# Patient Record
Sex: FEMALE | Race: WHITE | NOT HISPANIC OR LATINO | Employment: OTHER | URBAN - METROPOLITAN AREA
[De-identification: names, ages, dates, MRNs, and addresses within clinical notes are randomized per-mention and may not be internally consistent; named-entity substitution may affect disease eponyms.]

---

## 2017-08-18 ENCOUNTER — CONVERSION ENCOUNTER (OUTPATIENT)
Dept: MAMMOGRAPHY | Facility: HOSPITAL | Age: 57
End: 2017-08-18

## 2018-09-12 ENCOUNTER — CONVERSION ENCOUNTER (OUTPATIENT)
Dept: MAMMOGRAPHY | Facility: HOSPITAL | Age: 58
End: 2018-09-12

## 2019-02-14 ENCOUNTER — CONVERSION ENCOUNTER (OUTPATIENT)
Dept: INTERNAL MEDICINE | Facility: CLINIC | Age: 59
End: 2019-02-14

## 2019-02-14 ENCOUNTER — OFFICE VISIT CONVERTED (OUTPATIENT)
Dept: INTERNAL MEDICINE | Facility: CLINIC | Age: 59
End: 2019-02-14
Attending: NURSE PRACTITIONER

## 2019-03-28 ENCOUNTER — OFFICE VISIT CONVERTED (OUTPATIENT)
Dept: INTERNAL MEDICINE | Facility: CLINIC | Age: 59
End: 2019-03-28
Attending: NURSE PRACTITIONER

## 2019-04-08 ENCOUNTER — HOSPITAL ENCOUNTER (OUTPATIENT)
Dept: URGENT CARE | Facility: CLINIC | Age: 59
Discharge: HOME | End: 2019-04-08
Attending: NURSE PRACTITIONER

## 2019-04-10 LAB — BACTERIA UR CULT: NORMAL

## 2019-04-11 LAB — BACTERIA SPEC AEROBE CULT: NORMAL

## 2019-06-07 ENCOUNTER — HOSPITAL ENCOUNTER (OUTPATIENT)
Dept: OTHER | Facility: HOSPITAL | Age: 59
Discharge: HOME | End: 2019-06-07
Attending: NURSE PRACTITIONER

## 2019-06-07 ENCOUNTER — OFFICE VISIT CONVERTED (OUTPATIENT)
Dept: INTERNAL MEDICINE | Facility: CLINIC | Age: 59
End: 2019-06-07
Attending: NURSE PRACTITIONER

## 2019-06-20 ENCOUNTER — OFFICE VISIT CONVERTED (OUTPATIENT)
Dept: INTERNAL MEDICINE | Facility: CLINIC | Age: 59
End: 2019-06-20
Attending: NURSE PRACTITIONER

## 2019-06-20 ENCOUNTER — HOSPITAL ENCOUNTER (OUTPATIENT)
Dept: OTHER | Facility: HOSPITAL | Age: 59
Discharge: HOME | End: 2019-06-20
Attending: NURSE PRACTITIONER

## 2019-06-22 LAB — BACTERIA SPEC AEROBE CULT: NORMAL

## 2019-06-28 ENCOUNTER — OFFICE VISIT CONVERTED (OUTPATIENT)
Dept: INTERNAL MEDICINE | Facility: CLINIC | Age: 59
End: 2019-06-28
Attending: NURSE PRACTITIONER

## 2019-06-28 ENCOUNTER — CONVERSION ENCOUNTER (OUTPATIENT)
Dept: INTERNAL MEDICINE | Facility: CLINIC | Age: 59
End: 2019-06-28

## 2019-06-28 ENCOUNTER — HOSPITAL ENCOUNTER (OUTPATIENT)
Dept: OTHER | Facility: HOSPITAL | Age: 59
Discharge: HOME | End: 2019-06-28
Attending: NURSE PRACTITIONER

## 2019-06-28 LAB
ALBUMIN SERPL-MCNC: 4 G/DL (ref 3.5–5)
ALBUMIN/GLOB SERPL: 1.2 {RATIO} (ref 1.4–2.6)
ALP SERPL-CCNC: 80 U/L (ref 53–141)
ALT SERPL-CCNC: 14 U/L (ref 10–40)
ANION GAP SERPL CALC-SCNC: 15 MMOL/L (ref 8–19)
AST SERPL-CCNC: 13 U/L (ref 15–50)
BASOPHILS # BLD AUTO: 0.04 10*3/UL (ref 0–0.2)
BASOPHILS NFR BLD AUTO: 0.7 % (ref 0–3)
BILIRUB SERPL-MCNC: 0.29 MG/DL (ref 0.2–1.3)
BUN SERPL-MCNC: 12 MG/DL (ref 5–25)
BUN/CREAT SERPL: 13 {RATIO} (ref 6–20)
CALCIUM SERPL-MCNC: 9.1 MG/DL (ref 8.7–10.4)
CHLORIDE SERPL-SCNC: 107 MMOL/L (ref 99–111)
CHOLEST SERPL-MCNC: 194 MG/DL (ref 107–200)
CHOLEST/HDLC SERPL: 3.7 {RATIO} (ref 3–6)
CONV ABS IMM GRAN: 0.01 10*3/UL (ref 0–0.2)
CONV CO2: 23 MMOL/L (ref 22–32)
CONV IMMATURE GRAN: 0.2 % (ref 0–1.8)
CONV TOTAL PROTEIN: 7.3 G/DL (ref 6.3–8.2)
CREAT UR-MCNC: 0.92 MG/DL (ref 0.5–0.9)
DEPRECATED RDW RBC AUTO: 49.3 FL (ref 36.4–46.3)
EOSINOPHIL # BLD AUTO: 0.18 10*3/UL (ref 0–0.7)
EOSINOPHIL # BLD AUTO: 3.2 % (ref 0–7)
ERYTHROCYTE [DISTWIDTH] IN BLOOD BY AUTOMATED COUNT: 14.7 % (ref 11.7–14.4)
EST. AVERAGE GLUCOSE BLD GHB EST-MCNC: 105 MG/DL
GFR SERPLBLD BASED ON 1.73 SQ M-ARVRAT: >60 ML/MIN/{1.73_M2}
GLOBULIN UR ELPH-MCNC: 3.3 G/DL (ref 2–3.5)
GLUCOSE SERPL-MCNC: 87 MG/DL (ref 65–99)
HBA1C MFR BLD: 13.4 G/DL (ref 12–16)
HBA1C MFR BLD: 5.3 % (ref 3.5–5.7)
HCT VFR BLD AUTO: 42.4 % (ref 37–47)
HDLC SERPL-MCNC: 52 MG/DL (ref 40–60)
LDLC SERPL CALC-MCNC: 124 MG/DL (ref 70–100)
LYMPHOCYTES # BLD AUTO: 2.1 10*3/UL (ref 1–5)
MCH RBC QN AUTO: 28.8 PG (ref 27–31)
MCHC RBC AUTO-ENTMCNC: 31.6 G/DL (ref 33–37)
MCV RBC AUTO: 91 FL (ref 81–99)
MONOCYTES # BLD AUTO: 0.43 10*3/UL (ref 0.2–1.2)
MONOCYTES NFR BLD AUTO: 7.7 % (ref 3–10)
NEUTROPHILS # BLD AUTO: 2.83 10*3/UL (ref 2–8)
NEUTROPHILS NFR BLD AUTO: 50.6 % (ref 30–85)
NRBC CBCN: 0 % (ref 0–0.7)
OSMOLALITY SERPL CALC.SUM OF ELEC: 291 MOSM/KG (ref 273–304)
PLATELET # BLD AUTO: 266 10*3/UL (ref 130–400)
PMV BLD AUTO: 10.3 FL (ref 9.4–12.3)
POTASSIUM SERPL-SCNC: 4.3 MMOL/L (ref 3.5–5.3)
RBC # BLD AUTO: 4.66 10*6/UL (ref 4.2–5.4)
SODIUM SERPL-SCNC: 141 MMOL/L (ref 135–147)
TRIGL SERPL-MCNC: 90 MG/DL (ref 40–150)
VARIANT LYMPHS NFR BLD MANUAL: 37.6 % (ref 20–45)
VLDLC SERPL-MCNC: 18 MG/DL (ref 5–37)
WBC # BLD AUTO: 5.59 10*3/UL (ref 4.8–10.8)

## 2019-08-27 ENCOUNTER — OFFICE VISIT CONVERTED (OUTPATIENT)
Dept: INTERNAL MEDICINE | Facility: CLINIC | Age: 59
End: 2019-08-27
Attending: NURSE PRACTITIONER

## 2019-08-27 ENCOUNTER — CONVERSION ENCOUNTER (OUTPATIENT)
Dept: INTERNAL MEDICINE | Facility: CLINIC | Age: 59
End: 2019-08-27

## 2019-09-13 ENCOUNTER — HOSPITAL ENCOUNTER (OUTPATIENT)
Dept: MAMMOGRAPHY | Facility: HOSPITAL | Age: 59
Discharge: HOME | End: 2019-09-13
Attending: NURSE PRACTITIONER

## 2019-12-02 ENCOUNTER — OFFICE VISIT CONVERTED (OUTPATIENT)
Dept: INTERNAL MEDICINE | Facility: CLINIC | Age: 59
End: 2019-12-02
Attending: NURSE PRACTITIONER

## 2019-12-04 ENCOUNTER — HOSPITAL ENCOUNTER (OUTPATIENT)
Dept: MRI IMAGING | Facility: HOSPITAL | Age: 59
Discharge: HOME | End: 2019-12-04
Attending: NURSE PRACTITIONER

## 2020-04-06 ENCOUNTER — OFFICE VISIT CONVERTED (OUTPATIENT)
Dept: ORTHOPEDIC SURGERY | Facility: CLINIC | Age: 60
End: 2020-04-06
Attending: ORTHOPAEDIC SURGERY

## 2020-04-06 ENCOUNTER — CONVERSION ENCOUNTER (OUTPATIENT)
Dept: ORTHOPEDIC SURGERY | Facility: CLINIC | Age: 60
End: 2020-04-06

## 2020-05-18 ENCOUNTER — CONVERSION ENCOUNTER (OUTPATIENT)
Dept: ORTHOPEDIC SURGERY | Facility: CLINIC | Age: 60
End: 2020-05-18

## 2020-05-18 ENCOUNTER — TELEPHONE CONVERTED (OUTPATIENT)
Dept: ORTHOPEDIC SURGERY | Facility: CLINIC | Age: 60
End: 2020-05-18
Attending: PHYSICIAN ASSISTANT

## 2020-05-20 ENCOUNTER — TELEPHONE CONVERTED (OUTPATIENT)
Dept: INTERNAL MEDICINE | Facility: CLINIC | Age: 60
End: 2020-05-20
Attending: INTERNAL MEDICINE

## 2020-06-19 ENCOUNTER — OFFICE VISIT CONVERTED (OUTPATIENT)
Dept: ORTHOPEDIC SURGERY | Facility: CLINIC | Age: 60
End: 2020-06-19
Attending: PHYSICIAN ASSISTANT

## 2020-06-24 ENCOUNTER — OFFICE VISIT CONVERTED (OUTPATIENT)
Dept: INTERNAL MEDICINE | Facility: CLINIC | Age: 60
End: 2020-06-24
Attending: PHYSICIAN ASSISTANT

## 2020-06-24 ENCOUNTER — HOSPITAL ENCOUNTER (OUTPATIENT)
Dept: OTHER | Facility: HOSPITAL | Age: 60
Discharge: HOME | End: 2020-06-24
Attending: PHYSICIAN ASSISTANT

## 2020-06-24 LAB
ALBUMIN SERPL-MCNC: 4.2 G/DL (ref 3.5–5)
ALBUMIN/GLOB SERPL: 1.6 {RATIO} (ref 1.4–2.6)
ALP SERPL-CCNC: 72 U/L (ref 53–141)
ALT SERPL-CCNC: 18 U/L (ref 10–40)
ANION GAP SERPL CALC-SCNC: 15 MMOL/L (ref 8–19)
AST SERPL-CCNC: 17 U/L (ref 15–50)
BASOPHILS # BLD AUTO: 0.04 10*3/UL (ref 0–0.2)
BASOPHILS NFR BLD AUTO: 0.6 % (ref 0–3)
BILIRUB SERPL-MCNC: 0.32 MG/DL (ref 0.2–1.3)
BUN SERPL-MCNC: 18 MG/DL (ref 5–25)
BUN/CREAT SERPL: 21 {RATIO} (ref 6–20)
CALCIUM SERPL-MCNC: 9.3 MG/DL (ref 8.7–10.4)
CHLORIDE SERPL-SCNC: 110 MMOL/L (ref 99–111)
CONV ABS IMM GRAN: 0.01 10*3/UL (ref 0–0.2)
CONV CO2: 21 MMOL/L (ref 22–32)
CONV IMMATURE GRAN: 0.2 % (ref 0–1.8)
CONV TOTAL PROTEIN: 6.9 G/DL (ref 6.3–8.2)
CREAT UR-MCNC: 0.87 MG/DL (ref 0.5–0.9)
DEPRECATED RDW RBC AUTO: 54.1 FL (ref 36.4–46.3)
EOSINOPHIL # BLD AUTO: 0.15 10*3/UL (ref 0–0.7)
EOSINOPHIL # BLD AUTO: 2.3 % (ref 0–7)
ERYTHROCYTE [DISTWIDTH] IN BLOOD BY AUTOMATED COUNT: 15.9 % (ref 11.7–14.4)
GFR SERPLBLD BASED ON 1.73 SQ M-ARVRAT: >60 ML/MIN/{1.73_M2}
GLOBULIN UR ELPH-MCNC: 2.7 G/DL (ref 2–3.5)
GLUCOSE SERPL-MCNC: 88 MG/DL (ref 65–99)
HCT VFR BLD AUTO: 43.2 % (ref 37–47)
HGB BLD-MCNC: 13.4 G/DL (ref 12–16)
LYMPHOCYTES # BLD AUTO: 2.47 10*3/UL (ref 1–5)
LYMPHOCYTES NFR BLD AUTO: 37.9 % (ref 20–45)
MAGNESIUM SERPL-MCNC: 1.94 MG/DL (ref 1.6–2.3)
MCH RBC QN AUTO: 28.9 PG (ref 27–31)
MCHC RBC AUTO-ENTMCNC: 31 G/DL (ref 33–37)
MCV RBC AUTO: 93.1 FL (ref 81–99)
MONOCYTES # BLD AUTO: 0.52 10*3/UL (ref 0.2–1.2)
MONOCYTES NFR BLD AUTO: 8 % (ref 3–10)
NEUTROPHILS # BLD AUTO: 3.32 10*3/UL (ref 2–8)
NEUTROPHILS NFR BLD AUTO: 51 % (ref 30–85)
NRBC CBCN: 0 % (ref 0–0.7)
OSMOLALITY SERPL CALC.SUM OF ELEC: 295 MOSM/KG (ref 273–304)
PLATELET # BLD AUTO: 239 10*3/UL (ref 130–400)
PMV BLD AUTO: 11.4 FL (ref 9.4–12.3)
POTASSIUM SERPL-SCNC: 4.3 MMOL/L (ref 3.5–5.3)
RBC # BLD AUTO: 4.64 10*6/UL (ref 4.2–5.4)
SODIUM SERPL-SCNC: 142 MMOL/L (ref 135–147)
TSH SERPL-ACNC: 1.24 M[IU]/L (ref 0.27–4.2)
WBC # BLD AUTO: 6.51 10*3/UL (ref 4.8–10.8)

## 2020-07-27 ENCOUNTER — OFFICE VISIT CONVERTED (OUTPATIENT)
Dept: ORTHOPEDIC SURGERY | Facility: CLINIC | Age: 60
End: 2020-07-27
Attending: PHYSICIAN ASSISTANT

## 2020-08-01 ENCOUNTER — HOSPITAL ENCOUNTER (OUTPATIENT)
Dept: PREADMISSION TESTING | Facility: HOSPITAL | Age: 60
Discharge: HOME | End: 2020-08-01
Attending: ORTHOPAEDIC SURGERY

## 2020-08-01 LAB — SARS-COV-2 RNA SPEC QL NAA+PROBE: NOT DETECTED

## 2020-08-06 ENCOUNTER — HOSPITAL ENCOUNTER (OUTPATIENT)
Dept: PERIOP | Facility: HOSPITAL | Age: 60
Setting detail: HOSPITAL OUTPATIENT SURGERY
Discharge: HOME | End: 2020-08-06
Attending: ORTHOPAEDIC SURGERY

## 2020-08-06 LAB
ANION GAP SERPL CALC-SCNC: 15 MMOL/L (ref 8–19)
BUN SERPL-MCNC: 19 MG/DL (ref 5–25)
BUN/CREAT SERPL: 17 {RATIO} (ref 6–20)
CALCIUM SERPL-MCNC: 9.7 MG/DL (ref 8.7–10.4)
CHLORIDE SERPL-SCNC: 106 MMOL/L (ref 99–111)
CONV CO2: 24 MMOL/L (ref 22–32)
CREAT UR-MCNC: 1.11 MG/DL (ref 0.5–0.9)
GFR SERPLBLD BASED ON 1.73 SQ M-ARVRAT: 54 ML/MIN/{1.73_M2}
GLUCOSE BLD-MCNC: 115 MG/DL (ref 65–99)
GLUCOSE SERPL-MCNC: 93 MG/DL (ref 65–99)
OSMOLALITY SERPL CALC.SUM OF ELEC: 294 MOSM/KG (ref 273–304)
POTASSIUM SERPL-SCNC: 4.3 MMOL/L (ref 3.5–5.3)
SODIUM SERPL-SCNC: 141 MMOL/L (ref 135–147)

## 2020-08-19 ENCOUNTER — OFFICE VISIT CONVERTED (OUTPATIENT)
Dept: ORTHOPEDIC SURGERY | Facility: CLINIC | Age: 60
End: 2020-08-19
Attending: PHYSICIAN ASSISTANT

## 2020-09-16 ENCOUNTER — OFFICE VISIT CONVERTED (OUTPATIENT)
Dept: ORTHOPEDIC SURGERY | Facility: CLINIC | Age: 60
End: 2020-09-16
Attending: PHYSICIAN ASSISTANT

## 2020-11-06 ENCOUNTER — HOSPITAL ENCOUNTER (OUTPATIENT)
Dept: URGENT CARE | Facility: CLINIC | Age: 60
Discharge: HOME | End: 2020-11-06
Attending: PHYSICIAN ASSISTANT

## 2020-11-10 ENCOUNTER — TELEPHONE CONVERTED (OUTPATIENT)
Dept: INTERNAL MEDICINE | Facility: CLINIC | Age: 60
End: 2020-11-10
Attending: NURSE PRACTITIONER

## 2021-04-13 ENCOUNTER — HOSPITAL ENCOUNTER (OUTPATIENT)
Dept: OTHER | Facility: HOSPITAL | Age: 61
Discharge: HOME | End: 2021-04-13
Attending: PHYSICIAN ASSISTANT

## 2021-04-13 ENCOUNTER — CONVERSION ENCOUNTER (OUTPATIENT)
Dept: INTERNAL MEDICINE | Facility: CLINIC | Age: 61
End: 2021-04-13

## 2021-04-13 ENCOUNTER — OFFICE VISIT CONVERTED (OUTPATIENT)
Dept: INTERNAL MEDICINE | Facility: CLINIC | Age: 61
End: 2021-04-13
Attending: PHYSICIAN ASSISTANT

## 2021-04-13 LAB
BASOPHILS # BLD AUTO: 0.03 10*3/UL (ref 0–0.2)
BASOPHILS NFR BLD AUTO: 0.4 % (ref 0–3)
CONV ABS IMM GRAN: 0.01 10*3/UL (ref 0–0.2)
CONV IMMATURE GRAN: 0.1 % (ref 0–1.8)
DEPRECATED RDW RBC AUTO: 51.2 FL (ref 36.4–46.3)
EOSINOPHIL # BLD AUTO: 0.19 10*3/UL (ref 0–0.7)
EOSINOPHIL # BLD AUTO: 2.7 % (ref 0–7)
ERYTHROCYTE [DISTWIDTH] IN BLOOD BY AUTOMATED COUNT: 15.4 % (ref 11.7–14.4)
HCT VFR BLD AUTO: 44.6 % (ref 37–47)
HGB BLD-MCNC: 13.9 G/DL (ref 12–16)
LYMPHOCYTES # BLD AUTO: 2.78 10*3/UL (ref 1–5)
LYMPHOCYTES NFR BLD AUTO: 40.1 % (ref 20–45)
MCH RBC QN AUTO: 28.4 PG (ref 27–31)
MCHC RBC AUTO-ENTMCNC: 31.2 G/DL (ref 33–37)
MCV RBC AUTO: 91 FL (ref 81–99)
MONOCYTES # BLD AUTO: 0.57 10*3/UL (ref 0.2–1.2)
MONOCYTES NFR BLD AUTO: 8.2 % (ref 3–10)
NEUTROPHILS # BLD AUTO: 3.35 10*3/UL (ref 2–8)
NEUTROPHILS NFR BLD AUTO: 48.5 % (ref 30–85)
NRBC CBCN: 0 % (ref 0–0.7)
PLATELET # BLD AUTO: 238 10*3/UL (ref 130–400)
PMV BLD AUTO: 10.9 FL (ref 9.4–12.3)
RBC # BLD AUTO: 4.9 10*6/UL (ref 4.2–5.4)
WBC # BLD AUTO: 6.93 10*3/UL (ref 4.8–10.8)

## 2021-04-14 LAB
ALBUMIN SERPL-MCNC: 4.2 G/DL (ref 3.5–5)
ALBUMIN/GLOB SERPL: 1.4 {RATIO} (ref 1.4–2.6)
ALP SERPL-CCNC: 78 U/L (ref 53–141)
ALT SERPL-CCNC: 14 U/L (ref 10–40)
ANION GAP SERPL CALC-SCNC: 15 MMOL/L (ref 8–19)
AST SERPL-CCNC: 18 U/L (ref 15–50)
BILIRUB SERPL-MCNC: 0.32 MG/DL (ref 0.2–1.3)
BUN SERPL-MCNC: 17 MG/DL (ref 5–25)
BUN/CREAT SERPL: 18 {RATIO} (ref 6–20)
CALCIUM SERPL-MCNC: 9.1 MG/DL (ref 8.7–10.4)
CHLORIDE SERPL-SCNC: 109 MMOL/L (ref 99–111)
CONV CO2: 20 MMOL/L (ref 22–32)
CONV TOTAL PROTEIN: 7.3 G/DL (ref 6.3–8.2)
CREAT UR-MCNC: 0.93 MG/DL (ref 0.5–0.9)
CRP SERPL-MCNC: 9.6 MG/L (ref 0–5)
ERYTHROCYTE [SEDIMENTATION RATE] IN BLOOD: 16 MM/H (ref 0–30)
GFR SERPLBLD BASED ON 1.73 SQ M-ARVRAT: >60 ML/MIN/{1.73_M2}
GLOBULIN UR ELPH-MCNC: 3.1 G/DL (ref 2–3.5)
GLUCOSE SERPL-MCNC: 73 MG/DL (ref 65–99)
OSMOLALITY SERPL CALC.SUM OF ELEC: 290 MOSM/KG (ref 273–304)
POTASSIUM SERPL-SCNC: 4 MMOL/L (ref 3.5–5.3)
SODIUM SERPL-SCNC: 140 MMOL/L (ref 135–147)

## 2021-04-16 LAB
C3 SERPL-MCNC: 119 MG/DL (ref 88–201)
C4 SERPL-MCNC: 18 MG/DL (ref 10–40)
CONV ANA IGG BY ELISA: NORMAL
RHEUMATOID FACT SERPL-ACNC: <10 IU/ML (ref 0–14)

## 2021-04-26 ENCOUNTER — OFFICE VISIT CONVERTED (OUTPATIENT)
Dept: INTERNAL MEDICINE | Facility: CLINIC | Age: 61
End: 2021-04-26
Attending: NURSE PRACTITIONER

## 2021-04-26 ENCOUNTER — HOSPITAL ENCOUNTER (OUTPATIENT)
Dept: OTHER | Facility: HOSPITAL | Age: 61
Discharge: HOME | End: 2021-04-26
Attending: NURSE PRACTITIONER

## 2021-04-29 ENCOUNTER — HOSPITAL ENCOUNTER (OUTPATIENT)
Dept: OTHER | Facility: HOSPITAL | Age: 61
Discharge: HOME | End: 2021-04-29
Attending: NURSE PRACTITIONER

## 2021-05-10 NOTE — H&P
History and Physical      Patient Name: Melvina Martinez   Patient ID: [id number]   Sex: Female   YOB: 1960    Primary Care Provider: Isabela GUERRERO   Referring Provider: Дмитрий Francois MD    Visit Date: April 6, 2020    Provider: Дмитрий Francois MD   Location: [facility]   Location Address: [address] [place] [address]  [id number]   Location Phone: [phone]          Chief Complaint  · Right shoulder pain      History Of Present Illness  Melvina Martinez is a 59 year old /White female who presents today to [facility].      She's here for evaluation of right shoulder pain, pointing to bicipital groove and posterior shoulder. Patient states pain started after repetitively carrying grandchild. Patient states minimal pain relief with massage therapy and home exercises. Denies receiving a course of physical therapy or right shoulder steroid injection. Patient had a right shoulder MRI on 12/4/2019.       Past Medical History  Anxiety disorder; Bipolar Disorder; Depression; Kidney stones; PTSD; Screening Mammogram         Past Surgical History  Ankle surgery; Appendectomy; Cesarian Section; EYE SURGERY; Knee surgery; Tummy tuck         Medication List  Abilify 10 mg oral tablet; amlodipine 2.5 mg oral tablet; Bromfed DM 2-30-10 mg/5 mL oral syrup; hydroxyzine HCl 25 mg oral tablet; Imitrex 5 mg/actuation nasal spray,non-aerosol; Jardiance 10 mg oral tablet; lamotrigine 100 mg oral tablet; Miralax 17 gram/dose oral powder; prazosin 1 mg oral capsule; topiramate 50 mg oral tablet         Allergy List  Flagyl; PENICILLINS         Family Medical History  Dementia Conditions; Breast Neoplasm; Diabetes         Social History  Tobacco (Never)         Immunizations  Name Date Admin   Tdap          Review of Systems  · Constitutional  o Denies  o : fever, chills, weight loss  · Cardiovascular  o Denies  o : chest pain, shortness of breath  · Gastrointestinal  o Admits  o :  "heartburn  o Denies  o : liver disease, nausea, blood in stools  · Genitourinary  o Denies  o : painful urination, blood in urine  · Integument  o Denies  o : rash, itching  · Neurologic  o Admits  o : headache  o Denies  o : weakness, loss of consciousness  · Musculoskeletal  o Denies  o : painful, swollen joints  · Psychiatric  o Admits  o : anxiety, depression  o Denies  o : drug/alcohol addiction      Vitals  Date Time BP Position Site L\R Cuff Size HR RR TEMP (F) WT  HT  BMI kg/m2 BSA m2 O2 Sat HC       04/06/2020 10:02 AM       16  208lbs 0oz 5'  5\" 34.61 2.08           Physical Examination  · Constitutional  o Appearance  o : well developed, well-nourished, no obvious deformities present  · Head and Face  o Head  o :   § Inspection  § : normocephalic  o Face  o :   § Inspection  § : no facial lesions  · Eyes  o Conjunctivae  o : conjunctivae normal  o Sclerae  o : sclerae white  · Ears, Nose, Mouth and Throat  o Ears  o :   § External Ears  § : appearance within normal limits  § Hearing  § : intact  o Nose  o :   § External Nose  § : appearance normal  · Neck  o Inspection/Palpation  o : normal appearance  o Range of Motion  o : full range of motion  · Respiratory  o Respiratory Effort  o : breathing unlabored  o Inspection of Chest  o : normal appearance  o Auscultation of Lungs  o : no audible wheezing or rales  · Cardiovascular  o Heart  o : regular rate  · Gastrointestinal  o Abdominal Examination  o : soft and non-tender  · Skin and Subcutaneous Tissue  o General Inspection  o : intact, no rashes  · Psychiatric  o General  o : Alert and oriented x3  o Judgement and Insight  o : judgment and insight intact  o Mood and Affect  o : mood normal, affect appropriate  · Right Shoulder  o Inspection  o : Appearance of right shoulder is normal. No skin discoloration, atrophy, or swelling. Full forward flexion. Tender bicipital groove. Positive Speed's Test. Weakness with empty can test. Good tone of deltoid, " biceps, triceps, wrist extensors, and wrist flexors. Neurovascularly intact. Sensation grossly intact.  · Injection Note/Aspiration Note  o Site  o : right shoulder  o Procedure  o : Procedure: After educating the patient, patient gave consent for procedure. After using Chloraprep, the joint space was injected. The patient tolerated the procedure well.  o Medication  o : 80 mg of DepoMedrol with 9cc of 1% Lidocaine  · In Office Procedures  o View  o : AP/LATERAL  o Site  o : right shoulder   o Indication  o : right shoulder pain  o Study  o : X-rays ordered, taken in the office, and reviewed today.  o Xray  o : Negative for fracture or dislocation; Mild to moderate AC joint osteoarthritis; Minimal glenohumeral joint osteoarthritis.   o Comparative Data  o : No comparative data found  · Imaging  o Imaging  o : Right shoulder MRI 12/4/2019 revealed 1) Rotator cuff tendinopathy without definite findings of high-grade tendon tear. 2) Mild degenerative changes at the glenohumeral joint and mild to moderate AC joint degeneration. 3) Biceps tendinopathy without definite tear.          Assessment  · Right shoulder pain, unspecified chronicity     719.41/M25.511  · Right Shoulder Biceps Tendinitis     726.90/M77.9      Plan  · Orders  o Depo-Medrol injection 80mg () - - 04/06/2020   Lot [id number] Exp 05 2021 [employer] Administered by CEM CORDERO MD  o Shoulder Intra-articular Injection without US Guidance [facility] ([id number]) - - 04/06/2020   Lot [id number] [id number] Exp 07 01 2021 [employer] Administered by CEM CORDERO MD  o Scapula (Right) [facility] Preferred View ([id number]) - 719.41/M25.511 - 04/06/2020  · Medications  o Medications have been Reconciled  o Transition of Care or Provider Policy  · Instructions  o Reviewed the patient's Past Medical, Social, and Family history as well as the ROS at today's visit, no changes.  o Call or return if worsening symptoms.  o Reviewed MRI.  o The above service was scribed by Brianna Santos on  my behalf and I attest to the accuracy of the note. charisma  o The plan is a right shoulder steroid injection and a course of physical therapy. If failing to improve, follow-up in 6 weeks.             Electronically Signed by: Yani Santos - , Other -Author on April 6, 2020 11:45:42 AM  Electronically Co-signed by: Дмитрий Francois MD -Reviewer on April 8, 2020 04:30:54 PM

## 2021-05-13 NOTE — PROGRESS NOTES
Progress Note      Patient Name: Melvina Martinez   Patient ID: [id number]   Sex: Female   YOB: 1960    Primary Care Provider: Isabela GUERRERO    Visit Date: June 24, 2020    Provider: Evelyn Escalante PA-C   Location: [facility]   Location Address: [address] [place] [address], [address]  [place], [place]  [id number]   Location Phone: [phone]          Chief Complaint  · Restless legs   · Legs spasms       History Of Present Illness  Melvina Martinez is a 59 year old /White female who presents for evaluation and treatment of:      muscle tightness/ RLS x 1 month.  Symptoms at nighttime.  No swelling of legs that she has noticed.  She has recently started working at [employer] and is on her feet more but symptoms started a few weeks before starting job.  No shortness of breath or orthopnea.    Requesting Hep B vaccine, [facility].  Last dose in January 2020.       Past Medical History  Disease Name Date Onset Notes   Anxiety disorder --  --    Arthritis --  --    Bipolar Disorder --  --    Depression --  --    Diabetes --  --    Hypertension --  --    Kidney stones --  --    PTSD --  --    Screening Mammogram 09/2019 normal, repeat in 1 year         Past Surgical History  Procedure Name Date Notes   Ankle surgery --  --    Appendectomy --  --    Cesarian Section --  --    Colonoscopy --  --    Eye Implant --  yes   EYE SURGERY --  --    Hysterectomy --  --    Knee surgery --  --    Tonsillectomy --  --    Tummy tuck --  --          Medication List  Name Date Started Instructions   Abilify 10 mg oral tablet  take 1 tablet (10 mg) by oral route once daily   amlodipine 2.5 mg oral tablet 03/18/2020 take 1 tablet (2.5 mg) by oral route once daily   diclofenac sodium 75 mg oral tablet,delayed release (DR/EC) 05/18/2020 take 1 tablet (75 mg) by oral route 2 times per day   hydroxyzine HCl 25 mg oral tablet  take 2 tablets (50 mg) by oral route twice daily   Imitrex  5 mg/actuation nasal spray,non-aerosol 06/02/2020 spray 1 spray (5 mg) by intranasal route once; if headache returns, dose may be repeated once after 2 hours, not to exceed 40 mg per day   Jardiance 10 mg oral tablet 05/27/2020 take 1 tablet (10 mg) by oral route once daily in the morning for 30 days   lamotrigine 100 mg oral tablet  take 1 tablet (100 mg) by oral route 2 times per day   pantoprazole 40 mg oral tablet,delayed release (DR/EC) 05/20/2020 take 1 tablet (40 mg) by oral route once daily   prazosin 1 mg oral capsule  take 1 capsule (1 mg) by oral route daily.   topiramate 50 mg oral tablet  take 1 tablet (50 mg) by oral route 2 times per day         Allergy List  Allergen Name Date Reaction Notes   Flagyl --  --  --    PENICILLINS --  --  --          Family Medical History  Disease Name Relative/Age Notes   Dementia Conditions Father/   --    Stroke Father/   Father   Heart Disease Father/   Father   Diabetes, unspecified type Father/   Father   Breast Neoplasm Mother/   --    Diabetes Father/   --    Family history of Arthritis Father/   Father         Social History  Finding Status Start/Stop Quantity Notes   Alcohol Use Current some day --/-- --  rarely drinks   lives with spouse --  --/-- --  --    . --  --/-- --  --    Recreational Drug Use Never --/-- --  no   Retired. --  --/-- --  --    Tobacco Never --/-- --  never smoker         Immunizations  NameDate Admin Mfg Trade Name Lot Number Route Inj VIS Given VIS Publication   Hepatitis B06/24/2020 SKB ENGERIX-B-ADULT [id number] IM RD 06/24/2020    Comments: pt tolerated well   Tdap04/09/2015 NE Not Entered  NE NE 02/19/2019 02/24/2015   Comments:          Review of Systems  · Constitutional  o Denies  o : fever, fatigue, weight loss, weight gain  · Cardiovascular  o Denies  o : lower extremity edema, claudication, chest pressure, palpitations  · Respiratory  o Denies  o : shortness of breath, wheezing, cough, hemoptysis, dyspnea on  "exertion  · Gastrointestinal  o Denies  o : nausea, vomiting, diarrhea, constipation, abdominal pain      Vitals  Date Time BP Position Site L\R Cuff Size HR RR TEMP (F) WT  HT  BMI kg/m2 BSA m2 O2 Sat HC       05/18/2020 10:18 AM         208lbs 0oz 5'  5\" 34.61 2.08     06/19/2020 01:57 PM      78 - R   212lbs 16oz 5'  5\" 35.44 2.1 97 %    06/24/2020 12:59 /84 Sitting    66 - R  98.2 214lbs 4oz 5'  5\" 35.65 2.11 98 %          Physical Examination  · Constitutional  o Appearance  o : no acute distress, well-nourished  · Head and Face  o Head  o :   § Inspection  § : atraumatic, normocephalic  · Ears, Nose, Mouth and Throat  o Ears  o :   § External Ears  § : normal  o Nose  o :   § Intranasal Exam  § : nares patent  o Oral Cavity  o :   § Oral Mucosa  § : moist mucous membranes  · Respiratory  o Respiratory Effort  o : breathing comfortably, symmetric chest rise  o Auscultation of Lungs  o : clear to asculatation bilaterally, no wheezes, rales, or rhonchii  · Cardiovascular  o Heart  o :   § Auscultation of Heart  § : regular rate and rhythm, no murmurs, rubs, or gallops  o Peripheral Vascular System  o :   § Extremities  § : 2+ pitting edema bilaterally  · Skin and Subcutaneous Tissue  o General Inspection  o : no rashes present, no lesions present, no areas of discoloration  · Neurologic  o Mental Status Examination  o :   § Orientation  § : grossly oriented to person, place and time  o Gait and Station  o :   § Gait Screening  § : normal gait              Assessment  · Restless leg syndrome     333.94/G25.81  Will check labs for any deficiencies, recommended compression socks and elevation. If symptoms persist or worsen or she develops other symptoms patient needs to return.  · Lower extremity edema     782.3/R60.0    Problems Reconciled  Plan  · Orders  o CBC with Auto Diff [facility] (58028) - 333.94/G25.81, 782.3/R60.0 - 06/24/2020  o CMP [facility] (82005) - 333.94/G25.81, 782.3/R60.0 - 06/24/2020  o TSH [facility] (98574) " - 333.94/G25.81, 782.3/R60.0 - 06/24/2020  o ACO-39: Current medications updated and reviewed () - - 06/24/2020  o Magnesium, serum (92104) - 333.94/G25.81, 782.3/R60.0 - 06/24/2020  o Engerix-B Adult Vaccine (20mcg/mL) (07277) - - 06/24/2020   Vaccine - Hepatitis B; Dose: 0.5; Site: Right Deltoid; Route: Intramuscular; Date: 06/24/2020 14:02:00; Exp: 05/28/2022; Lot: [id number]; Mfg: [employer]; TradeName: ENGERIX-B-ADULT; Administered By: Li Larry CMA; Comment: pt tolerated well  · Medications  o Medications have been Reconciled  o Transition of Care or Provider Policy  · Instructions  o Take all medications as prescribed/directed.  o Patient was educated/instructed on their diagnosis, treatment and medications prior to discharge from the clinic today.  o Patient instructed to seek medical attention urgently for new or worsening symptoms.  o Call the office with any concerns or questions.  · Disposition  o Call or Return if symptoms worsen or persist.  o follow up as needed  o Labs drawn in clinic            Electronically Signed by: Evelyn Escalante PA-C -Author on June 24, 2020 05:49:57 PM

## 2021-05-13 NOTE — PROGRESS NOTES
Progress Note      Patient Name: Melvina Martinez   Patient ID: [id number]   Sex: Female   YOB: 1960    Primary Care Provider: Isabela GUERRERO   Referring Provider: Дмитрий Francois MD    Visit Date: August 19, 2020    Provider: Siri Schmidt PA-C   Location: [facility]   Location Address: [address] [place] [address]  [id number]   Location Phone: [phone]          Chief Complaint  · Surgery follow up right shoulder arthroscopy      History Of Present Illness  Melvina Martinez is a 60 year old /White female who presents today to [facility].      She is s/p right arthroscopic SAD/DC and biceps tenotomy 8/7/20 by Dr. Francois. She is making progress in PT. She states moderate pain.       Past Medical History  Anxiety disorder; Arthritis; Bipolar Disorder; Depression; Diabetes; Hypertension; Kidney stones; PTSD; Screening Mammogram         Past Surgical History  Ankle surgery; Appendectomy; Cesarian Section; Colonoscopy; Eye Implant; EYE SURGERY; Hysterectomy; Knee surgery; Tonsillectomy; Tummy tuck         Medication List  Abilify 10 mg oral tablet; amlodipine 2.5 mg oral tablet; diclofenac sodium 75 mg oral tablet,delayed release (DR/EC); Imitrex 5 mg/actuation nasal spray,non-aerosol; Jardiance 10 mg oral tablet; lamotrigine 100 mg oral tablet; pantoprazole 40 mg oral tablet,delayed release (DR/EC); topiramate 50 mg oral tablet         Allergy List  Flagyl; PENICILLINS       Allergies Reconciled  Family Medical History  Dementia Conditions; Stroke; Heart Disease; Diabetes, unspecified type; Breast Neoplasm; Diabetes; Family history of Arthritis         Social History  Alcohol Use (Current some day); lives with spouse; .; Recreational Drug Use (Never); Retired.; Tobacco (Never)         Review of Systems  · Constitutional  o Denies  o : fever, chills, weight loss  · Cardiovascular  o Denies  o : chest pain, shortness of breath  · Gastrointestinal  o Denies  o : liver  "disease, heartburn, nausea, blood in stools  · Genitourinary  o Denies  o : painful urination, blood in urine  · Integument  o Denies  o : rash, itching  · Neurologic  o Denies  o : headache, weakness, loss of consciousness  · Musculoskeletal  o Admits  o : painful, swollen joints  · Psychiatric  o Denies  o : drug/alcohol addiction, anxiety, depression      Vitals  Date Time BP Position Site L\R Cuff Size HR RR TEMP (F) WT  HT  BMI kg/m2 BSA m2 O2 Sat        08/19/2020 08:21 AM      69 - R   212lbs 16oz 5'  5\" 35.44 2.1 98 %          Physical Examination  · Constitutional  o Appearance  o : well developed, well-nourished, no obvious deformities present  · Head and Face  o Head  o :   § Inspection  § : normocephalic  o Face  o :   § Inspection  § : no facial lesions  · Eyes  o Conjunctivae  o : conjunctivae normal  o Sclerae  o : sclerae white  · Ears, Nose, Mouth and Throat  o Ears  o :   § External Ears  § : appearance within normal limits  § Hearing  § : intact  o Nose  o :   § External Nose  § : appearance normal  · Neck  o Inspection/Palpation  o : normal appearance  o Range of Motion  o : full range of motion  · Respiratory  o Respiratory Effort  o : breathing unlabored  o Inspection of Chest  o : normal appearance  o Auscultation of Lungs  o : no audible wheezing or rales  · Cardiovascular  o Heart  o : regular rate  · Gastrointestinal  o Abdominal Examination  o : soft and non-tender  · Skin and Subcutaneous Tissue  o General Inspection  o : intact, no rashes  · Psychiatric  o General  o : Alert and oriented x3  o Judgement and Insight  o : judgment and insight intact  o Mood and Affect  o : mood normal, affect appropriate  · Right Shoulder  o Inspection  o : Well approximated incisions. Mild swelling. Forward flexion 110 degrees. Abduction 90 degrees. ER 30 degrees. IR to SI joint. Strength decreased. All compartments soft and well perfused.           Assessment  · Right Aftercare following surgery of the " muskuloskeletal system     V54.81      Plan  · Medications  o Medications have been Reconciled  o Transition of Care or Provider Policy  · Instructions  o Reviewed the patient's Past Medical, Social, and Family history as well as the ROS at today's visit, no changes.  o Call or return if worsening symptoms.  o Continue PT. May d/c sling. Sutures removed. Follow up 4 weeks. Norco prescribed.   o Electronically Identified Patient Education Materials Provided Electronically  · Referrals  o ID: [id number] Date: 04/01/2020 Type: Inbound  Specialty: Orthopedic Surgery            Electronically Signed by: LILA Garcia-C -Author on August 19, 2020 09:02:24 AM  Electronically Co-signed by: Дмитрий Francois MD -Reviewer on August 20, 2020 10:33:31 PM

## 2021-05-13 NOTE — PROGRESS NOTES
Progress Note      Patient Name: Melvina Martinez   Patient ID: [id number]   Sex: Female   YOB: 1960    Primary Care Provider: Isabela GUERRERO   Referring Provider: Дмитрий Francois MD    Visit Date: September 16, 2020    Provider: Siri Schmidt PA-C   Location: [facility]   Location Address: [address] [place] [address]  [id number]   Location Phone: [phone]          Chief Complaint  · Follow up right shoulder arthroscopy      History Of Present Illness  Melvina Martinez is a 60 year old /White female who presents today to [facility].      She is s/p right shoulder arthroscopic SAD/DC and biceps tenotomy 8/6/20 by Dr. Francois. She states pain and ROM are improving, and she is making progress in PT. She is happy with her outcome.       Past Medical History  Anxiety Disorder; Arthritis; Bipolar Disorder; Depression; Diabetes; Hypertension; Kidney stones; PTSD; Screening Mammogram         Past Surgical History  Ankle surgery; Appendectomy; Cesarian Section; Colonoscopy; Eye Implant; EYE SURGERY; Hysterectomy; Knee surgery; Tonsillectomy; Tummy tuck         Medication List  Abilify 10 mg oral tablet; amlodipine 2.5 mg oral tablet; diclofenac sodium 75 mg oral tablet,delayed release (DR/EC); Imitrex 5 mg/actuation nasal spray,non-aerosol; Jardiance 10 mg oral tablet; lamotrigine 100 mg oral tablet; Norco 7.5-325 mg oral tablet; pantoprazole 40 mg oral tablet,delayed release (DR/EC); topiramate 50 mg oral tablet         Allergy List  Flagyl; PENICILLINS       Allergies Reconciled  Family Medical History  Dementia Conditions; Stroke; Heart Disease; Diabetes, unspecified type; Breast Neoplasm; Diabetes; Family history of Arthritis         Social History  Alcohol Use (Current some day); lives with spouse; .; Recreational Drug Use (Never); Retired.; Tobacco (Never)         Review of Systems  · Constitutional  o Denies  o : fever, chills, weight  "loss  · Cardiovascular  o Denies  o : chest pain, shortness of breath  · Gastrointestinal  o Denies  o : liver disease, heartburn, nausea, blood in stools  · Genitourinary  o Denies  o : painful urination, blood in urine  · Integument  o Denies  o : rash, itching  · Neurologic  o Denies  o : headache, weakness, loss of consciousness  · Musculoskeletal  o Admits  o : painful, swollen joints  · Psychiatric  o Denies  o : drug/alcohol addiction, anxiety, depression      Vitals  Date Time BP Position Site L\R Cuff Size HR RR TEMP (F) WT  HT  BMI kg/m2 BSA m2 O2 Sat        09/16/2020 09:59 AM      64 - R   212lbs 16oz 5'  5\" 35.44 2.1 95 %          Physical Examination  · Constitutional  o Appearance  o : well developed, well-nourished, no obvious deformities present  · Head and Face  o Head  o :   § Inspection  § : normocephalic  o Face  o :   § Inspection  § : no facial lesions  · Eyes  o Conjunctivae  o : conjunctivae normal  o Sclerae  o : sclerae white  · Ears, Nose, Mouth and Throat  o Ears  o :   § External Ears  § : appearance within normal limits  § Hearing  § : intact  o Nose  o :   § External Nose  § : appearance normal  · Neck  o Inspection/Palpation  o : normal appearance  o Range of Motion  o : full range of motion  · Respiratory  o Respiratory Effort  o : breathing unlabored  o Inspection of Chest  o : normal appearance  o Auscultation of Lungs  o : no audible wheezing or rales  · Cardiovascular  o Heart  o : regular rate  · Gastrointestinal  o Abdominal Examination  o : soft and non-tender  · Skin and Subcutaneous Tissue  o General Inspection  o : intact, no rashes  · Psychiatric  o General  o : Alert and oriented x3  o Judgement and Insight  o : judgment and insight intact  o Mood and Affect  o : mood normal, affect appropriate  · Right Shoulder  o Inspection  o : Well healed incisions. Forward flexion 150 degrees. Abduction 120 degrees. ER 60 degrees. IR to L1. Strength 4+/ 5 with MMT. Neurovascularly " intact.           Assessment  · Right Aftercare following surgery of the muskuloskeletal system     V54.81      Plan  · Instructions  o Reviewed the patient's Past Medical, Social, and Family history as well as the ROS at today's visit, no changes.  o Call or return if worsening symptoms.  o Finish PT course. Increase activity as tolerated. Follow Up PRN.  · Referrals  o ID: [id number] Date: 04/01/2020 Type: Inbound  Specialty: Orthopedic Surgery            Electronically Signed by: LILA Garcia-PAIGE -Author on September 16, 2020 10:48:56 AM  Electronically Co-signed by: Дмитрий Francois MD -Reviewer on September 16, 2020 09:15:00 PM

## 2021-05-13 NOTE — PROGRESS NOTES
Quick Note      Patient Name: Melvina Martinez   Patient ID: [id number]   Sex: Female   YOB: 1960    Primary Care Provider: Isabela GUERRERO    Visit Date: May 20, 2020    Provider: Mihaela Traylor MD   Location: [facility]   Location Address: [address] [place] [address], [facility] [address]  [place], [place]  [id number]   Location Phone: [phone]          History Of Present Illness  TELEHEALTH TELEPHONE VISIT  Chief Complaint: acid reflux   Melvina Martinez is a 59 year old /White female who is presenting for evaluation via telehealth telephone visit. Verbal consent obtained before beginning visit.   Provider spent 9 minutes with the patient during telehealth visit.   The following staff were present during this visit: Pat Silverman MA; Mihaela Traylor MD   Past Medical History/Overview of Patient Symptoms     Complains of increased acid reflux symptoms  Has been taking Tums multiple times per day, elevated the head of her bed, limiting spicy or greasy foods, still having significant symptoms despite these efforts  Was previously on PPI but stopped due to concerns about side effects of long-term use    States that  is on pantoprazole which works well for him, she would like to try this           Assessment  · GERD (gastroesophageal reflux disease)     530.81/K21.9  will start Pantoprazole  discussed that dose could be reduced once symptoms are well controlled  advised to call clinic if symptoms not significantly improved with medication as further work up may be needed    Problems Reconciled  Plan  · Medications  o pantoprazole 40 mg oral tablet,delayed release (DR/EC)   SIG: take 1 tablet (40 mg) by oral route once daily   DISP: (90) tablets with 0 refills  Prescribed on 05/20/2020     · Instructions  o Plan Of Care:   o Chronic conditions reviewed and taken into consideration for today's treatment plan.  o Patient was educated/instructed on their diagnosis, treatment and  medications prior to discharge from the clinic today.  · Disposition  o Call or Return if symptoms worsen or persist.  o follow up as needed            Electronically Signed by: Mihaela Traylor MD -Author on May 20, 2020 01:19:41 PM

## 2021-05-13 NOTE — PROGRESS NOTES
Progress Note      Patient Name: Melvina Martinez   Patient ID: [id number]   Sex: Female   YOB: 1960    Primary Care Provider: Isabela GUERRERO   Referring Provider: Дмитрий Francois MD    Visit Date: July 27, 2020    Provider: Siri Schmidt PA-C   Location: [facility]   Location Address: [address] [place] [address]  [id number]   Location Phone: [phone]          Chief Complaint  · Follow up right shoulder pain      History Of Present Illness  Melvina Martinez is a 59 year old /White female who presents today to [facility].      She is following up for right shoulder pain, that has been ongoing for one year. She has failed to improved with conservative treatment with PT, injections and NSAIDs. MRI revealed  AC arthritis, RC fraying and biceps tendinitis.       Past Medical History  Anxiety disorder; Arthritis; Bipolar Disorder; Depression; Diabetes; Hypertension; Kidney stones; PTSD; Screening Mammogram         Past Surgical History  Ankle surgery; Appendectomy; Cesarian Section; Colonoscopy; Eye Implant; EYE SURGERY; Hysterectomy; Knee surgery; Tonsillectomy; Tummy tuck         Medication List  Abilify 10 mg oral tablet; amlodipine 2.5 mg oral tablet; diclofenac sodium 75 mg oral tablet,delayed release (DR/EC); Imitrex 5 mg/actuation nasal spray,non-aerosol; Jardiance 10 mg oral tablet; lamotrigine 100 mg oral tablet; pantoprazole 40 mg oral tablet,delayed release (DR/EC); topiramate 50 mg oral tablet         Allergy List  Flagyl; PENICILLINS         Family Medical History  Dementia Conditions; Stroke; Heart Disease; Diabetes, unspecified type; Breast Neoplasm; Diabetes; Family history of Arthritis         Social History  Alcohol Use (Current some day); lives with spouse; .; Recreational Drug Use (Never); Retired.; Tobacco (Never)         Review of Systems  · Constitutional  o Denies  o : fever, chills, weight loss  · Cardiovascular  o Denies  o : chest pain,  "shortness of breath  · Gastrointestinal  o Denies  o : liver disease, heartburn, nausea, blood in stools  · Genitourinary  o Denies  o : painful urination, blood in urine  · Integument  o Denies  o : rash, itching  · Neurologic  o Denies  o : headache, weakness, loss of consciousness  · Musculoskeletal  o Admits  o : painful, swollen joints  · Psychiatric  o Denies  o : drug/alcohol addiction, anxiety, depression      Vitals  Date Time BP Position Site L\R Cuff Size HR RR TEMP (F) WT  HT  BMI kg/m2 BSA m2 O2 Sat HC       07/27/2020 10:40 AM      69 - R   212lbs 16oz 5'  5\" 35.44 2.1 99 %          Physical Examination  · Constitutional  o Appearance  o : well developed, well-nourished, no obvious deformities present  · Head and Face  o Head  o :   § Inspection  § : normocephalic  o Face  o :   § Inspection  § : no facial lesions  · Eyes  o Conjunctivae  o : conjunctivae normal  o Sclerae  o : sclerae white  · Ears, Nose, Mouth and Throat  o Ears  o :   § External Ears  § : appearance within normal limits  § Hearing  § : intact  o Nose  o :   § External Nose  § : appearance normal  · Neck  o Inspection/Palpation  o : normal appearance  o Range of Motion  o : full range of motion  · Respiratory  o Respiratory Effort  o : breathing unlabored  o Inspection of Chest  o : normal appearance  o Auscultation of Lungs  o : no audible wheezing or rales  · Cardiovascular  o Heart  o : regular rate  · Gastrointestinal  o Abdominal Examination  o : soft and non-tender  · Skin and Subcutaneous Tissue  o General Inspection  o : intact, no rashes  · Psychiatric  o General  o : Alert and oriented x3  o Judgement and Insight  o : judgment and insight intact  o Mood and Affect  o : mood normal, affect appropriate  · Right Shoulder  o Inspection  o : Normal appearing. Tender AC joint. Tender subacromial space. Tender biceps tendon. Near full ROM. + Neer's. + Hawkin's. Neurovascularly intact.           Assessment  · AC (acromioclavicular) " arthritis     716.91/M19.019  · Pain: Shoulder     719.41/M25.519  · Shoulder impingement syndrome, right     726.2/M75.41  · Biceps tendinitis     726.12/M75.20      Plan  · Medications  o Medications have been Reconciled  o Transition of Care or Provider Policy  · Instructions  o Dr. Francois saw and examined the patient and agrees with plan.   o Reviewed the patient's Past Medical, Social, and Family history as well as the ROS at today's visit, no changes.  o Call or return if worsening symptoms.  o Discussed surgery.  o Risks/benefits discussed with patient including, but not limited to: infection, bleeding, neurovascular damage, malunion, nonunion, aesthetic deformity, need for further surgery, and death.  o She wishes to proceed with right shoulder arthroscopy.   o Electronically Identified Patient Education Materials Provided Electronically            Electronically Signed by: LILA Garcia-PAIGE -Author on July 27, 2020 11:33:43 AM  Electronically Co-signed by: Дмитрий Francois MD -Reviewer on July 29, 2020 08:50:32 AM

## 2021-05-13 NOTE — PROGRESS NOTES
"   Quick Note      Patient Name: Melvina Martinez   Patient ID: [id number]   Sex: Female   YOB: 1960    Primary Care Provider: Isabela GUERRERO    Visit Date: May 18, 2020    Provider: Siri Schmidt PA-C   Location: [facility]   Location Address: [address] [place] [address]  [id number]   Location Phone: [phone]          History Of Present Illness  TELEHEALTH TELEPHONE VISIT  Chief Complaint: Right shoulder pain   Melvina Martinez is a 59 year old /White female who is presenting for evaluation via telehealth telephone visit. Verbal consent obtained before beginning visit.   Provider spent 5 minutes with the patient during telehealth visit.   The following staff were present during this visit: Siri Schmidt PA-C   Past Medical History/Overview of Patient Symptoms     She is following up for right shoulder biceps tendinitis. She states her pain improved with injection on 4/6/20 by Dr. Francois, but pain returned after 1.5 weeks. She was not able to start PT.     Assessment: right shoulder pain, right biceps tendinitis  Plan: Course of PT, Voltaren prescribed. Follow up 4 weeks.       Vitals  Date Time BP Position Site L\R Cuff Size HR RR TEMP (F) WT  HT  BMI kg/m2 BSA m2 O2 Sat HC       05/18/2020 10:18 AM         208lbs 0oz 5'  5\" 34.61 2.08               Plan  · Medications  o Medications have been Reconciled  o Transition of Care or Provider Policy  · Instructions  o Plan Of Care:             Electronically Signed by: Siri Schmidt PA-C -Author on May 18, 2020 10:30:41 AM  Electronically Co-signed by: Дмитрий Francois MD -Reviewer on May 18, 2020 04:31:53 PM  "

## 2021-05-13 NOTE — PROGRESS NOTES
Progress Note      Patient Name: Melvina Martinez   Patient ID: [id number]   Sex: Female   YOB: 1960    Primary Care Provider: Isabela GUERRERO    Visit Date: November 10, 2020    Provider: YOLANDA Oliva   Location: [facility]   Location Address: [address] [place] [address] [address]  [place], [place]  [id number]   Location Phone: [phone]          History Of Present Illness  TELEHEALTH TELEPHONE VISIT  Melvina Martinez is a 60 year old /White female who is presenting for evaluation via telehealth telephone visit. Verbal consent obtained before beginning visit.   Provider spent 11 minutes with the patient during the telehealth visit.   The following staff were present during this visit: Isabela Nelson NP   Past Medical History/ Overview of Patient Symptoms  Melvina Martinez is a 60 year old /White female who presents for evaluation and treatment of:      x-ray at [facility] with tendonitis.   pain for about a week and a half.   NKI, left handed, does repetitive motions throughout the day  taking motrin, patch also not helping pain  she reports no improvement with ace wrap         Past Medical History  Disease Name Date Onset Notes   Anxiety disorder --  --    Arthritis --  --    Bipolar Disorder --  --    Depression --  --    Diabetes --  --    Hypertension --  --    Kidney stones --  --    PTSD --  --    Screening Mammogram 09/2019 normal, repeat in 1 year         Past Surgical History  Procedure Name Date Notes   Ankle surgery --  --    Appendectomy --  --    Cesarian Section --  --    Colonoscopy --  --    Eye Implant --  yes   EYE SURGERY --  --    Hysterectomy --  --    Knee surgery --  --    Tonsillectomy --  --    Tummy tuck --  --          Medication List  Name Date Started Instructions   amlodipine 2.5 mg oral tablet 10/05/2020 take 1 tablet (2.5 mg) by oral route once daily   Imitrex 5 mg/actuation nasal spray,non-aerosol 06/02/2020 spray 1 spray (5 mg) by  intranasal route once; if headache returns, dose may be repeated once after 2 hours, not to exceed 40 mg per day   Jardiance 10 mg oral tablet 10/05/2020 take 1 tablet (10 mg) by oral route once daily in the morning for 30 days   lamotrigine 100 mg oral tablet  take 1 tablet (100 mg) by oral route 2 times per day   pantoprazole 40 mg oral tablet,delayed release (DR/EC) 08/19/2020 take 1 tablet (40 mg) by oral route once daily   prazosin 2 mg oral capsule  take 1 capsule (2 mg) by oral route 2 times per day   quetiapine 100 mg oral tablet  take 1 tablet (100 mg) by oral route 3 times per day   topiramate 50 mg oral tablet  take 1 tablet (50 mg) by oral route 2 times per day         Allergy List  Allergen Name Date Reaction Notes   Flagyl --  --  --    PENICILLINS --  --  --        Allergies Reconciled  Family Medical History  Disease Name Relative/Age Notes   Dementia Conditions Father/   --    Stroke Father/   Father   Heart Disease Father/   Father   Diabetes, unspecified type Father/   Father   Breast Neoplasm Mother/   --    Diabetes Father/   --    Family history of Arthritis Father/   Father         Social History  Finding Status Start/Stop Quantity Notes   Alcohol Use Current some day --/-- --  rarely drinks   lives with spouse --  --/-- --  --    . --  --/-- --  --    Recreational Drug Use Never --/-- --  no   Retired. --  --/-- --  --    Tobacco Never --/-- --  never smoker         Immunizations  NameDate Admin Mfg Trade Name Lot Number Route Inj VIS Given VIS Publication   Hepatitis B06/24/2020 SKB ENGERIX-B-ADULT [id number] IM RD 06/24/2020    Comments: pt tolerated well   Tdap04/09/2015 NE Not Entered  NE NE 02/19/2019 02/24/2015   Comments:          Review of Systems  · Constitutional  o Denies  o : fever, fatigue, weight loss, weight gain  · Cardiovascular  o Denies  o : lower extremity edema, claudication, chest pressure, palpitations  · Respiratory  o Denies  o : shortness of breath, wheezing,  cough, hemoptysis, dyspnea on exertion  · Gastrointestinal  o Denies  o : nausea, vomiting, diarrhea, constipation, abdominal pain      Physical Examination     Gen: No acute distress  Resp: No cough, no audible wheezing  Neuro: Grossly oriented to person, place, and time  Psych: Normal mood and affect  MSK: left elbow-pain reproduced with verbalized pronation and supination of palm with arm extended           Assessment  · Left elbow pain     719.42/M25.522  · Lateral epicondylitis, left elbow     726.32/M77.12  She has a left wrist splint at home already. She will start wearing this during the day and at night. She will do range of motion 4-6 times a day without the splint. She will continue with Motrin and ice as needed. If this is not improving within 2 to 4 weeks she will call for further eval and Ortho referral.    Problems Reconciled  Plan  · Orders  o Physician Telephone Evaluation, 11-20 minutes (23359) - - 11/10/2020  o ACO-39: Current medications updated and reviewed (1159F, ) - - 11/10/2020  · Medications  o Medications have been Reconciled  o Transition of Care or Provider Policy  · Instructions  o Plan Of Care:   o Call the office with any concerns or questions.  o Patient was educated/instructed on their diagnosis, treatment and medications prior to discharge from the clinic today.  · Disposition  o Call or Return if symptoms worsen or persist.            Electronically Signed by: Isabela Nelson APRN -Author on November 10, 2020 11:45:26 AM

## 2021-05-13 NOTE — PROGRESS NOTES
Progress Note      Patient Name: Melvina Martinez   Patient ID: [id number]   Sex: Female   YOB: 1960    Primary Care Provider: Isabela GUERRERO    Visit Date: June 19, 2020    Provider: Siri Schmidt PA-C   Location: [facility]   Location Address: [address] [place] [address]  [id number]   Location Phone: [phone]          Chief Complaint  · Follow up right shoulder pain      History Of Present Illness  Melvina Martinez is a 59 year old /White female who presents today to [facility].      She is following up for right shoulder pain that is anterior and posterior associated with crepitus. MRI showed no evidence of high grade RC tear, but did show tendinitis and AC arthritis as well as biceps tendinitis.       Past Medical History  Anxiety disorder; Arthritis; Bipolar Disorder; Depression; Diabetes; Hypertension; Kidney stones; PTSD; Screening Mammogram         Past Surgical History  Ankle surgery; Appendectomy; Cesarian Section; Colonoscopy; Eye Implant; EYE SURGERY; Hysterectomy; Knee surgery; Tonsillectomy; Tummy tuck         Medication List  Abilify 10 mg oral tablet; amlodipine 2.5 mg oral tablet; diclofenac sodium 75 mg oral tablet,delayed release (DR/EC); hydroxyzine HCl 25 mg oral tablet; Imitrex 5 mg/actuation nasal spray,non-aerosol; Jardiance 10 mg oral tablet; lamotrigine 100 mg oral tablet; pantoprazole 40 mg oral tablet,delayed release (DR/EC); prazosin 1 mg oral capsule; topiramate 50 mg oral tablet         Allergy List  Flagyl; PENICILLINS       Allergies Reconciled  Family Medical History  Dementia Conditions; Stroke; Heart Disease; Diabetes, unspecified type; Breast Neoplasm; Diabetes; Family history of Arthritis         Social History  Alcohol Use (Current some day); lives with spouse; .; Recreational Drug Use (Never); Retired.; Tobacco (Never)         Review of Systems  · Constitutional  o Denies  o : fever, chills, weight  "loss  · Cardiovascular  o Denies  o : chest pain, shortness of breath  · Gastrointestinal  o Denies  o : liver disease, heartburn, nausea, blood in stools  · Genitourinary  o Denies  o : painful urination, blood in urine  · Integument  o Denies  o : rash, itching  · Neurologic  o Denies  o : headache, weakness, loss of consciousness  · Musculoskeletal  o Admits  o : painful, swollen joints  · Psychiatric  o Denies  o : drug/alcohol addiction, anxiety, depression      Vitals  Date Time BP Position Site L\R Cuff Size HR RR TEMP (F) WT  HT  BMI kg/m2 BSA m2 O2 Sat        06/19/2020 01:57 PM      78 - R   212lbs 16oz 5'  5\" 35.44 2.1 97 %          Physical Examination  · Constitutional  o Appearance  o : well developed, well-nourished, no obvious deformities present  · Head and Face  o Head  o :   § Inspection  § : normocephalic  o Face  o :   § Inspection  § : no facial lesions  · Eyes  o Conjunctivae  o : conjunctivae normal  o Sclerae  o : sclerae white  · Ears, Nose, Mouth and Throat  o Ears  o :   § External Ears  § : appearance within normal limits  § Hearing  § : intact  o Nose  o :   § External Nose  § : appearance normal  · Neck  o Inspection/Palpation  o : normal appearance  o Range of Motion  o : full range of motion  · Respiratory  o Respiratory Effort  o : breathing unlabored  o Inspection of Chest  o : normal appearance  o Auscultation of Lungs  o : no audible wheezing or rales  · Cardiovascular  o Heart  o : regular rate  · Gastrointestinal  o Abdominal Examination  o : soft and non-tender  · Skin and Subcutaneous Tissue  o General Inspection  o : intact, no rashes  · Psychiatric  o General  o : Alert and oriented x3  o Judgement and Insight  o : judgment and insight intact  o Mood and Affect  o : mood normal, affect appropriate  · Right Shoulder  o Inspection  o : Appearance of right shoulder is normal. No skin discoloration, atrophy, or swelling. Full forward flexion. Tender bicipital groove. Positive " Speed's Test. Weakness with empty can test. Good tone of deltoid, biceps, triceps, wrist extensors, and wrist flexors. Neurovascularly intact. Sensation grossly intact.   · Imaging  o Imaging  o : Right shoulder MRI 12/4/2019 revealed 1) Rotator cuff tendinopathy without definite findings of high-grade tendon tear. 2) Mild degenerative changes at the glenohumeral joint and mild to moderate AC joint degeneration. 3) Biceps tendinopathy without definite tear.           Assessment  · AC (acromioclavicular) arthritis     716.91/M19.019  · Pain: Shoulder     719.41/M25.519  · Biceps tendinitis     726.12/M75.20  · Rotator cuff tendinitis, right     726.10/M75.81      Plan  · Instructions  o Reviewed the patient's Past Medical, Social, and Family history as well as the ROS at today's visit, no changes.  o Call or return if worsening symptoms.  o Change anti-inflammatory to Relafen, PT ordered. Follow up in 6 weeks.  · Referrals  o ID: [id number] Date: 04/01/2020 Type: Inbound  Specialty: Orthopedic Surgery            Electronically Signed by: MARY GarciaC -Author on June 19, 2020 02:23:00 PM

## 2021-05-14 VITALS
WEIGHT: 208.56 LBS | OXYGEN SATURATION: 97 % | DIASTOLIC BLOOD PRESSURE: 70 MMHG | BODY MASS INDEX: 34.75 KG/M2 | SYSTOLIC BLOOD PRESSURE: 108 MMHG | HEART RATE: 83 BPM | TEMPERATURE: 98.5 F | RESPIRATION RATE: 18 BRPM | HEIGHT: 65 IN

## 2021-05-14 VITALS — BODY MASS INDEX: 35.49 KG/M2 | WEIGHT: 213 LBS | HEIGHT: 65 IN | OXYGEN SATURATION: 95 % | HEART RATE: 64 BPM

## 2021-05-14 VITALS — HEIGHT: 65 IN | BODY MASS INDEX: 35.49 KG/M2 | WEIGHT: 213 LBS | OXYGEN SATURATION: 98 % | HEART RATE: 69 BPM

## 2021-05-14 VITALS
HEIGHT: 65 IN | HEART RATE: 84 BPM | BODY MASS INDEX: 34.82 KG/M2 | TEMPERATURE: 99.9 F | SYSTOLIC BLOOD PRESSURE: 128 MMHG | WEIGHT: 209 LBS | OXYGEN SATURATION: 98 % | DIASTOLIC BLOOD PRESSURE: 72 MMHG

## 2021-05-14 NOTE — PROGRESS NOTES
Progress Note      Patient Name: Melvina Martinez   Patient ID: [id number]   Sex: Female   YOB: 1960    Primary Care Provider: Isabela GUERRERO    Visit Date: April 26, 2021    Provider: YOLANDA Oliva   Location: [facility]   Location Address: [address] [place] [address], [facility]  [place], [place]  [id number]   Location Phone: [phone]          Chief Complaint  · unable to raise shoulders above head   · f/u labs      History Of Present Illness  Melvina Martinez is a 60 year old /White female who presents for evaluation and treatment of:      She reports having a massage recently. She reports having severe pain during this even to light palpation. She was seen following this and fibromyalgia was discussed. reviewed labs in the office today.     She also reports pain in her shoulders and limited ROM bilaterally x1 mth. left shoulder started prior to right shoulder. she is having severe pain in left shoulder. right shoulder surgery last aug. she reports ongoing pain since that time but more tolerable than left    [facility]    She see psych, Mrs. [facility] again may 17. Has been on current med regimen x 5 years. doing well with bipolar/anxiety.       Past Medical History  Disease Name Date Onset Notes   Anxiety disorder --  --    Arthritis --  --    Bipolar Disorder --  --    Depression --  --    Diabetes --  --    Hypertension --  --    Kidney Stones --  --    PTSD --  --    Screening Mammogram 09/2019 normal, repeat in 1 year         Past Surgical History  Procedure Name Date Notes   Ankle surgery --  --    Appendectomy --  --    Cesarian Section --  --    Colonoscopy --  --    Eye Implant --  yes   EYE SURGERY --  --    Hysterectomy --  --    Knee surgery --  --    Tonsillectomy --  --    Tummy tuck --  --          Medication List  Name Date Started Instructions   amlodipine 2.5 mg oral tablet 04/22/2021 take 1 tablet (2.5 mg) by  oral route once daily   Imitrex 5 mg/actuation nasal spray,non-aerosol 02/09/2021 spray 1 spray (5 mg) by intranasal route once; if headache returns, dose may be repeated once after 2 hours, not to exceed 40 mg per day   Jardiance 10 mg oral tablet 04/19/2021 TAKE ONE TABLET BY MOUTH EVERY MORNING   lamotrigine 100 mg oral tablet  take 1 tablet (100 mg) by oral route 2 times per day   pantoprazole 40 mg oral tablet,delayed release (DR/EC) 11/25/2020 take 1 tablet (40 mg) by oral route once daily   prazosin 2 mg oral capsule  take 1 capsule (2 mg) by oral route 2 times per day   quetiapine 100 mg oral tablet  take 1 tablet (100 mg) by oral route 3 times per day   topiramate 50 mg oral tablet  take 1 tablet (50 mg) by oral route 2 times per day         Allergy List  Allergen Name Date Reaction Notes   Flagyl --  --  --    PENICILLINS --  --  --        Allergies Reconciled  Family Medical History  Disease Name Relative/Age Notes   Dementia Conditions Father/   --    Stroke Father/   Father   Heart Disease Father/   Father   Diabetes, unspecified type Father/   Father   Breast Neoplasm Mother/   --    Diabetes Father/   --    Family history of Arthritis Father/   Father         Social History  Finding Status Start/Stop Quantity Notes   Alcohol Use --  --/-- --  rarely drinks   lives with spouse --  --/-- --  --    . --  --/-- --  --    Recreational Drug Use Never --/-- --  no   Retired. --  --/-- --  --    Tobacco Never --/-- --  never smoker         Immunizations  NameDate Admin Mfg Trade Name Lot Number Route Inj VIS Given VIS Publication   Hepatitis B06/24/2020 SKB ENGERIX-B-ADULT [id number] IM RD 06/24/2020    Comments: pt tolerated well   Tdap04/09/2015 NE Not Entered  NE NE 02/19/2019 02/24/2015   Comments:          Review of Systems  · Constitutional  o Denies  o : fever, fatigue, weight loss, weight gain  · Cardiovascular  o Denies  o : lower extremity edema, claudication, chest pressure,  "palpitations  · Respiratory  o Denies  o : shortness of breath, wheezing, cough, hemoptysis, dyspnea on exertion  · Gastrointestinal  o Denies  o : nausea, vomiting, diarrhea, constipation, abdominal pain      Vitals  Date Time BP Position Site L\R Cuff Size HR RR TEMP (F) WT  HT  BMI kg/m2 BSA m2 O2 Sat FR L/min FiO2 HC       06/24/2020 12:59 /84 Sitting    66 - R  98.2 214lbs 4oz 5'  5\" 35.65 2.11 98 %  21%    04/13/2021 03:13 /72 Sitting    84 - R  99.9 209lbs 0oz 5'  5\" 34.78 2.09 98 %  21%    04/26/2021 11:40 /70 Sitting    83 - R 18 98.5 208lbs 9oz 5'  5\" 34.71 2.08 97 %  21%          Physical Examination  · Constitutional  o Appearance  o : no acute distress, well-nourished  · Head and Face  o Head  o :   § Inspection  § : atraumatic, normocephalic  · Eyes  o Eyes  o : extraocular movements intact, no scleral icterus, no conjunctival injection  · Ears, Nose, Mouth and Throat  o Ears  o :   § External Ears  § : normal  o Nose  o :   § Intranasal Exam  § : nares patent  o Oral Cavity  o :   § Oral Mucosa  § : moist mucous membranes  · Respiratory  o Respiratory Effort  o : breathing comfortably, symmetric chest rise  o Auscultation of Lungs  o : clear to asculatation bilaterally, no wheezes, rales, or rhonchii  · Cardiovascular  o Heart  o :   § Auscultation of Heart  § : regular rate and rhythm, no murmurs, rubs, or gallops  o Peripheral Vascular System  o :   § Extremities  § : no edema  · Neurologic  o Mental Status Examination  o :   § Orientation  § : grossly oriented to person, place and time  o Gait and Station  o :   § Gait Screening  § : normal gait  · Psychiatric  o General  o : normal mood and affect  · Right Shoulder  o Inspection  o : no abnormalities, redness, swelling, scarring or atrophy  o Palpation  o : tender to palpation at AC joint  o Range of Motion  o : 4/5 external rotation strength  · Left Shoulder  o Inspection  o : no abnormalities, redness, swelling, scarring or " atrophy  o Palpation  o : tender to palpation, no crepitus  o Range of Motion  o : decreased ROM. positive arm drop test          Assessment  · Screening for depression     V79.0/Z13.89  · PTSD     309.81  · Shoulder pain, left     719.41/M25.512  X-ray in the office today. Concern for rotator cuff injury. We will also get an MRI.  · Right shoulder pain     719.41/M25.511  She will schedule follow-up with Dr. Jasso's office given recent repair  · Bipolar depression     296.50/F31.9  · Fibromyalgia     729.1/M79.7  Discussed labs results and diagnosis of fibromyalgia versus other diagnoses. Discussed possible treatment options such as Cymbalta, amitriptyline, or gabapentin/Lyrica. Concerned that given significant psych history and the fact that she is currently doing well on several medicines [facility], hesitant to add or change these at this time. She will discuss options as discussed in the office with psychiatrist in the next few weeks. May consider starting her on gabapentin if Cymbalta and amitriptyline are not appropriate options.      Plan  · Orders  o ACO-18: Positive screen for clinical depression using a standardized tool and a follow-up plan documented () - V79.0/Z13.89 - 04/26/2021   phq9 score of 11.  o ACO-39: Current medications updated and reviewed (, 1159F) - - 04/26/2021  o Shoulder (Left) [facility]erred [facility] ([id number]) - 719.41/M25.512 - 04/26/2021  o MRI shoulder left wo contrast (31105) - 719.41/M25.512 - 04/26/2021  o ORTHOPEDIC CONSULTATION (ORTHO) - 719.41/M25.512, 719.41/M25.511 - 04/26/2021  · Medications  o Medications have been Reconciled  o Transition of Care or Provider Policy  · Instructions  o Depression Screen completed and scanned into the EMR under the designated folder within the patient's documents.  o Today's PHQ-9 result is __11._  o Patient was educated/instructed on their diagnosis, treatment and medications prior to discharge from the clinic  today.  · Disposition  o Call or Return if symptoms worsen or persist.  o Follow up in 1 month  o Xray performed in clinic            Electronically Signed by: YOLANDA Oliva -Author on April 26, 2021 07:12:51 PM

## 2021-05-14 NOTE — PROGRESS NOTES
"   Progress Note      Patient Name: Melvina Martinez   Patient ID: [id number]   Sex: Female   YOB: 1960    Primary Care Provider: Isabela GUERRERO    Visit Date: April 13, 2021    Provider: Evelyn Escalante PA-C   Location: [facility]   Location Address: [address] [place] [address], [facility]  [place], [place]  [id number]   Location Phone: [phone]          Chief Complaint  · \" Having severe pain in both legs for years but has gotton worse now\"  · \" Wanted to get tested for fibromalysia\"  · \" Went to get a massage and couldnt tolerate the pressure on my legs\"      History Of Present Illness  Melvina Martinez is a 60 year old /White female who presents for evaluation and treatment of:      concerns of fibromyalgia.  Patient states that she had leg pain when she was getting a massage last week.  Patient has been very sensitive to touch.  Patient states that she has had this problem for awhile and would like to be tested for possible fibromyalgia.  Patient has had R knee pain after massage as well.       Past Medical History  Disease Name Date Onset Notes   Anxiety disorder --  --    Arthritis --  --    Bipolar Disorder --  --    Depression --  --    Diabetes --  --    Hypertension --  --    Kidney Stones --  --    PTSD --  --    Screening Mammogram 09/2019 normal, repeat in 1 year         Past Surgical History  Procedure Name Date Notes   Ankle surgery --  --    Appendectomy --  --    Cesarian Section --  --    Colonoscopy --  --    Eye Implant --  yes   EYE SURGERY --  --    Hysterectomy --  --    Knee surgery --  --    Tonsillectomy --  --    Tummy maggyck --  --          Medication List  Name Date Started Instructions   amlodipine 2.5 mg oral tablet 01/11/2021 take 1 tablet (2.5 mg) by oral route once daily   Imitrex 5 mg/actuation nasal spray,non-aerosol 02/09/2021 spray 1 spray (5 mg) by intranasal route once; if headache returns, dose may be repeated once after 2 hours, not to exceed " 40 mg per day   Jardiance 10 mg oral tablet 03/15/2021 TAKE ONE TABLET BY MOUTH EVERY MORNING   lamotrigine 100 mg oral tablet  take 1 tablet (100 mg) by oral route 2 times per day   pantoprazole 40 mg oral tablet,delayed release (DR/EC) 11/25/2020 take 1 tablet (40 mg) by oral route once daily   prazosin 2 mg oral capsule  take 1 capsule (2 mg) by oral route 2 times per day   quetiapine 100 mg oral tablet  take 1 tablet (100 mg) by oral route 3 times per day   topiramate 50 mg oral tablet  take 1 tablet (50 mg) by oral route 2 times per day         Allergy List  Allergen Name Date Reaction Notes   Flagyl --  --  --    PENICILLINS --  --  --        Allergies Reconciled  Family Medical History  Disease Name Relative/Age Notes   Dementia Conditions Father/   --    Stroke Father/   Father   Heart Disease Father/   Father   Diabetes, unspecified type Father/   Father   Breast Neoplasm Mother/   --    Diabetes Father/   --    Family history of Arthritis Father/   Father         Social History  Finding Status Start/Stop Quantity Notes   Alcohol Use Current some day --/-- --  rarely drinks   lives with spouse --  --/-- --  --    . --  --/-- --  --    Recreational Drug Use Never --/-- --  no   Retired. --  --/-- --  --    Tobacco Never --/-- --  never smoker         Immunizations  NameDate Admin Mfg Trade Name Lot Number Route Inj VIS Given VIS Publication   Hepatitis B06/24/2020 SKB ENGERIX-B-ADULT [id number] IM RD 06/24/2020    Comments: pt tolerated well   Tdap04/09/2015 NE Not Entered  NE NE 02/19/2019 02/24/2015   Comments:          Review of Systems  · Constitutional  o Denies  o : fever, fatigue, weight loss, weight gain  · Cardiovascular  o Denies  o : lower extremity edema, claudication, chest pressure, palpitations  · Respiratory  o Denies  o : shortness of breath, wheezing, cough, hemoptysis, dyspnea on exertion  · Gastrointestinal  o Denies  o : nausea, vomiting, diarrhea, constipation, abdominal  "pain      Vitals  Date Time BP Position Site L\R Cuff Size HR RR TEMP (F) WT  HT  BMI kg/m2 BSA m2 O2 Sat FR L/min FiO2 HC       06/24/2020 12:59 /84 Sitting    66 - R  98.2 214lbs 4oz 5'  5\" 35.65 2.11 98 %  21%    07/27/2020 10:40 AM      69 - R   212lbs 16oz 5'  5\" 35.44 2.1 99 %      08/19/2020 08:21 AM      69 - R   212lbs 16oz 5'  5\" 35.44 2.1 98 %      09/16/2020 09:59 AM      64 - R   212lbs 16oz 5'  5\" 35.44 2.1 95 %      04/13/2021 03:13 /72 Sitting    84 - R  99.9 209lbs 0oz 5'  5\" 34.78 2.09 98 %  21%          Physical Examination  · Constitutional  o Appearance  o : no acute distress, well-nourished  · Head and Face  o Head  o :   § Inspection  § : atraumatic, normocephalic  · Eyes  o Eyes  o : extraocular movements intact, no scleral icterus, no conjunctival injection  · Ears, Nose, Mouth and Throat  o Ears  o :   § External Ears  § : normal  o Nose  o :   § Intranasal Exam  § : nares patent  o Oral Cavity  o :   § Oral Mucosa  § : moist mucous membranes  · Respiratory  o Respiratory Effort  o : breathing comfortably, symmetric chest rise  o Auscultation of Lungs  o : clear to asculatation bilaterally, no wheezes, rales, or rhonchii  · Cardiovascular  o Heart  o :   § Auscultation of Heart  § : regular rate and rhythm, no murmurs, rubs, or gallops  o Peripheral Vascular System  o :   § Extremities  § : no edema  · Musculoskeletal  o General  o :   § General Musculoskeletal  § : No joint swelling or deformity., Muscle tone, strength, and development grossly normal. R knee TTP superior and lateral patella  · Skin and Subcutaneous Tissue  o General Inspection  o : no lesions present, no areas of discoloration, skin turgor normal  · Neurologic  o Mental Status Examination  o :   § Orientation  § : grossly oriented to person, place and time  o Gait and Station  o :   § Gait Screening  § : normal gait  · Psychiatric  o General  o : normal mood and " affect              Assessment  · Arthritis     716.90/M19.90  Will go ahead and check inflammatory markers for other reasons for joint pain and muscle pain. If all normal could consider trial of Cymbalta. Patient is to follow up in 4 weeks with Isabela Nelson NP to discuss other options.   · Knee pain, right     719.46/M25.561  Continue conservative treatment at this time, could consider knee xray and PT if symptoms persist or worsen.    Problems Reconciled  Plan  · Orders  o CBC with Auto Diff [facility] ([id number]) - 716.90/M19.90, 719.46/M25.561 - 04/13/2021  o CMP [facility] ([id number]) - 716.90/M19.90, 719.46/M25.561 - 04/13/2021  o ACO-39: Current medications updated and reviewed (1159F, ) - - 04/13/2021  o RF, C3, C4, MONCHO IgG by Melinda [facility] ([id number], 93520, [id number]) - 716.90/M19.90, 719.46/M25.561 - 04/13/2021  o CRP (Inflammation) [facility] ([id number]) - 716.90/M19.90, 719.46/M25.561 - 04/13/2021  o ESR (61521) - 716.90/M19.90, 719.46/M25.561 - 04/13/2021  · Medications  o Medications have been Reconciled  o Transition of Care or Provider Policy  · Instructions  o Patient was educated/instructed on their diagnosis, treatment and medications prior to discharge from the clinic today.  o Patient instructed to seek medical attention urgently for new or worsening symptoms.  o Call the office with any concerns or questions.  · Disposition  o Call or Return if symptoms worsen or persist.  o follow up in 1 month  o Follow up with YOLANDA Ma            Electronically Signed by: Evelyn Escalante PA-C -Author on April 13, 2021 04:26:20 PM

## 2021-05-15 VITALS
HEIGHT: 65 IN | OXYGEN SATURATION: 99 % | RESPIRATION RATE: 14 BRPM | SYSTOLIC BLOOD PRESSURE: 120 MMHG | BODY MASS INDEX: 35.24 KG/M2 | HEART RATE: 77 BPM | WEIGHT: 211.5 LBS | DIASTOLIC BLOOD PRESSURE: 84 MMHG | TEMPERATURE: 98.8 F

## 2021-05-15 VITALS
SYSTOLIC BLOOD PRESSURE: 108 MMHG | DIASTOLIC BLOOD PRESSURE: 78 MMHG | HEART RATE: 106 BPM | HEIGHT: 65 IN | BODY MASS INDEX: 34.99 KG/M2 | WEIGHT: 210 LBS | OXYGEN SATURATION: 96 % | TEMPERATURE: 98.3 F

## 2021-05-15 VITALS
TEMPERATURE: 98.2 F | OXYGEN SATURATION: 98 % | BODY MASS INDEX: 35.7 KG/M2 | WEIGHT: 214.25 LBS | DIASTOLIC BLOOD PRESSURE: 84 MMHG | SYSTOLIC BLOOD PRESSURE: 124 MMHG | HEIGHT: 65 IN | HEART RATE: 66 BPM

## 2021-05-15 VITALS — WEIGHT: 213 LBS | HEIGHT: 65 IN | OXYGEN SATURATION: 97 % | HEART RATE: 78 BPM | BODY MASS INDEX: 35.49 KG/M2

## 2021-05-15 VITALS
SYSTOLIC BLOOD PRESSURE: 128 MMHG | RESPIRATION RATE: 15 BRPM | DIASTOLIC BLOOD PRESSURE: 88 MMHG | HEIGHT: 65 IN | OXYGEN SATURATION: 97 % | TEMPERATURE: 97.8 F | WEIGHT: 217 LBS | BODY MASS INDEX: 36.15 KG/M2 | HEART RATE: 81 BPM

## 2021-05-15 VITALS
OXYGEN SATURATION: 98 % | RESPIRATION RATE: 14 BRPM | SYSTOLIC BLOOD PRESSURE: 118 MMHG | WEIGHT: 208 LBS | HEIGHT: 65 IN | BODY MASS INDEX: 34.66 KG/M2 | TEMPERATURE: 98.2 F | HEART RATE: 84 BPM | DIASTOLIC BLOOD PRESSURE: 74 MMHG

## 2021-05-15 VITALS — WEIGHT: 208 LBS | HEIGHT: 65 IN | BODY MASS INDEX: 34.66 KG/M2

## 2021-05-15 VITALS
RESPIRATION RATE: 12 BRPM | DIASTOLIC BLOOD PRESSURE: 84 MMHG | HEIGHT: 65 IN | SYSTOLIC BLOOD PRESSURE: 122 MMHG | OXYGEN SATURATION: 99 % | TEMPERATURE: 98.3 F | BODY MASS INDEX: 34.36 KG/M2 | WEIGHT: 206.25 LBS | HEART RATE: 66 BPM

## 2021-05-15 VITALS — OXYGEN SATURATION: 99 % | HEART RATE: 69 BPM | BODY MASS INDEX: 35.49 KG/M2 | WEIGHT: 213 LBS | HEIGHT: 65 IN

## 2021-05-15 VITALS
HEIGHT: 65 IN | TEMPERATURE: 97.4 F | DIASTOLIC BLOOD PRESSURE: 90 MMHG | HEART RATE: 96 BPM | BODY MASS INDEX: 35.22 KG/M2 | SYSTOLIC BLOOD PRESSURE: 114 MMHG | WEIGHT: 211.37 LBS | OXYGEN SATURATION: 97 %

## 2021-05-15 VITALS
BODY MASS INDEX: 36.55 KG/M2 | DIASTOLIC BLOOD PRESSURE: 84 MMHG | HEIGHT: 65 IN | OXYGEN SATURATION: 96 % | RESPIRATION RATE: 15 BRPM | HEART RATE: 105 BPM | SYSTOLIC BLOOD PRESSURE: 120 MMHG | WEIGHT: 219.37 LBS | TEMPERATURE: 98.1 F

## 2021-05-15 VITALS — HEIGHT: 65 IN | WEIGHT: 208 LBS | BODY MASS INDEX: 34.66 KG/M2 | RESPIRATION RATE: 16 BRPM

## 2021-05-17 ENCOUNTER — OFFICE VISIT CONVERTED (OUTPATIENT)
Dept: ORTHOPEDIC SURGERY | Facility: CLINIC | Age: 61
End: 2021-05-17
Attending: ORTHOPAEDIC SURGERY

## 2021-05-28 ENCOUNTER — OFFICE VISIT CONVERTED (OUTPATIENT)
Dept: INTERNAL MEDICINE | Facility: CLINIC | Age: 61
End: 2021-05-28
Attending: NURSE PRACTITIONER

## 2021-06-04 RX ORDER — AMLODIPINE BESYLATE 2.5 MG/1
2.5 TABLET ORAL DAILY
COMMUNITY
End: 2021-06-23 | Stop reason: SDUPTHER

## 2021-06-04 RX ORDER — SUMATRIPTAN 5 MG/1
1 SPRAY NASAL
COMMUNITY
End: 2021-06-23 | Stop reason: SDUPTHER

## 2021-06-04 RX ORDER — QUETIAPINE FUMARATE 100 MG/1
100 TABLET, FILM COATED ORAL 2 TIMES DAILY
COMMUNITY
End: 2021-06-23 | Stop reason: SDUPTHER

## 2021-06-04 RX ORDER — PANTOPRAZOLE SODIUM 40 MG/1
40 TABLET, DELAYED RELEASE ORAL DAILY
COMMUNITY
End: 2021-06-11 | Stop reason: SDUPTHER

## 2021-06-04 RX ORDER — EMPAGLIFLOZIN 10 MG/1
10 TABLET, FILM COATED ORAL DAILY
COMMUNITY
End: 2021-06-23 | Stop reason: SDUPTHER

## 2021-06-04 RX ORDER — DULOXETIN HYDROCHLORIDE 30 MG/1
30 CAPSULE, DELAYED RELEASE ORAL DAILY
COMMUNITY
End: 2021-06-23 | Stop reason: SDUPTHER

## 2021-06-04 RX ORDER — CLINDAMYCIN HYDROCHLORIDE 300 MG/1
300 CAPSULE ORAL EVERY 8 HOURS
COMMUNITY
Start: 2021-06-03 | End: 2021-06-13

## 2021-06-04 RX ORDER — TOPIRAMATE 50 MG/1
50 TABLET, FILM COATED ORAL 2 TIMES DAILY
COMMUNITY
End: 2021-06-23 | Stop reason: SDUPTHER

## 2021-06-04 RX ORDER — PRAZOSIN HYDROCHLORIDE 2 MG/1
2 CAPSULE ORAL NIGHTLY
COMMUNITY
End: 2021-06-23 | Stop reason: SDUPTHER

## 2021-06-04 RX ORDER — LAMOTRIGINE 100 MG/1
200 TABLET ORAL DAILY
COMMUNITY
End: 2021-06-23 | Stop reason: SDUPTHER

## 2021-06-05 NOTE — H&P
History and Physical      Patient Name: Melvina Martinez   Patient ID: [id number]   Sex: Female   YOB: 1960    Primary Care Provider: Isabela GUERRERO    Visit Date: May 17, 2021    Provider: Дмитрий Francois MD   Location: [facility]   Location Address: [address] [place] [address]  [id number]   Location Phone: [phone]          Chief Complaint  · Left shoulder pain       History Of Present Illness  Melvina Martinez is a 60 year old /White female who presents today to [facility].      Patient presents today for an evaluation of left shoulder. She states she has been having decreasing range of motion and pain with range of motion. She presents today with MRI results of her left shoulder. She denies any injury or trauma to her left shoulder. She had a previous right shoulder arthroscopy. She states her pain is worse than her right shoulder before surgery.       Past Medical History  Anxiety disorder; Arthritis; Bipolar Disorder; Depression; Diabetes; Hypertension; Kidney Stones; PTSD; Screening Mammogram         Past Surgical History  Ankle surgery; Appendectomy; Cesarian Section; Colonoscopy; Eye Implant; EYE SURGERY; Hysterectomy; Knee surgery; Tonsillectomy; Tummy tuck         Medication List  amlodipine 2.5 mg oral tablet; Cymbalta 30 mg oral capsule,delayed release(DR/EC); Imitrex 5 mg/actuation nasal spray,non-aerosol; Jardiance 10 mg oral tablet; lamotrigine 100 mg oral tablet; pantoprazole 40 mg oral tablet,delayed release (DR/EC); prazosin 2 mg oral capsule; quetiapine 100 mg oral tablet; topiramate 50 mg oral tablet         Allergy List  Flagyl; PENICILLINS       Allergies Reconciled  Family Medical History  Dementia Conditions; Stroke; Heart Disease; Diabetes, unspecified type; Breast Neoplasm; Diabetes; Family history of Arthritis         Social History  Alcohol Use; lives with spouse; .; Recreational Drug Use (Never); Retired.; Tobacco (Never)  "        Immunizations  Name Date Admin   Hepatitis B 06/24/2020   Tdap 04/09/2015         Review of Systems  · Constitutional  o Denies  o : fever, chills, weight loss  · Cardiovascular  o Denies  o : chest pain, shortness of breath  · Gastrointestinal  o Denies  o : liver disease, heartburn, nausea, blood in stools  · Genitourinary  o Denies  o : painful urination, blood in urine  · Integument  o Denies  o : rash, itching  · Neurologic  o Denies  o : headache, weakness, loss of consciousness  · Musculoskeletal  o Denies  o : painful, swollen joints  · Psychiatric  o Denies  o : drug/alcohol addiction, anxiety, depression      Vitals  Date Time BP Position Site L\R Cuff Size HR RR TEMP (F) WT  HT  BMI kg/m2 BSA m2 O2 Sat FR L/min FiO2        05/17/2021 03:49 PM      90 - R   209lbs 2oz 5'  5\" 34.8 2.09 97 %            Physical Examination  · Constitutional  o Appearance  o : well developed, well-nourished, no obvious deformities present  · Head and Face  o Head  o :   § Inspection  § : normocephalic  o Face  o :   § Inspection  § : no facial lesions  · Eyes  o Conjunctivae  o : conjunctivae normal  o Sclerae  o : sclerae white  · Ears, Nose, Mouth and Throat  o Ears  o :   § External Ears  § : appearance within normal limits  § Hearing  § : intact  o Nose  o :   § External Nose  § : appearance normal  · Neck  o Inspection/Palpation  o : normal appearance  o Range of Motion  o : full range of motion  · Respiratory  o Respiratory Effort  o : breathing unlabored  o Inspection of Chest  o : normal appearance  o Auscultation of Lungs  o : no audible wheezing or rales  · Cardiovascular  o Heart  o : regular rate  · Gastrointestinal  o Abdominal Examination  o : soft and non-tender  · Skin and Subcutaneous Tissue  o General Inspection  o : intact, no rashes  · Psychiatric  o General  o : Alert and oriented x3  o Judgement and Insight  o : judgment and insight intact  o Mood and Affect  o : mood normal, affect " appropriate  · Left Shoulder  o Inspection  o : Full passive forward flexion with significant pain. Good tone of deltoid, biceps, triceps, wrist extensors, and wrist flexors. No swelling, skin discoloration or atrophy. Skin intact. Sensation grossly intact. Neurovascular intact. Weak empty can testing. Painful empty can testing. IR to back pocket. Painful cross body adduction.   · Imaging  o Imaging  o : 4/29/21 LEFT SHOULDER MRI: 1. High-grade bursal surface tear of the mid supraspinatus with suspected small full-thickness tear component. 2. Infraspinatus tendinopathy with shallow bursal surface fraying/tear. 3. Mild glenohumeral osteoarthritis and mild degenerative changes at the acromioclavicular joint. 4. Degenerative changes in the posterior-superior labrum and suspected small tear of the inferior labrum.           Assessment  · Left shoulder pain, unspecified chronicity     719.41/M25.512  · Rotator cuff tear of left shoulder     840.4/M75.100      Plan  · Medications  o Medications have been Reconciled  o Transition of Care or Provider Policy  · Instructions  o Dr. Francois saw and examined the patient and agrees with plan.   o MRI reviewed by Dr. Francois.  o Reviewed the patient's Past Medical, Social, and Family history as well as the ROS at today's visit, no changes.  o Call or return if worsening symptoms.  o Discussed surgery.  o Risks/benefits discussed with patient including, but not limited to: infection, bleeding, neurovascular damage, re-rupture, aesthetic deformity, need for further surgery, and death.  o Surgery pamphlet given.  o Follow Up Post-OP.  o Discussed treatment plans and diagnosis with the patient. Discussed operative vs non-operative measures. Patient wishes to proceed with a left shoulder arthroscopy.   o The above service was scribed by Lisa Leon on my behalf and I attest to the accuracy of the note. charisma  o Electronically Identified Patient Education Materials Provided  Electronically  · Referrals  o ID: [id number] Date: 05/14/2021 Type: Inbound  Specialty: Orthopedic Surgery            Electronically Signed by: Lisa Leon-, Other -Author on May 18, 2021 11:02:21 AM  Electronically Co-signed by: Дмитрий Francois MD -Reviewer on May 18, 2021 04:13:35 PM

## 2021-06-10 ENCOUNTER — ANESTHESIA (OUTPATIENT)
Dept: PERIOP | Facility: HOSPITAL | Age: 61
End: 2021-06-10

## 2021-06-10 ENCOUNTER — HOSPITAL ENCOUNTER (OUTPATIENT)
Facility: HOSPITAL | Age: 61
Setting detail: HOSPITAL OUTPATIENT SURGERY
Discharge: HOME | End: 2021-06-10
Attending: ORTHOPAEDIC SURGERY | Admitting: ORTHOPAEDIC SURGERY

## 2021-06-10 ENCOUNTER — ANESTHESIA EVENT (OUTPATIENT)
Dept: PERIOP | Facility: HOSPITAL | Age: 61
End: 2021-06-10

## 2021-06-10 VITALS
TEMPERATURE: 97.7 F | HEART RATE: 57 BPM | SYSTOLIC BLOOD PRESSURE: 125 MMHG | HEIGHT: 65 IN | WEIGHT: 201.94 LBS | RESPIRATION RATE: 16 BRPM | DIASTOLIC BLOOD PRESSURE: 82 MMHG | BODY MASS INDEX: 33.65 KG/M2 | OXYGEN SATURATION: 93 %

## 2021-06-10 DIAGNOSIS — S46.012D TRAUMATIC COMPLETE TEAR OF LEFT ROTATOR CUFF, SUBSEQUENT ENCOUNTER: Primary | ICD-10-CM

## 2021-06-10 PROBLEM — M75.102 ROTATOR CUFF TEAR, LEFT: Status: ACTIVE | Noted: 2021-06-10

## 2021-06-10 LAB
GLUCOSE BLDC GLUCOMTR-MCNC: 109 MG/DL (ref 70–130)
GLUCOSE BLDC GLUCOMTR-MCNC: 88 MG/DL (ref 70–130)

## 2021-06-10 PROCEDURE — 23412 REPAIR ROTATOR CUFF CHRONIC: CPT | Performed by: ORTHOPAEDIC SURGERY

## 2021-06-10 PROCEDURE — 25010000002 PROPOFOL 10 MG/ML EMULSION: Performed by: NURSE ANESTHETIST, CERTIFIED REGISTERED

## 2021-06-10 PROCEDURE — 25010000002 MIDAZOLAM PER 1 MG: Performed by: ANESTHESIOLOGY

## 2021-06-10 PROCEDURE — 25010000002 ONDANSETRON PER 1 MG: Performed by: NURSE ANESTHETIST, CERTIFIED REGISTERED

## 2021-06-10 PROCEDURE — C1713 ANCHOR/SCREW BN/BN,TIS/BN: HCPCS | Performed by: ORTHOPAEDIC SURGERY

## 2021-06-10 PROCEDURE — 82962 GLUCOSE BLOOD TEST: CPT

## 2021-06-10 PROCEDURE — L3670 SO ACRO/CLAV CAN WEB PRE OTS: HCPCS | Performed by: ORTHOPAEDIC SURGERY

## 2021-06-10 PROCEDURE — 25010000002 DEXAMETHASONE PER 1 MG: Performed by: NURSE ANESTHETIST, CERTIFIED REGISTERED

## 2021-06-10 PROCEDURE — 76942 ECHO GUIDE FOR BIOPSY: CPT | Performed by: ORTHOPAEDIC SURGERY

## 2021-06-10 PROCEDURE — 25010000002 FENTANYL CITRATE (PF) 50 MCG/ML SOLUTION: Performed by: NURSE ANESTHETIST, CERTIFIED REGISTERED

## 2021-06-10 PROCEDURE — 29824 SHO ARTHRS SRG DSTL CLAVICLC: CPT | Performed by: ORTHOPAEDIC SURGERY

## 2021-06-10 PROCEDURE — 25010000002 NEOSTIGMINE 10 MG/10ML SOLUTION 10 ML VIAL: Performed by: NURSE ANESTHETIST, CERTIFIED REGISTERED

## 2021-06-10 DEVICE — SUT/ANCH BIOCOMP PUSHLOCK SP 4.5X28MM: Type: IMPLANTABLE DEVICE | Site: SHOULDER | Status: FUNCTIONAL

## 2021-06-10 DEVICE — SUT/ANCH BIOCOMP CORKSCREW FUL/THRD FIBRCHAIN2 2FW5.5X15: Type: IMPLANTABLE DEVICE | Site: SHOULDER | Status: FUNCTIONAL

## 2021-06-10 RX ORDER — MIDAZOLAM HYDROCHLORIDE 1 MG/ML
6 INJECTION INTRAMUSCULAR; INTRAVENOUS ONCE
Status: COMPLETED | OUTPATIENT
Start: 2021-06-10 | End: 2021-06-10

## 2021-06-10 RX ORDER — BACITRACIN 50000 [IU]/1
INJECTION, POWDER, FOR SOLUTION INTRAMUSCULAR AS NEEDED
Status: DISCONTINUED | OUTPATIENT
Start: 2021-06-10 | End: 2021-06-10 | Stop reason: HOSPADM

## 2021-06-10 RX ORDER — LIDOCAINE HYDROCHLORIDE 20 MG/ML
INJECTION, SOLUTION INFILTRATION; PERINEURAL AS NEEDED
Status: DISCONTINUED | OUTPATIENT
Start: 2021-06-10 | End: 2021-06-10 | Stop reason: SURG

## 2021-06-10 RX ORDER — GLYCOPYRROLATE 0.2 MG/ML
INJECTION INTRAMUSCULAR; INTRAVENOUS AS NEEDED
Status: DISCONTINUED | OUTPATIENT
Start: 2021-06-10 | End: 2021-06-10 | Stop reason: SURG

## 2021-06-10 RX ORDER — ONDANSETRON 2 MG/ML
INJECTION INTRAMUSCULAR; INTRAVENOUS AS NEEDED
Status: DISCONTINUED | OUTPATIENT
Start: 2021-06-10 | End: 2021-06-10 | Stop reason: SURG

## 2021-06-10 RX ORDER — FENTANYL CITRATE 50 UG/ML
INJECTION, SOLUTION INTRAMUSCULAR; INTRAVENOUS AS NEEDED
Status: DISCONTINUED | OUTPATIENT
Start: 2021-06-10 | End: 2021-06-10 | Stop reason: SURG

## 2021-06-10 RX ORDER — ACETAMINOPHEN 500 MG
1000 TABLET ORAL ONCE
Status: COMPLETED | OUTPATIENT
Start: 2021-06-10 | End: 2021-06-10

## 2021-06-10 RX ORDER — BUPIVACAINE HYDROCHLORIDE 5 MG/ML
INJECTION, SOLUTION PERINEURAL
Status: COMPLETED | OUTPATIENT
Start: 2021-06-10 | End: 2021-06-10

## 2021-06-10 RX ORDER — DEXMEDETOMIDINE HYDROCHLORIDE 100 UG/ML
INJECTION, SOLUTION INTRAVENOUS AS NEEDED
Status: DISCONTINUED | OUTPATIENT
Start: 2021-06-10 | End: 2021-06-10 | Stop reason: SURG

## 2021-06-10 RX ORDER — CLINDAMYCIN PHOSPHATE 900 MG/50ML
900 INJECTION INTRAVENOUS ONCE
Status: COMPLETED | OUTPATIENT
Start: 2021-06-10 | End: 2021-06-10

## 2021-06-10 RX ORDER — DEXAMETHASONE SODIUM PHOSPHATE 4 MG/ML
INJECTION, SOLUTION INTRA-ARTICULAR; INTRALESIONAL; INTRAMUSCULAR; INTRAVENOUS; SOFT TISSUE AS NEEDED
Status: DISCONTINUED | OUTPATIENT
Start: 2021-06-10 | End: 2021-06-10 | Stop reason: SURG

## 2021-06-10 RX ORDER — MEPERIDINE HYDROCHLORIDE 25 MG/ML
50 INJECTION INTRAMUSCULAR; INTRAVENOUS; SUBCUTANEOUS ONCE
Status: DISCONTINUED | OUTPATIENT
Start: 2021-06-10 | End: 2021-06-10 | Stop reason: HOSPADM

## 2021-06-10 RX ORDER — SODIUM CHLORIDE 0.9 % (FLUSH) 0.9 %
10 SYRINGE (ML) INJECTION AS NEEDED
Status: DISCONTINUED | OUTPATIENT
Start: 2021-06-10 | End: 2021-06-10 | Stop reason: HOSPADM

## 2021-06-10 RX ORDER — SODIUM CHLORIDE, SODIUM LACTATE, POTASSIUM CHLORIDE, CALCIUM CHLORIDE 600; 310; 30; 20 MG/100ML; MG/100ML; MG/100ML; MG/100ML
9 INJECTION, SOLUTION INTRAVENOUS CONTINUOUS PRN
Status: DISCONTINUED | OUTPATIENT
Start: 2021-06-10 | End: 2021-06-10 | Stop reason: HOSPADM

## 2021-06-10 RX ORDER — HYDROCODONE BITARTRATE AND ACETAMINOPHEN 7.5; 325 MG/1; MG/1
1 TABLET ORAL EVERY 4 HOURS PRN
Qty: 45 TABLET | Refills: 0 | Status: SHIPPED | OUTPATIENT
Start: 2021-06-10 | End: 2021-07-02 | Stop reason: SDUPTHER

## 2021-06-10 RX ORDER — NEOSTIGMINE METHYLSULFATE 1 MG/ML
INJECTION, SOLUTION INTRAVENOUS AS NEEDED
Status: DISCONTINUED | OUTPATIENT
Start: 2021-06-10 | End: 2021-06-10 | Stop reason: SURG

## 2021-06-10 RX ORDER — MEPERIDINE HYDROCHLORIDE 25 MG/ML
12.5 INJECTION INTRAMUSCULAR; INTRAVENOUS; SUBCUTANEOUS
Status: DISCONTINUED | OUTPATIENT
Start: 2021-06-10 | End: 2021-06-10 | Stop reason: HOSPADM

## 2021-06-10 RX ORDER — OXYCODONE HYDROCHLORIDE 5 MG/1
5 TABLET ORAL ONCE AS NEEDED
Status: DISCONTINUED | OUTPATIENT
Start: 2021-06-10 | End: 2021-06-10 | Stop reason: HOSPADM

## 2021-06-10 RX ORDER — PROPOFOL 10 MG/ML
VIAL (ML) INTRAVENOUS AS NEEDED
Status: DISCONTINUED | OUTPATIENT
Start: 2021-06-10 | End: 2021-06-10 | Stop reason: SURG

## 2021-06-10 RX ORDER — SODIUM CHLORIDE 0.9 % (FLUSH) 0.9 %
10 SYRINGE (ML) INJECTION EVERY 12 HOURS SCHEDULED
Status: DISCONTINUED | OUTPATIENT
Start: 2021-06-10 | End: 2021-06-10 | Stop reason: HOSPADM

## 2021-06-10 RX ORDER — ONDANSETRON 2 MG/ML
4 INJECTION INTRAMUSCULAR; INTRAVENOUS ONCE AS NEEDED
Status: DISCONTINUED | OUTPATIENT
Start: 2021-06-10 | End: 2021-06-10 | Stop reason: HOSPADM

## 2021-06-10 RX ORDER — ROCURONIUM BROMIDE 10 MG/ML
INJECTION, SOLUTION INTRAVENOUS AS NEEDED
Status: DISCONTINUED | OUTPATIENT
Start: 2021-06-10 | End: 2021-06-10 | Stop reason: SURG

## 2021-06-10 RX ORDER — GLYCOPYRROLATE 0.2 MG/ML
0.2 INJECTION INTRAMUSCULAR; INTRAVENOUS
Status: COMPLETED | OUTPATIENT
Start: 2021-06-10 | End: 2021-06-10

## 2021-06-10 RX ADMIN — ROCURONIUM BROMIDE 50 MG: 10 INJECTION INTRAVENOUS at 09:35

## 2021-06-10 RX ADMIN — ONDANSETRON 4 MG: 2 INJECTION INTRAMUSCULAR; INTRAVENOUS at 10:17

## 2021-06-10 RX ADMIN — GLYCOPYRROLATE 0.4 MCG: 0.2 INJECTION INTRAMUSCULAR; INTRAVENOUS at 10:46

## 2021-06-10 RX ADMIN — FENTANYL CITRATE 100 MCG: 50 INJECTION INTRAMUSCULAR; INTRAVENOUS at 09:35

## 2021-06-10 RX ADMIN — LIDOCAINE HYDROCHLORIDE 100 MG: 20 INJECTION, SOLUTION INFILTRATION; PERINEURAL at 09:35

## 2021-06-10 RX ADMIN — NEOSTIGMINE METHYLSULFATE 3 MG: 1 INJECTION, SOLUTION INTRAVENOUS at 10:46

## 2021-06-10 RX ADMIN — DEXMEDETOMIDINE HYDROCHLORIDE 20 MCG: 100 INJECTION, SOLUTION INTRAVENOUS at 10:17

## 2021-06-10 RX ADMIN — GLYCOPYRROLATE 0.2 MG: 0.2 INJECTION INTRAMUSCULAR; INTRAVENOUS at 08:51

## 2021-06-10 RX ADMIN — PROPOFOL 200 MG: 10 INJECTION, EMULSION INTRAVENOUS at 09:35

## 2021-06-10 RX ADMIN — DEXMEDETOMIDINE HYDROCHLORIDE 20 MCG: 100 INJECTION, SOLUTION INTRAVENOUS at 09:51

## 2021-06-10 RX ADMIN — DEXAMETHASONE SODIUM PHOSPHATE 4 MG: 4 INJECTION INTRA-ARTICULAR; INTRALESIONAL; INTRAMUSCULAR; INTRAVENOUS; SOFT TISSUE at 09:40

## 2021-06-10 RX ADMIN — MIDAZOLAM HYDROCHLORIDE 6 MG: 1 INJECTION, SOLUTION INTRAMUSCULAR; INTRAVENOUS at 08:25

## 2021-06-10 RX ADMIN — SODIUM CHLORIDE, POTASSIUM CHLORIDE, SODIUM LACTATE AND CALCIUM CHLORIDE: 600; 310; 30; 20 INJECTION, SOLUTION INTRAVENOUS at 09:28

## 2021-06-10 RX ADMIN — SODIUM CHLORIDE, POTASSIUM CHLORIDE, SODIUM LACTATE AND CALCIUM CHLORIDE 9 ML/HR: 600; 310; 30; 20 INJECTION, SOLUTION INTRAVENOUS at 11:39

## 2021-06-10 RX ADMIN — BUPIVACAINE HYDROCHLORIDE 35 ML: 5 INJECTION, SOLUTION PERINEURAL at 08:48

## 2021-06-10 RX ADMIN — CLINDAMYCIN IN 5 PERCENT DEXTROSE 900 MG: 18 INJECTION, SOLUTION INTRAVENOUS at 09:38

## 2021-06-10 RX ADMIN — ACETAMINOPHEN 1000 MG: 500 TABLET ORAL at 08:24

## 2021-06-10 NOTE — ANESTHESIA PROCEDURE NOTES
Peripheral Block    Pre-sedation assessment completed: 6/10/2021 8:22 AM    Patient reassessed immediately prior to procedure    Patient location during procedure: pre-op  Start time: 6/10/2021 8:23 AM  Stop time: 6/10/2021 8:45 AM  Reason for block: at surgeon's request and post-op pain management  Performed by  Anesthesiologist: Luciano Solano MD  Preanesthetic Checklist  Completed: patient identified, IV checked, site marked, risks and benefits discussed, surgical consent, monitors and equipment checked, pre-op evaluation and timeout performed  Prep:  Pt Position: supine (HOB elevated)  Sterile barriers:cap, washed/disinfected hands, sterile barriers, gloves, mask, partial drape and alcohol skin prep  Prep: ChloraPrep  Patient monitoring: blood pressure monitoring, continuous pulse oximetry and EKG  Procedure  Sedation:yes  Performed under: local infiltration  Guidance:ultrasound guided and nerve stimulator  ULTRASOUND INTERPRETATION.  Using ultrasound guidance a 22 G gauge needle was placed in close proximity to the brachial plexus nerve, at which point, under ultrasound guidance anesthetic was injected in the area of the nerve and spread of the anesthesia was seen on ultrasound in close proximity thereto.  There were no abnormalities seen on ultrasound; a digital image was taken; and the patient tolerated the procedure with no complications. Images:still images obtained, printed/placed on chart    Laterality:right  Block Type:interscalene  Injection Technique:single-shot  Needle Type:echogenic  Needle Gauge:22 G (2in)  Resistance on Injection: none    Medications Used: bupivacaine (MARCAINE) 0.5 % injection, 35 mL      Medications  Comment:precedex 50mcg added to solution, negative iv test dose negative sa, no HR changes    Post Assessment  Injection Assessment: negative aspiration for heme, no paresthesia on injection and incremental injection  Patient Tolerance:comfortable throughout  block  Complications:no  Additional Notes  The block or continuous infusion is requested by the referring physician for management of postoperative pain, or pain related to a procedure. Ultrasound guidance (deemed medically necessary). Painless injection, pt was awake and conversant during the procedure without complications. Needle and surrounding structures visualized throughout procedure. No adverse reactions or complications seen during this period. Post-procedure image showed no signs of complication, and anatomy was consistent with an uncomplicated nerve blockade.

## 2021-06-10 NOTE — ANESTHESIA POSTPROCEDURE EVALUATION
Patient: Melvina Martinez    Procedure Summary     Date: 06/10/21 Room / Location: [facility] OR [address] [place] [address] OR [facility]    Anesthesia Start: 0928 Anesthesia Stop: 1050    Procedures:       LEFT SHOULDER ARTHROSCOPY (Left Shoulder)      SUBACROMIAL DECOMPRESSION, DISTAL CLAVICULECTOMY AND ROTATOR CUFF REPAIR (Left Shoulder) Diagnosis: (LEFT SHOULDER ROTATOR CUFF TEAR)    Surgeons: Дмитрий Francois MD Provider: Luciano Solano MD    Anesthesia Type: general with block ASA Status: 2          Anesthesia Type: general with block    Vitals  Vitals Value Taken Time   /71 06/10/21 1058   Temp     Pulse 70 06/10/21 1059   Resp     SpO2 93 % 06/10/21 1059   Vitals shown include unvalidated device data.        Post Anesthesia Care and Evaluation    Patient location during evaluation: bedside  Patient participation: complete - patient participated  Level of consciousness: awake  Pain score: 0  Pain management: adequate  Airway patency: patent  Anesthetic complications: No anesthetic complications  PONV Status: none  Cardiovascular status: acceptable and stable  Respiratory status: acceptable and room air  Hydration status: acceptable

## 2021-06-10 NOTE — ANESTHESIA PROCEDURE NOTES
Airway  Urgency: elective    Date/Time: 6/10/2021 9:37 AM    General Information and Staff    Patient location during procedure: OR    Indications and Patient Condition    Preoxygenated: yes  Mask difficulty assessment: 1 - vent by mask    Final Airway Details  Final airway type: endotracheal airway      Successful airway: ETT    Successful intubation technique: direct laryngoscopy  Blade: Meier  Blade size: 2  ETT size (mm): 7.0

## 2021-06-11 ENCOUNTER — TELEPHONE (OUTPATIENT)
Dept: INTERNAL MEDICINE | Facility: CLINIC | Age: 61
End: 2021-06-11

## 2021-06-11 RX ORDER — PANTOPRAZOLE SODIUM 40 MG/1
40 TABLET, DELAYED RELEASE ORAL DAILY
Qty: 30 TABLET | Refills: 1 | Status: SHIPPED | OUTPATIENT
Start: 2021-06-11 | End: 2021-06-14 | Stop reason: SDUPTHER

## 2021-06-11 NOTE — TELEPHONE ENCOUNTER
Caller: PATRICIO RAI    Relationship: Emergency Contact    Best call back number: [phone]     Medication needed:   Requested Prescriptions     Pending Prescriptions Disp Refills   • pantoprazole (PROTONIX) 40 MG EC tablet       Sig: Take 1 tablet by mouth Daily.       When do you need the refill by: ASAP    Does the patient have less than a 3 day supply:  [] Yes  [x] No    What is the patient's preferred pharmacy:    [facility]  [address] [place] [phone]

## 2021-06-15 RX ORDER — PANTOPRAZOLE SODIUM 40 MG/1
40 TABLET, DELAYED RELEASE ORAL DAILY
Qty: 30 TABLET | Refills: 1 | Status: SHIPPED | OUTPATIENT
Start: 2021-06-15 | End: 2021-07-09 | Stop reason: SDUPTHER

## 2021-06-19 NOTE — ANESTHESIA PREPROCEDURE EVALUATION
Anesthesia Evaluation     NPO Solid Status: > 8 hours  NPO Liquid Status: > 8 hours           Airway   Mallampati: I  TM distance: >3 FB  Neck ROM: full  No difficulty expected  Dental - normal exam     Pulmonary - normal exam   Cardiovascular - normal exam    (+) hypertension well controlled,       Neuro/Psych  (+) headaches, psychiatric history,     GI/Hepatic/Renal/Endo    (+)   renal disease, diabetes mellitus type 2,     Musculoskeletal     Abdominal    Substance History      OB/GYN          Other   arthritis,                      Anesthesia Plan    ASA 2     general with block     intravenous induction         
room air

## 2021-06-23 ENCOUNTER — OFFICE VISIT (OUTPATIENT)
Dept: ORTHOPEDIC SURGERY | Facility: CLINIC | Age: 61
End: 2021-06-23

## 2021-06-23 VITALS — WEIGHT: 202.4 LBS | HEART RATE: 96 BPM | BODY MASS INDEX: 33.72 KG/M2 | OXYGEN SATURATION: 96 % | HEIGHT: 65 IN

## 2021-06-23 DIAGNOSIS — M25.512 ACUTE PAIN OF LEFT SHOULDER: ICD-10-CM

## 2021-06-23 DIAGNOSIS — Z47.89 AFTERCARE FOLLOWING SURGERY OF THE MUSCULOSKELETAL SYSTEM: Primary | ICD-10-CM

## 2021-06-23 PROCEDURE — 99024 POSTOP FOLLOW-UP VISIT: CPT | Performed by: PHYSICIAN ASSISTANT

## 2021-06-23 RX ORDER — AMLODIPINE BESYLATE 2.5 MG/1
TABLET ORAL
COMMUNITY
Start: 2021-04-22 | End: 2021-07-13 | Stop reason: SDUPTHER

## 2021-06-23 RX ORDER — EMPAGLIFLOZIN 10 MG/1
TABLET, FILM COATED ORAL
COMMUNITY
Start: 2021-04-19 | End: 2021-07-09 | Stop reason: SDUPTHER

## 2021-06-23 RX ORDER — VERAPAMIL HYDROCHLORIDE 120 MG/1
120 TABLET, FILM COATED ORAL DAILY
COMMUNITY

## 2021-06-23 RX ORDER — ARIPIPRAZOLE 10 MG/1
TABLET ORAL
COMMUNITY
End: 2021-07-09

## 2021-06-23 RX ORDER — QUETIAPINE FUMARATE 100 MG/1
50 TABLET, FILM COATED ORAL 2 TIMES DAILY
COMMUNITY
End: 2022-04-14 | Stop reason: SDDI

## 2021-06-23 RX ORDER — PRAZOSIN HYDROCHLORIDE 2 MG/1
4 CAPSULE ORAL NIGHTLY
COMMUNITY

## 2021-06-23 RX ORDER — ONDANSETRON 4 MG/1
4 TABLET, ORALLY DISINTEGRATING ORAL EVERY 6 HOURS PRN
Status: DISCONTINUED | OUTPATIENT
Start: 2021-06-23 | End: 2021-06-23

## 2021-06-23 RX ORDER — LAMOTRIGINE 100 MG/1
100 TABLET ORAL 2 TIMES DAILY
COMMUNITY

## 2021-06-23 RX ORDER — TOPIRAMATE 50 MG/1
50 TABLET, FILM COATED ORAL 2 TIMES DAILY
COMMUNITY

## 2021-06-23 RX ORDER — SUMATRIPTAN 5 MG/1
SPRAY NASAL
COMMUNITY
Start: 2021-02-09 | End: 2021-08-30 | Stop reason: SDUPTHER

## 2021-06-23 RX ORDER — DULOXETIN HYDROCHLORIDE 30 MG/1
CAPSULE, DELAYED RELEASE ORAL
COMMUNITY
Start: 2021-05-14 | End: 2021-07-09 | Stop reason: SDUPTHER

## 2021-06-23 RX ORDER — ONDANSETRON 4 MG/1
4 TABLET, ORALLY DISINTEGRATING ORAL EVERY 6 HOURS PRN
Status: DISCONTINUED | OUTPATIENT
Start: 2021-06-23 | End: 2021-10-03

## 2021-06-23 RX ORDER — HYDROXYZINE HYDROCHLORIDE 25 MG/1
25 TABLET, FILM COATED ORAL
COMMUNITY
End: 2021-07-09

## 2021-06-23 NOTE — PROGRESS NOTES
"Chief Complaint  Pain and Follow-up of the Left Shoulder and Suture / Staple Removal    Subjective          Melvina Martinez presents to [facility] for follow-up 2 weeks postop from left shoulder SAD, DCR and mini open rotator cuff repair performed by Dr. Francois 6/10/2021.  Patient states she was doing well, taking Norco for pain and occasionally coming out of the sling to work on shoulder pendulums.  She states that a few days ago she bent over without thinking, had her sling on, but went to  her 40 pound-year-old granddaughter, felt a pop deep in her shoulder and has had worsening pain since.  Patient is also complaining of right shoulder decreased range of motion over the past few months.  She had a arthroscopic procedure without rotator cuff repair to the right shoulder performed by Dr. Francois previously.    Objective   Vital Signs:   Pulse 96   Ht 165.1 cm (65\")   Wt 91.8 kg (202 lb 6.4 oz)   SpO2 96%   BMI 33.68 kg/m²       Physical Exam  Constitutional:       Appearance: Normal appearance. He is well-developed and normal weight.   HENT:      Head: Normocephalic.      Right Ear: Hearing and external ear normal.      Left Ear: Hearing and external ear normal.      Nose: Nose normal.   Eyes:      Conjunctiva/sclera: Conjunctivae normal.   Cardiovascular:      Rate and Rhythm: Normal rate.   Pulmonary:      Effort: Pulmonary effort is normal.      Breath sounds: No wheezing or rales.   Abdominal:      Palpations: Abdomen is soft.      Tenderness: There is no abdominal tenderness.   Musculoskeletal:      Cervical back: Normal range of motion.   Skin:     Findings: No rash.   Neurological:      Mental Status: He is alert and oriented to person, place, and time.   Psychiatric:         Mood and Affect: Mood and affect normal.         Judgment: Judgment normal.     Ortho Exam  Left shoulder: Well-healing surgical incisions without erythema dehiscence or purulent drainage.  Limited " flexion extension abduction internal and external rotation due to discomfort and recent surgery.  Sensation to light touch intact bilateral upper extremities.  Radial pulses 2+ bilaterally.  Full range of motion of left elbow wrist and digits on left upper extremity.  Right shoulder: Skin and neurovascularly intact, flexion to 110 internal rotation to L5, abduction to 140.  Neurovascularly intact.  Full range of motion right elbow wrist and digits on right upper extremity.             Imaging Results (Most Recent)     None                Assessment and Plan    Problem List Items Addressed This Visit        Musculoskeletal and Injuries    Aftercare following surgery of the musculoskeletal system - Primary    Current Assessment & Plan     Continue resting icing and elevating.  Zofran provided for nausea.  Sling recommended for the next 2 to 4 weeks.  Patient to start physical therapy for passive range of motion left shoulder in 1 week per Dr. Francois's request.  Patient will also start doing physical therapy for right shoulder.  Follow-up in 2 to 3 weeks.         Relevant Medications    ondansetron ODT (ZOFRAN-ODT) disintegrating tablet 4 mg    Other Relevant Orders    Ambulatory Referral to Physical Therapy Evaluate and treat, POST OP; Stretching (passive), ROM, Strengthening    Ambulatory Referral to Physical Therapy Evaluate and treat; Stretching (ACTIVE AND PASSIVE), Strengthening    Acute pain of left shoulder          Follow Up   Return in about 4 weeks (around 7/21/2021) for Recheck.  Patient Instructions   Continue resting icing and elevating arm, wear sling for the next 2 to 4 weeks, start physical therapy for left shoulder doing passive range of motion exercises next week.  Begin doing physical therapy for right shoulder.  Return in 2 to 3 weeks.    Patient was given instructions and counseling regarding her condition or for health maintenance advice. Please see specific information pulled into the AVS if  appropriate.

## 2021-06-23 NOTE — ASSESSMENT & PLAN NOTE
Continue resting icing and elevating.  Zofran provided for nausea.  Sling recommended for the next 2 to 4 weeks.  Patient to start physical therapy for passive range of motion left shoulder in 1 week per Dr. Francois's request.  Patient will also start doing physical therapy for right shoulder.  Follow-up in 2 to 3 weeks.

## 2021-06-23 NOTE — PATIENT INSTRUCTIONS
Continue resting icing and elevating arm, wear sling for the next 2 to 4 weeks, start physical therapy for left shoulder doing passive range of motion exercises next week.  Begin doing physical therapy for right shoulder.  Return in 2 to 3 weeks.

## 2021-06-29 ENCOUNTER — TELEPHONE (OUTPATIENT)
Dept: ORTHOPEDIC SURGERY | Facility: CLINIC | Age: 61
End: 2021-06-29

## 2021-07-02 DIAGNOSIS — Z98.890 S/P ARTHROSCOPY OF LEFT SHOULDER: Primary | ICD-10-CM

## 2021-07-02 RX ORDER — HYDROCODONE BITARTRATE AND ACETAMINOPHEN 7.5; 325 MG/1; MG/1
1 TABLET ORAL EVERY 6 HOURS PRN
Qty: 30 TABLET | Refills: 0 | Status: SHIPPED | OUTPATIENT
Start: 2021-07-02 | End: 2021-07-09

## 2021-07-02 NOTE — TELEPHONE ENCOUNTER
PATIENT ONLY HAS 2 PILLS LEFT OF NORCO 7.5 SHE HAD SURG ON 6/10/21. SHE HAD A RE-INJURY AND THERAPY REALLY HURTS HER SHE HAS A FOLLOW UP ON 7/12 THE SOONEST WITH DAI. CAN SHE GET A REFILL. PHARMACY IS [facility].  SINCE DR CORDERO IS OUT AND PATIENT HAS BEEN HAVING INCREASED PAIN SINCE 6/30 CAN DR MCKENZIE LAKHANI.

## 2021-07-09 RX ORDER — PANTOPRAZOLE SODIUM 40 MG/1
40 TABLET, DELAYED RELEASE ORAL DAILY
Qty: 30 TABLET | Refills: 1 | Status: SHIPPED | OUTPATIENT
Start: 2021-07-09 | End: 2021-09-09 | Stop reason: SDUPTHER

## 2021-07-09 RX ORDER — DULOXETIN HYDROCHLORIDE 30 MG/1
30 CAPSULE, DELAYED RELEASE ORAL DAILY
Qty: 90 CAPSULE | Refills: 0 | Status: SHIPPED | OUTPATIENT
Start: 2021-07-09 | End: 2021-10-25 | Stop reason: SDUPTHER

## 2021-07-09 NOTE — TELEPHONE ENCOUNTER
Caller: Melvina Martinez    Relationship: Self    Best call back number:[phone]  Medication needed:   pantoprazole (PROTONIX) 40 MG EC tablet  DULoxetine (Cymbalta) 30 MG capsule  When do you need the refill by: ASAP   What additional details did the patient provide when requesting the medication: WANTS 90 DAY SUPPLY IS POSSIBLE     Does the patient have less than a 3 day supply:  [x] Yes  [] No    What is the patient's preferred pharmacy: [facility] [place] [facility] [place]  [address] [phone] [facility] [phone]

## 2021-07-12 ENCOUNTER — OFFICE VISIT (OUTPATIENT)
Dept: ORTHOPEDIC SURGERY | Facility: CLINIC | Age: 61
End: 2021-07-12

## 2021-07-12 VITALS — HEART RATE: 76 BPM | HEIGHT: 65 IN | OXYGEN SATURATION: 98 % | WEIGHT: 205 LBS | BODY MASS INDEX: 34.16 KG/M2

## 2021-07-12 DIAGNOSIS — Z47.89 AFTERCARE FOLLOWING SURGERY OF THE MUSCULOSKELETAL SYSTEM: ICD-10-CM

## 2021-07-12 DIAGNOSIS — M25.512 ACUTE PAIN OF LEFT SHOULDER: Primary | ICD-10-CM

## 2021-07-12 PROCEDURE — 99024 POSTOP FOLLOW-UP VISIT: CPT | Performed by: PHYSICIAN ASSISTANT

## 2021-07-12 NOTE — ASSESSMENT & PLAN NOTE
Patient will obtain MRI, recommend she continue with PT and home exercises, follow-up after MRI.   Been saw and evaluated this patient today.

## 2021-07-12 NOTE — PROGRESS NOTES
"Chief Complaint  Pain of the Left Shoulder    Subjective          Melvina Martinez presents to [facility] for left shoulder pain.  Patient is roughly 6 weeks postop left shoulder SCD DCR and mini open RCR performed by Dr. Francois 6/10/2021.  At her last visit she admitted to reaching down to  her 40-year-old granddaughter while she was in a sling, she felt a pop deep in her shoulder and has had anterior shoulder pain since.  She has been to 6 physical therapy visits and states that her range of motion is declining.    Objective   Vital Signs:   Pulse 76   Ht 165.1 cm (65\")   Wt 93 kg (205 lb)   SpO2 98%   BMI 34.11 kg/m²       Physical Exam  Constitutional:       Appearance: Normal appearance. He is well-developed and normal weight.   HENT:      Head: Normocephalic.      Right Ear: Hearing and external ear normal.      Left Ear: Hearing and external ear normal.      Nose: Nose normal.   Eyes:      Conjunctiva/sclera: Conjunctivae normal.   Cardiovascular:      Rate and Rhythm: Normal rate.   Pulmonary:      Effort: Pulmonary effort is normal.      Breath sounds: No wheezing or rales.   Abdominal:      Palpations: Abdomen is soft.      Tenderness: There is no abdominal tenderness.   Musculoskeletal:      Cervical back: Normal range of motion.   Skin:     Findings: No rash.   Neurological:      Mental Status: He is alert and oriented to person, place, and time.   Psychiatric:         Mood and Affect: Mood and affect normal.         Judgment: Judgment normal.     Ortho Exam  Left shoulder: Well-healing surgical incisions without erythema dehiscence or purulent drainage, patient has extremely limited range of motion, unable flex shoulder past 20 degrees, abduction to 20 degrees, refuses to internally rotate the arm due to pain.  Sensation to light touch is intact.  Full range of motion left elbow wrist and digits on the extremity.  Radial pulse 2+ bilaterally.  Result Review :        "     Imaging Results (Most Recent)     None                Assessment and Plan    Problem List Items Addressed This Visit        Musculoskeletal and Injuries    Aftercare following surgery of the musculoskeletal system    Current Assessment & Plan     Patient will obtain MRI, recommend she continue with PT and home exercises, follow-up after MRI.  Dr. Francois saw and evaluated this patient today.         Acute pain of left shoulder - Primary    Relevant Orders    MRI Shoulder Left Without Contrast          Follow Up   Return for After MRI.  Patient Instructions   Continue home exercises and PT to keep shoulder range of motion.  Follow-up after MRI.    Patient was given instructions and counseling regarding her condition or for health maintenance advice. Please see specific information pulled into the AVS if appropriate.

## 2021-07-13 RX ORDER — AMLODIPINE BESYLATE 2.5 MG/1
2.5 TABLET ORAL DAILY
Qty: 90 TABLET | Refills: 1 | Status: SHIPPED | OUTPATIENT
Start: 2021-07-13 | End: 2021-10-29 | Stop reason: HOSPADM

## 2021-07-13 NOTE — TELEPHONE ENCOUNTER
Caller: Melvina Martinez    Relationship: Self    Best call back number: [phone]    Medication needed:   Requested Prescriptions     Pending Prescriptions Disp Refills   • amLODIPine (NORVASC) 2.5 MG tablet         When do you need the refill by: ONE WEEK LEFT.    What additional details did the patient provide when requesting the medication: PATIENT IS REQUESTING 3 MO SUPPLY    Does the patient have less than a 3 day supply:  [] Yes  [x] No    What is the patient's preferred pharmacy: [facility]  [place] - [address] [phone] [facility] [phone]   [phone]

## 2021-07-15 VITALS — WEIGHT: 209.12 LBS | HEART RATE: 90 BPM | BODY MASS INDEX: 34.84 KG/M2 | HEIGHT: 65 IN | OXYGEN SATURATION: 97 %

## 2021-07-15 VITALS
TEMPERATURE: 97.3 F | SYSTOLIC BLOOD PRESSURE: 118 MMHG | HEIGHT: 65 IN | DIASTOLIC BLOOD PRESSURE: 82 MMHG | OXYGEN SATURATION: 97 % | BODY MASS INDEX: 34.32 KG/M2 | WEIGHT: 206 LBS | HEART RATE: 83 BPM

## 2021-07-21 ENCOUNTER — TELEPHONE (OUTPATIENT)
Dept: INTERNAL MEDICINE | Facility: CLINIC | Age: 61
End: 2021-07-21

## 2021-07-21 RX ORDER — LANCETS 30 GAUGE
2 EACH MISCELLANEOUS 2 TIMES DAILY
Qty: 60 EACH | Refills: 0 | Status: SHIPPED | OUTPATIENT
Start: 2021-07-21 | End: 2021-12-13 | Stop reason: SDUPTHER

## 2021-07-21 NOTE — TELEPHONE ENCOUNTER
Caller: Melvina Martinez    Relationship: Self    Best call back number:[phone]    What is the best time to reach you: ANYTIME    Who are you requesting to speak with (clinical staff, provider,  specific staff member): CLINICAL      What was the call regarding: PATIENT HAS BEEN HAVING AN EXTREMELY DRY MOUTH LATELY. SHE DRINKS PLENTY OF WATER AND IT IS STILL SUPER DRY. IS THERE SOMETHING PATIENT CAN TAKE OR TRY?    Do you require a callback: YES

## 2021-07-21 NOTE — TELEPHONE ENCOUNTER
Patient is in [place], please send blood glucose kit and supplies to pharmacy in [place]. Scheduled an appt with Dr. Traylor on Monday and patient is aware to head to hospital if any abdominal pain, vomiting, diarrhea, or other symptoms arise.

## 2021-07-21 NOTE — TELEPHONE ENCOUNTER
Isabela Nelson patient  Last Seen 5/28/21, last labs 4/13/21    Patient has been urinating a lot and also dry mouth, dad has Diabetes and is currently on Jardiance but doesn't check her sugar levels or anything. Please advise.

## 2021-07-21 NOTE — TELEPHONE ENCOUNTER
Pt should be seen asap for an appointment to discuss sugar levels further.  If she is acutely worried she can come in for a nurse visit to get her sugar checked quickly and we can refill her meter.  If vomiting or diarrhea or abd pain could be headed into DKA which would be more emergent

## 2021-07-26 ENCOUNTER — OFFICE VISIT (OUTPATIENT)
Dept: INTERNAL MEDICINE | Facility: CLINIC | Age: 61
End: 2021-07-26

## 2021-07-26 ENCOUNTER — HOSPITAL ENCOUNTER (OUTPATIENT)
Dept: MRI IMAGING | Facility: HOSPITAL | Age: 61
Discharge: HOME | End: 2021-07-26
Admitting: PHYSICIAN ASSISTANT

## 2021-07-26 VITALS
HEIGHT: 65 IN | WEIGHT: 207.38 LBS | SYSTOLIC BLOOD PRESSURE: 110 MMHG | BODY MASS INDEX: 34.55 KG/M2 | HEART RATE: 115 BPM | TEMPERATURE: 98 F | OXYGEN SATURATION: 98 % | DIASTOLIC BLOOD PRESSURE: 78 MMHG

## 2021-07-26 DIAGNOSIS — M25.512 ACUTE PAIN OF LEFT SHOULDER: ICD-10-CM

## 2021-07-26 DIAGNOSIS — E11.9 TYPE 2 DIABETES MELLITUS WITHOUT COMPLICATION, WITHOUT LONG-TERM CURRENT USE OF INSULIN (HCC): Primary | ICD-10-CM

## 2021-07-26 PROBLEM — I10 HYPERTENSION: Status: ACTIVE | Noted: 2021-07-26

## 2021-07-26 PROBLEM — F43.10 POSTTRAUMATIC STRESS DISORDER: Status: ACTIVE | Noted: 2021-07-26

## 2021-07-26 PROBLEM — E66.01 MORBID OBESITY (HCC): Status: ACTIVE | Noted: 2017-11-20

## 2021-07-26 PROBLEM — F31.9 BIPOLAR DISORDER: Status: ACTIVE | Noted: 2021-07-26

## 2021-07-26 LAB
ALBUMIN SERPL-MCNC: 4 G/DL (ref 3.5–5.2)
ALBUMIN/GLOB SERPL: 1.3 G/DL
ALP SERPL-CCNC: 91 U/L (ref 39–117)
ALT SERPL W P-5'-P-CCNC: 13 U/L (ref 1–33)
ANION GAP SERPL CALCULATED.3IONS-SCNC: 8.4 MMOL/L (ref 5–15)
AST SERPL-CCNC: 13 U/L (ref 1–32)
BILIRUB SERPL-MCNC: 0.2 MG/DL (ref 0–1.2)
BUN SERPL-MCNC: 15 MG/DL (ref 6–20)
BUN/CREAT SERPL: 16.9 (ref 7–25)
CALCIUM SPEC-SCNC: 9.2 MG/DL (ref 8.6–10.5)
CHLORIDE SERPL-SCNC: 108 MMOL/L (ref 98–107)
CO2 SERPL-SCNC: 24.6 MMOL/L (ref 22–29)
CREAT SERPL-MCNC: 0.89 MG/DL (ref 0.57–1)
GFR SERPL CREATININE-BSD FRML MDRD: 65 ML/MIN/1.73
GLOBULIN UR ELPH-MCNC: 3 GM/DL
GLUCOSE SERPL-MCNC: 103 MG/DL (ref 65–99)
HBA1C MFR BLD: 5.27 % (ref 4.8–5.6)
POTASSIUM SERPL-SCNC: 4.5 MMOL/L (ref 3.5–5.2)
PROT SERPL-MCNC: 7 G/DL (ref 6–8.5)
SODIUM SERPL-SCNC: 141 MMOL/L (ref 136–145)

## 2021-07-26 PROCEDURE — 36415 COLL VENOUS BLD VENIPUNCTURE: CPT | Performed by: INTERNAL MEDICINE

## 2021-07-26 PROCEDURE — 80053 COMPREHEN METABOLIC PANEL: CPT | Performed by: INTERNAL MEDICINE

## 2021-07-26 PROCEDURE — 83036 HEMOGLOBIN GLYCOSYLATED A1C: CPT | Performed by: INTERNAL MEDICINE

## 2021-07-26 PROCEDURE — 73221 MRI JOINT UPR EXTREM W/O DYE: CPT | Performed by: RADIOLOGY

## 2021-07-26 PROCEDURE — 73221 MRI JOINT UPR EXTREM W/O DYE: CPT

## 2021-07-26 PROCEDURE — 99213 OFFICE O/P EST LOW 20 MIN: CPT | Performed by: INTERNAL MEDICINE

## 2021-07-28 ENCOUNTER — OFFICE VISIT (OUTPATIENT)
Dept: ORTHOPEDIC SURGERY | Facility: CLINIC | Age: 61
End: 2021-07-28

## 2021-07-28 VITALS — HEIGHT: 65 IN | BODY MASS INDEX: 34.85 KG/M2 | OXYGEN SATURATION: 98 % | HEART RATE: 88 BPM | WEIGHT: 209.2 LBS

## 2021-07-28 DIAGNOSIS — M25.611 DECREASED RIGHT SHOULDER RANGE OF MOTION: ICD-10-CM

## 2021-07-28 DIAGNOSIS — Z47.89 AFTERCARE FOLLOWING SURGERY OF THE MUSCULOSKELETAL SYSTEM: Primary | ICD-10-CM

## 2021-07-28 DIAGNOSIS — M75.101 TEAR OF RIGHT SUPRASPINATUS TENDON: ICD-10-CM

## 2021-07-28 PROCEDURE — 99024 POSTOP FOLLOW-UP VISIT: CPT | Performed by: PHYSICIAN ASSISTANT

## 2021-07-28 NOTE — PROGRESS NOTES
"Chief Complaint  Pain of the Left Shoulder    Subjective          Melvina Martinez presents to [facility] for follow-up on left shoulder.  She is status post left shoulder arthroscopy with subacromial decompression, distal clavicle resection and mini open rotator cuff repair of the supraspinatus tendon performed by Dr. Francois 6/10/2021.  Several weeks ago the patient admitted to reaching down to  her 4-year-old granddaughter, patient states she was wearing the sling at that time but felt a deep pop in her shoulder and has had anterior shoulder pain since.  She notes significant pain and decreased range of motion.  At her last visit an MRI of the left shoulder was ordered and patient follows up today with the results.  She presents wearing a sling today.    Objective   Vital Signs:   Pulse 88   Ht 165.1 cm (65\")   Wt 94.9 kg (209 lb 3.2 oz)   SpO2 98%   BMI 34.81 kg/m²       Physical Exam  Constitutional:       Appearance: Normal appearance. He is well-developed and normal weight.   HENT:      Head: Normocephalic.      Right Ear: Hearing and external ear normal.      Left Ear: Hearing and external ear normal.      Nose: Nose normal.   Eyes:      Conjunctiva/sclera: Conjunctivae normal.   Cardiovascular:      Rate and Rhythm: Normal rate.   Pulmonary:      Effort: Pulmonary effort is normal.      Breath sounds: No wheezing or rales.   Abdominal:      Palpations: Abdomen is soft.      Tenderness: There is no abdominal tenderness.   Musculoskeletal:      Cervical back: Normal range of motion.   Skin:     Findings: No rash.   Neurological:      Mental Status: He is alert and oriented to person, place, and time.   Psychiatric:         Mood and Affect: Mood and affect normal.         Judgment: Judgment normal.     Ortho Exam  Left shoulder: Significant tenderness to palpation of the left anterior shoulder.  Mild swelling, skin intact with well-healing surgical incisions.  Passive flexion " to 110, abduction to 40, internal rotation to the lateral aspect of the hip.  Patient winces in pain with range of motion of the shoulder.  Sensation to light touch intact, radial pulses 2+, good range of motion left elbow wrist and digits.  Result Review :            Imaging Results (Most Recent)     None       MRI Shoulder Left Without Contrast    Result Date: 7/26/2021  Narrative: PROCEDURE: MRI SHOULDER LEFT WO CONTRAST  COMPARISON: [facility], MR, MRI LEFT SHOULDER WO CONTRAST, 4/29/2021, 10:59.  INDICATIONS: SEVERE LEFT SHOULDER PAIN, WITH LIMITED ROM. POST LEFT SHOULDER SURGERY JUNE 2021.  TECHNIQUE: A variety of imaging planes and parameters were utilized for visualization of suspected pathology.  Images were performed without contrast.   FINDINGS:  The examination is limited by patient motion artifact.  Marrow edema in the humeral head is favored to be postsurgical.  No fracture or malalignment is seen.  There appears to have been a previous distal clavicle ectomy.  Correlate with surgical history.  Marrow edema in the distal clavicles favored to be postsurgical in etiology.  Fluid is noted in the subdeltoid/subacromial bursa.  Intermediate signal abnormality in the supraspinatus tendon is consistent with tendinosis.  There is a partial thickness bursal surface tear of the distal supraspinatus tendon at the humeral insertion.  The tear measures 0.3 cm AP.  No significant tendon retraction.  The rotator cuff otherwise appears unremarkable.  No significant muscle body atrophy is seen.  The biceps long head tendon and its attachment to the superior labrum are intact.  The generative changes of the labrum are noted.  No acute labral tear is seen.  A small glenohumeral joint effusion is noted.  Cartilage in the joint appears intact.  No loose body is seen.  CONCLUSION:  1. Previous supraspinatus tendon tear.  A small partial thickness bursal surface tear of the distal supraspinatus tendon  remains 2. Subdeltoid/subacromial bursitis 3. Previous distal clavicle ectomy 4. Small glenohumeral joint effusion 5. Mild supraspinatus tendinosis      Craig Bolivar M.D.       Electronically Signed and Approved By: Craig Bolivar M.D. on 7/26/2021 at 15:36                      Assessment and Plan    Problem List Items Addressed This Visit        Musculoskeletal and Injuries    Aftercare following surgery of the musculoskeletal system - Primary    Current Assessment & Plan     Dr. Francois and myself both evaluated this patient today.  MRI reviewed with the patient.  Recommend continuation of PT and home exercises, resting icing and elevating, Tylenol and ibuprofen for pain relief.  We will follow up in 5 weeks time for recheck or sooner if patient experiences any new or worsening symptoms.         Decreased right shoulder range of motion    Tear of right supraspinatus tendon          Follow Up   Return in about 5 weeks (around 9/1/2021) for Recheck.  Patient Instructions   Dr. Francois and myself both evaluated this patient today.  MRI reviewed with the patient.  Recommend continuation of PT and home exercises, resting icing and elevating, Tylenol and ibuprofen for pain relief.  We will follow up in 5 weeks time for recheck or sooner if patient experiences any new or worsening symptoms.    Patient was given instructions and counseling regarding her condition or for health maintenance advice. Please see specific information pulled into the AVS if appropriate.

## 2021-07-28 NOTE — PATIENT INSTRUCTIONS
Dr. Francois and myself both evaluated this patient today.  MRI reviewed with the patient.  Recommend continuation of PT and home exercises, resting icing and elevating, Tylenol and ibuprofen for pain relief.  We will follow up in 5 weeks time for recheck or sooner if patient experiences any new or worsening symptoms.

## 2021-08-30 ENCOUNTER — TELEMEDICINE (OUTPATIENT)
Dept: INTERNAL MEDICINE | Facility: CLINIC | Age: 61
End: 2021-08-30

## 2021-08-30 DIAGNOSIS — E11.9 TYPE 2 DIABETES MELLITUS WITHOUT COMPLICATION, WITHOUT LONG-TERM CURRENT USE OF INSULIN (HCC): ICD-10-CM

## 2021-08-30 DIAGNOSIS — I10 ESSENTIAL HYPERTENSION: Primary | ICD-10-CM

## 2021-08-30 PROBLEM — G43.909 MIGRAINE: Status: ACTIVE | Noted: 2021-08-30

## 2021-08-30 PROCEDURE — 99214 OFFICE O/P EST MOD 30 MIN: CPT | Performed by: INTERNAL MEDICINE

## 2021-08-30 RX ORDER — SUMATRIPTAN 5 MG/1
1 SPRAY NASAL
Qty: 6 EACH | Refills: 2 | Status: SHIPPED | OUTPATIENT
Start: 2021-08-30

## 2021-08-30 NOTE — PROGRESS NOTES
This was an audio and video enabled telemedicine encounter via [employer].    Chief Complaint  Diabetes and Hypertension    Subjective          Melvina Martinez presents to [facility]  Presents for follow-up of chronic medical conditions and to review labs.    Labs reviewed, CMP normal, A1c 5.2%.  Has been checking her sugars in the morning and states that they usually run around 90.    Needs a refill of her Imitrex sent to [facility].  States that this works well for her.      Objective   Vital Signs:   There were no vitals taken for this visit.    Physical Exam  Constitutional:       Appearance: Normal appearance.   HENT:      Head: Normocephalic and atraumatic.   Eyes:      General: No scleral icterus.        Right eye: No discharge.         Left eye: No discharge.      Conjunctiva/sclera: Conjunctivae normal.   Pulmonary:      Effort: Pulmonary effort is normal.   Skin:     Findings: No lesion or rash.   Neurological:      Mental Status: She is alert and oriented to person, place, and time. Mental status is at baseline.   Psychiatric:         Mood and Affect: Mood normal.         Behavior: Behavior normal.         Thought Content: Thought content normal.         Judgment: Judgment normal.        Result Review :   The following data was reviewed by: Mihaela Traylor MD on 08/30/2021:  CMP    CMP 4/13/21 7/26/21   Glucose  103 (A)   Glucose 73    BUN 17 15   Creatinine 0.93 (A) 0.89   eGFR Non African Am  65   Sodium 140 141   Potassium 4.0 4.5   Chloride 109 108 (A)   Calcium 9.1 9.2   Albumin 4.2 4.00   Total Bilirubin 0.32 0.2   Alkaline Phosphatase 78 91   AST (SGOT) 18 13   ALT (SGPT) 14 13   (A) Abnormal value            CBC    CBC 4/13/21   WBC 6.93   RBC 4.90   Hemoglobin 13.9   Hematocrit 44.6   MCV 91.0   MCH 28.4   MCHC 31.2 (A)   RDW 15.4 (A)   Platelets 238   (A) Abnormal value                    A1C Last 3 Results    HGBA1C Last 3 Results 7/26/21    Hemoglobin A1C 5.27              Procedures      Assessment and Plan    Diagnoses and all orders for this visit:    1. Essential hypertension (Primary)  Assessment & Plan:  Well-controlled at home per patient  Continue current management    Orders:  -     Comprehensive Metabolic Panel; Future  -     CBC & Differential; Future  -     TSH; Future  -     Lipid Panel; Future    2. Type 2 diabetes mellitus without complication, without long-term current use of insulin (CMS/[facility])  Assessment & Plan:  Well-controlled on Jardiance  Continue current management  Repeat labs in 3 months    Orders:  -     Comprehensive Metabolic Panel; Future  -     CBC & Differential; Future  -     TSH; Future  -     Lipid Panel; Future    Other orders  -     SUMAtriptan (Imitrex) 5 MG/ACT nasal spray; 1 spray into the nostril(s) as directed by provider Every 2 (Two) Hours As Needed for Migraine.  Dispense: 6 each; Refill: 2      Follow Up   Return in about 3 months (around 11/30/2021) for Next scheduled follow up, with Isabela Nelson.  Patient was given instructions and counseling regarding her condition or for health maintenance advice. Please see specific information pulled into the AVS if appropriate.

## 2021-09-01 ENCOUNTER — OFFICE VISIT (OUTPATIENT)
Dept: ORTHOPEDIC SURGERY | Facility: CLINIC | Age: 61
End: 2021-09-01

## 2021-09-01 VITALS — BODY MASS INDEX: 35.52 KG/M2 | HEART RATE: 74 BPM | OXYGEN SATURATION: 95 % | WEIGHT: 213.2 LBS | HEIGHT: 65 IN

## 2021-09-01 DIAGNOSIS — Z47.89 AFTERCARE FOLLOWING SURGERY OF THE MUSCULOSKELETAL SYSTEM: Primary | ICD-10-CM

## 2021-09-01 PROCEDURE — 99024 POSTOP FOLLOW-UP VISIT: CPT | Performed by: PHYSICIAN ASSISTANT

## 2021-09-01 NOTE — PROGRESS NOTES
"Chief Complaint  Pain of the Left Shoulder    Subjective          Melvina Martinez presents to [facility] for follow-up on left shoulder status post left shoulder arthroscopy with subacromial decompression, distal clavicle resection and mini open rotator cuff repair of the supraspinatus tendon performed by Dr. Francois 6/10/2021.  Patient states that since her last visit she has made great improvements in both pain and range of motion.  She has been doing physical therapy and home exercises.  She has discontinued use of her sling.  Early on in her postsurgical course the patient accidentally reached down to  her 4-year-old granddaughter and had significant pain in the shoulder, an MRI was obtained showing a 0.3 cm partial-thickness bursal surface tear of the distal supraspinatus tendon at the humerus insertion without significant retraction.  She is very happy that she is feeling and moving her shoulder much better.    Objective   Vital Signs:   Pulse 74   Ht 165.1 cm (65\")   Wt 96.7 kg (213 lb 3.2 oz)   SpO2 95%   BMI 35.48 kg/m²       Physical Exam  Constitutional:       Appearance: Normal appearance. Patient is well-developed and normal weight.   HENT:      Head: Normocephalic.      Right Ear: Hearing and external ear normal.      Left Ear: Hearing and external ear normal.      Nose: Nose normal.   Eyes:      Conjunctiva/sclera: Conjunctivae normal.   Cardiovascular:      Rate and Rhythm: Normal rate.   Pulmonary:      Effort: Pulmonary effort is normal.      Breath sounds: No wheezing or rales.   Abdominal:      Palpations: Abdomen is soft.      Tenderness: There is no abdominal tenderness.   Musculoskeletal:      Cervical back: Normal range of motion.   Skin:     Findings: No rash.   Neurological:      Mental Status: Patient is alert and oriented to person, place, and time.   Psychiatric:         Mood and Affect: Mood and affect normal.         Judgment: Judgment normal. "     Ortho Exam  Left shoulder: Skin intact, well-healed surgical incisions, mild tenderness about the shoulder to palpation, active forward flexion to 90 abduction to 45 and internal rotation to the side of the hip.  Passive range of motion flexion 135 abduction 135, limited external rotation.  Strength 3 out of 5 in the left compared to 5 out of 5 in the right shoulder with resisted shoulder flexion and extension.  Sensation to light touch is intact, radial pulses 2+, good range of motion left elbow wrist and digits.  Result Review :            Imaging Results (Most Recent)     None                Assessment and Plan    Problem List Items Addressed This Visit        Musculoskeletal and Injuries    Aftercare following surgery of the musculoskeletal system - Primary    Current Assessment & Plan     Patient is doing much better, recommend continuation of physical therapy and home exercises.  Tylenol and ibuprofen for pain relief.  Follow-up in 6 weeks for recheck.         Relevant Orders    Ambulatory Referral to Physical Therapy Evaluate and treat, POST OP; Stretching, ROM, Strengthening          Follow Up   Return in about 6 weeks (around 10/13/2021) for Recheck.  Patient Instructions   Patient is doing much better, recommend continuation of physical therapy and home exercises.  Tylenol and ibuprofen for pain relief.  Follow-up in 6 weeks for recheck.    Patient was given instructions and counseling regarding her condition or for health maintenance advice. Please see specific information pulled into the AVS if appropriate.

## 2021-09-01 NOTE — PATIENT INSTRUCTIONS
Patient is doing much better, recommend continuation of physical therapy and home exercises.  Tylenol and ibuprofen for pain relief.  Follow-up in 6 weeks for recheck.

## 2021-09-03 ENCOUNTER — OFFICE VISIT (OUTPATIENT)
Dept: INTERNAL MEDICINE | Facility: CLINIC | Age: 61
End: 2021-09-03

## 2021-09-03 VITALS
TEMPERATURE: 97 F | WEIGHT: 210.8 LBS | SYSTOLIC BLOOD PRESSURE: 130 MMHG | OXYGEN SATURATION: 95 % | BODY MASS INDEX: 35.12 KG/M2 | HEIGHT: 65 IN | DIASTOLIC BLOOD PRESSURE: 72 MMHG | HEART RATE: 84 BPM

## 2021-09-03 DIAGNOSIS — M19.042 PRIMARY OSTEOARTHRITIS OF LEFT HAND: Primary | ICD-10-CM

## 2021-09-03 PROCEDURE — 99213 OFFICE O/P EST LOW 20 MIN: CPT | Performed by: INTERNAL MEDICINE

## 2021-09-09 RX ORDER — PANTOPRAZOLE SODIUM 40 MG/1
40 TABLET, DELAYED RELEASE ORAL DAILY
Qty: 30 TABLET | Refills: 1 | Status: SHIPPED | OUTPATIENT
Start: 2021-09-09 | End: 2021-10-29 | Stop reason: HOSPADM

## 2021-09-09 NOTE — TELEPHONE ENCOUNTER
Caller: Melvina Martinez    Relationship: Self    Best call back number: [phone]    Medication needed:   Requested Prescriptions     Pending Prescriptions Disp Refills   • pantoprazole (PROTONIX) 40 MG EC tablet 30 tablet 1     Sig: Take 1 tablet by mouth Daily.       When do you need the refill by: 09/09/2021      Does the patient have less than a 3 day supply:  [] Yes  [x] No    What is the patient's preferred pharmacy: [facility] [facility] [place] - [address] [facility] [address] [facility] [phone] [facility] [phone]

## 2021-09-17 ENCOUNTER — TRANSCRIBE ORDERS (OUTPATIENT)
Dept: ADMINISTRATIVE | Facility: HOSPITAL | Age: 61
End: 2021-09-17

## 2021-09-17 DIAGNOSIS — N63.0 LUMP IN FEMALE BREAST: Primary | ICD-10-CM

## 2021-09-23 NOTE — TELEPHONE ENCOUNTER
Caller: Melvina Martinez    Relationship: Self      Medication requested (name and dosage): empagliflozin (Jardiance) 10 MG tablet tablet - 90 DAY SUPPLY    Pharmacy where request should be sent: [facility] [place] [facility] [place]  [address] [facility] - [phone] [facility] [phone] FX     Additional details provided by patient: PATIENT ONLY HAS 4 DAYS LEFT    Best call back number: [phone]     Does the patient have less than a 3 day supply:  [] Yes  [x] No    Janet Dwyer Rep   09/23/21 11:07 EDT     PATIENT REQUESTS CALL BACK WHEN PRESCRIPTIONS HAVE BEEN SENT

## 2021-10-03 ENCOUNTER — APPOINTMENT (OUTPATIENT)
Dept: CT IMAGING | Facility: HOSPITAL | Age: 61
End: 2021-10-03

## 2021-10-03 ENCOUNTER — HOSPITAL ENCOUNTER (INPATIENT)
Facility: HOSPITAL | Age: 61
LOS: 1 days | Discharge: HOME | End: 2021-10-07
Attending: EMERGENCY MEDICINE | Admitting: INTERNAL MEDICINE

## 2021-10-03 DIAGNOSIS — R11.2 INTRACTABLE VOMITING WITH NAUSEA, UNSPECIFIED VOMITING TYPE: Primary | ICD-10-CM

## 2021-10-03 DIAGNOSIS — E87.20 LACTIC ACIDOSIS: ICD-10-CM

## 2021-10-03 DIAGNOSIS — K82.8 BILIARY DYSKINESIA: ICD-10-CM

## 2021-10-03 PROBLEM — R11.10 INTRACTABLE VOMITING: Status: ACTIVE | Noted: 2021-10-03

## 2021-10-03 LAB
ALBUMIN SERPL-MCNC: 4.6 G/DL (ref 3.5–5.2)
ALBUMIN/GLOB SERPL: 1.5 G/DL
ALP SERPL-CCNC: 86 U/L (ref 39–117)
ALT SERPL W P-5'-P-CCNC: 14 U/L (ref 1–33)
AMPHET+METHAMPHET UR QL: NEGATIVE
ANION GAP SERPL CALCULATED.3IONS-SCNC: 16.7 MMOL/L (ref 5–15)
AST SERPL-CCNC: 18 U/L (ref 1–32)
BARBITURATES UR QL SCN: NEGATIVE
BASOPHILS # BLD AUTO: 0.04 10*3/MM3 (ref 0–0.2)
BASOPHILS NFR BLD AUTO: 0.5 % (ref 0–1.5)
BENZODIAZ UR QL SCN: NEGATIVE
BILIRUB SERPL-MCNC: 0.5 MG/DL (ref 0–1.2)
BILIRUB UR QL STRIP: NEGATIVE
BUN SERPL-MCNC: 15 MG/DL (ref 8–23)
BUN/CREAT SERPL: 16.5 (ref 7–25)
CALCIUM SPEC-SCNC: 9.7 MG/DL (ref 8.6–10.5)
CANNABINOIDS SERPL QL: NEGATIVE
CHLORIDE SERPL-SCNC: 107 MMOL/L (ref 98–107)
CLARITY UR: CLEAR
CO2 SERPL-SCNC: 16.3 MMOL/L (ref 22–29)
COCAINE UR QL: NEGATIVE
COLOR UR: YELLOW
CREAT SERPL-MCNC: 0.91 MG/DL (ref 0.57–1)
D-LACTATE SERPL-SCNC: 0.9 MMOL/L (ref 0.5–2)
D-LACTATE SERPL-SCNC: 2.2 MMOL/L (ref 0.5–2)
DEPRECATED RDW RBC AUTO: 48.2 FL (ref 37–54)
EOSINOPHIL # BLD AUTO: 0.07 10*3/MM3 (ref 0–0.4)
EOSINOPHIL NFR BLD AUTO: 0.9 % (ref 0.3–6.2)
ERYTHROCYTE [DISTWIDTH] IN BLOOD BY AUTOMATED COUNT: 15.2 % (ref 12.3–15.4)
GFR SERPL CREATININE-BSD FRML MDRD: 63 ML/MIN/1.73
GLOBULIN UR ELPH-MCNC: 3.1 GM/DL
GLUCOSE SERPL-MCNC: 100 MG/DL (ref 65–99)
GLUCOSE UR STRIP-MCNC: ABNORMAL MG/DL
HCT VFR BLD AUTO: 43.5 % (ref 34–46.6)
HGB BLD-MCNC: 14.7 G/DL (ref 12–15.9)
HGB UR QL STRIP.AUTO: NEGATIVE
HOLD SPECIMEN: NORMAL
HOLD SPECIMEN: NORMAL
IMM GRANULOCYTES # BLD AUTO: 0.02 10*3/MM3 (ref 0–0.05)
IMM GRANULOCYTES NFR BLD AUTO: 0.3 % (ref 0–0.5)
KETONES UR QL STRIP: ABNORMAL
LEUKOCYTE ESTERASE UR QL STRIP.AUTO: NEGATIVE
LIPASE SERPL-CCNC: 30 U/L (ref 13–60)
LYMPHOCYTES # BLD AUTO: 3.08 10*3/MM3 (ref 0.7–3.1)
LYMPHOCYTES NFR BLD AUTO: 38.9 % (ref 19.6–45.3)
MCH RBC QN AUTO: 29.4 PG (ref 26.6–33)
MCHC RBC AUTO-ENTMCNC: 33.8 G/DL (ref 31.5–35.7)
MCV RBC AUTO: 87 FL (ref 79–97)
METHADONE UR QL SCN: NEGATIVE
MONOCYTES # BLD AUTO: 0.57 10*3/MM3 (ref 0.1–0.9)
MONOCYTES NFR BLD AUTO: 7.2 % (ref 5–12)
NEUTROPHILS NFR BLD AUTO: 4.13 10*3/MM3 (ref 1.7–7)
NEUTROPHILS NFR BLD AUTO: 52.2 % (ref 42.7–76)
NITRITE UR QL STRIP: NEGATIVE
NRBC BLD AUTO-RTO: 0 /100 WBC (ref 0–0.2)
OPIATES UR QL: POSITIVE
OXYCODONE UR QL SCN: NEGATIVE
PH UR STRIP.AUTO: 7 [PH] (ref 5–8)
PLATELET # BLD AUTO: 233 10*3/MM3 (ref 140–450)
PMV BLD AUTO: 9.6 FL (ref 6–12)
POTASSIUM SERPL-SCNC: 4.4 MMOL/L (ref 3.5–5.2)
PROT SERPL-MCNC: 7.7 G/DL (ref 6–8.5)
PROT UR QL STRIP: NEGATIVE
RBC # BLD AUTO: 5 10*6/MM3 (ref 3.77–5.28)
SODIUM SERPL-SCNC: 140 MMOL/L (ref 136–145)
SP GR UR STRIP: >1.03 (ref 1–1.03)
UROBILINOGEN UR QL STRIP: ABNORMAL
WBC # BLD AUTO: 7.91 10*3/MM3 (ref 3.4–10.8)
WHOLE BLOOD HOLD SPECIMEN: NORMAL
WHOLE BLOOD HOLD SPECIMEN: NORMAL

## 2021-10-03 PROCEDURE — 80307 DRUG TEST PRSMV CHEM ANLYZR: CPT | Performed by: EMERGENCY MEDICINE

## 2021-10-03 PROCEDURE — G0378 HOSPITAL OBSERVATION PER HR: HCPCS

## 2021-10-03 PROCEDURE — 25010000002 ONDANSETRON PER 1 MG: Performed by: EMERGENCY MEDICINE

## 2021-10-03 PROCEDURE — 99284 EMERGENCY DEPT VISIT MOD MDM: CPT

## 2021-10-03 PROCEDURE — 36415 COLL VENOUS BLD VENIPUNCTURE: CPT | Performed by: EMERGENCY MEDICINE

## 2021-10-03 PROCEDURE — 25010000002 METOCLOPRAMIDE PER 10 MG: Performed by: EMERGENCY MEDICINE

## 2021-10-03 PROCEDURE — 83605 ASSAY OF LACTIC ACID: CPT | Performed by: EMERGENCY MEDICINE

## 2021-10-03 PROCEDURE — 25010000002 DIPHENHYDRAMINE PER 50 MG: Performed by: EMERGENCY MEDICINE

## 2021-10-03 PROCEDURE — 74177 CT ABD & PELVIS W/CONTRAST: CPT

## 2021-10-03 PROCEDURE — 99223 1ST HOSP IP/OBS HIGH 75: CPT | Performed by: PHYSICIAN ASSISTANT

## 2021-10-03 PROCEDURE — 85025 COMPLETE CBC W/AUTO DIFF WBC: CPT | Performed by: EMERGENCY MEDICINE

## 2021-10-03 PROCEDURE — 25010000002 HYDROMORPHONE 1 MG/ML SOLUTION: Performed by: EMERGENCY MEDICINE

## 2021-10-03 PROCEDURE — 81003 URINALYSIS AUTO W/O SCOPE: CPT | Performed by: EMERGENCY MEDICINE

## 2021-10-03 PROCEDURE — 80053 COMPREHEN METABOLIC PANEL: CPT | Performed by: EMERGENCY MEDICINE

## 2021-10-03 PROCEDURE — 83690 ASSAY OF LIPASE: CPT | Performed by: EMERGENCY MEDICINE

## 2021-10-03 PROCEDURE — 0 IOPAMIDOL PER 1 ML: Performed by: EMERGENCY MEDICINE

## 2021-10-03 RX ORDER — METOCLOPRAMIDE HYDROCHLORIDE 5 MG/ML
10 INJECTION INTRAMUSCULAR; INTRAVENOUS ONCE
Status: COMPLETED | OUTPATIENT
Start: 2021-10-03 | End: 2021-10-03

## 2021-10-03 RX ORDER — ONDANSETRON 2 MG/ML
4 INJECTION INTRAMUSCULAR; INTRAVENOUS ONCE
Status: COMPLETED | OUTPATIENT
Start: 2021-10-03 | End: 2021-10-03

## 2021-10-03 RX ORDER — DIPHENHYDRAMINE HYDROCHLORIDE 50 MG/ML
25 INJECTION INTRAMUSCULAR; INTRAVENOUS ONCE
Status: COMPLETED | OUTPATIENT
Start: 2021-10-03 | End: 2021-10-03

## 2021-10-03 RX ORDER — POLYETHYLENE GLYCOL 3350 17 G/17G
17 POWDER, FOR SOLUTION ORAL DAILY PRN
Status: DISCONTINUED | OUTPATIENT
Start: 2021-10-03 | End: 2021-10-04

## 2021-10-03 RX ORDER — AMOXICILLIN 250 MG
2 CAPSULE ORAL 2 TIMES DAILY
Status: DISCONTINUED | OUTPATIENT
Start: 2021-10-03 | End: 2021-10-04

## 2021-10-03 RX ORDER — SODIUM CHLORIDE 0.9 % (FLUSH) 0.9 %
10 SYRINGE (ML) INJECTION AS NEEDED
Status: DISCONTINUED | OUTPATIENT
Start: 2021-10-03 | End: 2021-10-07 | Stop reason: HOSPADM

## 2021-10-03 RX ORDER — ONDANSETRON 2 MG/ML
4 INJECTION INTRAMUSCULAR; INTRAVENOUS EVERY 6 HOURS PRN
Status: DISCONTINUED | OUTPATIENT
Start: 2021-10-03 | End: 2021-10-07 | Stop reason: HOSPADM

## 2021-10-03 RX ORDER — PROCHLORPERAZINE EDISYLATE 5 MG/ML
5 INJECTION INTRAMUSCULAR; INTRAVENOUS EVERY 6 HOURS PRN
Status: DISCONTINUED | OUTPATIENT
Start: 2021-10-03 | End: 2021-10-07 | Stop reason: HOSPADM

## 2021-10-03 RX ORDER — ACETAMINOPHEN 325 MG/1
650 TABLET ORAL EVERY 4 HOURS PRN
Status: DISCONTINUED | OUTPATIENT
Start: 2021-10-03 | End: 2021-10-07 | Stop reason: HOSPADM

## 2021-10-03 RX ORDER — ACETAMINOPHEN 160 MG/5ML
650 SOLUTION ORAL EVERY 4 HOURS PRN
Status: DISCONTINUED | OUTPATIENT
Start: 2021-10-03 | End: 2021-10-07 | Stop reason: HOSPADM

## 2021-10-03 RX ORDER — SODIUM CHLORIDE, SODIUM LACTATE, POTASSIUM CHLORIDE, CALCIUM CHLORIDE 600; 310; 30; 20 MG/100ML; MG/100ML; MG/100ML; MG/100ML
100 INJECTION, SOLUTION INTRAVENOUS CONTINUOUS
Status: DISCONTINUED | OUTPATIENT
Start: 2021-10-03 | End: 2021-10-07

## 2021-10-03 RX ORDER — BISACODYL 5 MG/1
5 TABLET, DELAYED RELEASE ORAL DAILY PRN
Status: DISCONTINUED | OUTPATIENT
Start: 2021-10-03 | End: 2021-10-04

## 2021-10-03 RX ORDER — SODIUM CHLORIDE 0.9 % (FLUSH) 0.9 %
10 SYRINGE (ML) INJECTION EVERY 12 HOURS SCHEDULED
Status: DISCONTINUED | OUTPATIENT
Start: 2021-10-03 | End: 2021-10-07 | Stop reason: HOSPADM

## 2021-10-03 RX ORDER — BISACODYL 10 MG
10 SUPPOSITORY, RECTAL RECTAL DAILY PRN
Status: DISCONTINUED | OUTPATIENT
Start: 2021-10-03 | End: 2021-10-04

## 2021-10-03 RX ORDER — ACETAMINOPHEN 650 MG/1
650 SUPPOSITORY RECTAL EVERY 4 HOURS PRN
Status: DISCONTINUED | OUTPATIENT
Start: 2021-10-03 | End: 2021-10-07 | Stop reason: HOSPADM

## 2021-10-03 RX ADMIN — ONDANSETRON 4 MG: 2 INJECTION INTRAMUSCULAR; INTRAVENOUS at 16:06

## 2021-10-03 RX ADMIN — SODIUM CHLORIDE, POTASSIUM CHLORIDE, SODIUM LACTATE AND CALCIUM CHLORIDE 100 ML/HR: 600; 310; 30; 20 INJECTION, SOLUTION INTRAVENOUS at 21:55

## 2021-10-03 RX ADMIN — DIPHENHYDRAMINE HYDROCHLORIDE 25 MG: 50 INJECTION INTRAMUSCULAR; INTRAVENOUS at 19:28

## 2021-10-03 RX ADMIN — METOCLOPRAMIDE 10 MG: 5 INJECTION, SOLUTION INTRAMUSCULAR; INTRAVENOUS at 19:29

## 2021-10-03 RX ADMIN — SODIUM CHLORIDE, PRESERVATIVE FREE 10 ML: 5 INJECTION INTRAVENOUS at 21:55

## 2021-10-03 RX ADMIN — HYDROMORPHONE HYDROCHLORIDE 1 MG: 1 INJECTION, SOLUTION INTRAMUSCULAR; INTRAVENOUS; SUBCUTANEOUS at 17:27

## 2021-10-03 RX ADMIN — DOCUSATE SODIUM 50MG AND SENNOSIDES 8.6MG 2 TABLET: 8.6; 5 TABLET, FILM COATED ORAL at 21:56

## 2021-10-03 RX ADMIN — ONDANSETRON 4 MG: 2 INJECTION INTRAMUSCULAR; INTRAVENOUS at 17:14

## 2021-10-03 RX ADMIN — IOPAMIDOL 100 ML: 755 INJECTION, SOLUTION INTRAVENOUS at 16:49

## 2021-10-03 RX ADMIN — SODIUM CHLORIDE 2000 ML: 9 INJECTION, SOLUTION INTRAVENOUS at 19:29

## 2021-10-03 RX ADMIN — HYDROMORPHONE HYDROCHLORIDE 1 MG: 1 INJECTION, SOLUTION INTRAMUSCULAR; INTRAVENOUS; SUBCUTANEOUS at 16:05

## 2021-10-03 NOTE — ED PROVIDER NOTES
"Time: 3:48 PM EDT  Arrived by: Private car  Chief Complaint:   Chief Complaint   Patient presents with   • Abdominal Pain     PT STATES SHE HAS BEEN THROWING UP AND THAT SHE WAS CONSITPATED, PT STATES THAT SHE TOOK MAG CITRATE ON FRI AND HAD A BOWEL MOVEMENT BUT THE PAIN IS BACK ON THE LEFT HAND SIDE OF HER ABDOMEN      History provided by: Patient  History is limited by: N/A     History of Present Illness:    Melvina Martinez is a 61 y.o. female who presents to the emergency department today with complaints of abdominal pain. The patient reports that four days ago, she developed constipation and diffuse pain to her abdomen. She has also had episodes of vomiting with one today which looked like \"green bile.\" Her family states that she has tried Ex-Lax and mag citrate with no significant relief.    Last night, the patient's relative reports that her pain worsened significantly. The patient adds that she feels \"shaky\" and like she is \"vibrating.\" She also feels as if she \"can't breathe right.\" She denies any sensations of bloating and is unable to keep down any foods. She does note a history of kidney stones, but states that her discomfort today is far more severe.    The patient has a medical history of anxiety, hypertension, acid reflux, diabetes mellitus, and migraines. She has a surgical history of appendectomy, , and hysterectomy. She denies smoking or drug use but does drink occasionally. There are no other acute complaints at this time.      History provided by:  Patient and relative   used: No    Abdominal Pain  Pain location:  Generalized  Pain quality comment:  \"pain\"  Pain radiates to:  Does not radiate  Pain severity:  Severe  Onset quality:  Gradual  Duration:  4 days  Timing:  Intermittent  Progression:  Worsening  Chronicity:  New  Relieved by:  Nothing  Worsened by:  Nothing  Ineffective treatments: Laxatives.  Associated symptoms: constipation (4 days), nausea, shortness of " "breath (\"can't breathe right\") and vomiting    Associated symptoms: no chest pain, no chills, no dysuria and no fever        Similar Symptoms Previously: No.  Recently seen: Patient was seen by radiology on 2021 for a lump/mass in breast.      Patient Care Team  Primary Care Provider: Isabela Nelson APRN    Past Medical History:     Allergies   Allergen Reactions   • Penicillins Anaphylaxis and Shortness Of Breath   • Metronidazole Hives and Nausea Only     Past Medical History:   Diagnosis Date   • Acid reflux    • Anxiety disorder    • Arthritis    • Bipolar disorder ([facility])    • Depression    • Diabetes ([facility])     DOES NOT CHECK BS DAILY   • Hypertension    • Kidney stones    • Limb swelling    • Migraines    • PTSD (post-traumatic stress disorder)    • Rotator cuff tear, left    • Rotator cuff tear, left 6/10/2021   • Stress incontinence      Past Surgical History:   Procedure Laterality Date   • ABDOMINOPLASTY      Tummy tuck   • ANKLE SURGERY     • APPENDECTOMY     •  SECTION     • COLONOSCOPY     • EYE SURGERY     • HYSTERECTOMY     • INTRAOCULAR LENS INSERTION     • KNEE SURGERY     • SHOULDER ARTHROSCOPY Left 6/10/2021    Procedure: LEFT SHOULDER ARTHROSCOPY;  Surgeon: Дмитрий Francois MD;  Location: [facility];  Service: Orthopedics;  Laterality: Left;   • SHOULDER ROTATOR CUFF REPAIR Left 6/10/2021    Procedure: SUBACROMIAL DECOMPRESSION, DISTAL CLAVICULECTOMY AND ROTATOR CUFF REPAIR;  Surgeon: Дмитрий Francois MD;  Location: [facility] OR [facility];  Service: Orthopedics;  Laterality: Left;   • SHOULDER SURGERY Right    • TONSILLECTOMY       Family History   Problem Relation Age of Onset   • Cancer Mother         Unspecified   • Breast cancer Mother    • Dementia Father    • Stroke Father    • Heart disease Father    • Diabetes Father         Unspecified type   • Arthritis Father        Home Medications:  Prior to Admission medications    Medication Sig Start Date End Date Taking? Authorizing " Provider   amLODIPine (NORVASC) 2.5 MG tablet Take 1 tablet by mouth Daily. 7/13/21  Yes Evelyn Escalante PA-C   Blood Glucose Monitoring Suppl kit 1 kit 3 (Three) Times a Day. Lancets and blood glucose strips with machine to check blood sugars up to 3 times a day. 7/21/21  Yes Evelia Emmanuel MD   DULoxetine (Cymbalta) 30 MG capsule Take 1 capsule by mouth Daily. 7/9/21  Yes Evelia Emmanuel MD   empagliflozin (Jardiance) 10 MG tablet tablet Take 1 tablet by mouth Daily. 9/24/21  Yes Isabela Nelson APRN   glucose blood test strip Use as instructed 7/21/21  Yes Evelia Emmanuel MD   lamoTRIgine (LaMICtal) 100 MG tablet lamotrigine 100 mg oral tablet take 1 tablet (100 mg) by oral route 2 times per day   Active   Yes Gregorio Adam MD   Lancets misc 2 each 2 (two) times a day. 7/21/21  Yes Evelia Emmanuel MD   pantoprazole (PROTONIX) 40 MG EC tablet Take 1 tablet by mouth Daily. 9/9/21  Yes Evelia Emmanuel MD   prazosin (MINIPRESS) 2 MG capsule prazosin 2 mg oral capsule take 1 capsule (2 mg) by oral route 2 times per day   Active   Yes ProviderGregorio MD   QUEtiapine (SEROquel) 100 MG tablet quetiapine 100 mg oral tablet take 1 tablet (100 mg) by oral route 3 times per day   Active   Yes ProviderGregorio MD   verapamil (CALAN) 120 MG tablet verapamil 120 mg oral tablet take 1 tablet (120 mg) by oral route daily.   Suspended   Yes ProviderGregorio MD   SUMAtriptan (Imitrex) 5 MG/ACT nasal spray 1 spray into the nostril(s) as directed by provider Every 2 (Two) Hours As Needed for Migraine. 8/30/21   Mihaela Traylor MD   topiramate (TOPAMAX) 50 MG tablet topiramate 50 mg oral tablet take 1 tablet (50 mg) by oral route 2 times per day   Active    ProviderGregorio MD        Social History:   Social History     Tobacco Use   • Smoking status: Never Smoker   • Smokeless tobacco: Never Used   Vaping Use   • Vaping Use: Never used   Substance Use Topics   • Alcohol  "use: Yes     Comment: rarely drinks   • Drug use: Never     Recent travel: not applicable     Review of Systems:  Review of Systems   Constitutional: Negative for chills and fever.        Patient feels \"shaky\" and like she is \"vibrating.\"   HENT: Negative for nosebleeds.    Eyes: Negative for redness.   Respiratory: Positive for shortness of breath (\"can't breathe right\").    Cardiovascular: Negative for chest pain.   Gastrointestinal: Positive for abdominal pain (left abdomen), constipation (4 days), nausea and vomiting. Negative for abdominal distention.   Genitourinary: Negative for dysuria and frequency.   Musculoskeletal: Negative for back pain and neck pain.   Skin: Negative for rash.   Neurological: Negative for seizures and syncope.   All other systems reviewed and are negative.       Physical Exam:  BP (!) 136/104   Pulse 82   Temp 98.1 °F (36.7 °C)   Resp 26   Ht 165.1 cm (65\")   Wt 94.8 kg (208 lb 15.9 oz)   LMP  (LMP Unknown)   SpO2 91%   Breastfeeding No   BMI 34.78 kg/m²     Physical Exam  Vitals and nursing note reviewed.   Constitutional:       General: She is in acute distress (severe distress due to pain).   HENT:      Head: Normocephalic and atraumatic.      Nose: Nose normal.      Mouth/Throat:      Mouth: Mucous membranes are moist.   Eyes:      General: No scleral icterus.  Cardiovascular:      Rate and Rhythm: Normal rate and regular rhythm.      Heart sounds: Normal heart sounds. No murmur heard.     Pulmonary:      Effort: No respiratory distress.      Breath sounds: Normal breath sounds.   Abdominal:      Palpations: Abdomen is soft. There is no mass or pulsatile mass.      Tenderness: There is generalized abdominal tenderness (moderate).      Hernia: No hernia is present.      Comments: High pitched bowel sounds.   Musculoskeletal:         General: No tenderness. Normal range of motion.      Cervical back: Normal range of motion and neck supple. No tenderness.      Right lower " leg: No edema.      Left lower leg: No edema.   Skin:     General: Skin is warm and dry.   Neurological:      Mental Status: She is alert. Mental status is at baseline.   Psychiatric:         Behavior: Behavior normal.                Medications in the Emergency Department:  Medications   sodium chloride 0.9 % flush 10 mL (has no administration in time range)   sodium chloride 0.9 % bolus 2,000 mL (2,000 mL Intravenous New Bag 10/3/21 1929)   HYDROmorphone (DILAUDID) injection 1 mg (1 mg Intravenous Given 10/3/21 1605)   ondansetron (ZOFRAN) injection 4 mg (4 mg Intravenous Given 10/3/21 1606)   iopamidol (ISOVUE-370) 76 % injection 100 mL (100 mL Intravenous Given 10/3/21 1649)   ondansetron (ZOFRAN) injection 4 mg (4 mg Intravenous Given 10/3/21 1714)   HYDROmorphone (DILAUDID) injection 1 mg (1 mg Intravenous Given 10/3/21 1727)   metoclopramide (REGLAN) injection 10 mg (10 mg Intravenous Given 10/3/21 1929)   diphenhydrAMINE (BENADRYL) injection 25 mg (25 mg Intravenous Given 10/3/21 1928)        Labs  Labs Reviewed   COMPREHENSIVE METABOLIC PANEL - Abnormal; Notable for the following components:       Result Value    Glucose 100 (*)     CO2 16.3 (*)     Anion Gap 16.7 (*)     All other components within normal limits    Narrative:     GFR Normal >60  Chronic Kidney Disease <60  Kidney Failure <15     URINALYSIS W/ MICROSCOPIC IF INDICATED (NO CULTURE) - Abnormal; Notable for the following components:    Specific Gravity, UA >1.030 (*)     Glucose, UA >=1000 mg/dL (3+) (*)     Ketones, UA 15 mg/dL (1+) (*)     All other components within normal limits    Narrative:     Urine microscopic not indicated.   LACTIC ACID, PLASMA - Abnormal; Notable for the following components:    Lactate 2.2 (*)     All other components within normal limits   LIPASE - Normal   CBC WITH AUTO DIFFERENTIAL - Normal   RAINBOW DRAW    Narrative:     The following orders were created for panel order [place] Draw.  Procedure                                Abnormality         Status                     ---------                               -----------         ------                     Green Top (Gel)[[id number]]                                  Final result               Lavender Top[[id number]]                                     Final result               Gold Top - SST[[id number]]                                   Final result               Light Blue Top[[id number]]                                   Final result                 Please view results for these tests on the individual orders.   LACTIC ACID, REFLEX   URINE DRUG SCREEN   CBC AND DIFFERENTIAL    Narrative:     The following orders were created for panel order CBC & Differential.  Procedure                               Abnormality         Status                     ---------                               -----------         ------                     CBC Auto Differential[[id number]]        Normal              Final result                 Please view results for these tests on the individual orders.   GREEN TOP   LAVENDER TOP   GOLD TOP - SST   LIGHT BLUE TOP        Imaging:  CT Abdomen Pelvis With Contrast    Result Date: 10/3/2021  PROCEDURE: CT ABDOMEN PELVIS W CONTRAST  COMPARISON: Other, [facility], MAMMO SCREENING DIGITAL TOMOSYNTHESIS BILATERAL W CAD, 9/13/2021, 11:14.  [facility], CT, ABD PEL W/O CONTRAST, 5/08/2017, 11:45.  INDICATIONS: abdominal pain  TECHNIQUE: After obtaining the patient's consent, CT images were created with non-ionic intravenous contrast material.   PROTOCOL:   Standard imaging protocol performed    RADIATION:   DLP: 1130.7 mGy*cm   Automated exposure control was utilized to minimize radiation dose. CONTRAST: 100 cc Isovue 370 I.V. LABS:   eGFR:  > 60 ml/min/1.73m2  FINDINGS:  There are hypodense areas within the left lobe of the liver.  The largest measures 3.7 cm.  These are suggested on prior exam and could reflect cysts.  The gallbladder is distended.   There are no gallstones.  There is no intrahepatic biliary dilatation.  There is no acute abnormality of the spleen or the pancreas.  There is a left renal cyst measuring 9.8 by 8.2 cm.  The right kidney is grossly unremarkable.  The bladder does not appear unusual for the degree of distention.  There are some sigmoid diverticula without changes of acute diverticulitis.  There is no bowel obstruction.  There is a small hiatal hernia.  There is some asymmetry in the appearance of the left breast as compared to the right which could be positional.  There are some degenerative changes at the L5-S1 level.  There is a levoscoliosis.  CONCLUSION:  1. Hepatic lesions suggestive of cysts. 2. Left renal cyst and probable parapelvic renal cysts. 3. Levoscoliosis lumbar spine.  There are some degenerative changes L5-S1. 4. Small hiatal hernia.     LUIS ANGEL LIMA MD       Electronically Signed and Approved By: LUIS ANGEL LIMA MD on 10/03/2021 at 17:13               Procedures:  Procedures    Progress                      Patient was seen evaluated ED by me.  The above history and physical examination was performed as stated.  The diagnostic was obtained.  Results reviewed.  Patient CT scan did not show any acute findings.  I did mention there is a distended gallbladder but no signs of stones.  During the ED course patient has had intractable nausea and vomiting to the point of actually retching.  Patient has been given multiple doses of antiemetics and have failed to control her symptoms.  Subside patient will require continued IV hydration and antiemetics.  Patient will need further work-up in the hospital.  Of consult hospitalist for admission.      Medical Decision Making:  MDM     Sepsis was ruled out in the emergency department and is not present on arrival. This is supported as the patient does not meet two out of the four SIRS criteria accompanied by a suspected or known bacterial infection.      SIRS criteria considered:    1.                     Temperature > 100.4 or <98.6    2.                     Heart Rate > 90    3.                     Respiratory Rate > 22    4.                     WBC > 12K or <4K.        Final diagnoses:   Intractable vomiting with nausea, unspecified vomiting type   Lactic acidosis        Disposition:  ED Disposition     ED Disposition Condition Comment    Decision to Admit            Documentation assistance provided by Huong Meeks acting as scribe for Dr. Spencer Soni. Information recorded by the scribe was done at my direction and has been verified and validated by me.        Huong Meeks  10/03/21 1557       Huong Meeks  10/03/21 1639       Spencer Soni DO  10/03/21 1957

## 2021-10-03 NOTE — H&P
[facility]   HOSPITALIST HISTORY AND PHYSICAL  Date: 10/4/2021   Patient Name: Melvina Martinez  : 1960  MRN: [id number]  Primary Care Physician:  Isabela Nelson APRN  Date of admission: 10/3/2021    Subjective   Subjective     Chief Complaint: abdominal pain    HPI:    Melvina Matrinez is a 61 y.o. female with PMH of GERD, anxiety, bipolar disorder, PTSD, depression, HTN, and DM who presents to the ED complaining of abdominal pain.  Patient states this started approximately 4 days ago and was constipated.  States that she took mag citrate and had some results.  However, her abdominal pain and vomiting has continued.  She has had Ex-Lax with no results.  CT of the abdomen pelvis was done in the ED and shows hepatic lesions suggestive of cysts, left renal cysts and probable peripelvic renal cyst but otherwise no acute abdominal findings.  Lipase was 30, patient is afebrile white blood count was within normal limits.  Patient was given multiple antiemetics and pain medicine but still had continued nausea/vomiting.  The hospitalist service was contacted for admission for intractable nausea and vomiting.  Patient was admitted to the floor and did well for several hours without nausea, vomiting, or pain.  However, after being on the floor for multiple hours patient had severe, sudden onset of pain.  Additional imaging was ordered given concern for acute abdomen.   Personal History     Past Medical History:  Past Medical History:   Diagnosis Date   • Acid reflux    • Anxiety disorder    • Arthritis    • Bipolar disorder (HCC)    • Depression    • Diabetes (HCC)     DOES NOT CHECK BS DAILY   • Hypertension    • Kidney stones    • Limb swelling    • Migraines    • PTSD (post-traumatic stress disorder)    • Rotator cuff tear, left    • Rotator cuff tear, left 6/10/2021   • Stress incontinence        Past Surgical History:  Past Surgical History:   Procedure Laterality Date   • ABDOMINOPLASTY      Tummy tuck   • ANKLE  SURGERY     • APPENDECTOMY     •  SECTION     • COLONOSCOPY     • EYE SURGERY     • HYSTERECTOMY     • INTRAOCULAR LENS INSERTION     • KNEE SURGERY     • SHOULDER ARTHROSCOPY Left 6/10/2021    Procedure: LEFT SHOULDER ARTHROSCOPY;  Surgeon: Дмитрий Francois MD;  Location: [facility];  Service: Orthopedics;  Laterality: Left;   • SHOULDER ROTATOR CUFF REPAIR Left 6/10/2021    Procedure: SUBACROMIAL DECOMPRESSION, DISTAL CLAVICULECTOMY AND ROTATOR CUFF REPAIR;  Surgeon: Дмитрий Francois MD;  Location: [facility];  Service: Orthopedics;  Laterality: Left;   • SHOULDER SURGERY Right    • TONSILLECTOMY         Family History:   Family History   Problem Relation Age of Onset   • Cancer Mother         Unspecified   • Breast cancer Mother    • Dementia Father    • Stroke Father    • Heart disease Father    • Diabetes Father         Unspecified type   • Arthritis Father          Social History:   Social History     Tobacco Use   • Smoking status: Never Smoker   • Smokeless tobacco: Never Used   Vaping Use   • Vaping Use: Never used   Substance Use Topics   • Alcohol use: Yes     Comment: rarely drinks   • Drug use: Never       Home Medications:  Advanced Collagen, Blood Glucose Monitoring Suppl, DULoxetine, Glucosamine-Chondroit-Vit C-Mn, Lancets, QUEtiapine, SUMAtriptan, Vitamin C, amLODIPine, empagliflozin, glucose blood, lamoTRIgine, pantoprazole, prazosin, topiramate, and verapamil    Allergies:  Allergies   Allergen Reactions   • Penicillins Anaphylaxis and Shortness Of Breath   • Metronidazole Hives and Nausea Only       Review of Systems  Constitutional: No fever, unintentional weight loss, malaise  HEENT: No vision changes, loss of vision, double vision, difficulty swallowing, painful swallowing, or tinnitus  Respiratory: No cough, shortness of breath, sputum production, or hemoptysis  Cardiovascular: No chest pain, palpitations, orthopnea, or paroxysmal nocturnal dyspnea  Gastrointestinal: Positive  for nausea, vomiting, no diarrhea, hematochezia, bright red blood per rectum, dark tarry stools, bowel incontinence or constipation  Genitourinary: No dysuria, hematuria, bladder incontinence, frequency, nocturia, or hesitancy  Musculoskeletal: No arthritis, joint swelling, deformities or joint pain  Endocrine: No fatigue, heat or cold intolerance  Hematologic: No excessive bruising or bleeding  Psychiatric: No Anxiety or depression  Neurologic: No Confusion, numbness, or weakness  Skin: No rash or open wounds    Objective   Objective     Vitals:   Temp:  [97.9 °F (36.6 °C)-99.5 °F (37.5 °C)] 99.5 °F (37.5 °C)  Heart Rate:  [] 76  Resp:  [18-26] 20  BP: (126-155)/() 141/75  Flow (L/min):  [2] 2    Physical Exam    Constitutional: Awake, alert, retching   Eyes: Pupils equal, sclerae anicteric, no conjunctival injection   HENT: NCAT, mucous membranes moist   Neck: Supple, no thyromegaly, no lymphadenopathy, trachea midline   Respiratory: Clear to auscultation bilaterally, nonlabored respirations    Cardiovascular: RRR, no murmurs, rubs, or gallops, palpable pedal pulses bilaterally   Gastrointestinal: Hypoactive bowel sounds, diffuse tenderness, worse in the epigastric region and left lower quadrant, soft, nondistended   Musculoskeletal: No bilateral ankle edema, no clubbing or cyanosis to extremities   Psychiatric: Appropriate affect, cooperative   Neurologic: Oriented x 3, strength symmetric in all extremities,speech clear   Skin: No rashes         Assessment/Plan   Assessment / Plan     Assessment:  Abdominal pain  Intractable nausea vomiting  Diabetes mellitus  Hypertension  GERD  PTSD   Bipolar disorder  Depression/anxiety    Plan:  -Admit to hospitalist service  -Continue IV fluids  -IV Zofran/Compazine as needed for nausea/vomiting  -Repeat abdominal CT did not show any acute findings  -Could consider general surgery consult should abdominal pain continue although there appears to be no acute  findings on CTs  -Sliding scale insulin for diabetes mellitus  -Morphine as needed for pain  -Continue appropriate home medications once reconciled      DVT prophylaxis:  No DVT prophylaxis order currently exists.    CODE STATUS:         Admission Status:  I believe this patient meets inpatient status.    Electronically signed by LILA Archuleta, 10/03/21, 7:54 PM EDT.

## 2021-10-04 ENCOUNTER — APPOINTMENT (OUTPATIENT)
Dept: GENERAL RADIOLOGY | Facility: HOSPITAL | Age: 61
End: 2021-10-04

## 2021-10-04 ENCOUNTER — APPOINTMENT (OUTPATIENT)
Dept: CT IMAGING | Facility: HOSPITAL | Age: 61
End: 2021-10-04

## 2021-10-04 ENCOUNTER — APPOINTMENT (OUTPATIENT)
Dept: ULTRASOUND IMAGING | Facility: HOSPITAL | Age: 61
End: 2021-10-04

## 2021-10-04 LAB
ANION GAP SERPL CALCULATED.3IONS-SCNC: 11 MMOL/L (ref 5–15)
BUN SERPL-MCNC: 11 MG/DL (ref 8–23)
BUN/CREAT SERPL: 14.5 (ref 7–25)
CALCIUM SPEC-SCNC: 8.3 MG/DL (ref 8.6–10.5)
CHLORIDE SERPL-SCNC: 111 MMOL/L (ref 98–107)
CO2 SERPL-SCNC: 19 MMOL/L (ref 22–29)
CREAT SERPL-MCNC: 0.76 MG/DL (ref 0.57–1)
D-LACTATE SERPL-SCNC: 1 MMOL/L (ref 0.5–2)
DEPRECATED RDW RBC AUTO: 50.2 FL (ref 37–54)
ERYTHROCYTE [DISTWIDTH] IN BLOOD BY AUTOMATED COUNT: 15.3 % (ref 12.3–15.4)
GFR SERPL CREATININE-BSD FRML MDRD: 78 ML/MIN/1.73
GLUCOSE BLDC GLUCOMTR-MCNC: 112 MG/DL (ref 70–99)
GLUCOSE BLDC GLUCOMTR-MCNC: 75 MG/DL (ref 70–99)
GLUCOSE BLDC GLUCOMTR-MCNC: 81 MG/DL (ref 70–99)
GLUCOSE BLDC GLUCOMTR-MCNC: 94 MG/DL (ref 70–99)
GLUCOSE SERPL-MCNC: 84 MG/DL (ref 65–99)
HCT VFR BLD AUTO: 38.3 % (ref 34–46.6)
HGB BLD-MCNC: 12.5 G/DL (ref 12–15.9)
MAGNESIUM SERPL-MCNC: 1.9 MG/DL (ref 1.6–2.4)
MCH RBC QN AUTO: 29.6 PG (ref 26.6–33)
MCHC RBC AUTO-ENTMCNC: 32.6 G/DL (ref 31.5–35.7)
MCV RBC AUTO: 90.5 FL (ref 79–97)
PLATELET # BLD AUTO: 206 10*3/MM3 (ref 140–450)
PMV BLD AUTO: 9.9 FL (ref 6–12)
POTASSIUM SERPL-SCNC: 3.7 MMOL/L (ref 3.5–5.2)
RBC # BLD AUTO: 4.23 10*6/MM3 (ref 3.77–5.28)
SODIUM SERPL-SCNC: 141 MMOL/L (ref 136–145)
TROPONIN T SERPL-MCNC: <0.01 NG/ML (ref 0–0.03)
WBC # BLD AUTO: 8.06 10*3/MM3 (ref 3.4–10.8)

## 2021-10-04 PROCEDURE — 25010000002 ONDANSETRON PER 1 MG: Performed by: PHYSICIAN ASSISTANT

## 2021-10-04 PROCEDURE — 85027 COMPLETE CBC AUTOMATED: CPT | Performed by: PHYSICIAN ASSISTANT

## 2021-10-04 PROCEDURE — 93005 ELECTROCARDIOGRAM TRACING: CPT | Performed by: GENERAL PRACTICE

## 2021-10-04 PROCEDURE — 82962 GLUCOSE BLOOD TEST: CPT

## 2021-10-04 PROCEDURE — 80048 BASIC METABOLIC PNL TOTAL CA: CPT | Performed by: GENERAL PRACTICE

## 2021-10-04 PROCEDURE — 83605 ASSAY OF LACTIC ACID: CPT | Performed by: GENERAL PRACTICE

## 2021-10-04 PROCEDURE — G0378 HOSPITAL OBSERVATION PER HR: HCPCS

## 2021-10-04 PROCEDURE — 84484 ASSAY OF TROPONIN QUANT: CPT | Performed by: GENERAL PRACTICE

## 2021-10-04 PROCEDURE — 74176 CT ABD & PELVIS W/O CONTRAST: CPT

## 2021-10-04 PROCEDURE — 74018 RADEX ABDOMEN 1 VIEW: CPT

## 2021-10-04 PROCEDURE — 76705 ECHO EXAM OF ABDOMEN: CPT

## 2021-10-04 PROCEDURE — 83735 ASSAY OF MAGNESIUM: CPT | Performed by: GENERAL PRACTICE

## 2021-10-04 PROCEDURE — 25010000002 MORPHINE PER 10 MG: Performed by: GENERAL PRACTICE

## 2021-10-04 PROCEDURE — 99232 SBSQ HOSP IP/OBS MODERATE 35: CPT | Performed by: INTERNAL MEDICINE

## 2021-10-04 RX ORDER — BISACODYL 5 MG/1
5 TABLET, DELAYED RELEASE ORAL DAILY PRN
Status: DISCONTINUED | OUTPATIENT
Start: 2021-10-04 | End: 2021-10-07 | Stop reason: HOSPADM

## 2021-10-04 RX ORDER — MORPHINE SULFATE 2 MG/ML
2 INJECTION, SOLUTION INTRAMUSCULAR; INTRAVENOUS EVERY 4 HOURS PRN
Status: DISCONTINUED | OUTPATIENT
Start: 2021-10-04 | End: 2021-10-07 | Stop reason: HOSPADM

## 2021-10-04 RX ORDER — AMOXICILLIN 250 MG
2 CAPSULE ORAL 2 TIMES DAILY
Status: DISCONTINUED | OUTPATIENT
Start: 2021-10-04 | End: 2021-10-07 | Stop reason: HOSPADM

## 2021-10-04 RX ORDER — DEXTROSE MONOHYDRATE 100 MG/ML
25 INJECTION, SOLUTION INTRAVENOUS
Status: DISCONTINUED | OUTPATIENT
Start: 2021-10-04 | End: 2021-10-07 | Stop reason: HOSPADM

## 2021-10-04 RX ORDER — AMLODIPINE BESYLATE 2.5 MG/1
2.5 TABLET ORAL DAILY
Status: DISCONTINUED | OUTPATIENT
Start: 2021-10-04 | End: 2021-10-07 | Stop reason: HOSPADM

## 2021-10-04 RX ORDER — NICOTINE POLACRILEX 4 MG
15 LOZENGE BUCCAL
Status: DISCONTINUED | OUTPATIENT
Start: 2021-10-04 | End: 2021-10-07 | Stop reason: HOSPADM

## 2021-10-04 RX ORDER — MULTIVIT-MIN/IRON/FOLIC ACID/K 18-600-40
1000 CAPSULE ORAL DAILY
COMMUNITY
End: 2021-11-03

## 2021-10-04 RX ORDER — LAMOTRIGINE 100 MG/1
100 TABLET ORAL EVERY 12 HOURS SCHEDULED
Status: DISCONTINUED | OUTPATIENT
Start: 2021-10-04 | End: 2021-10-07 | Stop reason: HOSPADM

## 2021-10-04 RX ORDER — TERAZOSIN 5 MG/1
5 CAPSULE ORAL NIGHTLY
Status: DISCONTINUED | OUTPATIENT
Start: 2021-10-04 | End: 2021-10-07 | Stop reason: HOSPADM

## 2021-10-04 RX ORDER — POLYETHYLENE GLYCOL 3350 17 G/17G
17 POWDER, FOR SOLUTION ORAL 2 TIMES DAILY
Status: DISCONTINUED | OUTPATIENT
Start: 2021-10-04 | End: 2021-10-07 | Stop reason: HOSPADM

## 2021-10-04 RX ORDER — QUETIAPINE FUMARATE 100 MG/1
100 TABLET, FILM COATED ORAL EVERY 8 HOURS SCHEDULED
Status: DISCONTINUED | OUTPATIENT
Start: 2021-10-04 | End: 2021-10-07 | Stop reason: HOSPADM

## 2021-10-04 RX ORDER — DULOXETIN HYDROCHLORIDE 30 MG/1
30 CAPSULE, DELAYED RELEASE ORAL DAILY
Status: DISCONTINUED | OUTPATIENT
Start: 2021-10-04 | End: 2021-10-07 | Stop reason: HOSPADM

## 2021-10-04 RX ORDER — BISACODYL 10 MG
10 SUPPOSITORY, RECTAL RECTAL DAILY PRN
Status: DISCONTINUED | OUTPATIENT
Start: 2021-10-04 | End: 2021-10-07 | Stop reason: HOSPADM

## 2021-10-04 RX ORDER — TOPIRAMATE 25 MG/1
50 TABLET ORAL EVERY 12 HOURS SCHEDULED
Status: DISCONTINUED | OUTPATIENT
Start: 2021-10-04 | End: 2021-10-07 | Stop reason: HOSPADM

## 2021-10-04 RX ORDER — MORPHINE SULFATE 2 MG/ML
2 INJECTION, SOLUTION INTRAMUSCULAR; INTRAVENOUS ONCE
Status: COMPLETED | OUTPATIENT
Start: 2021-10-04 | End: 2021-10-04

## 2021-10-04 RX ADMIN — SODIUM CHLORIDE, POTASSIUM CHLORIDE, SODIUM LACTATE AND CALCIUM CHLORIDE 100 ML/HR: 600; 310; 30; 20 INJECTION, SOLUTION INTRAVENOUS at 08:01

## 2021-10-04 RX ADMIN — PANTOPRAZOLE SODIUM 8 MG/HR: 40 INJECTION, POWDER, FOR SOLUTION INTRAVENOUS at 08:01

## 2021-10-04 RX ADMIN — SODIUM CHLORIDE, POTASSIUM CHLORIDE, SODIUM LACTATE AND CALCIUM CHLORIDE 100 ML/HR: 600; 310; 30; 20 INJECTION, SOLUTION INTRAVENOUS at 20:00

## 2021-10-04 RX ADMIN — TOPIRAMATE 50 MG: 25 TABLET, FILM COATED ORAL at 21:30

## 2021-10-04 RX ADMIN — SODIUM CHLORIDE, PRESERVATIVE FREE 10 ML: 5 INJECTION INTRAVENOUS at 08:50

## 2021-10-04 RX ADMIN — TERAZOSIN HYDROCHLORIDE 5 MG: 5 CAPSULE ORAL at 21:25

## 2021-10-04 RX ADMIN — PANTOPRAZOLE SODIUM 8 MG/HR: 40 INJECTION, POWDER, FOR SOLUTION INTRAVENOUS at 14:30

## 2021-10-04 RX ADMIN — QUETIAPINE FUMARATE 100 MG: 100 TABLET ORAL at 14:28

## 2021-10-04 RX ADMIN — PANTOPRAZOLE SODIUM 8 MG/HR: 40 INJECTION, POWDER, FOR SOLUTION INTRAVENOUS at 18:39

## 2021-10-04 RX ADMIN — MORPHINE SULFATE 2 MG: 2 INJECTION, SOLUTION INTRAMUSCULAR; INTRAVENOUS at 21:24

## 2021-10-04 RX ADMIN — POLYETHYLENE GLYCOL 3350 17 G: 17 POWDER, FOR SOLUTION ORAL at 21:24

## 2021-10-04 RX ADMIN — ONDANSETRON 4 MG: 2 INJECTION INTRAMUSCULAR; INTRAVENOUS at 21:24

## 2021-10-04 RX ADMIN — ONDANSETRON 4 MG: 2 INJECTION INTRAMUSCULAR; INTRAVENOUS at 02:39

## 2021-10-04 RX ADMIN — MORPHINE SULFATE 2 MG: 2 INJECTION, SOLUTION INTRAMUSCULAR; INTRAVENOUS at 02:48

## 2021-10-04 RX ADMIN — DOCUSATE SODIUM 50MG AND SENNOSIDES 8.6MG 2 TABLET: 8.6; 5 TABLET, FILM COATED ORAL at 07:35

## 2021-10-04 RX ADMIN — SODIUM CHLORIDE, PRESERVATIVE FREE 10 ML: 5 INJECTION INTRAVENOUS at 21:25

## 2021-10-04 RX ADMIN — ONDANSETRON 4 MG: 2 INJECTION INTRAMUSCULAR; INTRAVENOUS at 12:02

## 2021-10-04 RX ADMIN — AMLODIPINE BESYLATE 2.5 MG: 2.5 TABLET ORAL at 09:45

## 2021-10-04 RX ADMIN — PANTOPRAZOLE SODIUM 8 MG/HR: 40 INJECTION, POWDER, FOR SOLUTION INTRAVENOUS at 03:07

## 2021-10-04 RX ADMIN — DULOXETINE HYDROCHLORIDE 30 MG: 30 CAPSULE, DELAYED RELEASE ORAL at 09:43

## 2021-10-04 RX ADMIN — TOPIRAMATE 50 MG: 25 TABLET, FILM COATED ORAL at 09:43

## 2021-10-04 RX ADMIN — DOCUSATE SODIUM 50MG AND SENNOSIDES 8.6MG 2 TABLET: 8.6; 5 TABLET, FILM COATED ORAL at 21:25

## 2021-10-04 RX ADMIN — LAMOTRIGINE 100 MG: 100 TABLET ORAL at 21:25

## 2021-10-04 RX ADMIN — LAMOTRIGINE 100 MG: 100 TABLET ORAL at 09:43

## 2021-10-04 RX ADMIN — QUETIAPINE FUMARATE 100 MG: 100 TABLET ORAL at 21:25

## 2021-10-04 RX ADMIN — MORPHINE SULFATE 2 MG: 2 INJECTION, SOLUTION INTRAMUSCULAR; INTRAVENOUS at 10:55

## 2021-10-04 NOTE — PLAN OF CARE
Goal Outcome Evaluation:  Plan of Care Reviewed With: patient, spouse        Progress: no change  Outcome Summary: Patient had complaints of pain, and n/v on shift. Patient stated she was able to pass gas once today during the shift. Patient scheduled for HIDA scan tomorrow.

## 2021-10-04 NOTE — PROGRESS NOTES
[facility]   Hospitalist Progress Note  Date: 10/4/2021  Patient Name: Melvina Martinez  : 1960  MRN: [id number]  Date of admission: 10/3/2021      Subjective   Subjective   Chief Complaint: abdominal pain     HPI:     Melvina Martinez is a 61 y.o. female with PMH of GERD, anxiety, bipolar disorder, PTSD, depression, HTN, and DM who presents to the ED complaining of abdominal pain.  Patient states this started approximately 4 days ago and was constipated.  States that she took mag citrate and had some results.  However, her abdominal pain and vomiting has continued.  She has had Ex-Lax with no results.  CT of the abdomen pelvis was done in the ED and shows hepatic lesions suggestive of cysts, left renal cysts and probable peripelvic renal cyst but otherwise no acute abdominal findings.  Lipase was 30, patient is afebrile white blood count was within normal limits.  Patient was given multiple antiemetics and pain medicine but still had continued nausea/vomiting.  The hospitalist service was contacted for admission for intractable nausea and vomiting.  Patient was admitted to the floor and did well for several hours without nausea, vomiting, or pain.  However, after being on the floor for multiple hours patient had severe, sudden onset of pain.  Additional imaging was ordered given concern for acute abdomen.     Summary: Patient admitted with intractable nausea vomiting and abdominal pain.  CT of the abdomen x2 since admission negative.  She has continued to complain of abdominal pain and nausea.  Gallbladder ultrasound to be ordered on this morning possible HIDA scan if gallbladder ultrasound is negative.      Interval Followup: Seen and evaluated on this morning.  She complains of recurrent abdominal pain mostly in her epigastric and bilateral lower regions.  Does complain of some nausea but no vomiting this morning.  No fevers.  She reports that she has not had a bowel movement since the day prior to this  beginning.    Review of Systems   All systems were reviewed and negative except for: As noted in HPI  Objective   Objective     Vitals:   Temp:  [97.9 °F (36.6 °C)-99.5 °F (37.5 °C)] 98.1 °F (36.7 °C)  Heart Rate:  [] 70  Resp:  [18-26] 20  BP: (126-155)/() 135/78  Flow (L/min):  [2] 2  Physical Exam    Constitutional: Awake, alert, no acute distress   Eyes: Pupils equal, sclerae anicteric, no conjunctival injection   HENT: NCAT, mucous membranes moist   Neck: Supple, no thyromegaly, no lymphadenopathy, trachea midline   Respiratory: Clear to auscultation bilaterally, nonlabored respirations    Cardiovascular: RRR, no murmurs, rubs, or gallops, palpable pedal pulses bilaterally   Gastrointestinal: Positive bowel sounds, soft, diffusely tender to palpation but greatest in epigastric region no rebound or guarding, nondistended   Musculoskeletal: No bilateral ankle edema, no clubbing or cyanosis to extremities   Psychiatric: Appropriate affect, cooperative   Neurologic: Oriented x 3, strength moving all extremities no focal weakness appreciated, Cranial Nerves grossly intact to confrontation, speech clear   Skin: No rashes     Result Review    Result Review:  I have personally reviewed the results from the time of this admission to 10/4/2021 08:47 EDT and agree with these findings:  [x]  Laboratory  [x]  Microbiology  [x]  Radiology  []  EKG/Telemetry   []  Cardiology/Vascular   []  Pathology  []  Old records  [x]  Other: Medications      Assessment/Plan   Assessment / Plan     Assessment/Plan:  Abdominal pain  Intractable nausea vomiting  Diabetes mellitus  Hypertension  GERD  PTSD   Bipolar disorder  Depression/anxiety     Plan:  -Continue to monitor hospitalist service  -Continue IV fluids  -IV Zofran/Compazine as needed for nausea/vomiting  -Repeat abdominal CT did not show any acute findings  --We will obtain a gallbladder ultrasound on today.  Possible HIDA scan if ultrasound is negative and patient  continues to have abdominal pain complaints  -Could consider general surgery consult should abdominal pain continue although there appears to be no acute findings on CTs  -Sliding scale insulin for diabetes mellitus  -Morphine as needed for pain  -Continue appropriate home medications once reconciled      Discussed plan with RN.    DVT prophylaxis:  No DVT prophylaxis order currently exists.    CODE STATUS:    Full      Electronically signed by Elan Lerner MD, 10/04/21, 8:47 AM EDT.

## 2021-10-04 NOTE — PLAN OF CARE
Goal Outcome Evaluation:  Patient admitted this shift. No reports of pain or nausea when arrived to floor but later severe left abdominal and epigastric pain started with some distention, Provider notified. Patient pain managed with prn morphine and was started on protonix drip, tolerating both medications well. No complaints of pain or N&V since. Patient's  was updated at patient's request and  asked that we update him with all new information, okayed by patient. Lincoln, rn

## 2021-10-04 NOTE — CASE MANAGEMENT/SOCIAL WORK
Discharge Planning Assessment   Jackie     Patient Name: Melvina Martinez  MRN: [id number]  Today's Date: 10/4/2021    Admit Date: 10/3/2021    Discharge Needs Assessment     Row Name 10/04/21 1424       Living Environment    Lives With  spouse    Current Living Arrangements  home/apartment/condo    Primary Care Provided by  self    Family Caregiver if Needed  spouse    Quality of Family Relationships  helpful;involved;supportive    Able to Return to Prior Arrangements  yes       Resource/Environmental Concerns    Resource/Environmental Concerns  none       Transition Planning    Patient/Family Anticipates Transition to  home with family    Patient/Family Anticipated Services at Transition  none    Transportation Anticipated  family or friend will provide       Discharge Needs Assessment    Readmission Within the Last 30 Days  no previous admission in last 30 days    Current Outpatient/Agency/Support Group  other (see comments) outpatient PT    Equipment Currently Used at Home  cpap does not wear    Concerns to be Addressed  no discharge needs identified    Anticipated Changes Related to Illness  none    Equipment Needed After Discharge  none        Discharge Plan     Row Name 10/04/21 1421       Plan    Plan  Called pt to complete assessment. Pt admitted with intractable vomiting. Pt lives at home independently. Pt states she has been going to [facility] 2x[facility]. Pt states she has a cpap but does not wear. Pt denies any dc needs at this time.    Patient/Family in Agreement with Plan  yes        Continued Care and Services - Admitted Since 10/3/2021    Coordination has not been started for this encounter.         Demographic Summary     Row Name 10/04/21 1423       General Information    Admission Type  observation    Arrived From  emergency department    Reason for Consult  discharge planning    Preferred Language  English     Used During This Interaction  no        Functional Status     Row Name  10/04/21 1423       Functional Status    Usual Activity Tolerance  excellent    Current Activity Tolerance  excellent       Functional Status, IADL    Medications  independent    Meal Preparation  independent    Housekeeping  independent    Laundry  independent    Shopping  independent       Mental Status Summary    Recent Changes in Mental Status/Cognitive Functioning  no changes        Psychosocial    No documentation.       Abuse/Neglect    No documentation.       Legal    No documentation.       Substance Abuse    No documentation.       Patient Forms    No documentation.           Katalina Hyde RN

## 2021-10-05 ENCOUNTER — APPOINTMENT (OUTPATIENT)
Dept: NUCLEAR MEDICINE | Facility: HOSPITAL | Age: 61
End: 2021-10-05

## 2021-10-05 LAB
ANION GAP SERPL CALCULATED.3IONS-SCNC: 8 MMOL/L (ref 5–15)
BUN SERPL-MCNC: 6 MG/DL (ref 8–23)
BUN/CREAT SERPL: 7.1 (ref 7–25)
CALCIUM SPEC-SCNC: 8.6 MG/DL (ref 8.6–10.5)
CHLORIDE SERPL-SCNC: 109 MMOL/L (ref 98–107)
CO2 SERPL-SCNC: 22 MMOL/L (ref 22–29)
CREAT SERPL-MCNC: 0.85 MG/DL (ref 0.57–1)
DEPRECATED RDW RBC AUTO: 50.7 FL (ref 37–54)
ERYTHROCYTE [DISTWIDTH] IN BLOOD BY AUTOMATED COUNT: 15.3 % (ref 12.3–15.4)
GFR SERPL CREATININE-BSD FRML MDRD: 68 ML/MIN/1.73
GLUCOSE BLDC GLUCOMTR-MCNC: 118 MG/DL (ref 70–99)
GLUCOSE BLDC GLUCOMTR-MCNC: 79 MG/DL (ref 70–99)
GLUCOSE BLDC GLUCOMTR-MCNC: 83 MG/DL (ref 70–99)
GLUCOSE BLDC GLUCOMTR-MCNC: 84 MG/DL (ref 70–99)
GLUCOSE SERPL-MCNC: 85 MG/DL (ref 65–99)
HCT VFR BLD AUTO: 39.3 % (ref 34–46.6)
HGB BLD-MCNC: 12.7 G/DL (ref 12–15.9)
MCH RBC QN AUTO: 29.5 PG (ref 26.6–33)
MCHC RBC AUTO-ENTMCNC: 32.3 G/DL (ref 31.5–35.7)
MCV RBC AUTO: 91.4 FL (ref 79–97)
PLATELET # BLD AUTO: 177 10*3/MM3 (ref 140–450)
PMV BLD AUTO: 10.1 FL (ref 6–12)
POTASSIUM SERPL-SCNC: 3.5 MMOL/L (ref 3.5–5.2)
RBC # BLD AUTO: 4.3 10*6/MM3 (ref 3.77–5.28)
SODIUM SERPL-SCNC: 139 MMOL/L (ref 136–145)
WBC # BLD AUTO: 6.51 10*3/MM3 (ref 3.4–10.8)

## 2021-10-05 PROCEDURE — 99233 SBSQ HOSP IP/OBS HIGH 50: CPT | Performed by: INTERNAL MEDICINE

## 2021-10-05 PROCEDURE — G0378 HOSPITAL OBSERVATION PER HR: HCPCS

## 2021-10-05 PROCEDURE — A9537 TC99M MEBROFENIN: HCPCS | Performed by: INTERNAL MEDICINE

## 2021-10-05 PROCEDURE — 85027 COMPLETE CBC AUTOMATED: CPT | Performed by: INTERNAL MEDICINE

## 2021-10-05 PROCEDURE — 78227 HEPATOBIL SYST IMAGE W/DRUG: CPT

## 2021-10-05 PROCEDURE — 0 TECHNETIUM TC 99M MEBROFENIN KIT: Performed by: INTERNAL MEDICINE

## 2021-10-05 PROCEDURE — 99221 1ST HOSP IP/OBS SF/LOW 40: CPT | Performed by: SURGERY

## 2021-10-05 PROCEDURE — 80048 BASIC METABOLIC PNL TOTAL CA: CPT | Performed by: INTERNAL MEDICINE

## 2021-10-05 PROCEDURE — 82962 GLUCOSE BLOOD TEST: CPT

## 2021-10-05 PROCEDURE — 25010000002 ONDANSETRON PER 1 MG: Performed by: PHYSICIAN ASSISTANT

## 2021-10-05 RX ORDER — SODIUM PHOSPHATE,MONO-DIBASIC 19G-7G/118
1 ENEMA (ML) RECTAL ONCE
Status: COMPLETED | OUTPATIENT
Start: 2021-10-05 | End: 2021-10-05

## 2021-10-05 RX ORDER — PANTOPRAZOLE SODIUM 40 MG/10ML
40 INJECTION, POWDER, LYOPHILIZED, FOR SOLUTION INTRAVENOUS
Status: DISCONTINUED | OUTPATIENT
Start: 2021-10-05 | End: 2021-10-07 | Stop reason: HOSPADM

## 2021-10-05 RX ORDER — KIT FOR THE PREPARATION OF TECHNETIUM TC 99M MEBROFENIN 45 MG/10ML
1 INJECTION, POWDER, LYOPHILIZED, FOR SOLUTION INTRAVENOUS
Status: COMPLETED | OUTPATIENT
Start: 2021-10-05 | End: 2021-10-05

## 2021-10-05 RX ADMIN — SODIUM PHOSPHATE 1 ENEMA: 7; 19 ENEMA RECTAL at 21:50

## 2021-10-05 RX ADMIN — PANTOPRAZOLE SODIUM 40 MG: 40 INJECTION, POWDER, FOR SOLUTION INTRAVENOUS at 11:46

## 2021-10-05 RX ADMIN — DULOXETINE HYDROCHLORIDE 30 MG: 30 CAPSULE, DELAYED RELEASE ORAL at 11:45

## 2021-10-05 RX ADMIN — LAMOTRIGINE 100 MG: 100 TABLET ORAL at 21:50

## 2021-10-05 RX ADMIN — POLYETHYLENE GLYCOL 3350 17 G: 17 POWDER, FOR SOLUTION ORAL at 21:50

## 2021-10-05 RX ADMIN — PANTOPRAZOLE SODIUM 8 MG/HR: 40 INJECTION, POWDER, FOR SOLUTION INTRAVENOUS at 00:43

## 2021-10-05 RX ADMIN — SODIUM CHLORIDE, POTASSIUM CHLORIDE, SODIUM LACTATE AND CALCIUM CHLORIDE 100 ML/HR: 600; 310; 30; 20 INJECTION, SOLUTION INTRAVENOUS at 17:05

## 2021-10-05 RX ADMIN — QUETIAPINE FUMARATE 100 MG: 100 TABLET ORAL at 13:08

## 2021-10-05 RX ADMIN — SODIUM CHLORIDE, PRESERVATIVE FREE 10 ML: 5 INJECTION INTRAVENOUS at 21:50

## 2021-10-05 RX ADMIN — SODIUM CHLORIDE, PRESERVATIVE FREE 10 ML: 5 INJECTION INTRAVENOUS at 11:46

## 2021-10-05 RX ADMIN — TERAZOSIN HYDROCHLORIDE 5 MG: 5 CAPSULE ORAL at 21:50

## 2021-10-05 RX ADMIN — TOPIRAMATE 50 MG: 25 TABLET, FILM COATED ORAL at 21:50

## 2021-10-05 RX ADMIN — DOCUSATE SODIUM 50MG AND SENNOSIDES 8.6MG 2 TABLET: 8.6; 5 TABLET, FILM COATED ORAL at 21:50

## 2021-10-05 RX ADMIN — MEBROFENIN 1 DOSE: 45 INJECTION, POWDER, LYOPHILIZED, FOR SOLUTION INTRAVENOUS at 09:00

## 2021-10-05 RX ADMIN — ONDANSETRON 4 MG: 2 INJECTION INTRAMUSCULAR; INTRAVENOUS at 13:13

## 2021-10-05 RX ADMIN — TOPIRAMATE 50 MG: 25 TABLET, FILM COATED ORAL at 11:45

## 2021-10-05 RX ADMIN — QUETIAPINE FUMARATE 100 MG: 100 TABLET ORAL at 21:50

## 2021-10-05 RX ADMIN — PANTOPRAZOLE SODIUM 8 MG/HR: 40 INJECTION, POWDER, FOR SOLUTION INTRAVENOUS at 05:10

## 2021-10-05 RX ADMIN — DOCUSATE SODIUM 50MG AND SENNOSIDES 8.6MG 2 TABLET: 8.6; 5 TABLET, FILM COATED ORAL at 11:45

## 2021-10-05 RX ADMIN — LAMOTRIGINE 100 MG: 100 TABLET ORAL at 11:45

## 2021-10-05 RX ADMIN — PANTOPRAZOLE SODIUM 40 MG: 40 INJECTION, POWDER, FOR SOLUTION INTRAVENOUS at 17:04

## 2021-10-05 RX ADMIN — POLYETHYLENE GLYCOL 3350 17 G: 17 POWDER, FOR SOLUTION ORAL at 11:46

## 2021-10-05 RX ADMIN — QUETIAPINE FUMARATE 100 MG: 100 TABLET ORAL at 05:10

## 2021-10-05 RX ADMIN — AMLODIPINE BESYLATE 2.5 MG: 2.5 TABLET ORAL at 11:45

## 2021-10-05 NOTE — PLAN OF CARE
Goal Outcome Evaluation:  Minimal complaints of pain or nausea this shift. No pain meds since before 0400, and npo since 0515 for HIDA scan today. Lincoln, rn

## 2021-10-05 NOTE — PROGRESS NOTES
[facility]   Hospitalist Progress Note  Date: 10/5/2021  Patient Name: Melvina Martinez  : 1960  MRN: [id number]  Date of admission: 10/3/2021      Subjective   Subjective   Chief Complaint:   Intractable nausea vomiting, abdominal pain     HPI:     Melvina Martinez is a 61 y.o. female with PMH of GERD, anxiety, bipolar disorder, PTSD, depression, HTN, and DM who presents to the ED complaining of abdominal pain, nausea/vomiting.  Patient states this started approximately 4 days prior associated with constipation.    Patient took medications at home for constipation with minimal relief.  CT of the abdomen pelvis was done in the ED and shows hepatic lesions suggestive of cysts, left renal cysts and probable peripelvic renal cyst but otherwise no acute abdominal findings.  LFTs normal, Lipase was 30.  Patient was given multiple antiemetics and pain medicine but still had continued nausea/vomiting.  The hospitalist service was contacted for admission for intractable nausea and vomiting.  Patient was admitted to the floor and did well for several hours without nausea, vomiting, or pain.    However abdominal pain returned with severe nausea and vomiting, repeat CT scan was performed again with no new findings.  Right upper quadrant ultrasound performed with findings of gallbladder is mildly distended without definitive cholelithiasis or cholecystitis.  HIDA scan was obtained shows an ejection fraction of 0%.      Interval history:  HIDA scan today shows abnormal gallbladder ejection fraction measuring 0%.  This was discussed with general surgery.  Will consult surgery, given concern for functional bladder disorder.  Patient still having right upper quadrant pain, still having nausea and vomiting    Review of Systems   All systems were reviewed and negative except for: Persistent right upper quadrant pain, nausea vomiting  Objective   Objective     Vitals:   Temp:  [97.5 °F (36.4 °C)-98.6 °F (37 °C)] 97.5 °F (36.4  °C)  Heart Rate:  [60-77] 71  Resp:  [16-20] 18  BP: (110-136)/(58-86) 125/66  Flow (L/min):  [2] 2  Physical Exam    Constitutional: Awake, alert, appears uncomfortable   Eyes: Pupils equal, sclerae anicteric, no conjunctival injection   HENT: NCAT, mucous membranes moist   Neck: Supple, no thyromegaly, no lymphadenopathy, trachea midline   Respiratory: Clear to auscultation bilaterally, nonlabored respirations    Cardiovascular: RRR, no murmurs, rubs, or gallops, palpable pedal pulses bilaterally   Gastrointestinal: Positive bowel sounds, soft, tender to right upper quadrant, no rebound no guarding   Musculoskeletal: No bilateral ankle edema, no clubbing or cyanosis to extremities   Psychiatric: Appropriate affect, cooperative   Neurologic: Oriented x 3, strength moving all extremities no focal weakness appreciated, Cranial Nerves grossly intact to confrontation, speech clear      Result Review    Result Review:  I have personally reviewed the results from the time of this admission to 10/5/2021 14:59 EDT and agree with these findings:  [x]  Laboratory  [x]  Microbiology  [x]  Radiology  []  EKG/Telemetry   []  Cardiology/Vascular   []  Pathology  []  Old records  [x]  Other: Medications      Assessment/Plan   Assessment / Plan     Assessment/Plan:  Abdominal pain  Functional gallbladder disorder  Intractable nausea vomiting  Diabetes mellitus  Hypertension  GERD  PTSD   Bipolar disorder  Depression/anxiety     Plan:  Continue to monitor hospitalist service  Consulting general surgery today, appreciate assistance  HIDA scan shows gallbladder ejection fraction of 0%  Patient still has medications available for nausea, vomiting  Pain medications still available  Home medications resumed as indicated    Discussed plan with RN, general surgery    DVT prophylaxis:  No DVT prophylaxis order currently exists.    CODE STATUS:    Full    Electronically signed by Joshua Parker MD, 10/05/21, 2:59 PM EDT.

## 2021-10-05 NOTE — PLAN OF CARE
Goal Outcome Evaluation:  Plan of Care Reviewed With: patient           Outcome Summary: Patient with vomiting today after HIDA scan. Patient states that she has been passing gas but still has not had a BM. General surgery consult today.

## 2021-10-06 ENCOUNTER — ANESTHESIA EVENT (OUTPATIENT)
Dept: PERIOP | Facility: HOSPITAL | Age: 61
End: 2021-10-06

## 2021-10-06 ENCOUNTER — ANESTHESIA (OUTPATIENT)
Dept: PERIOP | Facility: HOSPITAL | Age: 61
End: 2021-10-06

## 2021-10-06 PROBLEM — K82.8 BILIARY DYSKINESIA: Status: ACTIVE | Noted: 2021-10-03

## 2021-10-06 LAB
ALBUMIN SERPL-MCNC: 3.4 G/DL (ref 3.5–5.2)
ALBUMIN/GLOB SERPL: 1.4 G/DL
ALP SERPL-CCNC: 64 U/L (ref 39–117)
ALT SERPL W P-5'-P-CCNC: 11 U/L (ref 1–33)
ANION GAP SERPL CALCULATED.3IONS-SCNC: 8.6 MMOL/L (ref 5–15)
AST SERPL-CCNC: 13 U/L (ref 1–32)
BILIRUB SERPL-MCNC: 0.4 MG/DL (ref 0–1.2)
BUN SERPL-MCNC: 5 MG/DL (ref 8–23)
BUN/CREAT SERPL: 6 (ref 7–25)
CALCIUM SPEC-SCNC: 8.6 MG/DL (ref 8.6–10.5)
CHLORIDE SERPL-SCNC: 108 MMOL/L (ref 98–107)
CO2 SERPL-SCNC: 23.4 MMOL/L (ref 22–29)
CREAT SERPL-MCNC: 0.84 MG/DL (ref 0.57–1)
DEPRECATED RDW RBC AUTO: 49.3 FL (ref 37–54)
ERYTHROCYTE [DISTWIDTH] IN BLOOD BY AUTOMATED COUNT: 15 % (ref 12.3–15.4)
GFR SERPL CREATININE-BSD FRML MDRD: 69 ML/MIN/1.73
GLOBULIN UR ELPH-MCNC: 2.4 GM/DL
GLUCOSE BLDC GLUCOMTR-MCNC: 102 MG/DL (ref 70–99)
GLUCOSE BLDC GLUCOMTR-MCNC: 136 MG/DL (ref 70–99)
GLUCOSE BLDC GLUCOMTR-MCNC: 82 MG/DL (ref 70–99)
GLUCOSE BLDC GLUCOMTR-MCNC: 84 MG/DL (ref 70–99)
GLUCOSE SERPL-MCNC: 86 MG/DL (ref 65–99)
HCT VFR BLD AUTO: 38.2 % (ref 34–46.6)
HGB BLD-MCNC: 12.6 G/DL (ref 12–15.9)
MAGNESIUM SERPL-MCNC: 1.7 MG/DL (ref 1.6–2.4)
MCH RBC QN AUTO: 29.6 PG (ref 26.6–33)
MCHC RBC AUTO-ENTMCNC: 33 G/DL (ref 31.5–35.7)
MCV RBC AUTO: 89.9 FL (ref 79–97)
PLATELET # BLD AUTO: 183 10*3/MM3 (ref 140–450)
PMV BLD AUTO: 10.2 FL (ref 6–12)
POTASSIUM SERPL-SCNC: 3.3 MMOL/L (ref 3.5–5.2)
PROT SERPL-MCNC: 5.8 G/DL (ref 6–8.5)
RBC # BLD AUTO: 4.25 10*6/MM3 (ref 3.77–5.28)
SODIUM SERPL-SCNC: 140 MMOL/L (ref 136–145)
WBC # BLD AUTO: 6.15 10*3/MM3 (ref 3.4–10.8)

## 2021-10-06 PROCEDURE — 47563 LAPARO CHOLECYSTECTOMY/GRAPH: CPT | Performed by: SPECIALIST/TECHNOLOGIST, OTHER

## 2021-10-06 PROCEDURE — 47563 LAPARO CHOLECYSTECTOMY/GRAPH: CPT | Performed by: SURGERY

## 2021-10-06 PROCEDURE — 83735 ASSAY OF MAGNESIUM: CPT | Performed by: INTERNAL MEDICINE

## 2021-10-06 PROCEDURE — 25010000002 FENTANYL CITRATE (PF) 50 MCG/ML SOLUTION: Performed by: NURSE ANESTHETIST, CERTIFIED REGISTERED

## 2021-10-06 PROCEDURE — 25010000002 DEXAMETHASONE PER 1 MG: Performed by: NURSE ANESTHETIST, CERTIFIED REGISTERED

## 2021-10-06 PROCEDURE — 25010000002 LEVOFLOXACIN PER 250 MG: Performed by: NURSE ANESTHETIST, CERTIFIED REGISTERED

## 2021-10-06 PROCEDURE — C1889 IMPLANT/INSERT DEVICE, NOC: HCPCS | Performed by: SURGERY

## 2021-10-06 PROCEDURE — 25010000002 HYDROMORPHONE 1 MG/ML SOLUTION: Performed by: NURSE ANESTHETIST, CERTIFIED REGISTERED

## 2021-10-06 PROCEDURE — 25010000002 PROPOFOL 10 MG/ML EMULSION: Performed by: NURSE ANESTHETIST, CERTIFIED REGISTERED

## 2021-10-06 PROCEDURE — 85027 COMPLETE CBC AUTOMATED: CPT | Performed by: INTERNAL MEDICINE

## 2021-10-06 PROCEDURE — 25010000003 POTASSIUM CHLORIDE 10 MEQ/100ML SOLUTION: Performed by: INTERNAL MEDICINE

## 2021-10-06 PROCEDURE — 99233 SBSQ HOSP IP/OBS HIGH 50: CPT | Performed by: INTERNAL MEDICINE

## 2021-10-06 PROCEDURE — 0FT44ZZ RESECTION OF GALLBLADDER, PERCUTANEOUS ENDOSCOPIC APPROACH: ICD-10-PCS | Performed by: SURGERY

## 2021-10-06 PROCEDURE — 25010000002 MAGNESIUM SULFATE 2 GM/50ML SOLUTION: Performed by: INTERNAL MEDICINE

## 2021-10-06 PROCEDURE — 25010000003 LIDOCAINE 1 % SOLUTION 20 ML VIAL: Performed by: SURGERY

## 2021-10-06 PROCEDURE — 88304 TISSUE EXAM BY PATHOLOGIST: CPT | Performed by: SURGERY

## 2021-10-06 PROCEDURE — 25010000002 KETOROLAC TROMETHAMINE PER 15 MG: Performed by: NURSE ANESTHETIST, CERTIFIED REGISTERED

## 2021-10-06 PROCEDURE — 82962 GLUCOSE BLOOD TEST: CPT

## 2021-10-06 PROCEDURE — 80053 COMPREHEN METABOLIC PANEL: CPT | Performed by: INTERNAL MEDICINE

## 2021-10-06 PROCEDURE — 25010000002 ONDANSETRON PER 1 MG: Performed by: NURSE ANESTHETIST, CERTIFIED REGISTERED

## 2021-10-06 PROCEDURE — 25010000003 MEPERIDINE PER 100 MG: Performed by: NURSE ANESTHETIST, CERTIFIED REGISTERED

## 2021-10-06 PROCEDURE — 25010000002 MIDAZOLAM PER 1MG: Performed by: NURSE ANESTHETIST, CERTIFIED REGISTERED

## 2021-10-06 DEVICE — LIGACLIP 10-M/L, 10MM ENDOSCOPIC ROTATING MULTIPLE CLIP APPLIERS
Type: IMPLANTABLE DEVICE | Site: ABDOMEN | Status: FUNCTIONAL
Brand: LIGACLIP

## 2021-10-06 RX ORDER — SODIUM CHLORIDE 0.9 % (FLUSH) 0.9 %
10 SYRINGE (ML) INJECTION EVERY 12 HOURS SCHEDULED
Status: DISCONTINUED | OUTPATIENT
Start: 2021-10-06 | End: 2021-10-06 | Stop reason: HOSPADM

## 2021-10-06 RX ORDER — OXYCODONE HYDROCHLORIDE 5 MG/1
5 TABLET ORAL
Status: DISCONTINUED | OUTPATIENT
Start: 2021-10-06 | End: 2021-10-06 | Stop reason: HOSPADM

## 2021-10-06 RX ORDER — ONDANSETRON 2 MG/ML
INJECTION INTRAMUSCULAR; INTRAVENOUS AS NEEDED
Status: DISCONTINUED | OUTPATIENT
Start: 2021-10-06 | End: 2021-10-06 | Stop reason: SURG

## 2021-10-06 RX ORDER — LEVOFLOXACIN 5 MG/ML
500 INJECTION, SOLUTION INTRAVENOUS ONCE
Status: DISCONTINUED | OUTPATIENT
Start: 2021-10-06 | End: 2021-10-06 | Stop reason: HOSPADM

## 2021-10-06 RX ORDER — SODIUM CHLORIDE, SODIUM LACTATE, POTASSIUM CHLORIDE, CALCIUM CHLORIDE 600; 310; 30; 20 MG/100ML; MG/100ML; MG/100ML; MG/100ML
100 INJECTION, SOLUTION INTRAVENOUS CONTINUOUS
Status: DISCONTINUED | OUTPATIENT
Start: 2021-10-06 | End: 2021-10-07

## 2021-10-06 RX ORDER — MIDAZOLAM HYDROCHLORIDE 2 MG/2ML
INJECTION, SOLUTION INTRAMUSCULAR; INTRAVENOUS AS NEEDED
Status: DISCONTINUED | OUTPATIENT
Start: 2021-10-06 | End: 2021-10-06 | Stop reason: SURG

## 2021-10-06 RX ORDER — PROMETHAZINE HYDROCHLORIDE 25 MG/1
25 SUPPOSITORY RECTAL ONCE AS NEEDED
Status: DISCONTINUED | OUTPATIENT
Start: 2021-10-06 | End: 2021-10-06 | Stop reason: HOSPADM

## 2021-10-06 RX ORDER — PROPOFOL 10 MG/ML
VIAL (ML) INTRAVENOUS AS NEEDED
Status: DISCONTINUED | OUTPATIENT
Start: 2021-10-06 | End: 2021-10-06 | Stop reason: SURG

## 2021-10-06 RX ORDER — KETOROLAC TROMETHAMINE 30 MG/ML
INJECTION, SOLUTION INTRAMUSCULAR; INTRAVENOUS AS NEEDED
Status: DISCONTINUED | OUTPATIENT
Start: 2021-10-06 | End: 2021-10-06 | Stop reason: SURG

## 2021-10-06 RX ORDER — HYDROCODONE BITARTRATE AND ACETAMINOPHEN 10; 325 MG/1; MG/1
1 TABLET ORAL EVERY 6 HOURS PRN
Status: DISCONTINUED | OUTPATIENT
Start: 2021-10-06 | End: 2021-10-06

## 2021-10-06 RX ORDER — DEXAMETHASONE SODIUM PHOSPHATE 4 MG/ML
INJECTION, SOLUTION INTRA-ARTICULAR; INTRALESIONAL; INTRAMUSCULAR; INTRAVENOUS; SOFT TISSUE AS NEEDED
Status: DISCONTINUED | OUTPATIENT
Start: 2021-10-06 | End: 2021-10-06 | Stop reason: SURG

## 2021-10-06 RX ORDER — POTASSIUM CHLORIDE 7.45 MG/ML
10 INJECTION INTRAVENOUS
Status: COMPLETED | OUTPATIENT
Start: 2021-10-06 | End: 2021-10-06

## 2021-10-06 RX ORDER — INDOCYANINE GREEN AND WATER 25 MG
2.5 KIT INJECTION
Status: COMPLETED | OUTPATIENT
Start: 2021-10-07 | End: 2021-10-06

## 2021-10-06 RX ORDER — MEPERIDINE HYDROCHLORIDE 25 MG/ML
12.5 INJECTION INTRAMUSCULAR; INTRAVENOUS; SUBCUTANEOUS
Status: DISCONTINUED | OUTPATIENT
Start: 2021-10-06 | End: 2021-10-06 | Stop reason: HOSPADM

## 2021-10-06 RX ORDER — FENTANYL CITRATE 50 UG/ML
INJECTION, SOLUTION INTRAMUSCULAR; INTRAVENOUS AS NEEDED
Status: DISCONTINUED | OUTPATIENT
Start: 2021-10-06 | End: 2021-10-06 | Stop reason: SURG

## 2021-10-06 RX ORDER — PROMETHAZINE HYDROCHLORIDE 12.5 MG/1
25 TABLET ORAL ONCE AS NEEDED
Status: DISCONTINUED | OUTPATIENT
Start: 2021-10-06 | End: 2021-10-06 | Stop reason: HOSPADM

## 2021-10-06 RX ORDER — METOPROLOL TARTRATE 5 MG/5ML
INJECTION INTRAVENOUS AS NEEDED
Status: DISCONTINUED | OUTPATIENT
Start: 2021-10-06 | End: 2021-10-06 | Stop reason: SURG

## 2021-10-06 RX ORDER — ROCURONIUM BROMIDE 10 MG/ML
INJECTION, SOLUTION INTRAVENOUS AS NEEDED
Status: DISCONTINUED | OUTPATIENT
Start: 2021-10-06 | End: 2021-10-06 | Stop reason: SURG

## 2021-10-06 RX ORDER — LEVOFLOXACIN 5 MG/ML
INJECTION, SOLUTION INTRAVENOUS AS NEEDED
Status: DISCONTINUED | OUTPATIENT
Start: 2021-10-06 | End: 2021-10-06 | Stop reason: SURG

## 2021-10-06 RX ORDER — HYDROCODONE BITARTRATE AND ACETAMINOPHEN 10; 325 MG/1; MG/1
1 TABLET ORAL EVERY 6 HOURS PRN
Status: DISCONTINUED | OUTPATIENT
Start: 2021-10-06 | End: 2021-10-07 | Stop reason: HOSPADM

## 2021-10-06 RX ORDER — SODIUM CHLORIDE 0.9 % (FLUSH) 0.9 %
10 SYRINGE (ML) INJECTION AS NEEDED
Status: DISCONTINUED | OUTPATIENT
Start: 2021-10-06 | End: 2021-10-06 | Stop reason: HOSPADM

## 2021-10-06 RX ORDER — HYDROCODONE BITARTRATE AND ACETAMINOPHEN 5; 325 MG/1; MG/1
1 TABLET ORAL EVERY 6 HOURS PRN
Status: DISCONTINUED | OUTPATIENT
Start: 2021-10-06 | End: 2021-10-07 | Stop reason: HOSPADM

## 2021-10-06 RX ORDER — GLYCOPYRROLATE 0.2 MG/ML
INJECTION INTRAMUSCULAR; INTRAVENOUS AS NEEDED
Status: DISCONTINUED | OUTPATIENT
Start: 2021-10-06 | End: 2021-10-06 | Stop reason: SURG

## 2021-10-06 RX ORDER — MAGNESIUM SULFATE HEPTAHYDRATE 40 MG/ML
2 INJECTION, SOLUTION INTRAVENOUS ONCE
Status: COMPLETED | OUTPATIENT
Start: 2021-10-06 | End: 2021-10-06

## 2021-10-06 RX ORDER — KETAMINE HYDROCHLORIDE 50 MG/ML
INJECTION, SOLUTION, CONCENTRATE INTRAMUSCULAR; INTRAVENOUS AS NEEDED
Status: DISCONTINUED | OUTPATIENT
Start: 2021-10-06 | End: 2021-10-06 | Stop reason: SURG

## 2021-10-06 RX ORDER — ONDANSETRON 2 MG/ML
4 INJECTION INTRAMUSCULAR; INTRAVENOUS ONCE AS NEEDED
Status: DISCONTINUED | OUTPATIENT
Start: 2021-10-06 | End: 2021-10-06 | Stop reason: HOSPADM

## 2021-10-06 RX ORDER — LIDOCAINE HYDROCHLORIDE 20 MG/ML
INJECTION, SOLUTION INFILTRATION; PERINEURAL AS NEEDED
Status: DISCONTINUED | OUTPATIENT
Start: 2021-10-06 | End: 2021-10-06 | Stop reason: SURG

## 2021-10-06 RX ADMIN — FENTANYL CITRATE 100 MCG: 50 INJECTION INTRAMUSCULAR; INTRAVENOUS at 14:25

## 2021-10-06 RX ADMIN — POTASSIUM CHLORIDE 10 MEQ: 7.46 INJECTION, SOLUTION INTRAVENOUS at 10:44

## 2021-10-06 RX ADMIN — SUGAMMADEX 200 MG: 100 INJECTION, SOLUTION INTRAVENOUS at 15:10

## 2021-10-06 RX ADMIN — PANTOPRAZOLE SODIUM 40 MG: 40 INJECTION, POWDER, FOR SOLUTION INTRAVENOUS at 17:49

## 2021-10-06 RX ADMIN — INDOCYANINE GREEN AND WATER 25 MG: KIT at 14:10

## 2021-10-06 RX ADMIN — HYDROMORPHONE HYDROCHLORIDE 0.5 MG: 1 INJECTION, SOLUTION INTRAMUSCULAR; INTRAVENOUS; SUBCUTANEOUS at 16:12

## 2021-10-06 RX ADMIN — MIDAZOLAM HYDROCHLORIDE 2 MG: 1 INJECTION, SOLUTION INTRAMUSCULAR; INTRAVENOUS at 14:19

## 2021-10-06 RX ADMIN — OXYCODONE HYDROCHLORIDE 5 MG: 5 TABLET ORAL at 16:37

## 2021-10-06 RX ADMIN — TERAZOSIN HYDROCHLORIDE 5 MG: 5 CAPSULE ORAL at 21:38

## 2021-10-06 RX ADMIN — ONDANSETRON 4 MG: 2 INJECTION INTRAMUSCULAR; INTRAVENOUS at 16:25

## 2021-10-06 RX ADMIN — QUETIAPINE FUMARATE 100 MG: 100 TABLET ORAL at 21:38

## 2021-10-06 RX ADMIN — LAMOTRIGINE 100 MG: 100 TABLET ORAL at 21:38

## 2021-10-06 RX ADMIN — DULOXETINE HYDROCHLORIDE 30 MG: 30 CAPSULE, DELAYED RELEASE ORAL at 09:16

## 2021-10-06 RX ADMIN — POLYETHYLENE GLYCOL 3350 17 G: 17 POWDER, FOR SOLUTION ORAL at 21:39

## 2021-10-06 RX ADMIN — LAMOTRIGINE 100 MG: 100 TABLET ORAL at 09:16

## 2021-10-06 RX ADMIN — ROCURONIUM BROMIDE 50 MG: 10 INJECTION INTRAVENOUS at 14:27

## 2021-10-06 RX ADMIN — GLYCOPYRROLATE 0.2 MG: 0.2 INJECTION INTRAMUSCULAR; INTRAVENOUS at 14:39

## 2021-10-06 RX ADMIN — MEPERIDINE HYDROCHLORIDE 12.5 MG: 25 INJECTION INTRAMUSCULAR; INTRAVENOUS; SUBCUTANEOUS at 16:15

## 2021-10-06 RX ADMIN — POTASSIUM CHLORIDE 10 MEQ: 7.46 INJECTION, SOLUTION INTRAVENOUS at 09:32

## 2021-10-06 RX ADMIN — FENTANYL CITRATE 100 MCG: 50 INJECTION INTRAMUSCULAR; INTRAVENOUS at 14:56

## 2021-10-06 RX ADMIN — SODIUM CHLORIDE, POTASSIUM CHLORIDE, SODIUM LACTATE AND CALCIUM CHLORIDE 100 ML/HR: 600; 310; 30; 20 INJECTION, SOLUTION INTRAVENOUS at 05:22

## 2021-10-06 RX ADMIN — KETAMINE HYDROCHLORIDE 25 MG: 50 INJECTION, SOLUTION INTRAMUSCULAR; INTRAVENOUS at 14:33

## 2021-10-06 RX ADMIN — SODIUM CHLORIDE, POTASSIUM CHLORIDE, SODIUM LACTATE AND CALCIUM CHLORIDE: 600; 310; 30; 20 INJECTION, SOLUTION INTRAVENOUS at 14:56

## 2021-10-06 RX ADMIN — METOPROLOL TARTRATE 5 MG: 1 INJECTION, SOLUTION INTRAVENOUS at 15:02

## 2021-10-06 RX ADMIN — KETOROLAC TROMETHAMINE 30 MG: 30 INJECTION, SOLUTION INTRAMUSCULAR; INTRAVENOUS at 14:59

## 2021-10-06 RX ADMIN — QUETIAPINE FUMARATE 100 MG: 100 TABLET ORAL at 05:23

## 2021-10-06 RX ADMIN — TOPIRAMATE 50 MG: 25 TABLET, FILM COATED ORAL at 21:38

## 2021-10-06 RX ADMIN — SODIUM CHLORIDE, PRESERVATIVE FREE 10 ML: 5 INJECTION INTRAVENOUS at 07:40

## 2021-10-06 RX ADMIN — SODIUM CHLORIDE, PRESERVATIVE FREE 10 ML: 5 INJECTION INTRAVENOUS at 21:39

## 2021-10-06 RX ADMIN — DEXAMETHASONE SODIUM PHOSPHATE 4 MG: 4 INJECTION INTRA-ARTICULAR; INTRALESIONAL; INTRAMUSCULAR; INTRAVENOUS; SOFT TISSUE at 14:37

## 2021-10-06 RX ADMIN — DOCUSATE SODIUM 50MG AND SENNOSIDES 8.6MG 2 TABLET: 8.6; 5 TABLET, FILM COATED ORAL at 21:38

## 2021-10-06 RX ADMIN — LIDOCAINE HYDROCHLORIDE 100 MG: 20 INJECTION, SOLUTION INFILTRATION; PERINEURAL at 14:27

## 2021-10-06 RX ADMIN — HYDROMORPHONE HYDROCHLORIDE 0.5 MG: 1 INJECTION, SOLUTION INTRAMUSCULAR; INTRAVENOUS; SUBCUTANEOUS at 16:33

## 2021-10-06 RX ADMIN — POTASSIUM CHLORIDE 10 MEQ: 7.46 INJECTION, SOLUTION INTRAVENOUS at 11:48

## 2021-10-06 RX ADMIN — PANTOPRAZOLE SODIUM 40 MG: 40 INJECTION, POWDER, FOR SOLUTION INTRAVENOUS at 07:40

## 2021-10-06 RX ADMIN — AMLODIPINE BESYLATE 2.5 MG: 2.5 TABLET ORAL at 09:16

## 2021-10-06 RX ADMIN — LEVOFLOXACIN 500 MG: 5 INJECTION, SOLUTION INTRAVENOUS at 14:30

## 2021-10-06 RX ADMIN — ONDANSETRON 4 MG: 2 INJECTION INTRAMUSCULAR; INTRAVENOUS at 15:08

## 2021-10-06 RX ADMIN — MAGNESIUM SULFATE 2 G: 2 INJECTION INTRAVENOUS at 07:40

## 2021-10-06 RX ADMIN — SODIUM CHLORIDE, POTASSIUM CHLORIDE, SODIUM LACTATE AND CALCIUM CHLORIDE 100 ML/HR: 600; 310; 30; 20 INJECTION, SOLUTION INTRAVENOUS at 16:27

## 2021-10-06 RX ADMIN — PROPOFOL 200 MG: 10 INJECTION, EMULSION INTRAVENOUS at 14:27

## 2021-10-06 NOTE — OP NOTE
Preoperative diagnosis: Biliary dyskinesia    Postoperative diagnosis: Same    Procedure: Laparoscopic cholecystectomy    Surgeon: Charissa    Anesthesia: General    Assistant: Mary Jacobson    EBL: 3    Specimens: Gallbladder    Complications: None    Indications: 61-year-old female who had intractable nausea vomiting and was admitted to the hospitalist service for evaluation.  During the course of her evaluation was found to have 0% ejection fraction on HIDA scan.  Since she was admitted to the hospital and having intractable nausea vomiting she was taken to the operating room for laparoscopic cholecystectomy.    Procedures     Patient was identified in the preoperative holding area.  Risks benefits alternatives of the procedure were again discussed.  All questions were answered.  Patient voiced understanding agreed to proceed.     Patient was brought back the operating room placed supine on the operating room table.  Monitoring devices and Daniele stockings were placed.  After induction of anesthesia patient was successfully intubated.  After intubation patient was prepped and draped in standard surgical fashion.  After prepping and draping timeout was performed to verify both correct patient and correct procedure.     We began by injecting local anesthesia in the left upper quadrant.  Small nick was made in the skin and the Veress needle was inserted into the abdomen.  Intra-abdominal placement was verified with a normal saline drop test.  After verification the abdomen was insufflated to 15 mmHg pressure.  After insufflation injected local anesthesia in the supraumbilical region.  Incision was made accommodate a 5 mm trocar.  Then using the Visiport technique the abdomen was entered.  Once the abdomen was entered we reinserted the laparoscope and looked underneath her Visiport and Veress needle entry sites.  We saw no obvious underlying injury.  We then went about placing our subsequent trochars.  After injection  of local anesthesia and under direct visualization a 12 mm trocar was placed in the subxiphoid region and two 5 mm trochars were placed in the right upper quadrant.     We then grasped the gallbladder and retracted above the liver.    We took down some minor inflammatory lesions from the posterior wall the gallbladder.  We then retracted the gallbladder out laterally and began making window between the liver bed the gallbladder itself.  Through this window we are able to see the right lobe of the liver very clearly.  We saw also 2 structures going directly in the gallbladder 1 which appear to be cystic duct 1 which appear to be cystic artery.  Through the window we saw the right lobe of the liver.  We saw no crossing structures behind medially or laterally through this window.  Seeing the 2 structures going directly the gallbladder seeing the right lobe of the liver with no crossing structures we felt that we had obtained critical view of safety.  After obtaining critical view of safety and during the course of the procedure we used IC-Green Claudine.  With IC-Green Claudine were able to verify and reverified our anatomy.  We saw the gallbladder light up with IC-Green, or cystic duct did not light up very well with IC-Green but it did seem to light up faintly.  The common bile duct did lit up very clearly with the IC-Green.  It appeared to be a single column of bile traversing from the liver down towards the duodenum and is well away from our dissection field although she did have a somewhat shortened cystic duct.  Satisfied we did verify and reverified the anatomy we placed 3 clips proximally 1 clip distally on the cystic duct 2 clips proximally 1 clip distally on the cystic artery and ligated these with laparoscopic scissors.  We were then able to elevate the gallbladder off the liver bed carefully dissected free from the liver bed with electrocautery.  Once it was completely dissected free it was amputated and placed  in an Endo Catch bag and removed from the abdomen.  It was passed off for pathology.  We then inspected operative field.  We made sure the appear to be hemostatic with electrocautery.  We irrigated the right upper quadrant suctioned free any spilled bile or blood.  We irrigated till the effluent was clear and suctioned that free.  Inspected operative field 1 final time.  We inspected our clip sites these appear to be intact with no spillage of bile or blood.  We inspected the common bile duct which appeared to be a single column of bile with no obvious signs of obstruction or injury and well away from our clip sites.  We then remove the 12 mm trocar that site was closed with a Evaristo Woody device and 0 Vicryl suture.  The rating trochars were then removed under direct visualization and the abdomen was desufflated.  Skin incisions were closed with subcuticular 4-0 Vicryl stitch and dressed with skin affix skin glue.  Patient was then aroused anesthesia taken off the OR table taken to PACU in stable condition.  Sponge needle instrument counts were correct x2.  Mary Jacobson is present for entire to the procedure and helped in all portions of the case.    Diagnoses and all orders for this visit:    1. Intractable vomiting with nausea, unspecified vomiting type (Primary)    2. Lactic acidosis    3. Biliary dyskinesia  Overview:  Added automatically from request for surgery [id number]    Orders:  -     Case Request; Standing  -     Case Request  -     Tissue Pathology Exam; Standing  -     Tissue Pathology Exam    Other orders  -     Cancel: NPO Diet; Standing  -     Undress and Gown; Standing  -     Insert peripheral IV; Standing  -     sodium chloride 0.9 % flush 10 mL  -     [place] Draw; Standing  -     CBC & Differential; Standing  -     Comprehensive Metabolic Panel; Standing  -     Lipase; Standing  -     Urinalysis With Microscopic If Indicated (No Culture) - Urine, Clean Catch; Standing  -     Cancel: NPO Diet  -      Undress and Gown  -     Insert peripheral IV  -     [place] Draw  -     CBC & Differential  -     Comprehensive Metabolic Panel  -     Lipase  -     Urinalysis With Microscopic If Indicated (No Culture) - Urine, Clean Catch  -     Lactic Acid, Plasma; Standing  -     CT Abdomen Pelvis With Contrast; Standing  -     HYDROmorphone (DILAUDID) injection 1 mg  -     ondansetron (ZOFRAN) injection 4 mg  -     Lactic Acid, Plasma  -     CT Abdomen Pelvis With Contrast  -     STAT Lactic Acid, Reflex; Standing  -     STAT Lactic Acid, Reflex  -     iopamidol (ISOVUE-370) 76 % injection 100 mL  -     ondansetron (ZOFRAN) injection 4 mg  -     Discontinue: HYDROmorphone (DILAUDID) injection 1 mg  -     HYDROmorphone (DILAUDID) injection 1 mg  -     sodium chloride 0.9 % bolus 2,000 mL  -     metoclopramide (REGLAN) injection 10 mg  -     diphenhydrAMINE (BENADRYL) injection 25 mg  -     Hospitalist (on-call MD unless specified); Standing  -     Hospitalist (on-call MD unless specified)  -     Urine Drug Screen - Urine, Clean Catch; Standing  -     Urine Drug Screen - Urine, Clean Catch  -     Initiate Observation Status; Standing  -     Initiate Observation Status  -     Vital Signs; Standing  -     Cancel: Basic Metabolic Panel; Standing  -     CBC (No Diff); Standing  -     Insert Peripheral IV; Standing  -     Cancel: Saline Lock & Maintain IV Access; Standing  -     sodium chloride 0.9 % flush 10 mL  -     sodium chloride 0.9 % flush 10 mL  -     acetaminophen (TYLENOL) tablet 650 mg  -     acetaminophen (TYLENOL) 160 MG/5ML solution 650 mg  -     acetaminophen (TYLENOL) suppository 650 mg  -     Discontinue: sennosides-docusate (PERICOLACE) 8.6-50 MG per tablet 2 tablet  -     Discontinue: polyethylene glycol (MIRALAX) packet 17 g  -     Discontinue: bisacodyl (DULCOLAX) EC tablet 5 mg  -     Discontinue: bisacodyl (DULCOLAX) suppository 10 mg  -     VTE Prophylaxis Not Indicated: Other: Patient Currently  Anticoagulated / Receiving Prophylaxis; Standing  -     Activity - Ad Jessica; Standing  -     Cancel: Diet Clear Liquid; Standing  -     lactated ringers infusion  -     ondansetron (ZOFRAN) injection 4 mg  -     Vital Signs  -     Insert Peripheral IV  -     Cancel: Saline Lock & Maintain IV Access  -     VTE Prophylaxis Not Indicated: Other: Patient Currently Anticoagulated / Receiving Prophylaxis  -     Activity - Ad Jesisca  -     Cancel: Diet Clear Liquid  -     prochlorperazine (COMPAZINE) injection 5 mg  -     influenza vac split quad (FLUZONE,FLUARIX,AFLURIA,FLULAVAL) injection 0.5 mL  -     Cancel: Basic Metabolic Panel  -     CBC (No Diff)  -     Vital Signs  -     Vital Signs  -     Vital Signs  -     Vital Signs  -     Vital Signs  -     Vital Signs  -     XR Abdomen KUB; Standing  -     XR Abdomen KUB  -     ECG 12 Lead; Standing  -     ECG 12 Lead  -     morphine injection 2 mg  -     Discontinue: pantoprazole (PROTONIX) 40 mg in 100mL NS IVPB  -     Lactic Acid, Plasma; Standing  -     Basic Metabolic Panel; Standing  -     Magnesium; Standing  -     Troponin; Standing  -     Lactic Acid, Plasma  -     Basic Metabolic Panel  -     Magnesium  -     Troponin  -     Cancel: CT Abdomen Pelvis With Contrast; Standing  -     Cancel: CT Abdomen Pelvis With Contrast  -     CT Abdomen Pelvis Without Contrast; Standing  -     CT Abdomen Pelvis Without Contrast  -     morphine injection 2 mg  -     Do NOT Hold Basal or Correction Scale Insulin When Patient is NPO, Hold Scheduled Mealtime (Bolus) Insulin if NPO; Standing  -     Follow S Hypoglycemia Standing Orders For Blood Glucose Less Than 70 mg/dL; Standing  -     dextrose (GLUTOSE) oral gel 15 g  -     dextrose 10 % infusion  -     glucagon (human recombinant) (GLUCAGEN DIAGNOSTIC) injection 1 mg  -     POC Glucose 4x Daily AC & at Bedtime; Standing  -     insulin lispro (humaLOG) injection 0-9 Units  -     Do NOT Hold Basal or Correction Scale Insulin When  Patient is NPO, Hold Scheduled Mealtime (Bolus) Insulin if NPO  -     Follow [facility] Hypoglycemia Standing Orders For Blood Glucose Less Than 70 mg/dL  -     POC Glucose 4x Daily AC & at Bedtime  -     POC Glucose 4x Daily AC & at Bedtime  -     POC Glucose 4x Daily AC & at Bedtime  -     POC Glucose 4x Daily AC & at Bedtime  -     POC Glucose 4x Daily AC & at Bedtime  -     POC Glucose Once; Standing  -     POC Glucose Once  -     US Gallbladder; Standing  -     US Gallbladder  -     amLODIPine (NORVASC) tablet 2.5 mg  -     DULoxetine (CYMBALTA) DR capsule 30 mg  -     lamoTRIgine (LaMICtal) tablet 100 mg  -     terazosin (HYTRIN) capsule 5 mg  -     QUEtiapine (SEROquel) tablet 100 mg  -     topiramate (TOPAMAX) tablet 50 mg  -     Please confirm pt dose, schedule and type of verapamil (ie extended release?).  Please also confirm if pt also taking norvasc  Nursing Communication; Standing  -     Please confirm pt dose, schedule and type of verapamil (ie extended release?).  Please also confirm if pt also taking norvasc  Nursing Communication  -     POC Glucose 4x Daily AC & at Bedtime  -     POC Glucose Once; Standing  -     POC Glucose Once  -     Basic Metabolic Panel; Standing  -     CBC (No Diff); Standing  -     Cancel: NM Hepatobiliary Without CCK; Standing  -     Cancel: NM Hepatobiliary Without CCK  -     polyethylene glycol (MIRALAX) packet 17 g  -     sennosides-docusate (PERICOLACE) 8.6-50 MG per tablet 2 tablet  -     bisacodyl (DULCOLAX) EC tablet 5 mg  -     bisacodyl (DULCOLAX) suppository 10 mg  -     Initiate clear liquid diet after HIDA scan performed on today.  Nursing Communication; Standing  -     Initiate clear liquid diet after HIDA scan performed on today.  Nursing Communication  -     NM HIDA Scan With Pharmacological Intervention; Standing  -     NM HIDA Scan With Pharmacological Intervention  -     POC Glucose Once; Standing  -     POC Glucose Once  -     POC Glucose 4x Daily AC & at  Bedtime  -     Cancel: Opioid Administration - Capnography (EtCO2) Monitoring; Standing  -     Cancel: Opioid Administration - Document EtCO2 Value With Each Set of Vitals & Any Change in Patient Status; Standing  -     Cancel: Opioid Administration - Notify Provider Capnography (EtCO2); Standing  -     POC Glucose 4x Daily AC & at Bedtime  -     Basic Metabolic Panel  -     CBC (No Diff)  -     POC Glucose Once; Standing  -     POC Glucose Once  -     Vital Signs  -     Vital Signs  -     Vital Signs  -     Vital Signs  -     Vital Signs  -     Vital Signs  -     POC Glucose 4x Daily AC & at Bedtime  -     pantoprazole (PROTONIX) injection 40 mg  -     POC Glucose Once; Standing  -     POC Glucose Once  -     technetium Tc 99m mebrofenin (CHOLETEC) injection 1 dose  -     POC Glucose 4x Daily AC & at Bedtime  -     POC Glucose Once; Standing  -     POC Glucose Once  -     Inpatient General Surgery Consult; Standing  -     Inpatient General Surgery Consult  -     CBC (No Diff); Standing  -     Comprehensive Metabolic Panel; Standing  -     Magnesium; Standing  -     POC Glucose Once; Standing  -     POC Glucose Once  -     POC Glucose 4x Daily AC & at Bedtime  -     Fleets enema 1 enema  -     POC Glucose Once; Standing  -     POC Glucose Once  -     POC Glucose 4x Daily AC & at Bedtime  -     CBC (No Diff)  -     Comprehensive Metabolic Panel  -     Magnesium  -     Follow Anesthesia Guidelines / Protocol; Standing  -     Cancel: Follow Anesthesia Guidelines / Protocol; Standing  -     Verify NPO Status; Standing  -     Cancel: Obtain Informed Consent; Standing  -     lactated ringers infusion  -     Perform Chlorhexidine Skin Prep Night Before and Morning of Procedure; Standing  -     Cancel: Insert Peripheral IV; Standing  -     Saline Lock & Maintain IV Access; Standing  -     Discontinue: sodium chloride 0.9 % flush 10 mL  -     Discontinue: sodium chloride 0.9 % flush 10 mL  -     Discontinue: levoFLOXacin  (LEVAQUIN) 500 mg/100 mL D5W (premix) (LEVAQUIN) 500 mg  -     NPO Diet; Standing  -     indocyanine green (IC-GREEN) injection 2.5 mg  -     Obtain Informed Consent; Standing  -     Follow Anesthesia Guidelines / Protocol  -     Perform Chlorhexidine Skin Prep Night Before and Morning of Procedure  -     Obtain Informed Consent  -     NPO Diet  -     Vital Signs  -     Vital Signs  -     Vital Signs  -     Vital Signs  -     Vital Signs  -     Vital Signs  -     POC Glucose 4x Daily AC & at Bedtime  -     magnesium sulfate 2g/50 mL (PREMIX) infusion  -     potassium chloride 10 mEq in 100 mL IVPB  -     POC Glucose Once; Standing  -     POC Glucose Once  -     Inpatient Admission; Standing  -     Cancel: Cardiac Monitoring; Standing  -     Inpatient Admission  -     Cancel: Cardiac Monitoring  -     POC Glucose 4x Daily AC & at Bedtime  -     POC Glucose Once; Standing  -     POC Glucose Once  -     Cancel: Follow Anesthesia Guidelines / Protocol  -     Verify NPO Status  -     Cancel: Obtain Informed Consent  -     Cancel: Insert Peripheral IV  -     Saline Lock & Maintain IV Access  -     Discontinue: bupivacaine-EPINEPHrine PF 20 mL, lidocaine 1 % 20 mL mixture  -     Vital Signs Every 5 Minutes for 15 Minutes, Every 15 Minutes Thereafter.; Standing  -     Apply Warming Wilson; Standing  -     Suction; Standing  -     Notify Anesthesia of Any Acute Changes in Patient Condition; Standing  -     Notify Anesthesia for Unrelieved Pain; Standing  -     Oxygen Therapy- Nasal Cannula; Titrate for SPO2: 90% - 95%; Standing  -     Pulse Oximetry, Continuous; Standing  -     POC Glucose STAT; Standing  -     Cardiac Monitoring; Standing  -     oxyCODONE (ROXICODONE) immediate release tablet 5 mg  -     HYDROmorphone (DILAUDID) injection 0.25 mg  -     HYDROmorphone (DILAUDID) injection 0.5 mg  -     ondansetron (ZOFRAN) injection 4 mg  -     promethazine (PHENERGAN) suppository 25 mg  -     promethazine (PHENERGAN)  tablet 25 mg  -     meperidine (DEMEROL) injection 12.5 mg  -     Once Discharge Criteria to Floor Met, Follow Surgeon Orders; Standing  -     Discharge Patient From PACU When Discharge Criteria Met; Standing  -     Diet Regular; Standing  -     HYDROcodone-acetaminophen (NORCO) 5-325 MG per tablet 1 tablet  -     HYDROcodone-acetaminophen (NORCO)  MG per tablet 1 tablet  -     Vital Signs Every 5 Minutes for 15 Minutes, Every 15 Minutes Thereafter.  -     Apply Warming Yeaddiss  -     Notify Anesthesia of Any Acute Changes in Patient Condition  -     Notify Anesthesia for Unrelieved Pain  -     Oxygen Therapy- Nasal Cannula; Titrate for SPO2: 90% - 95%  -     Pulse Oximetry, Continuous  -     POC Glucose STAT  -     Cardiac Monitoring  -     Once Discharge Criteria to Floor Met, Follow Surgeon Orders  -     Discharge Patient From PACU When Discharge Criteria Met            This document has been electronically signed by Jeancarlos Carrington MD  October 6, 2021 16:59 EDT

## 2021-10-06 NOTE — ANESTHESIA PREPROCEDURE EVALUATION
Anesthesia Evaluation     Patient summary reviewed and Nursing notes reviewed                Airway   Mallampati: I  TM distance: >3 FB  Dental      Pulmonary - normal exam   Cardiovascular - normal exam  Exercise tolerance: good (4-7 METS)    (+) hypertension,       Neuro/Psych  GI/Hepatic/Renal/Endo    (+) obesity,  GERD,  renal disease, diabetes mellitus,     Musculoskeletal     Abdominal    Substance History      OB/GYN          Other                        Anesthesia Plan    ASA 3     general     intravenous induction     Anesthetic plan, all risks, benefits, and alternatives have been provided, discussed and informed consent has been obtained with: patient.

## 2021-10-06 NOTE — PROGRESS NOTES
[facility]   Hospitalist Progress Note  Date: 10/6/2021  Patient Name: Melvina Martinez  : 1960  MRN: [id number]  Date of admission: 10/3/2021      Subjective   Subjective   Chief Complaint:   Intractable nausea vomiting, abdominal pain     HPI:     Melvina Martinez is a 61 y.o. female with PMH of GERD, anxiety, bipolar disorder, PTSD, depression, HTN, and DM who presents to the ED complaining of abdominal pain, nausea/vomiting.  Patient states this started approximately 4 days prior associated with constipation.    Patient took medications at home for constipation with minimal relief.  CT of the abdomen pelvis was done in the ED and shows hepatic lesions suggestive of cysts, left renal cysts and probable peripelvic renal cyst but otherwise no acute abdominal findings.  LFTs normal, Lipase was 30.  Patient was given multiple antiemetics and pain medicine but still had continued nausea/vomiting.  The hospitalist service was contacted for admission for intractable nausea and vomiting.  Patient was admitted to the floor and did well for several hours without nausea, vomiting, or pain.    However abdominal pain returned with severe nausea and vomiting, repeat CT scan was performed again with no new findings.  Right upper quadrant ultrasound performed with findings of gallbladder is mildly distended without definitive cholelithiasis or cholecystitis.  HIDA scan was obtained shows an ejection fraction of 0%.      Interval history:  Patient was seen by general surgery on the fifth, patient had lap sivakumar performed today.  Discussed with general surgery patient tolerated procedure well.  Patient seen both before and after surgery.  Postop patient was resting comfortably in bed with her  at bedside.  Patient remains slightly in pain still lethargic, will keep overnight and plan to discharge home tomorrow.    Review of Systems   All systems were reviewed and negative except for: Minimal right upper quadrant pain  before surgery.  Postop somnolent  Objective   Objective     Vitals:   Temp:  [97 °F (36.1 °C)-98.1 °F (36.7 °C)] 97.7 °F (36.5 °C)  Heart Rate:  [66-89] 75  Resp:  [10-20] 16  BP: (102-151)/() 129/84  Flow (L/min):  [1.5-4] 1.5  Physical Exam preoperative   Constitutional: Awake, alert, appears comfortable   Eyes: Pupils equal, sclerae anicteric, no conjunctival injection   HENT: NCAT, mucous membranes moist   Neck: Supple, no thyromegaly, no lymphadenopathy, trachea midline   Respiratory: Clear to auscultation bilaterally, nonlabored respirations    Cardiovascular: RRR, no murmurs, rubs, or gallops, palpable pedal pulses bilaterally   Gastrointestinal: Positive bowel sounds, soft, minimal tenderness to right upper quadrant, no rebound no guarding   Musculoskeletal: No bilateral ankle edema, no clubbing or cyanosis to extremities   Psychiatric: Appropriate affect, cooperative   Neurologic: Oriented x 3, strength moving all extremities no focal weakness appreciated, Cranial Nerves grossly intact to confrontation, speech clear      Result Review    Result Review:  I have personally reviewed the results from the time of this admission to 10/6/2021 17:52 EDT and agree with these findings:  [x]  Laboratory  [x]  Microbiology  [x]  Radiology  []  EKG/Telemetry   []  Cardiology/Vascular   []  Pathology  []  Old records  [x]  Other: Medications      Assessment/Plan   Assessment / Plan     Assessment/Plan:  Abdominal pain  Functional gallbladder disorder  Intractable nausea vomiting  Diabetes mellitus  Hypertension  GERD  PTSD   Bipolar disorder  Depression/anxiety     Plan:  Continue to monitor on hospitalist service  Consulting general surgery, appreciate assistance  HIDA scan shows gallbladder ejection fraction of 0%  Patient had laparoscopic cholecystectomy performed on 10/6/2021 with general surgery  Patient seen postoperative, remains lethargic and mild pain  We will continue to monitor patient closely, plan to  discharge home in the morning  Patient still has medications available for nausea, vomiting  Pain medications still available, will increase if needed  Home medications resumed as indicated    Discussed plan with RN, general surgery    DVT prophylaxis:  No DVT prophylaxis order currently exists.    CODE STATUS:    Full    Electronically signed by Joshua Parker MD, 10/06/21, 5:52 PM EDT.

## 2021-10-06 NOTE — CONSULTS
[facility]   HISTORY AND PHYSICAL    Patient Name: Melvina Martinez  : 1960  MRN: [id number]  Primary Care Physician:  Isabela Nelson APRN  Date of admission: 10/3/2021    Subjective   Subjective     Chief Complaint: Abdominal pain, nausea vomiting, diarrhea    HPI:    Melvina Martinez is a 61 y.o. female admitted to the hospital with intractable nausea vomiting and diarrhea.  During the course of her work-up she had a HIDA scan.  HIDA scan shows an ejection fraction of 0%.  Is indicative of biliary dyskinesia.      Review of Systems   Respiratory: Negative.    Cardiovascular: Negative.    Gastrointestinal: Positive for abdominal pain, diarrhea, nausea and vomiting.   Genitourinary: Negative.    Musculoskeletal: Negative.    Neurological: Negative.         Personal History     Past Medical History:   Diagnosis Date   • Acid reflux    • Anxiety disorder    • Arthritis    • Bipolar disorder (HCC)    • Depression    • Diabetes (HCC)     DOES NOT CHECK BS DAILY   • Hypertension    • Kidney stones    • Limb swelling    • Migraines    • PTSD (post-traumatic stress disorder)    • Rotator cuff tear, left    • Rotator cuff tear, left 6/10/2021   • Stress incontinence        Past Surgical History:   Procedure Laterality Date   • ABDOMINOPLASTY      Tummy tuck   • ANKLE SURGERY     • APPENDECTOMY     •  SECTION     • COLONOSCOPY     • EYE SURGERY     • HYSTERECTOMY     • INTRAOCULAR LENS INSERTION     • KNEE SURGERY     • SHOULDER ARTHROSCOPY Left 6/10/2021    Procedure: LEFT SHOULDER ARTHROSCOPY;  Surgeon: Дмитрий Francois MD;  Location: [facility];  Service: Orthopedics;  Laterality: Left;   • SHOULDER ROTATOR CUFF REPAIR Left 6/10/2021    Procedure: SUBACROMIAL DECOMPRESSION, DISTAL CLAVICULECTOMY AND ROTATOR CUFF REPAIR;  Surgeon: Дмитрий Francois MD;  [place] [facility] [place] [facility] [place];  Service: Orthopedics;  Laterality: Left;   • SHOULDER SURGERY Right    • TONSILLECTOMY         Family History: family history  includes Arthritis in her father; Breast cancer in her mother; Cancer in her mother; Dementia in her father; Diabetes in her father; Heart disease in her father; Stroke in her father. Otherwise pertinent FHx was reviewed and not pertinent to current issue.    Social History:  reports that she has never smoked. She has never used smokeless tobacco. She reports current alcohol use. She reports that she does not use drugs.    Home Medications:  Advanced Collagen, Blood Glucose Monitoring Suppl, DULoxetine, Glucosamine-Chondroit-Vit C-Mn, Lancets, QUEtiapine, SUMAtriptan, Vitamin C, amLODIPine, empagliflozin, glucose blood, lamoTRIgine, pantoprazole, prazosin, topiramate, and verapamil      Allergies:  Allergies   Allergen Reactions   • Penicillins Anaphylaxis and Shortness Of Breath   • Metronidazole Hives and Nausea Only       Objective   Objective     Vitals:   Temp:  [97.5 °F (36.4 °C)-97.9 °F (36.6 °C)] 97.9 °F (36.6 °C)  Heart Rate:  [69-77] 69  Resp:  [16-18] 18  BP: (110-134)/(58-86) 129/74    Physical Exam  Constitutional:       Appearance: Normal appearance.   Cardiovascular:      Rate and Rhythm: Normal rate.   Pulmonary:      Effort: Pulmonary effort is normal.   Abdominal:      Palpations: Abdomen is soft.   Skin:     General: Skin is warm.          Result Review    Result Review:  I have personally reviewed the results from the time of this admission to 10/5/2021 22:54 EDT and agree with these findings:  [x]  Laboratory  []  Microbiology  []  Radiology  []  EKG/Telemetry   []  Cardiology/Vascular   []  Pathology  []  Old records  []  Other:    @[facility]@  @[facility]@  Lab Results   Component Value Date    ALT 14 10/03/2021      Most notable findings include: Normal leukocytosis normal liver enzymes, HIDA scan showing EF of 0%    Assessment/Plan   Assessment / Plan     Brief Patient Summary:  Melvina Martinez is a 61 y.o. female who has likely biliary dyskinesia with intractable nausea vomiting    [facility]  Problems:  [facility] Problems    Diagnosis    • Intractable vomiting      Plan: OR tomorrow for laparoscopic cholecystectomy  Risk-benefit alternatives of the procedure were discussed extensively  All questions were answered  Patient voiced understanding and agreed to proceed      DVT prophylaxis:  No DVT prophylaxis order currently exists.    CODE STATUS:           Electronically signed by Jeancarlos Carrington MD, 10/05/21, 10:54 PM EDT.

## 2021-10-06 NOTE — BRIEF OP NOTE
CHOLECYSTECTOMY LAPAROSCOPIC  Progress Note    Melvina Linaresre  10/6/2021    Pre-op Diagnosis:   Biliary dyskinesia [K82.8]       Post-Op Diagnosis Codes:     * Biliary dyskinesia [K82.8]    Procedure/CPT® Codes:        Procedure(s):  CHOLECYSTECTOMY LAPAROSCOPIC    Surgeon(s):  Jeancarlos Carrington MD    Anesthesia: General    Staff:   Circulator: Vanessa Barrera RN  Scrub Person: Rosa Lopez  Assistant: Mary Jacobson CSA  Other: Houston Hirsch RN; Christine Garcia  Assistant: Mary Jacobson CSA      Estimated Blood Loss: 25 mL    Urine Voided: 0 mL    Specimens:                Specimens     ID Source Type Tests Collected By Collected At Frozen?    A Gallbladder Tissue · TISSUE PATHOLOGY EXAM   Jeancarlos Carrington MD 10/6/21 2387     Description: GALLBLADDER    This specimen was not marked as sent.                Drains:   [REMOVED] NG/OG Tube Orogastric 18 Fr Center mouth (Removed)       Findings: Mildly inflamed gallbladder    Complications: None    Assistant: Mary Jacobson CSA  was responsible for performing the following activities: Retraction, Suction, Irrigation, Suturing, Closing, Placing Dressing and Held/Positioned Camera and their skilled assistance was necessary for the success of this case.    Jeancarlos Carrington MD     Date: 10/6/2021  Time: 15:21 EDT

## 2021-10-06 NOTE — ANESTHESIA POSTPROCEDURE EVALUATION
Patient: Melvina Martinez    Procedure Summary     Date: 10/06/21 Room / Location: [facility] OR 03 / [facility] MAIN OR    Anesthesia Start: 1419 Anesthesia Stop: 1527    Procedure: CHOLECYSTECTOMY LAPAROSCOPIC (N/A Abdomen) Diagnosis:       Biliary dyskinesia      (Biliary dyskinesia [K82.8])    Surgeons: Jeancarlos Carrington MD Provider: Hiram Claudio MD    Anesthesia Type: general ASA Status: 3          Anesthesia Type: general    Vitals  Vitals Value Taken Time   /74 10/06/21 1551   Temp 36.1 °C (97 °F) 10/06/21 1525   Pulse 87 10/06/21 1552   Resp 15 10/06/21 1545   SpO2 100 % 10/06/21 1552   Vitals shown include unvalidated device data.        Post Anesthesia Care and Evaluation    Patient location during evaluation: bedside  Patient participation: complete - patient participated  Level of consciousness: awake  Pain management: adequate  Airway patency: patent  Anesthetic complications: No anesthetic complications  PONV Status: none  Cardiovascular status: acceptable and stable  Respiratory status: acceptable and room air  Hydration status: acceptable    Comments: An Anesthesiologist personally participated in the most demanding procedures (including induction and emergence if applicable) in the anesthesia plan, monitored the course of anesthesia administration at frequent intervals and remained physically present and available for immediate diagnosis and treatment of emergencies.

## 2021-10-06 NOTE — H&P (VIEW-ONLY)
[facility]   HISTORY AND PHYSICAL    Patient Name: Melvina Martinez  : 1960  MRN: [id number]  Primary Care Physician:  Isabela Nelson APRN  Date of admission: 10/3/2021    Subjective   Subjective     Chief Complaint: Abdominal pain, nausea vomiting, diarrhea    HPI:    Melvina Martinez is a 61 y.o. female admitted to the hospital with intractable nausea vomiting and diarrhea.  During the course of her work-up she had a HIDA scan.  HIDA scan shows an ejection fraction of 0%.  Is indicative of biliary dyskinesia.      Review of Systems   Respiratory: Negative.    Cardiovascular: Negative.    Gastrointestinal: Positive for abdominal pain, diarrhea, nausea and vomiting.   Genitourinary: Negative.    Musculoskeletal: Negative.    Neurological: Negative.         Personal History     Past Medical History:   Diagnosis Date   • Acid reflux    • Anxiety disorder    • Arthritis    • Bipolar disorder (HCC)    • Depression    • Diabetes (HCC)     DOES NOT CHECK BS DAILY   • Hypertension    • Kidney stones    • Limb swelling    • Migraines    • PTSD (post-traumatic stress disorder)    • Rotator cuff tear, left    • Rotator cuff tear, left 6/10/2021   • Stress incontinence        Past Surgical History:   Procedure Laterality Date   • ABDOMINOPLASTY      Tummy tuck   • ANKLE SURGERY     • APPENDECTOMY     •  SECTION     • COLONOSCOPY     • EYE SURGERY     • HYSTERECTOMY     • INTRAOCULAR LENS INSERTION     • KNEE SURGERY     • SHOULDER ARTHROSCOPY Left 6/10/2021    Procedure: LEFT SHOULDER ARTHROSCOPY;  Surgeon: Дмитрий Francois MD;  Location: [facility];  Service: Orthopedics;  Laterality: Left;   • SHOULDER ROTATOR CUFF REPAIR Left 6/10/2021    Procedure: SUBACROMIAL DECOMPRESSION, DISTAL CLAVICULECTOMY AND ROTATOR CUFF REPAIR;  Surgeon: Дмитрий Francois MD;  [place] [facility] [place] [facility] [place]  Service: [place]opedics;  Laterality: Left;   • SHOULDER SURGERY Right    • TONSILLECTOMY         Family History: family history  includes Arthritis in her father; Breast cancer in her mother; Cancer in her mother; Dementia in her father; Diabetes in her father; Heart disease in her father; Stroke in her father. Otherwise pertinent FHx was reviewed and not pertinent to current issue.    Social History:  reports that she has never smoked. She has never used smokeless tobacco. She reports current alcohol use. She reports that she does not use drugs.    Home Medications:  Advanced Collagen, Blood Glucose Monitoring Suppl, DULoxetine, Glucosamine-Chondroit-Vit C-Mn, Lancets, QUEtiapine, SUMAtriptan, Vitamin C, amLODIPine, empagliflozin, glucose blood, lamoTRIgine, pantoprazole, prazosin, topiramate, and verapamil      Allergies:  Allergies   Allergen Reactions   • Penicillins Anaphylaxis and Shortness Of Breath   • Metronidazole Hives and Nausea Only       Objective   Objective     Vitals:   Temp:  [97.5 °F (36.4 °C)-97.9 °F (36.6 °C)] 97.9 °F (36.6 °C)  Heart Rate:  [69-77] 69  Resp:  [16-18] 18  BP: (110-134)/(58-86) 129/74    Physical Exam  Constitutional:       Appearance: Normal appearance.   Cardiovascular:      Rate and Rhythm: Normal rate.   Pulmonary:      Effort: Pulmonary effort is normal.   Abdominal:      Palpations: Abdomen is soft.   Skin:     General: Skin is warm.          Result Review    Result Review:  I have personally reviewed the results from the time of this admission to 10/5/2021 22:54 EDT and agree with these findings:  [x]  Laboratory  []  Microbiology  []  Radiology  []  EKG/Telemetry   []  Cardiology/Vascular   []  Pathology  []  Old records  []  Other:    @[facility]@  @[facility]@  Lab Results   Component Value Date    ALT 14 10/03/2021      Most notable findings include: Normal leukocytosis normal liver enzymes, HIDA scan showing EF of 0%    Assessment/Plan   Assessment / Plan     Brief Patient Summary:  Melvina Martinez is a 61 y.o. female who has likely biliary dyskinesia with intractable nausea vomiting    [facility]  Problems:  [facility] Problems    Diagnosis    • Intractable vomiting      Plan: OR tomorrow for laparoscopic cholecystectomy  Risk-benefit alternatives of the procedure were discussed extensively  All questions were answered  Patient voiced understanding and agreed to proceed      DVT prophylaxis:  No DVT prophylaxis order currently exists.    CODE STATUS:           Electronically signed by Jeancarlos Carrington MD, 10/05/21, 10:54 PM EDT.

## 2021-10-06 NOTE — PLAN OF CARE
Goal Outcome Evaluation:           Progress: no change  Outcome Summary: Pt to have surgical procedure today for gallbladder removal. NPO since MN.

## 2021-10-07 ENCOUNTER — READMISSION MANAGEMENT (OUTPATIENT)
Dept: CALL CENTER | Facility: HOSPITAL | Age: 61
End: 2021-10-07

## 2021-10-07 VITALS
SYSTOLIC BLOOD PRESSURE: 131 MMHG | TEMPERATURE: 98.3 F | RESPIRATION RATE: 18 BRPM | DIASTOLIC BLOOD PRESSURE: 71 MMHG | HEIGHT: 65 IN | OXYGEN SATURATION: 95 % | HEART RATE: 66 BPM | BODY MASS INDEX: 34.23 KG/M2 | WEIGHT: 205.47 LBS

## 2021-10-07 LAB
ALBUMIN SERPL-MCNC: 3.5 G/DL (ref 3.5–5.2)
ALBUMIN/GLOB SERPL: 1.5 G/DL
ALP SERPL-CCNC: 66 U/L (ref 39–117)
ALT SERPL W P-5'-P-CCNC: 19 U/L (ref 1–33)
ANION GAP SERPL CALCULATED.3IONS-SCNC: 6 MMOL/L (ref 5–15)
AST SERPL-CCNC: 24 U/L (ref 1–32)
BILIRUB SERPL-MCNC: 0.2 MG/DL (ref 0–1.2)
BUN SERPL-MCNC: 7 MG/DL (ref 8–23)
BUN/CREAT SERPL: 9.2 (ref 7–25)
CALCIUM SPEC-SCNC: 8.6 MG/DL (ref 8.6–10.5)
CHLORIDE SERPL-SCNC: 107 MMOL/L (ref 98–107)
CO2 SERPL-SCNC: 23 MMOL/L (ref 22–29)
CREAT SERPL-MCNC: 0.76 MG/DL (ref 0.57–1)
DEPRECATED RDW RBC AUTO: 48.6 FL (ref 37–54)
ERYTHROCYTE [DISTWIDTH] IN BLOOD BY AUTOMATED COUNT: 14.6 % (ref 12.3–15.4)
GFR SERPL CREATININE-BSD FRML MDRD: 78 ML/MIN/1.73
GLOBULIN UR ELPH-MCNC: 2.4 GM/DL
GLUCOSE BLDC GLUCOMTR-MCNC: 75 MG/DL (ref 70–99)
GLUCOSE SERPL-MCNC: 98 MG/DL (ref 65–99)
HCT VFR BLD AUTO: 36.8 % (ref 34–46.6)
HGB BLD-MCNC: 12 G/DL (ref 12–15.9)
MAGNESIUM SERPL-MCNC: 1.8 MG/DL (ref 1.6–2.4)
MCH RBC QN AUTO: 29.4 PG (ref 26.6–33)
MCHC RBC AUTO-ENTMCNC: 32.6 G/DL (ref 31.5–35.7)
MCV RBC AUTO: 90.2 FL (ref 79–97)
PLATELET # BLD AUTO: 175 10*3/MM3 (ref 140–450)
PMV BLD AUTO: 10.3 FL (ref 6–12)
POTASSIUM SERPL-SCNC: 4.3 MMOL/L (ref 3.5–5.2)
PROT SERPL-MCNC: 5.9 G/DL (ref 6–8.5)
RBC # BLD AUTO: 4.08 10*6/MM3 (ref 3.77–5.28)
SODIUM SERPL-SCNC: 136 MMOL/L (ref 136–145)
WBC # BLD AUTO: 6.92 10*3/MM3 (ref 3.4–10.8)

## 2021-10-07 PROCEDURE — 99239 HOSP IP/OBS DSCHRG MGMT >30: CPT | Performed by: INTERNAL MEDICINE

## 2021-10-07 PROCEDURE — 82962 GLUCOSE BLOOD TEST: CPT

## 2021-10-07 PROCEDURE — 80053 COMPREHEN METABOLIC PANEL: CPT | Performed by: INTERNAL MEDICINE

## 2021-10-07 PROCEDURE — 90686 IIV4 VACC NO PRSV 0.5 ML IM: CPT | Performed by: SURGERY

## 2021-10-07 PROCEDURE — 25010000002 ONDANSETRON PER 1 MG: Performed by: SURGERY

## 2021-10-07 PROCEDURE — 85027 COMPLETE CBC AUTOMATED: CPT | Performed by: INTERNAL MEDICINE

## 2021-10-07 PROCEDURE — 25010000002 INFLUENZA VAC SPLIT QUAD 0.5 ML SUSPENSION PREFILLED SYRINGE: Performed by: SURGERY

## 2021-10-07 PROCEDURE — G0008 ADMIN INFLUENZA VIRUS VAC: HCPCS | Performed by: SURGERY

## 2021-10-07 PROCEDURE — 83735 ASSAY OF MAGNESIUM: CPT | Performed by: INTERNAL MEDICINE

## 2021-10-07 RX ORDER — HYDROCODONE BITARTRATE AND ACETAMINOPHEN 5; 325 MG/1; MG/1
1 TABLET ORAL EVERY 6 HOURS PRN
Qty: 10 TABLET | Refills: 0 | Status: SHIPPED | OUTPATIENT
Start: 2021-10-07 | End: 2022-04-08

## 2021-10-07 RX ADMIN — QUETIAPINE FUMARATE 100 MG: 100 TABLET ORAL at 06:14

## 2021-10-07 RX ADMIN — SODIUM CHLORIDE, PRESERVATIVE FREE 10 ML: 5 INJECTION INTRAVENOUS at 08:36

## 2021-10-07 RX ADMIN — HYDROCODONE BITARTRATE AND ACETAMINOPHEN 1 TABLET: 10; 325 TABLET ORAL at 06:14

## 2021-10-07 RX ADMIN — ONDANSETRON 4 MG: 2 INJECTION INTRAMUSCULAR; INTRAVENOUS at 08:35

## 2021-10-07 RX ADMIN — DOCUSATE SODIUM 50MG AND SENNOSIDES 8.6MG 2 TABLET: 8.6; 5 TABLET, FILM COATED ORAL at 08:35

## 2021-10-07 RX ADMIN — DULOXETINE HYDROCHLORIDE 30 MG: 30 CAPSULE, DELAYED RELEASE ORAL at 08:35

## 2021-10-07 RX ADMIN — POLYETHYLENE GLYCOL 3350 17 G: 17 POWDER, FOR SOLUTION ORAL at 08:34

## 2021-10-07 RX ADMIN — AMLODIPINE BESYLATE 2.5 MG: 2.5 TABLET ORAL at 08:48

## 2021-10-07 RX ADMIN — INFLUENZA VIRUS VACCINE 0.5 ML: 15; 15; 15; 15 SUSPENSION INTRAMUSCULAR at 10:54

## 2021-10-07 RX ADMIN — TOPIRAMATE 50 MG: 25 TABLET, FILM COATED ORAL at 08:35

## 2021-10-07 RX ADMIN — LAMOTRIGINE 100 MG: 100 TABLET ORAL at 08:35

## 2021-10-07 RX ADMIN — PANTOPRAZOLE SODIUM 40 MG: 40 INJECTION, POWDER, FOR SOLUTION INTRAVENOUS at 08:34

## 2021-10-07 NOTE — PLAN OF CARE
Goal Outcome Evaluation:           Progress: improving  Outcome Summary: Patient tolerating fluids and food by mouth with intermittent nausea, no reported vomiting. Patient reports pain 3-4/10 on pain scale when sitting up in bed or moving around, patient given Norco 10mg for pain. Patient to be discharged home today per MD orders.

## 2021-10-07 NOTE — PLAN OF CARE
Goal Outcome Evaluation:  Plan of Care Reviewed With: patient        Progress: improving  Outcome Summary: Patient able to tolerate, fluids and food by mouth with no issues, still no BM as of yet. Patient has been up walking to the bathroom with assistance, and no complaints of pain so far this shift. Continues to receive fluids via LR at 100mL/h.

## 2021-10-07 NOTE — PROGRESS NOTES
[facility]     Progress Note    Patient Name: Melvina Martinez  : 1960  MRN: [id number]    Date of admission: 10/3/2021    Subjective   Subjective     Patient without complaints.  Feels better today, abdomen sore.  Wants to go home.  Vss.     Objective   Objective     Review of Systems:    A 10 point review of systems was performed and all are negative except for what is documented in the HPI.     Vitals:   Temp:  [97 °F (36.1 °C)-98.4 °F (36.9 °C)] 98.3 °F (36.8 °C)  Heart Rate:  [66-89] 66  Resp:  [10-19] 18  BP: (104-151)/() 131/71  Flow (L/min):  [1.5-4] 1.5  Physical Exam    Constitutional: Awake, alert   Eyes: PERRLA    Neck: Supple, trachea midline   Respiratory: respirations even and unlabored   Cardiovascular: RRR   Gastrointestinal: Soft, approp TTP   Psychiatric: Appropriate affect, cooperative   Neurologic: Oriented x 3, speech clear   Skin: No rashes     Result Review    Result Review:  I have personally reviewed the results from the time of this admission to 10/7/2021 10:04 EDT and agree with these findings:  []  Laboratory  []  Microbiology  []  Radiology  []  EKG/Telemetry   []  Cardiology/Vascular   []  Pathology  []  Old records  []  Other:      Assessment/Plan   Assessment / Plan     Diagnosis   S/P lap sivakumar    [facility] Problems:  [facility] Problems    Diagnosis    • **Biliary dyskinesia      Added automatically from request for surgery [id number]     • Intractable vomiting        Plan:      Ok to DC home    DVT prophylaxis:  No DVT prophylaxis order currently exists.          This document has been electronically signed by YOLANDA Gordon  2021 10:04 EDT

## 2021-10-07 NOTE — DISCHARGE SUMMARY
[facility]         HOSPITALIST  DISCHARGE SUMMARY    Patient Name: Melivna Martinez  : 1960  MRN: [id number]    Date of Admission: 10/3/2021  Date of Discharge:  10/7/2021  Primary Care Physician: Isabela Nelson APRN    Consults     Date and Time Order Name Status Description    10/5/2021  2:48 PM Inpatient General Surgery Consult Completed     10/3/2021  7:29 PM Hospitalist (on-call MD unless specified) Completed           Active and Resolved Hospital Problems:  Active Hospital Problems    Diagnosis POA   • **Biliary dyskinesia [K82.8] Unknown   • Intractable vomiting [R11.10] Yes      Resolved Hospital Problems   No resolved problems to display.       Hospital Course     Hospital Course:  Melvina Martinez is a 61 y.o. female with PMH of GERD, anxiety, bipolar disorder, PTSD, depression, HTN, and DM who presents to the ED complaining of abdominal pain, nausea/vomiting.  Patient states this started approximately 4 days prior associated with constipation.    Patient took medications at home for constipation with minimal relief.  CT of the abdomen pelvis was done in the ED and shows hepatic lesions suggestive of cysts, left renal cysts and probable peripelvic renal cyst but otherwise no acute abdominal findings.  LFTs normal, Lipase was 30.  Patient was given multiple antiemetics and pain medicine but still had continued nausea/vomiting.  The hospitalist service was contacted for admission for intractable nausea and vomiting.  Patient was admitted to the floor and did well for several hours without nausea, vomiting, or pain.    However abdominal pain returned with severe nausea and vomiting, repeat CT scan was performed again with no new findings.  Right upper quadrant ultrasound performed with findings of gallbladder is mildly distended without definitive cholelithiasis or cholecystitis.  HIDA scan was obtained shows an ejection fraction of 0%. General surgery was consulted, patient had  laparoscopic cholecystectomy performed on 10/6/2021 for biliary dyskinesia. Patient tolerated the procedure well, remained in the hospital overnight. Patient discharged on 10/7/2021 after tolerating a light diet. Patient instructed to continue resting at home slowly advance her diet. Patient will need to follow-up with her primary care physician within 1 week. Patient will need to follow-up with general surgery. Patient seen on date of discharge, clinically and hemodynamically stable.  Patient provided concerning signs and symptoms prompting immediate medical attention, patient understanding and agreeable     DISCHARGE Follow Up Recommendations for labs and diagnostics:   Slowly advance diet  Follow-up with PCP  Follow-up with general surgery, Dr. Carrington      Day of Discharge     Vital Signs:  Temp:  [97 °F (36.1 °C)-98.4 °F (36.9 °C)] 98.3 °F (36.8 °C)  Heart Rate:  [66-89] 66  Resp:  [10-19] 18  BP: (104-151)/() 131/71  Flow (L/min):  [1.5-4] 1.5  Physical Exam:               Constitutional: Awake, alert, appears comfortable              Eyes: Pupils equal, sclerae anicteric, no conjunctival injection              HENT: NCAT, mucous membranes moist              Neck: Supple, no thyromegaly, no lymphadenopathy, trachea midline              Respiratory: Clear to auscultation bilaterally, nonlabored respirations               Cardiovascular: RRR, no murmurs, rubs, or gallops, palpable pedal pulses bilaterally              Gastrointestinal: Positive bowel sounds, soft, minimal tenderness, surgical sites clean dry and intact              Musculoskeletal: No bilateral ankle edema, no clubbing or cyanosis to extremities              Psychiatric: Appropriate affect, cooperative              Neurologic: Oriented x 3, strength moving all extremities no focal weakness appreciated, Cranial Nerves grossly intact to confrontation, speech clear      Discharge Details        Discharge Medications      New Medications       Instructions Start Date   HYDROcodone-acetaminophen 5-325 MG per tablet  Commonly known as: NORCO   1 tablet, Oral, Every 6 Hours PRN         Continue These Medications      Instructions Start Date   Advanced Collagen tablet   160 mg, Oral, Daily      amLODIPine 2.5 MG tablet  Commonly known as: NORVASC   2.5 mg, Oral, Daily      Blood Glucose Monitoring Suppl kit   1 kit, Does not apply, 3 Times Daily, Lancets and blood glucose strips with machine to check blood sugars up to 3 times a day.      DULoxetine 30 MG capsule  Commonly known as: Cymbalta   30 mg, Oral, Daily      empagliflozin 10 MG tablet tablet  Commonly known as: Jardiance   10 mg, Oral, Daily      GLUCOSAMINE 1500 COMPLEX PO   2,000 mg, Oral, Daily      glucose blood test strip   Use as instructed      lamoTRIgine 100 MG tablet  Commonly known as: LaMICtal   lamotrigine 100 mg oral tablet take 1 tablet (100 mg) by oral route 2 times per day   Active      Lancets misc   2 each, Does not apply, 2 times daily      pantoprazole 40 MG EC tablet  Commonly known as: PROTONIX   40 mg, Oral, Daily      prazosin 2 MG capsule  Commonly known as: MINIPRESS   prazosin 2 mg oral capsule take 1 capsule (2 mg) by oral route 2 times per day   Active      QUEtiapine 100 MG tablet  Commonly known as: SEROquel   quetiapine 100 mg oral tablet take 1 tablet (100 mg) by oral route 3 times per day   Active      SUMAtriptan 5 MG/ACT nasal spray  Commonly known as: Imitrex   5 mg, Nasal, Every 2 Hours PRN      topiramate 50 MG tablet  Commonly known as: TOPAMAX   topiramate 50 mg oral tablet take 1 tablet (50 mg) by oral route 2 times per day   Active      verapamil 120 MG tablet  Commonly known as: CALAN   verapamil 120 mg oral tablet take 1 tablet (120 mg) by oral route daily.   Suspended      Vitamin C 500 MG capsule   1,000 mg, Oral, Daily             Allergies   Allergen Reactions   • Penicillins Anaphylaxis and Shortness Of Breath   • Metronidazole Hives and Nausea Only        Discharge Disposition:  Home or Self Care    Diet:  Hospital:  Diet Order   Procedures   • Diet Regular       Discharge Activity:   Activity Instructions     Activity as Tolerated      Lifting Restrictions      Type of Restriction: Lifting    Lifting Restrictions: Avoid Straining to Lift    Length of Lifting Restriction: 1 week          CODE STATUS:  There are no questions and answers to display.         Future Appointments   Date Time Provider Department Center   10/8/2021  2:15 PM Mihaela Traylor MD [facility] IMP RADC [facility]RA   10/18/2021 10:30 AM Vicki Mims PA [facility] ORS RING [facility]       Additional Instructions for the Follow-ups that You Need to Schedule     Discharge Follow-up with PCP   As directed       Currently Documented PCP:    Isabela Nelson APRN    PCP Phone Number:    [phone]     Follow Up Details: In less than one week         Discharge Follow-up with Specified Provider: Dr Carrington; 1 Week   As directed      To: Dr Carrington    Follow Up: 1 Week               Pertinent  and/or Most Recent Results     PROCEDURES:   CHOLECYSTECTOMY LAPAROSCOPIC  Progress Note     Melvina Martinez  10/6/2021     Pre-op Diagnosis:   Biliary dyskinesia [K82.8]       Post-Op Diagnosis Codes:     * Biliary dyskinesia [K82.8]     Procedure/CPT® Codes:           Procedure(s):  CHOLECYSTECTOMY LAPAROSCOPIC     Surgeon(s):  Jeancarlos Carrington MD     Anesthesia: General     Staff:   Circulator: Vanessa Barrera RN  Scrub Person: Rosa Lopez  Assistant: Mary Jacobson CSA  Other: Houston Hirsch RN; Christine Garcia  Assistant: Mary Jacobson CSA        Estimated Blood Loss: 25 mL     Urine Voided: 0 mL     Specimens:                           Specimens      ID Source Type Tests Collected By Collected At [facility]?     A Gallbladder Tissue TISSUE PATHOLOGY EXAM    Jeancarlos Carrington MD 10/6/21 8791       Description: GALLBLADDER     This specimen was not marked as sent.                    Drains:   [REMOVED]  NG/OG Tube Orogastric 18 Fr Center mouth (Removed)         Findings: Mildly inflamed gallbladder     Complications: None     Assistant: Mary Jacobson CSA  was responsible for performing the following activities: Retraction, Suction, Irrigation, Suturing, Closing, Placing Dressing and Held/Positioned Camera and their skilled assistance was necessary for the success of this case.     Jeancarlos Carrington MD          LAB RESULTS:      Lab 10/07/21  0551 10/06/21  0504 10/05/21  0523 10/04/21  0332 10/03/21  2100 10/03/21  1604 10/03/21  1522   WBC 6.92 6.15 6.51 8.06  --   --  7.91   HEMOGLOBIN 12.0 12.6 12.7 12.5  --   --  14.7   HEMATOCRIT 36.8 38.2 39.3 38.3  --   --  43.5   PLATELETS 175 183 177 206  --   --  233   NEUTROS ABS  --   --   --   --   --   --  4.13   IMMATURE GRANS (ABS)  --   --   --   --   --   --  0.02   LYMPHS ABS  --   --   --   --   --   --  3.08   MONOS ABS  --   --   --   --   --   --  0.57   EOS ABS  --   --   --   --   --   --  0.07   MCV 90.2 89.9 91.4 90.5  --   --  87.0   LACTATE  --   --   --  1.0 0.9 2.2*  --          Lab 10/07/21  0551 10/06/21  0504 10/05/21  0523 10/04/21  0442 10/04/21  0332 10/03/21  1522   SODIUM 136 140 139 141  --  140   POTASSIUM 4.3 3.3* 3.5 3.7  --  4.4   CHLORIDE 107 108* 109* 111*  --  107   CO2 23.0 23.4 22.0 19.0*  --  16.3*   ANION GAP 6.0 8.6 8.0 11.0  --  16.7*   BUN 7* 5* 6* 11  --  15   CREATININE 0.76 0.84 0.85 0.76  --  0.91   GLUCOSE 98 86 85 84  --  100*   CALCIUM 8.6 8.6 8.6 8.3*  --  9.7   MAGNESIUM 1.8 1.7  --   --  1.9  --          Lab 10/07/21  0551 10/06/21  0504 10/03/21  1522   TOTAL PROTEIN 5.9* 5.8* 7.7   ALBUMIN 3.50 3.40* 4.60   GLOBULIN 2.4 2.4 3.1   ALT (SGPT) 19 11 14   AST (SGOT) 24 13 18   BILIRUBIN 0.2 0.4 0.5   ALK PHOS 66 64 86   LIPASE  --   --  30         Lab 10/04/21  0442   TROPONIN T <0.010                 Brief Urine Lab Results  (Last result in the past 365 days)      Color   Clarity   Blood   Leuk Est   Nitrite   Protein    CREAT   Urine HCG        10/03/21 1937 Yellow Clear Negative Negative Negative Negative             Microbiology Results (last 10 days)     ** No results found for the last 240 hours. **                           Labs Pending at Discharge:  Pending Labs     Order Current Status    Tissue Pathology Exam In process            Time spent on Discharge including face to face service:  35 minutes    Electronically signed by Joshua Parker MD, 10/07/21, 9:52 AM EDT.

## 2021-10-07 NOTE — OUTREACH NOTE
Prep Survey      Responses   [facility] discharged from?  [facility]   Is LACE score < 7 ?  No   Emergency Room discharge w/ pulse ox?  No   Eligibility  [facility]   Hospital  [facility]   Date of Admission  10/03/21   Date of Discharge  10/07/21   Discharge Disposition  Home or Self Care   Discharge diagnosis  intractable vomiting, abdominal pain, Lap sivakumar   Does the patient have one of the following disease processes/diagnoses(primary or secondary)?  General Surgery   Does the patient have Home health ordered?  No   Is there a DME ordered?  No   Prep survey completed?  Yes          Jennifer Shelton RN

## 2021-10-08 ENCOUNTER — TRANSITIONAL CARE MANAGEMENT TELEPHONE ENCOUNTER (OUTPATIENT)
Dept: CALL CENTER | Facility: HOSPITAL | Age: 61
End: 2021-10-08

## 2021-10-08 LAB
CYTO UR: NORMAL
LAB AP CASE REPORT: NORMAL
LAB AP CLINICAL INFORMATION: NORMAL
PATH REPORT.FINAL DX SPEC: NORMAL
PATH REPORT.GROSS SPEC: NORMAL

## 2021-10-08 NOTE — OUTREACH NOTE
Call Center TCM Note      Responses   [facility] patient discharged from?  [facility]   Does the patient have one of the following disease processes/diagnoses(primary or secondary)?  General Surgery   TCM attempt successful?  Yes   Call start time  [phone]   Call end time  0852   Discharge diagnosis  intractable vomiting, abdominal pain, Lap sivakumar   Meds reviewed with patient/caregiver?  Yes   Is the patient having any side effects they believe may be caused by any medication additions or changes?  No   Does the patient have all medications related to this admission filled (includes all antibiotics, pain medications, etc.)  Yes   Is the patient taking all medications as directed (includes completed medication regime)?  Yes   Medication comments  Taking pain medications as ordered--patient does not have a bowel regimen ordered, reports prior to discharge she had an incontinent episode of diarrhea.     Does the patient have a follow up appointment scheduled with their surgeon?  Yes [surgical f/u 10/14/21]   Has the patient kept scheduled appointments due by today?  N/A   Comments  Hospital PCP FOLLOW UP APPOINTMENT IS 10/12/21@0900am   Has home health visited the patient within 72 hours of discharge?  N/A   Psychosocial issues?  No   Did the patient receive a copy of their discharge instructions?  Yes   Nursing interventions  Reviewed instructions with patient   What is the patient's perception of their health status since discharge?  Improving [Pt c/o normal surgical soreness, taking pain meds. NO BMs, bloated but not passing gas yet--enc'd to increase activity today.  Pt still w/very little appetite-rev'd suggested diet on AVS.]   Nursing interventions  Nurse provided patient education   Is the patient /caregiver able to teach back basic post-op care?  No tub bath, swimming, or hot tub until instructed by MD, Keep incision areas clean,dry and protected, Lifting as instructed by MD in discharge instructions   Is the  patient/caregiver able to teach back signs and symptoms of incisional infection?  Increased redness, swelling or pain at the incisonal site, Increased drainage or bleeding, Incisional warmth, Pus or odor from incision, Fever [Patient w/surgical glue intact at time of call w/ no s/s infection]   Is the patient/caregiver able to teach back steps to recovery at home?  Rest and rebuild strength, gradually increase activity, Eat a well-balance diet   Is the patient/caregiver able to teach back the hierarchy of who to call/visit for symptoms/problems? PCP, Specialist, Home health nurse, Urgent Care, ED, 911  Yes   TCM call completed?  Yes          Yoselin Ford RN    10/8/2021, 08:52 EDT

## 2021-10-12 ENCOUNTER — OFFICE VISIT (OUTPATIENT)
Dept: INTERNAL MEDICINE | Facility: CLINIC | Age: 61
End: 2021-10-12

## 2021-10-12 VITALS
TEMPERATURE: 98 F | OXYGEN SATURATION: 97 % | WEIGHT: 208.2 LBS | HEIGHT: 65 IN | SYSTOLIC BLOOD PRESSURE: 140 MMHG | HEART RATE: 75 BPM | BODY MASS INDEX: 34.69 KG/M2 | RESPIRATION RATE: 18 BRPM | DIASTOLIC BLOOD PRESSURE: 72 MMHG

## 2021-10-12 DIAGNOSIS — Z90.49 S/P LAPAROSCOPIC CHOLECYSTECTOMY: ICD-10-CM

## 2021-10-12 DIAGNOSIS — R11.2 INTRACTABLE VOMITING WITH NAUSEA, UNSPECIFIED VOMITING TYPE: ICD-10-CM

## 2021-10-12 DIAGNOSIS — K82.8 BILIARY DYSKINESIA: Primary | ICD-10-CM

## 2021-10-12 PROCEDURE — 99495 TRANSJ CARE MGMT MOD F2F 14D: CPT | Performed by: NURSE PRACTITIONER

## 2021-10-12 RX ORDER — ONDANSETRON 4 MG/1
4 TABLET, FILM COATED ORAL EVERY 8 HOURS PRN
Qty: 20 TABLET | Refills: 0 | Status: SHIPPED | OUTPATIENT
Start: 2021-10-12 | End: 2021-12-03 | Stop reason: SDUPTHER

## 2021-10-12 RX ORDER — AMOXICILLIN 250 MG
1 CAPSULE ORAL DAILY
Qty: 30 TABLET | Refills: 0 | Status: SHIPPED | OUTPATIENT
Start: 2021-10-12 | End: 2022-04-08

## 2021-10-12 NOTE — ASSESSMENT & PLAN NOTE
Doing fairly well status post cholecystectomy.  We will start her on Senokot-S for constipation likely secondary to opiate use.  We will also give her some Zofran to take with hydrocodone since this is typically when nausea and vomiting are occurring.  She will call if symptoms do not improve.  She also has scheduled follow-up with Dr. Carrington in 2 days and will discuss ongoing concerns with him.  No suspicion for infection.

## 2021-10-12 NOTE — PROGRESS NOTES
Transitional Care Follow Up Visit  Subjective     Melvina is a 61 y.o. female who presents for a transitional care management visit.    Within 48 business hours after discharge our office contacted her via telephone to coordinate her care and needs.      I reviewed and discussed the details of that call along with the discharge summary, hospital problems, inpatient lab results, inpatient diagnostic studies, and consultation reports with Melvina.     Current outpatient and discharge medications have been reconciled for the patient.  Reviewed by: YOLANDA Da Silva      Date of TCM Phone Call 10/7/2021   Hospital [facility]   Date of Admission 10/3/2021   Date of Discharge 10/7/2021   Discharge Disposition Home or Self Care       Risk for Readmission (LACE) Score: 8 (10/7/2021  6:00 AM)      History of Present Illness     Course During Hospital Stay The following information was reviewed by: YOLANDA Da Silva on 10/12/2021: Melvina Martinez is a 61 y.o. female with PMH of GERD, anxiety, bipolar disorder, PTSD, depression, HTN, and DM who presents to the ED complaining of abdominal pain, nausea/vomiting.  Patient states this started approximately 4 days prior associated with constipation.    Patient took medications at home for constipation with minimal relief.  CT of the abdomen pelvis was done in the ED and shows hepatic lesions suggestive of cysts, left renal cysts and probable peripelvic renal cyst but otherwise no acute abdominal findings.  LFTs normal, Lipase was 30.  Patient was given multiple antiemetics and pain medicine but still had continued nausea/vomiting.  The hospitalist service was contacted for admission for intractable nausea and vomiting.  Patient was admitted to the floor and did well for several hours without nausea, vomiting, or pain.    However abdominal pain returned with severe nausea and vomiting, repeat CT scan was performed again with no new findings.  Right upper quadrant ultrasound performed with  "findings of gallbladder is mildly distended without definitive cholelithiasis or cholecystitis.  HIDA scan was obtained shows an ejection fraction of 0%. General surgery was consulted, patient had laparoscopic cholecystectomy performed on 10/6/2021 for biliary dyskinesia. Patient tolerated the procedure well, remained in the hospital overnight. Patient discharged on 10/7/2021 after tolerating a light diet. Patient instructed to continue resting at home slowly advance her diet. Patient will need to follow-up with her primary care physician within 1 week. Patient will need to follow-up with general surgery. Patient seen on date of discharge, clinically and hemodynamically stable.  Patient provided concerning signs and symptoms prompting immediate medical attention, patient understanding and agreeable    Since discharge she reports having only 1 BM which was 2 days ago, small and soft. She is taking hydrocodone mostly at night. She has also had additional episodes of nausea/vomiting. She has not been taking anything for nausea.  Denies fever, chills, body aches. Pain is tolerable and improving.     The following portions of the patient's history were reviewed and updated as appropriate: allergies, current medications, past family history, past medical history, past social history, past surgical history and problem list.     Vitals:    10/12/21 0917   BP: 140/72   BP Location: Left arm   Patient Position: Sitting   Cuff Size: Adult   Pulse: 75   Resp: 18   Temp: 98 °F (36.7 °C)   TempSrc: Temporal   SpO2: 97%   Weight: 94.4 kg (208 lb 3.2 oz)   Height: 165.1 cm (65\")       Review of Systems    Objective    /72 (BP Location: Left arm, Patient Position: Sitting, Cuff Size: Adult)   Pulse 75   Temp 98 °F (36.7 °C) (Temporal)   Resp 18   Ht 165.1 cm (65\")   Wt 94.4 kg (208 lb 3.2 oz)   LMP  (LMP Unknown)   SpO2 97%   BMI 34.65 kg/m²     Physical Exam  Vitals and nursing note reviewed.   Constitutional:       " General: She is not in acute distress.     Appearance: Normal appearance.   HENT:      Head: Normocephalic and atraumatic.      Right Ear: External ear normal.      Left Ear: External ear normal.      Nose: Nose normal.      Mouth/Throat:      Mouth: Mucous membranes are moist.   Eyes:      Conjunctiva/sclera: Conjunctivae normal.   Cardiovascular:      Rate and Rhythm: Normal rate and regular rhythm.      Pulses: Normal pulses.      Heart sounds: Normal heart sounds. No murmur heard.  No friction rub. No gallop.    Pulmonary:      Effort: Pulmonary effort is normal. No respiratory distress.      Breath sounds: No wheezing, rhonchi or rales.   Abdominal:      General: Bowel sounds are normal.      Palpations: Abdomen is soft.      Tenderness: There is abdominal tenderness (mild tenderness of surgical sites without purulent discharge or erythema).   Musculoskeletal:      Cervical back: Neck supple.      Right lower leg: No edema.      Left lower leg: No edema.   Skin:     General: Skin is warm and dry.   Neurological:      General: No focal deficit present.      Mental Status: She is alert and oriented to person, place, and time.   Psychiatric:         Mood and Affect: Mood normal.         Behavior: Behavior normal.           Assessment/Plan     Diagnoses and all orders for this visit:    1. Biliary dyskinesia (Primary)    2. Intractable vomiting with nausea, unspecified vomiting type    3. S/P laparoscopic cholecystectomy  Assessment & Plan:  Doing fairly well status post cholecystectomy.  We will start her on Senokot-S for constipation likely secondary to opiate use.  We will also give her some Zofran to take with hydrocodone since this is typically when nausea and vomiting are occurring.  She will call if symptoms do not improve.  She also has scheduled follow-up with Dr. Carrington in 2 days and will discuss ongoing concerns with him.  No suspicion for infection.      Other orders  -     sennosides-docusate  (Senokot S) 8.6-50 MG per tablet; Take 1 tablet by mouth Daily.  Dispense: 30 tablet; Refill: 0  -     ondansetron (Zofran) 4 MG tablet; Take 1 tablet by mouth Every 8 (Eight) Hours As Needed for Nausea or Vomiting.  Dispense: 20 tablet; Refill: 0      Follow Up     Return in about 2 months (around 12/12/2021).

## 2021-10-14 ENCOUNTER — OFFICE VISIT (OUTPATIENT)
Dept: SURGERY | Facility: CLINIC | Age: 61
End: 2021-10-14

## 2021-10-14 VITALS — HEIGHT: 65 IN | WEIGHT: 203.8 LBS | BODY MASS INDEX: 33.95 KG/M2 | RESPIRATION RATE: 16 BRPM

## 2021-10-14 DIAGNOSIS — K82.8 BILIARY DYSKINESIA: Primary | ICD-10-CM

## 2021-10-14 PROCEDURE — 99024 POSTOP FOLLOW-UP VISIT: CPT | Performed by: SURGERY

## 2021-10-14 RX ORDER — TAMOXIFEN CITRATE 20 MG/1
20 TABLET ORAL DAILY
COMMUNITY
Start: 2021-10-13 | End: 2022-01-11

## 2021-10-14 RX ORDER — TAMOXIFEN CITRATE 10 MG/1
TABLET ORAL
COMMUNITY
Start: 2021-10-13 | End: 2021-10-29 | Stop reason: HOSPADM

## 2021-10-14 RX ORDER — QUETIAPINE FUMARATE 100 MG/1
100 TABLET, FILM COATED ORAL
COMMUNITY
Start: 2021-08-13 | End: 2021-10-29 | Stop reason: HOSPADM

## 2021-10-14 NOTE — PROGRESS NOTES
Chief Complaint: Post-op    Subjective         History of Present Illness  Melvina Martinez is a 61 y.o. female presents to [facility] to be seen for follow-up after laparoscopic cholecystectomy.  Patient was admitted to the hospital for intractable nausea vomiting.  She was found biliary dyskinesia.  She is done well after her procedure.  She reports she had a couple episodes of vomiting but since then has not had any vomiting postoperatively.  Her pain is resolving.  She still has a little bit of soreness but otherwise is doing quite well.  Eating and drinking normally..    Objective     Past Medical History:   Diagnosis Date   • Acid reflux    • Anxiety disorder    • Arthritis    • Bipolar disorder (HCC)    • Depression    • Diabetes (HCC)     DOES NOT CHECK BS DAILY   • Hypertension    • Kidney stones    • Limb swelling    • Migraines    • PTSD (post-traumatic stress disorder)    • Rotator cuff tear, left    • Rotator cuff tear, left 6/10/2021   • Stress incontinence        Past Surgical History:   Procedure Laterality Date   • ABDOMINOPLASTY      Tummy tuck   • ANKLE SURGERY     • APPENDECTOMY     •  SECTION     • CHOLECYSTECTOMY N/A 10/6/2021    Procedure: CHOLECYSTECTOMY LAPAROSCOPIC;  Surgeon: Jeancarlos Carrington MD;  Location: [facility];  Service: General;  Laterality: N/A;   • COLONOSCOPY     • EYE SURGERY     • HYSTERECTOMY     • INTRAOCULAR LENS INSERTION     • KNEE SURGERY     • SHOULDER ARTHROSCOPY Left 6/10/2021    Procedure: LEFT SHOULDER ARTHROSCOPY;  Surgeon: Дмитрий Francois MD;  Location: [facility];  Service: Orthopedics;  Laterality: Left;   • SHOULDER ROTATOR CUFF REPAIR Left 6/10/2021    Procedure: SUBACROMIAL DECOMPRESSION, DISTAL CLAVICULECTOMY AND ROTATOR CUFF REPAIR;  Surgeon: Дмитрий Francois MD;  [place] [facility] [place] [facility]  Service: Orthopedics;  Laterality: Left;   • SHOULDER SURGERY Right    • TONSILLECTOMY           Current Outpatient  Medications:   •  amLODIPine (NORVASC) 2.5 MG tablet, Take 1 tablet by mouth Daily., Disp: 90 tablet, Rfl: 1  •  Ascorbic Acid (Vitamin C) 500 MG capsule, Take 1,000 mg by mouth Daily., Disp: , Rfl:   •  Blood Glucose Monitoring Suppl kit, 1 kit 3 (Three) Times a Day. Lancets and blood glucose strips with machine to check blood sugars up to 3 times a day., Disp: 1 each, Rfl: 0  •  DULoxetine (Cymbalta) 30 MG capsule, Take 1 capsule by mouth Daily., Disp: 90 capsule, Rfl: 0  •  empagliflozin (Jardiance) 10 MG tablet tablet, Take 1 tablet by mouth Daily., Disp: 90 tablet, Rfl: 1  •  Glucosamine-Chondroit-Vit C-Mn (GLUCOSAMINE 1500 COMPLEX PO), Take 2,000 mg by mouth Daily., Disp: , Rfl:   •  glucose blood test strip, Use as instructed, Disp: 100 each, Rfl: 0  •  HYDROcodone-acetaminophen (NORCO) 5-325 MG per tablet, Take 1 tablet by mouth Every 6 (Six) Hours As Needed for Moderate Pain ., Disp: 10 tablet, Rfl: 0  •  lamoTRIgine (LaMICtal) 100 MG tablet, lamotrigine 100 mg oral tablet take 1 tablet (100 mg) by oral route 2 times per day   Active, Disp: , Rfl:   •  Lancets misc, 2 each 2 (two) times a day., Disp: 60 each, Rfl: 0  •  ondansetron (Zofran) 4 MG tablet, Take 1 tablet by mouth Every 8 (Eight) Hours As Needed for Nausea or Vomiting., Disp: 20 tablet, Rfl: 0  •  pantoprazole (PROTONIX) 40 MG EC tablet, Take 1 tablet by mouth Daily., Disp: 30 tablet, Rfl: 1  •  prazosin (MINIPRESS) 2 MG capsule, prazosin 2 mg oral capsule take 1 capsule (2 mg) by oral route 2 times per day   Active, Disp: , Rfl:   •  QUEtiapine (SEROquel) 100 MG tablet, quetiapine 100 mg oral tablet take 1 tablet (100 mg) by oral route 3 times per day   Active, Disp: , Rfl:   •  sennosides-docusate (Senokot S) 8.6-50 MG per tablet, Take 1 tablet by mouth Daily., Disp: 30 tablet, Rfl: 0  •  Specialty Vitamins Products (Advanced Collagen) tablet, Take 160 mg by mouth Daily., Disp: , Rfl:   •  SUMAtriptan (Imitrex) 5 MG/ACT nasal spray, 1 spray  into the nostril(s) as directed by provider Every 2 (Two) Hours As Needed for Migraine., Disp: 6 each, Rfl: 2  •  tamoxifen (NOLVADEX) 20 MG chemo tablet, Take 20 mg by mouth Daily., Disp: , Rfl:   •  topiramate (TOPAMAX) 50 MG tablet, topiramate 50 mg oral tablet take 1 tablet (50 mg) by oral route 2 times per day   Active, Disp: , Rfl:   •  ascorbic acid (VITAMIN C) 500 MG capsule controlled-release CR capsule, Take 1,000 mg by mouth Daily., Disp: , Rfl:   •  GLUCOSAMINE-CHONDROIT-VIT C-MN PO, Take 2,000 mg by mouth Daily., Disp: , Rfl:   •  QUEtiapine (SEROquel) 100 MG tablet, Take 100 mg by mouth., Disp: , Rfl:   •  tamoxifen (NOLVADEX) 10 MG tablet, , Disp: , Rfl:   •  verapamil (CALAN) 120 MG tablet, verapamil 120 mg oral tablet take 1 tablet (120 mg) by oral route daily.   Suspended, Disp: , Rfl:     Allergies   Allergen Reactions   • Penicillins Anaphylaxis and Shortness Of Breath   • Metronidazole Hives and Nausea Only        Family History   Problem Relation Age of Onset   • Cancer Mother         Unspecified   • Breast cancer Mother    • Dementia Father    • Stroke Father    • Heart disease Father    • Diabetes Father         Unspecified type   • Arthritis Father        Social History     Socioeconomic History   • Marital status:    Tobacco Use   • Smoking status: Never Smoker   • Smokeless tobacco: Never Used   Vaping Use   • Vaping Use: Never used   Substance and Sexual Activity   • Alcohol use: Yes     Comment: rarely drinks   • Drug use: Never        Physical Exam  Constitutional:       Appearance: Normal appearance.   Cardiovascular:      Rate and Rhythm: Normal rate.   Pulmonary:      Effort: Pulmonary effort is normal.   Abdominal:      Palpations: Abdomen is soft.   Skin:     General: Skin is warm.        Post Surgical Incision  Surgical wound: This is healing well no signs of infection    Result Review :               Assessment and Plan    Diagnoses and all orders for this visit:    1.  Biliary dyskinesia (Primary)        Follow Up   No follow-ups on file.  Patient was given instructions and counseling regarding her condition or for health maintenance advice. Please see specific information pulled into the AVS if appropriate.     Doing well postoperatively.  Her pathology showed chronic cholecystitis.  At this point time I am pleased with her progress and I feel that she can follow-up with me as needed.  She is always welcome to call with any questions or concerns or if she has any further issues.    Discussed with the patient - all questions were answered they voiced understanding and agreed to proceed with above plan

## 2021-10-18 ENCOUNTER — READMISSION MANAGEMENT (OUTPATIENT)
Dept: CALL CENTER | Facility: HOSPITAL | Age: 61
End: 2021-10-18

## 2021-10-18 ENCOUNTER — OFFICE VISIT (OUTPATIENT)
Dept: ORTHOPEDIC SURGERY | Facility: CLINIC | Age: 61
End: 2021-10-18

## 2021-10-18 VITALS — OXYGEN SATURATION: 97 % | HEIGHT: 64 IN | WEIGHT: 210.4 LBS | HEART RATE: 91 BPM | BODY MASS INDEX: 35.92 KG/M2

## 2021-10-18 DIAGNOSIS — Z47.89 AFTERCARE FOLLOWING SURGERY OF THE MUSCULOSKELETAL SYSTEM: Primary | ICD-10-CM

## 2021-10-18 PROCEDURE — 99024 POSTOP FOLLOW-UP VISIT: CPT | Performed by: PHYSICIAN ASSISTANT

## 2021-10-18 NOTE — PATIENT INSTRUCTIONS
Recommend the patient restart PT, continue home exercises, anti-inflammatory as needed and follow-up in 6 weeks for recheck.

## 2021-10-18 NOTE — OUTREACH NOTE
General Surgery Week 2 Survey      Responses   [facility] patient discharged from? [facility]   Does the patient have one of the following disease processes/diagnoses(primary or secondary)? General Surgery   Week 2 attempt successful? No   Unsuccessful attempts Attempt 1          Haydee Mckinnon RN

## 2021-10-18 NOTE — PROGRESS NOTES
"Chief Complaint  Pain of the Left Shoulder    Subjective          Melvina Martinez presents to [facility] for follow-up on left shoulder status post left shoulder arthroscopy with SCD, DCR and mini RCR of the supraspinatus tendon performed by Dr. Francois 6/10/2021.  She states that she is doing very well, she had to take a break from therapy because she had a cholecystectomy a week or so ago.  She states she started to feel better but still has some achiness around her surgical wounds in her abdomen.  She is going to [place] this week.  She states she has good range of motion but has weakness in the shoulder.  Early on in her postsurgical course the patient accidentally reached down to  her 4-year-old granddaughter and had significant pain in the shoulder, we ordered an MRI showing a 0.3 cm partial-thickness bursal surface tear of the distal supraspinatus tendon at the humerus insertion with out significant retraction.    Objective   Allergies   Allergen Reactions   • Penicillins Anaphylaxis and Shortness Of Breath   • Metronidazole Hives and Nausea Only       Vital Signs:   Pulse 91   Ht 161.3 cm (63.5\")   Wt 95.4 kg (210 lb 6.4 oz)   SpO2 97%   BMI 36.69 kg/m²       Physical Exam  Constitutional:       Appearance: Normal appearance. Patient is well-developed and normal weight.   HENT:      Head: Normocephalic.      Right Ear: Hearing and external ear normal.      Left Ear: Hearing and external ear normal.      Nose: Nose normal.   Eyes:      Conjunctiva/sclera: Conjunctivae normal.   Cardiovascular:      Rate and Rhythm: Normal rate.   Pulmonary:      Effort: Pulmonary effort is normal.      Breath sounds: No wheezing or rales.   Abdominal:      Palpations: Abdomen is soft.      Tenderness: There is no abdominal tenderness.   Musculoskeletal:      Cervical back: Normal range of motion.   Skin:     Findings: No rash.   Neurological:      Mental Status: Patient is alert and " oriented to person, place, and time.   Psychiatric:         Mood and Affect: Mood and affect normal.         Judgment: Judgment normal.     Ortho Exam  Left shoulder: Well-healed surgical incision without erythema dehiscence or purulent drainage, no tenderness to palpation or swelling, passive flexion to 170 abduction to 160.  Active flexion 120 abduction 90 and internal rotation to the jeans pocket.  Sensation light touch intact, good range of motion left elbow wrist and digits, radial pulses 2+.  Result Review :            Imaging Results (Most Recent)     None                Assessment and Plan    Problem List Items Addressed This Visit        Musculoskeletal and Injuries    Aftercare following surgery of the musculoskeletal system - Primary    Current Assessment & Plan     Recommend the patient restart PT, continue home exercises, anti-inflammatory as needed and follow-up in 6 weeks for recheck.         Relevant Orders    Ambulatory Referral to Physical Therapy Evaluate and treat, POST OP          Follow Up   Return in about 6 weeks (around 11/29/2021) for Recheck.  Patient Instructions   Recommend the patient restart PT, continue home exercises, anti-inflammatory as needed and follow-up in 6 weeks for recheck.    Patient was given instructions and counseling regarding her condition or for health maintenance advice. Please see specific information pulled into the AVS if appropriate.

## 2021-10-21 ENCOUNTER — READMISSION MANAGEMENT (OUTPATIENT)
Dept: CALL CENTER | Facility: HOSPITAL | Age: 61
End: 2021-10-21

## 2021-10-21 LAB — QT INTERVAL: 391 MS

## 2021-10-21 NOTE — OUTREACH NOTE
General Surgery Week 2 Survey      Responses   [facility] patient discharged from? [facility]   Does the patient have one of the following disease processes/diagnoses(primary or secondary)? General Surgery   Week 2 attempt successful? Yes   Call start time 1459   Call end time 1502   Discharge diagnosis intractable vomiting, abdominal pain, Lap sivakumar   Meds reviewed with patient/caregiver? Yes   Is the patient having any side effects they believe may be caused by any medication additions or changes? No   Does the patient have all medications related to this admission filled (includes all antibiotics, pain medications, etc.) Yes   Is the patient taking all medications as directed (includes completed medication regime)? Yes   Does the patient have a follow up appointment scheduled with their surgeon? Yes   Has the patient kept scheduled appointments due by today? Yes   Has home health visited the patient within 72 hours of discharge? N/A   Psychosocial issues? No   Did the patient receive a copy of their discharge instructions? Yes   Nursing interventions Reviewed instructions with patient   What is the patient's perception of their health status since discharge? Improving   Nursing interventions Nurse provided patient education   Is the patient /caregiver able to teach back basic post-op care? Keep incision areas clean,dry and protected   Is the patient/caregiver able to teach back signs and symptoms of incisional infection? Increased redness, swelling or pain at the incisonal site,  Increased drainage or bleeding,  Incisional warmth,  Pus or odor from incision,  Fever   Is the patient/caregiver able to teach back steps to recovery at home? Rest and rebuild strength, gradually increase activity,  Set small, achievable goals for return to baseline health,  Eat a well-balance diet   If the patient is a current smoker, are they able to teach back resources for cessation? Not a smoker   Is the patient/caregiver able to  teach back the hierarchy of who to call/visit for symptoms/problems? PCP, Specialist, Home health nurse, Urgent Care, ED, 911 Yes   Additional teach back comments states appetite not fully back, is careful to avoid foods triggering nausea, still taking anti-nausea meds   Week 2 call completed? Yes          Angella Orlando RN

## 2021-10-24 ENCOUNTER — APPOINTMENT (OUTPATIENT)
Dept: CT IMAGING | Facility: HOSPITAL | Age: 61
End: 2021-10-24

## 2021-10-24 ENCOUNTER — HOSPITAL ENCOUNTER (EMERGENCY)
Facility: HOSPITAL | Age: 61
Discharge: HOME | End: 2021-10-24
Attending: EMERGENCY MEDICINE | Admitting: EMERGENCY MEDICINE

## 2021-10-24 VITALS
TEMPERATURE: 97.9 F | HEIGHT: 63 IN | WEIGHT: 202.38 LBS | RESPIRATION RATE: 22 BRPM | BODY MASS INDEX: 35.86 KG/M2 | HEART RATE: 96 BPM | DIASTOLIC BLOOD PRESSURE: 105 MMHG | SYSTOLIC BLOOD PRESSURE: 145 MMHG | OXYGEN SATURATION: 96 %

## 2021-10-24 DIAGNOSIS — K59.03 DRUG-INDUCED CONSTIPATION: Primary | ICD-10-CM

## 2021-10-24 LAB
ALBUMIN SERPL-MCNC: 4.5 G/DL (ref 3.5–5.2)
ALBUMIN/GLOB SERPL: 1.4 G/DL
ALP SERPL-CCNC: 90 U/L (ref 39–117)
ALT SERPL W P-5'-P-CCNC: 13 U/L (ref 1–33)
ANION GAP SERPL CALCULATED.3IONS-SCNC: 19 MMOL/L (ref 5–15)
AST SERPL-CCNC: 15 U/L (ref 1–32)
BASOPHILS # BLD AUTO: 0.06 10*3/MM3 (ref 0–0.2)
BASOPHILS NFR BLD AUTO: 0.7 % (ref 0–1.5)
BILIRUB SERPL-MCNC: 0.4 MG/DL (ref 0–1.2)
BILIRUB UR QL STRIP: NEGATIVE
BUN SERPL-MCNC: 16 MG/DL (ref 8–23)
BUN/CREAT SERPL: 16.8 (ref 7–25)
CALCIUM SPEC-SCNC: 9.7 MG/DL (ref 8.6–10.5)
CHLORIDE SERPL-SCNC: 107 MMOL/L (ref 98–107)
CLARITY UR: CLEAR
CO2 SERPL-SCNC: 15 MMOL/L (ref 22–29)
COLOR UR: YELLOW
CREAT SERPL-MCNC: 0.95 MG/DL (ref 0.57–1)
D-LACTATE SERPL-SCNC: 1.1 MMOL/L (ref 0.5–2)
D-LACTATE SERPL-SCNC: 2.6 MMOL/L (ref 0.5–2)
DEPRECATED RDW RBC AUTO: 46.4 FL (ref 37–54)
EOSINOPHIL # BLD AUTO: 0.18 10*3/MM3 (ref 0–0.4)
EOSINOPHIL NFR BLD AUTO: 2 % (ref 0.3–6.2)
ERYTHROCYTE [DISTWIDTH] IN BLOOD BY AUTOMATED COUNT: 14.4 % (ref 12.3–15.4)
GFR SERPL CREATININE-BSD FRML MDRD: 60 ML/MIN/1.73
GLOBULIN UR ELPH-MCNC: 3.3 GM/DL
GLUCOSE SERPL-MCNC: 117 MG/DL (ref 65–99)
GLUCOSE UR STRIP-MCNC: ABNORMAL MG/DL
HCT VFR BLD AUTO: 44.2 % (ref 34–46.6)
HGB BLD-MCNC: 14.8 G/DL (ref 12–15.9)
HGB UR QL STRIP.AUTO: NEGATIVE
HOLD SPECIMEN: NORMAL
HOLD SPECIMEN: NORMAL
IMM GRANULOCYTES # BLD AUTO: 0.03 10*3/MM3 (ref 0–0.05)
IMM GRANULOCYTES NFR BLD AUTO: 0.3 % (ref 0–0.5)
KETONES UR QL STRIP: ABNORMAL
LEUKOCYTE ESTERASE UR QL STRIP.AUTO: NEGATIVE
LIPASE SERPL-CCNC: 26 U/L (ref 13–60)
LYMPHOCYTES # BLD AUTO: 3.66 10*3/MM3 (ref 0.7–3.1)
LYMPHOCYTES NFR BLD AUTO: 40 % (ref 19.6–45.3)
MCH RBC QN AUTO: 29.1 PG (ref 26.6–33)
MCHC RBC AUTO-ENTMCNC: 33.5 G/DL (ref 31.5–35.7)
MCV RBC AUTO: 87 FL (ref 79–97)
MONOCYTES # BLD AUTO: 0.59 10*3/MM3 (ref 0.1–0.9)
MONOCYTES NFR BLD AUTO: 6.5 % (ref 5–12)
NEUTROPHILS NFR BLD AUTO: 4.62 10*3/MM3 (ref 1.7–7)
NEUTROPHILS NFR BLD AUTO: 50.5 % (ref 42.7–76)
NITRITE UR QL STRIP: NEGATIVE
NRBC BLD AUTO-RTO: 0 /100 WBC (ref 0–0.2)
PH UR STRIP.AUTO: 7.5 [PH] (ref 5–8)
PLATELET # BLD AUTO: 276 10*3/MM3 (ref 140–450)
PMV BLD AUTO: 10 FL (ref 6–12)
POTASSIUM SERPL-SCNC: 4 MMOL/L (ref 3.5–5.2)
PROT SERPL-MCNC: 7.8 G/DL (ref 6–8.5)
PROT UR QL STRIP: NEGATIVE
RBC # BLD AUTO: 5.08 10*6/MM3 (ref 3.77–5.28)
SODIUM SERPL-SCNC: 141 MMOL/L (ref 136–145)
SP GR UR STRIP: >1.03 (ref 1–1.03)
UROBILINOGEN UR QL STRIP: ABNORMAL
WBC # BLD AUTO: 9.14 10*3/MM3 (ref 3.4–10.8)
WHOLE BLOOD HOLD SPECIMEN: NORMAL
WHOLE BLOOD HOLD SPECIMEN: NORMAL

## 2021-10-24 PROCEDURE — 80053 COMPREHEN METABOLIC PANEL: CPT | Performed by: EMERGENCY MEDICINE

## 2021-10-24 PROCEDURE — 85025 COMPLETE CBC W/AUTO DIFF WBC: CPT | Performed by: EMERGENCY MEDICINE

## 2021-10-24 PROCEDURE — 96374 THER/PROPH/DIAG INJ IV PUSH: CPT

## 2021-10-24 PROCEDURE — 74174 CTA ABD&PLVS W/CONTRAST: CPT

## 2021-10-24 PROCEDURE — 81003 URINALYSIS AUTO W/O SCOPE: CPT | Performed by: EMERGENCY MEDICINE

## 2021-10-24 PROCEDURE — 87040 BLOOD CULTURE FOR BACTERIA: CPT | Performed by: EMERGENCY MEDICINE

## 2021-10-24 PROCEDURE — 96375 TX/PRO/DX INJ NEW DRUG ADDON: CPT

## 2021-10-24 PROCEDURE — 99284 EMERGENCY DEPT VISIT MOD MDM: CPT

## 2021-10-24 PROCEDURE — 83605 ASSAY OF LACTIC ACID: CPT | Performed by: EMERGENCY MEDICINE

## 2021-10-24 PROCEDURE — 0 IOPAMIDOL PER 1 ML: Performed by: EMERGENCY MEDICINE

## 2021-10-24 PROCEDURE — 36415 COLL VENOUS BLD VENIPUNCTURE: CPT

## 2021-10-24 PROCEDURE — 96376 TX/PRO/DX INJ SAME DRUG ADON: CPT

## 2021-10-24 PROCEDURE — 25010000002 HYDROMORPHONE 1 MG/ML SOLUTION: Performed by: EMERGENCY MEDICINE

## 2021-10-24 PROCEDURE — 83690 ASSAY OF LIPASE: CPT | Performed by: EMERGENCY MEDICINE

## 2021-10-24 PROCEDURE — 25010000002 ONDANSETRON PER 1 MG: Performed by: REGISTERED NURSE

## 2021-10-24 RX ORDER — SODIUM CHLORIDE 0.9 % (FLUSH) 0.9 %
10 SYRINGE (ML) INJECTION AS NEEDED
Status: DISCONTINUED | OUTPATIENT
Start: 2021-10-24 | End: 2021-10-25 | Stop reason: HOSPADM

## 2021-10-24 RX ORDER — ONDANSETRON 2 MG/ML
4 INJECTION INTRAMUSCULAR; INTRAVENOUS ONCE
Status: COMPLETED | OUTPATIENT
Start: 2021-10-24 | End: 2021-10-24

## 2021-10-24 RX ADMIN — IOPAMIDOL 100 ML: 755 INJECTION, SOLUTION INTRAVENOUS at 20:05

## 2021-10-24 RX ADMIN — Medication 300 ML: at 21:42

## 2021-10-24 RX ADMIN — ONDANSETRON 4 MG: 2 INJECTION INTRAMUSCULAR; INTRAVENOUS at 18:36

## 2021-10-24 RX ADMIN — SODIUM CHLORIDE 1000 ML: 9 INJECTION, SOLUTION INTRAVENOUS at 18:34

## 2021-10-24 RX ADMIN — HYDROMORPHONE HYDROCHLORIDE 0.5 MG: 1 INJECTION, SOLUTION INTRAMUSCULAR; INTRAVENOUS; SUBCUTANEOUS at 18:34

## 2021-10-24 RX ADMIN — HYDROMORPHONE HYDROCHLORIDE 0.5 MG: 1 INJECTION, SOLUTION INTRAMUSCULAR; INTRAVENOUS; SUBCUTANEOUS at 19:28

## 2021-10-24 NOTE — ED PROVIDER NOTES
Time: 6:00 PM EDT  Arrived by: JENNY  Chief Complaint: Abdominal pain, constipation  History provided by: Patient, spouse      History of Present Illness:  Patient is a 61 y.o. year old female that presents to the emergency department with complaint of inability to have bowel movement for 7 days and severe abdominal pain.  She reports having cholecystectomy 2 weeks ago, felt constipated 2 days later which was relieved by senna.  She has not had a bowel movement in the past 7 days and has taken Colace, senna, magnesium citrate over the past 2 days without relief.       used: No    Abdominal Pain  Pain location:  Generalized  Pain quality: sharp and stabbing    Pain radiates to:  Does not radiate  Pain severity:  Moderate  Onset quality:  Sudden  Duration:  7 days  Timing:  Constant  Chronicity:  New  Context: laxative use    Context comment:  Pain medication  Relieved by:  Nothing  Worsened by:  Nothing  Ineffective treatments:  Position changes and OTC medications  Associated symptoms: constipation and nausea    Associated symptoms: no chest pain, no chills, no cough, no diarrhea, no dysuria, no fever, no hematuria, no shortness of breath, no sore throat and no vomiting            Similar Symptoms Previously: No  Recently seen: Yes, Dr. Carrington for cholecystectomy on 10/6/2021      Patient Care Team  Primary Care Provider: YOLANDA Ma    Past Medical History:     Allergies   Allergen Reactions   • Penicillins Anaphylaxis and Shortness Of Breath   • Metronidazole Hives and Nausea Only     Past Medical History:   Diagnosis Date   • Acid reflux    • Anxiety disorder    • Arthritis    • Bipolar disorder (HCC)    • Depression    • Diabetes ([facility])     DOES NOT CHECK BS DAILY   • Hypertension    • Kidney stones    • Limb swelling    • Migraines    • PTSD (post-traumatic stress disorder)    • Rotator cuff tear, left    • Rotator cuff tear, left 6/10/2021   • Stress incontinence      Past Surgical  History:   Procedure Laterality Date   • ABDOMINOPLASTY      Tummy tuck   • ANKLE SURGERY     • APPENDECTOMY     •  SECTION     • CHOLECYSTECTOMY N/A 10/6/2021    Procedure: CHOLECYSTECTOMY LAPAROSCOPIC;  Surgeon: Jeancarlos Carrington MD;  Location: [facility] MAIN OR;  Service: General;  Laterality: N/A;   • COLONOSCOPY     • EYE SURGERY     • HYSTERECTOMY     • INTRAOCULAR LENS INSERTION     • KNEE SURGERY     • SHOULDER ARTHROSCOPY Left 6/10/2021    Procedure: LEFT SHOULDER ARTHROSCOPY;  Surgeon: Дмитрий Francois MD;  [place] [facility] [place] [facility] [place]  Service: Orthopedics;  Laterality: Left;   • SHOULDER ROTATOR CUFF REPAIR Left 6/10/2021    Procedure: SUBACROMIAL DECOMPRESSION, DISTAL CLAVICULECTOMY AND ROTATOR CUFF REPAIR;  Surgeon: Дмитрий Francois MD;  [place] [facility] [place] [facility] [place]  Service: Orthopedics;  Laterality: Left;   • SHOULDER SURGERY Right    • TONSILLECTOMY       Family History   Problem Relation Age of Onset   • Cancer Mother         Unspecified   • Breast cancer Mother    • Dementia Father    • Stroke Father    • Heart disease Father    • Diabetes Father         Unspecified type   • Arthritis Father        Home Medications:  Prior to Admission medications    Medication Sig Start Date End Date Taking? Authorizing Provider   amLODIPine (NORVASC) 2.5 MG tablet Take 1 tablet by mouth Daily. 21   Evelyn Escalante PA-C   ascorbic acid (VITAMIN C) 500 MG capsule controlled-release CR capsule Take 1,000 mg by mouth Daily.    Provider, MD Gregorio   Ascorbic Acid (Vitamin C) 500 MG capsule Take 1,000 mg by mouth Daily.    Provider, MD Gregorio   Blood Glucose Monitoring Suppl kit 1 kit 3 (Three) Times a Day. Lancets and blood glucose strips with machine to check blood sugars up to 3 times a day. 21   Evelia Emmanuel MD   DULoxetine (Cymbalta) 30 MG capsule Take 1 capsule by mouth Daily. 21   Evelia Emmanuel MD   empagliflozin (Jardiance) 10 MG tablet tablet Take 1 tablet by mouth Daily.  9/24/21   Isabela Nelson APRN   Glucosamine-Chondroit-Vit C-Mn (GLUCOSAMINE 1500 COMPLEX PO) Take 2,000 mg by mouth Daily.    Gregorio Adam MD   GLUCOSAMINE-CHONDROIT-VIT C-MN PO Take 2,000 mg by mouth Daily.    Gregorio Adam MD   glucose blood test strip Use as instructed 7/21/21   Evelia Emmanuel MD   HYDROcodone-acetaminophen (NORCO) 5-325 MG per tablet Take 1 tablet by mouth Every 6 (Six) Hours As Needed for Moderate Pain . 10/7/21   Joshua Parker MD   lamoTRIgine (LaMICtal) 100 MG tablet lamotrigine 100 mg oral tablet take 1 tablet (100 mg) by oral route 2 times per day   Active    Gregorio Adam MD   Lancets misc 2 each 2 (two) times a day. 7/21/21   Evelia Emmanuel MD   ondansetron (Zofran) 4 MG tablet Take 1 tablet by mouth Every 8 (Eight) Hours As Needed for Nausea or Vomiting. 10/12/21   Isabela Nelson APRN   pantoprazole (PROTONIX) 40 MG EC tablet Take 1 tablet by mouth Daily. 9/9/21   Evelia Emmanuel MD   prazosin (MINIPRESS) 2 MG capsule prazosin 2 mg oral capsule take 1 capsule (2 mg) by oral route 2 times per day   Active    Gregorio Adam MD   QUEtiapine (SEROquel) 100 MG tablet quetiapine 100 mg oral tablet take 1 tablet (100 mg) by oral route 3 times per day   Active    Gregorio Adam MD   QUEtiapine (SEROquel) 100 MG tablet Take 100 mg by mouth. 8/13/21   Gregorio Adam MD   sennosides-docusate (Senokot S) 8.6-50 MG per tablet Take 1 tablet by mouth Daily. 10/12/21   Isabela Nelson APRN   Specialty Vitamins Products (Advanced Collagen) tablet Take 160 mg by mouth Daily.    Gregorio Aadm MD   SUMAtriptan (Imitrex) 5 MG/ACT nasal spray 1 spray into the nostril(s) as directed by provider Every 2 (Two) Hours As Needed for Migraine. 8/30/21   Mihaela Traylor MD   tamoxifen (NOLVADEX) 10 MG tablet  10/13/21   Provider, MD Gregorio   tamoxifen (NOLVADEX) 20 MG chemo tablet Take 20 mg by mouth Daily. 10/13/21 1/11/22   "Provider, MD Gregorio   topiramate (TOPAMAX) 50 MG tablet topiramate 50 mg oral tablet take 1 tablet (50 mg) by oral route 2 times per day   Active    Provider, MD Gregorio   verapamil (CALAN) 120 MG tablet verapamil 120 mg oral tablet take 1 tablet (120 mg) by oral route daily.   Suspended    ProviderGregorio MD        Social History:   PT  reports that she has never smoked. She has never used smokeless tobacco. She reports current alcohol use. She reports that she does not use drugs.    Record Review:  I have reviewed the patient's records in [facility].     Review of Systems  Review of Systems   Constitutional: Negative for chills and fever.   HENT: Negative for congestion, ear pain and sore throat.    Eyes: Negative for pain.   Respiratory: Negative for cough, chest tightness and shortness of breath.    Cardiovascular: Negative for chest pain.   Gastrointestinal: Positive for abdominal distention, abdominal pain, constipation, nausea and rectal pain. Negative for blood in stool, diarrhea and vomiting.   Genitourinary: Negative for dysuria, flank pain and hematuria.   Musculoskeletal: Negative for joint swelling.   Skin: Negative for pallor.   Neurological: Negative for seizures and headaches.   All other systems reviewed and are negative.       Physical Exam    BP (!) 145/105   Pulse 96   Temp 97.9 °F (36.6 °C) (Oral)   Resp 22   Ht 160 cm (63\")   Wt 91.8 kg (202 lb 6.1 oz)   LMP  (LMP Unknown)   SpO2 96%   Breastfeeding No   BMI 35.85 kg/m²     Physical Exam  Vitals and nursing note reviewed.   Constitutional:       Appearance: Normal appearance. She is ill-appearing. She is not toxic-appearing.   HENT:      Head: Normocephalic and atraumatic.      Mouth/Throat:      Mouth: Mucous membranes are moist.   Eyes:      General: No scleral icterus.  Cardiovascular:      Rate and Rhythm: Normal rate and regular rhythm.      Pulses: Normal pulses.      Heart sounds: Normal heart sounds.   Pulmonary:      " "Effort: Pulmonary effort is normal. No respiratory distress.      Breath sounds: Normal breath sounds.   Abdominal:      General: Abdomen is protuberant. A surgical scar is present. Bowel sounds are decreased. There is distension.      Palpations: Abdomen is soft. There is no mass.      Tenderness: There is generalized abdominal tenderness. There is guarding. There is no right CVA tenderness or left CVA tenderness.      Comments: Rectal exam reveals soft stool, no impaction   Musculoskeletal:         General: Normal range of motion.      Cervical back: Normal range of motion and neck supple.   Skin:     General: Skin is warm and dry.   Neurological:      Mental Status: She is alert and oriented to person, place, and time. Mental status is at baseline.                  ED Course  BP (!) 145/105   Pulse 96   Temp 97.9 °F (36.6 °C) (Oral)   Resp 22   Ht 160 cm (63\")   Wt 91.8 kg (202 lb 6.1 oz)   LMP  (LMP Unknown)   SpO2 96%   Breastfeeding No   BMI 35.85 kg/m²   Results for orders placed or performed during the hospital encounter of 10/24/21   Lactic Acid, Plasma    Specimen: Arm, Left; Blood   Result Value Ref Range    Lactate 2.6 (C) 0.5 - 2.0 mmol/L   Comprehensive Metabolic Panel    Specimen: Arm, Left; Blood   Result Value Ref Range    Glucose 117 (H) 65 - 99 mg/dL    BUN 16 8 - 23 mg/dL    Creatinine 0.95 0.57 - 1.00 mg/dL    Sodium 141 136 - 145 mmol/L    Potassium 4.0 3.5 - 5.2 mmol/L    Chloride 107 98 - 107 mmol/L    CO2 15.0 (L) 22.0 - 29.0 mmol/L    Calcium 9.7 8.6 - 10.5 mg/dL    Total Protein 7.8 6.0 - 8.5 g/dL    Albumin 4.50 3.50 - 5.20 g/dL    ALT (SGPT) 13 1 - 33 U/L    AST (SGOT) 15 1 - 32 U/L    Alkaline Phosphatase 90 39 - 117 U/L    Total Bilirubin 0.4 0.0 - 1.2 mg/dL    eGFR Non African Amer 60 (L) >60 mL/min/1.73    Globulin 3.3 gm/dL    A/G Ratio 1.4 g/dL    BUN/Creatinine Ratio 16.8 7.0 - 25.0    Anion Gap 19.0 (H) 5.0 - 15.0 mmol/L   Lipase    Specimen: Arm, Left; Blood   Result " Value Ref Range    Lipase 26 13 - 60 U/L   Urinalysis With Microscopic If Indicated (No Culture) - Urine, Clean Catch    Specimen: Urine, Clean Catch   Result Value Ref Range    Color, UA Yellow Yellow, Straw    Appearance, UA Clear Clear    pH, UA 7.5 5.0 - 8.0    Specific Gravity, UA >1.030 (H) 1.005 - 1.030    Glucose,  mg/dL (2+) (A) Negative    Ketones, UA Trace (A) Negative    Bilirubin, UA Negative Negative    Blood, UA Negative Negative    Protein, UA Negative Negative    Leuk Esterase, UA Negative Negative    Nitrite, UA Negative Negative    Urobilinogen, UA 0.2 E.U./dL 0.2 - 1.0 E.U./dL   CBC Auto Differential    Specimen: Arm, Left; Blood   Result Value Ref Range    WBC 9.14 3.40 - 10.80 10*3/mm3    RBC 5.08 3.77 - 5.28 10*6/mm3    Hemoglobin 14.8 12.0 - 15.9 g/dL    Hematocrit 44.2 34.0 - 46.6 %    MCV 87.0 79.0 - 97.0 fL    MCH 29.1 26.6 - 33.0 pg    MCHC 33.5 31.5 - 35.7 g/dL    RDW 14.4 12.3 - 15.4 %    RDW-SD 46.4 37.0 - 54.0 fl    MPV 10.0 6.0 - 12.0 fL    Platelets 276 140 - 450 10*3/mm3    Neutrophil % 50.5 42.7 - 76.0 %    Lymphocyte % 40.0 19.6 - 45.3 %    Monocyte % 6.5 5.0 - 12.0 %    Eosinophil % 2.0 0.3 - 6.2 %    Basophil % 0.7 0.0 - 1.5 %    Immature Grans % 0.3 0.0 - 0.5 %    Neutrophils, Absolute 4.62 1.70 - 7.00 10*3/mm3    Lymphocytes, Absolute 3.66 (H) 0.70 - 3.10 10*3/mm3    Monocytes, Absolute 0.59 0.10 - 0.90 10*3/mm3    Eosinophils, Absolute 0.18 0.00 - 0.40 10*3/mm3    Basophils, Absolute 0.06 0.00 - 0.20 10*3/mm3    Immature Grans, Absolute 0.03 0.00 - 0.05 10*3/mm3    nRBC 0.0 0.0 - 0.2 /100 WBC   STAT Lactic Acid, Reflex    Specimen: Blood   Result Value Ref Range    Lactate 1.1 0.5 - 2.0 mmol/L   Green Top (Gel)   Result Value Ref Range    Extra Tube Hold for add-ons.    Lavender Top   Result Value Ref Range    Extra Tube hold for add-on    Gold Top - SST   Result Value Ref Range    Extra Tube Hold for add-ons.    Light Blue Top   Result Value Ref Range    Extra Tube  hold for add-on      Medications   HYDROmorphone (DILAUDID) injection 0.5 mg (0.5 mg Intravenous Given 10/24/21 1834)   sodium chloride 0.9 % bolus 1,000 mL (0 mL Intravenous Stopped 10/24/21 1930)   ondansetron (ZOFRAN) injection 4 mg (4 mg Intravenous Given 10/24/21 1836)   HYDROmorphone (DILAUDID) injection 0.5 mg (0.5 mg Intravenous Given 10/24/21 1928)   iopamidol (ISOVUE-370) 76 % injection 100 mL (100 mL Intravenous Given 10/24/21 2005)   molasses enema (300 mL Rectal Given 10/24/21 2142)     CT Abdomen Pelvis Without Contrast    Result Date: 10/4/2021  Narrative: PROCEDURE: CT ABDOMEN PELVIS WO CONTRAST  COMPARISON: [facility], CR, XR ABDOMEN KUB, 10/04/2021, 2:34.  INDICATIONS: WORSENING ABDOMEN PAIN SINCE ADMIT TODAY.  TECHNIQUE: 702 CT images were created without intravenous contrast.  The patient's left upper extremity in the scan field of view obscures detail.   PROTOCOL:   Standard imaging protocol performed    RADIATION:   Automated exposure control was utilized to minimize radiation dose.  FINDINGS: No acute findings are seen.  No significant interval change is appreciated since the 10/3/2021 hand CT study at 1649 hours.  There is secreted contrast agent within the urinary bladder lumen, related to the prior enhanced CT study.  Again, there is a large left renal cyst.  There are other smaller renal cysts bilaterally.  No hydronephrosis or obstructive uropathy is suspected.  There is vicarious excretion of contrast into the gallbladder.  No acute cholecystitis or pancreatitis is seen.  There is slight motion artifact on the study.  No pneumoperitoneum or pneumatosis is seen.  No mechanical bowel obstruction.  No bowel wall thickening.  The appendix is not clearly identified.  Please correlate with the surgical history.  No acute diverticulitis is seen.  The patient has undergone hysterectomy.  There is a small hiatal hernia.  There are probably benign hepatic cysts.  Please see the prior  abdominal/pelvic CT exam report from 10/3/2021 for further detail regarding additional findings.  CONCLUSION: No acute findings are seen by nonenhanced CT examination of the abdomen and pelvis.      CIRO VILLEGAS JR, MD       Electronically Signed and Approved By: CIRO VILLEGAS JR, MD on 10/04/2021 at 4:12             CT Angiogram Abdomen Pelvis    Result Date: 10/24/2021  Narrative: PROCEDURE: CT ANGIOGRAM ABDOMEN PELVIS W WO CONTRAST  COMPARISON: 10/4/2021.  INDICATIONS: MID EPIGASTRIC PAIN AND CONSTIPATION; POST CHOLEYCYSTECTOMY THIS WEEK.  PROTOCOL:   Standard abdominal CTA imaging protocol performed.    RADIATION:   Automated exposure control was utilized to minimize radiation dose.  TECHNIQUE: After obtaining the patient's consent, 1,030 CT/CTA images of the abdomen and pelvis were created with non-ionic intravenous contrast, and with multi-planar/3-D imaging to optimize visualization of vascular anatomy.   FINDINGS: No abdominal aortic aneurysm is seen.  No abdominal aortic dissection is suggested.  The imaged bilateral iliofemoral arterial arteries are patent without hemodynamically significant stenosis.  No aneurysmal dilatation of the imaged bilateral iliofemoral arterial system is seen.  There are 2 left renal arteries.  The left renal arteries are widely patent without hemodynamically significant stenosis.  The inferior accessory left renal artery is smaller in caliber than the more superior (larger caliber) probably main left renal artery.  A single right renal artery is identified.  It is widely patent without hemodynamically significant stenosis.  The celiac trunk, superior mesenteric artery, and inferior mesenteric artery are patent without hemodynamically significant stenosis.  No emergent large vessel occlusion is identified.  There are bilateral renal cysts, which measure about 8.6 cm or less in size.  A large septated cyst involves the upper to mid left kidney.  There are probably smaller  bilateral parapelvic renal cysts.  A small hiatal hernia is seen.  There are hepatic cysts, which measure approximately 3.5 cm (in greatest axial diameter) or less in size.  A large amount of formed stool is seen throughout the colon, especially the rectosigmoid colon.  There may be fecal stasis as with constipation.  Fecal impaction cannot be excluded.  There may be an associated generalized adynamic ileus.  A small amount of nonspecific ascites is seen in the lower pelvis.  It has a CT number of about 9 Hounsfield units or less.  The patient has undergone hysterectomy and cholecystectomy.  The appendix is not clearly identified.  It may be surgically absent, as well.  The adnexa are thought to be seen.  No suspicious adnexal mass is suggested.  A small amount of free fluid surrounds the left ovary.  No definite CTA evidence of choledocholithiasis.  No acute pancreatitis.  No focal fluid collection is seen in the gallbladder fossa to suggest abscess, hematoma, or biloma.  If clinically concerned regarding a biliary leak, consider a nonemergent Nuclear Medicine (NM) hepatobiliary (HIDA) scan.  No focal infiltrate is seen in the partially imaged lung bases.  No pleural or pericardial effusion.  No cardiac enlargement.  No adrenal mass is identified.  CONCLUSION:  1. A large amount of formed stool is seen throughout the colon.  There may be fecal stasis as with constipation.  Fecal impaction is possible.  There may be an associated colonic adynamic ileus.  2. The patient has undergone interval cholecystectomy since the 10/4/2021 study.  3. A small amount of nonspecific ascites is seen in the pelvis.  This finding is nonspecific.  4. No definite focal extraluminal fluid collection is seen in the gallbladder fossa, as discussed.  No unintended retained foreign body is seen.  5. No focal sizable (drainable) fluid collection is seen in the anterior abdominal wall.  No subcutaneous emphysema.  6. No pneumoperitoneum or  pneumatosis.  7. No hemodynamically significant arterial stenoses are seen.  No emergent large vessel occlusion is identified.  Minimal, if any, atherosclerotic change is appreciated.  No extravasation is seen.   8. Please see above comments for further detail.     CIRO VILLEGAS JR, MD       Electronically Signed and Approved By: CIRO VILLEGAS JR, MD on 10/24/2021 at 21:24             CT Abdomen Pelvis With Contrast    Result Date: 10/3/2021  Narrative: PROCEDURE: CT ABDOMEN PELVIS W CONTRAST  COMPARISON: Other, [facility], MAMMO SCREENING DIGITAL TOMOSYNTHESIS BILATERAL W CAD, 9/13/2021, 11:14.  [facility], CT, ABD PEL W/O CONTRAST, 5/08/2017, 11:45.  INDICATIONS: abdominal pain  TECHNIQUE: After obtaining the patient's consent, CT images were created with non-ionic intravenous contrast material.   PROTOCOL:   Standard imaging protocol performed    RADIATION:   DLP: 1130.7 mGy*cm   Automated exposure control was utilized to minimize radiation dose. CONTRAST: 100 cc Isovue 370 I.V. LABS:   eGFR:  > 60 ml/min/1.73m2  FINDINGS:  There are hypodense areas within the left lobe of the liver.  The largest measures 3.7 cm.  These are suggested on prior exam and could reflect cysts.  The gallbladder is distended.  There are no gallstones.  There is no intrahepatic biliary dilatation.  There is no acute abnormality of the spleen or the pancreas.  There is a left renal cyst measuring 9.8 by 8.2 cm.  The right kidney is grossly unremarkable.  The bladder does not appear unusual for the degree of distention.  There are some sigmoid diverticula without changes of acute diverticulitis.  There is no bowel obstruction.  There is a small hiatal hernia.  There is some asymmetry in the appearance of the left breast as compared to the right which could be positional.  There are some degenerative changes at the L5-S1 level.  There is a levoscoliosis.  CONCLUSION:  1. Hepatic lesions suggestive of cysts. 2. Left renal cyst and  probable parapelvic renal cysts. 3. Levoscoliosis lumbar spine.  There are some degenerative changes L5-S1. 4. Small hiatal hernia.     LUIS ANGEL LIMA MD       Electronically Signed and Approved By: LUIS ANGEL LIMA MD on 10/03/2021 at 17:13             US Gallbladder    Result Date: 10/4/2021  Narrative: PROCEDURE: US GALLBLADDER  COMPARISON:[facility], CT, CT ABDOMEN PELVIS WO CONTRAST, 10/04/2021, 3:50.  INDICATIONS: abd pain, n/v  TECHNIQUE: A limited ultrasound examination of the right upper quadrant was performed.   FINDINGS:  Pancreas is mostly obscured by bowel gas.  The liver measures 16.5 centimeters in maximal length.  No liver lesions are evident.  Portal venous flow is normal hepatic veins are patent.  Gallbladder is mildly distended without definite cholelithiasis or cholecystitis.  Gallbladder wall measures 3 millimeters.  Negative sonographic Blevins sign common duct normal size measuring 4 millimeters.  Right kidney unremarkable.   CONCLUSION:  1. Pancreas and portions of the liver are obscured by bowel gas.  Liver and right kidney cysts not well seen better seen on recent CT.  No acute finding.     JOSHUA STERLING MD       Electronically Signed and Approved By: JOSHUA STERLING MD on 10/04/2021 at 11:52             NM HIDA Scan With Pharmacological Intervention    Result Date: 10/5/2021  Narrative: PROCEDURE: NM HIDA SCAN WITH PHARMACOLOGICAL INTERVENTION  COMPARISON: [facility], CT, CT ABDOMEN PELVIS WO CONTRAST, 10/04/2021, 3:50.  [facility], US, US GALLBLADDER, 10/04/2021, 11:01.  INDICATIONS: Abdominal pain, upper, chronic, assess gallbladder motility  TECHNIQUE: Informed consent was obtained. A routine radionuclide hepatobiliary scan was performed after intravenous injection of radiopharmaceutical Tc-99 MARIELLE derivative with sequential acquisitions every 5 minutes for one hour. Then, a repeat hepatobiliary scan with gallbladder ejection fraction analysis was  performed after an 8 ounce can of Ensure was ingested orally.  RADIONUCLIDE:         4.8 mCi     Tc99m Mebrofenin - I.V.  FINDINGS:  BILIARY DUCTS: Normal radioisotopic biliary excretion.  GALLBLADDER: Normal with no evidence of cystic duct obstruction.  INTESTINE: Normal with no evidence of common biliary ductal obstruction.  EJECTION FRACTION: 0% at 72 minutes. (Normal EF > 35%).  OTHER: The patient experienced nausea, vomiting and epigastric pain with the ingestion of Ensure.   CONCLUSION:   1. No evidence of acute cholecystitis.  2. Abnormal gallbladder ejection fraction measuring 0%.  This may be secondary to chronic cholecystitis or biliary dyskinesia.      DIANNE JUNE MD       Electronically Signed and Approved By: DIANNE JUNE MD on 10/05/2021 at 12:03             XR Abdomen KUB    Result Date: 10/4/2021  Narrative: PROCEDURE: XR ABDOMEN KUB  COMPARISON: [facility], CT, CT ABDOMEN PELVIS W CONTRAST, 10/03/2021, 16:49.  INDICATIONS: abdominal pain, especially in the epigastric region  FINDINGS:  Two supine AP views (KUBs) of the abdomen and pelvis reveal a nonobstructive bowel gas pattern.  Residual urinary tract contrast is noted (related to the recent enhanced abdominal/pelvic CT study).  External artifacts obscure detail.  Supine views are limited in detection of pneumoperitoneum.  Consider upright and/or left lateral decubitus view(s) for further imaging assessment of possible pneumoperitoneum if clinically warranted.  CONCLUSION:  No mechanical bowel obstruction is suspected.     CIRO VILLEGAS JR, MD       Electronically Signed and Approved By: CIRO VILLEGAS JR, MD on 10/04/2021 at 3:05               Procedures/EKGs:  Procedures      Medical Decision Making:                     MDM  Number of Diagnoses or Management Options  Drug-induced constipation: new and requires workup  Diagnosis management comments: Patient symptoms are consistent with constipation.  At this time the  patient is resting comfortably and feels better, is alert and in no distress.  The patient had good results with a milk and molasses enema.  Repeat examination is unremarkable and benign; in particular, there's no discomfort at McBurney's point and there is no pulsatile mass. The history, exam, diagnostic testing, and current condition does not suggest acute appendicitis, bowel obstruction, acute cholecystitis, bowel perforation, major gastrointestinal bleeding, severe diverticulitis, abdominal aortic aneurysm, mesenteric ischemia, volvulus, sepsis, or other significant pathology that warrants further testing, continued ED treatment, admission, for surgical evaluation at this point. The vital signs have been stable. The patient does not have uncontrollable pain, intractable vomiting, or other significant symptoms. The patient's condition is stable and appropriate for discharge from the emergency department.       Amount and/or Complexity of Data Reviewed  Clinical lab tests: reviewed and ordered  Tests in the radiology section of CPT®: ordered and reviewed  Discussion of test results with the performing providers: yes (I discussed the patient's HPI, diagnostic and exam findings with Dr. Cross.  She she will see the patient in the office.)    Risk of Complications, Morbidity, and/or Mortality  Presenting problems: moderate  Diagnostic procedures: low  Management options: low    Patient Progress  Patient progress: stable       Final diagnoses:   Drug-induced constipation        Disposition:  ED Disposition     ED Disposition Condition Comment    Discharge Stable            Carissa Marsh, APRN  10/25/21 0148

## 2021-10-25 ENCOUNTER — HOSPITAL ENCOUNTER (OUTPATIENT)
Facility: HOSPITAL | Age: 61
Setting detail: OBSERVATION
Discharge: HOME | End: 2021-10-29
Attending: EMERGENCY MEDICINE | Admitting: INTERNAL MEDICINE

## 2021-10-25 ENCOUNTER — APPOINTMENT (OUTPATIENT)
Dept: ULTRASOUND IMAGING | Facility: HOSPITAL | Age: 61
End: 2021-10-25

## 2021-10-25 ENCOUNTER — APPOINTMENT (OUTPATIENT)
Dept: MAMMOGRAPHY | Facility: HOSPITAL | Age: 61
End: 2021-10-25

## 2021-10-25 ENCOUNTER — APPOINTMENT (OUTPATIENT)
Dept: CT IMAGING | Facility: HOSPITAL | Age: 61
End: 2021-10-25

## 2021-10-25 DIAGNOSIS — R10.13 EPIGASTRIC PAIN: ICD-10-CM

## 2021-10-25 DIAGNOSIS — R10.84 GENERALIZED ABDOMINAL PAIN: Primary | ICD-10-CM

## 2021-10-25 DIAGNOSIS — Z78.9 FAILURE OF OUTPATIENT TREATMENT: ICD-10-CM

## 2021-10-25 DIAGNOSIS — R93.5 ABNORMAL CT OF THE ABDOMEN: ICD-10-CM

## 2021-10-25 LAB
ALBUMIN SERPL-MCNC: 4.3 G/DL (ref 3.5–5.2)
ALBUMIN/GLOB SERPL: 1.5 G/DL
ALP SERPL-CCNC: 79 U/L (ref 39–117)
ALT SERPL W P-5'-P-CCNC: 13 U/L (ref 1–33)
ANION GAP SERPL CALCULATED.3IONS-SCNC: 11.3 MMOL/L (ref 5–15)
AST SERPL-CCNC: 17 U/L (ref 1–32)
BACTERIA UR QL AUTO: ABNORMAL /HPF
BASOPHILS # BLD AUTO: 0.04 10*3/MM3 (ref 0–0.2)
BASOPHILS NFR BLD AUTO: 0.6 % (ref 0–1.5)
BILIRUB SERPL-MCNC: 0.2 MG/DL (ref 0–1.2)
BILIRUB UR QL STRIP: NEGATIVE
BUN SERPL-MCNC: 11 MG/DL (ref 8–23)
BUN/CREAT SERPL: 10.6 (ref 7–25)
CALCIUM SPEC-SCNC: 8.9 MG/DL (ref 8.6–10.5)
CHLORIDE SERPL-SCNC: 112 MMOL/L (ref 98–107)
CLARITY UR: CLEAR
CO2 SERPL-SCNC: 19.7 MMOL/L (ref 22–29)
COLOR UR: YELLOW
CREAT SERPL-MCNC: 1.04 MG/DL (ref 0.57–1)
DEPRECATED RDW RBC AUTO: 45.5 FL (ref 37–54)
EOSINOPHIL # BLD AUTO: 0.34 10*3/MM3 (ref 0–0.4)
EOSINOPHIL NFR BLD AUTO: 4.8 % (ref 0.3–6.2)
ERYTHROCYTE [DISTWIDTH] IN BLOOD BY AUTOMATED COUNT: 14.2 % (ref 12.3–15.4)
GFR SERPL CREATININE-BSD FRML MDRD: 54 ML/MIN/1.73
GLOBULIN UR ELPH-MCNC: 2.8 GM/DL
GLUCOSE SERPL-MCNC: 105 MG/DL (ref 65–99)
GLUCOSE UR STRIP-MCNC: ABNORMAL MG/DL
HCT VFR BLD AUTO: 39.4 % (ref 34–46.6)
HGB BLD-MCNC: 13.1 G/DL (ref 12–15.9)
HGB UR QL STRIP.AUTO: NEGATIVE
HYALINE CASTS UR QL AUTO: ABNORMAL /LPF
IMM GRANULOCYTES # BLD AUTO: 0.02 10*3/MM3 (ref 0–0.05)
IMM GRANULOCYTES NFR BLD AUTO: 0.3 % (ref 0–0.5)
KETONES UR QL STRIP: NEGATIVE
LEUKOCYTE ESTERASE UR QL STRIP.AUTO: ABNORMAL
LIPASE SERPL-CCNC: 28 U/L (ref 13–60)
LYMPHOCYTES # BLD AUTO: 3.54 10*3/MM3 (ref 0.7–3.1)
LYMPHOCYTES NFR BLD AUTO: 49.8 % (ref 19.6–45.3)
MCH RBC QN AUTO: 29.5 PG (ref 26.6–33)
MCHC RBC AUTO-ENTMCNC: 33.2 G/DL (ref 31.5–35.7)
MCV RBC AUTO: 88.7 FL (ref 79–97)
MONOCYTES # BLD AUTO: 0.49 10*3/MM3 (ref 0.1–0.9)
MONOCYTES NFR BLD AUTO: 6.9 % (ref 5–12)
NEUTROPHILS NFR BLD AUTO: 2.68 10*3/MM3 (ref 1.7–7)
NEUTROPHILS NFR BLD AUTO: 37.6 % (ref 42.7–76)
NITRITE UR QL STRIP: NEGATIVE
NRBC BLD AUTO-RTO: 0 /100 WBC (ref 0–0.2)
PH UR STRIP.AUTO: 6.5 [PH] (ref 5–8)
PLATELET # BLD AUTO: 240 10*3/MM3 (ref 140–450)
PMV BLD AUTO: 10 FL (ref 6–12)
POTASSIUM SERPL-SCNC: 3.8 MMOL/L (ref 3.5–5.2)
PROT SERPL-MCNC: 7.1 G/DL (ref 6–8.5)
PROT UR QL STRIP: NEGATIVE
RBC # BLD AUTO: 4.44 10*6/MM3 (ref 3.77–5.28)
RBC # UR: ABNORMAL /HPF
REF LAB TEST METHOD: ABNORMAL
SARS-COV-2 RNA RESP QL NAA+PROBE: NOT DETECTED
SODIUM SERPL-SCNC: 143 MMOL/L (ref 136–145)
SP GR UR STRIP: >=1.03 (ref 1–1.03)
SQUAMOUS #/AREA URNS HPF: ABNORMAL /HPF
UROBILINOGEN UR QL STRIP: ABNORMAL
WBC # BLD AUTO: 7.11 10*3/MM3 (ref 3.4–10.8)
WBC UR QL AUTO: ABNORMAL /HPF

## 2021-10-25 PROCEDURE — C9803 HOPD COVID-19 SPEC COLLECT: HCPCS

## 2021-10-25 PROCEDURE — U0003 INFECTIOUS AGENT DETECTION BY NUCLEIC ACID (DNA OR RNA); SEVERE ACUTE RESPIRATORY SYNDROME CORONAVIRUS 2 (SARS-COV-2) (CORONAVIRUS DISEASE [COVID-19]), AMPLIFIED PROBE TECHNIQUE, MAKING USE OF HIGH THROUGHPUT TECHNOLOGIES AS DESCRIBED BY CMS-2020-01-R: HCPCS | Performed by: PHYSICIAN ASSISTANT

## 2021-10-25 PROCEDURE — 96376 TX/PRO/DX INJ SAME DRUG ADON: CPT

## 2021-10-25 PROCEDURE — 25010000002 ONDANSETRON PER 1 MG: Performed by: EMERGENCY MEDICINE

## 2021-10-25 PROCEDURE — 85025 COMPLETE CBC W/AUTO DIFF WBC: CPT | Performed by: PHYSICIAN ASSISTANT

## 2021-10-25 PROCEDURE — 96374 THER/PROPH/DIAG INJ IV PUSH: CPT

## 2021-10-25 PROCEDURE — G0378 HOSPITAL OBSERVATION PER HR: HCPCS

## 2021-10-25 PROCEDURE — 96375 TX/PRO/DX INJ NEW DRUG ADDON: CPT

## 2021-10-25 PROCEDURE — 25010000002 HYDROMORPHONE 1 MG/ML SOLUTION: Performed by: EMERGENCY MEDICINE

## 2021-10-25 PROCEDURE — 74177 CT ABD & PELVIS W/CONTRAST: CPT

## 2021-10-25 PROCEDURE — 81001 URINALYSIS AUTO W/SCOPE: CPT | Performed by: PHYSICIAN ASSISTANT

## 2021-10-25 PROCEDURE — 83690 ASSAY OF LIPASE: CPT | Performed by: PHYSICIAN ASSISTANT

## 2021-10-25 PROCEDURE — 25010000002 IOPAMIDOL 61 % SOLUTION: Performed by: EMERGENCY MEDICINE

## 2021-10-25 PROCEDURE — 80053 COMPREHEN METABOLIC PANEL: CPT | Performed by: PHYSICIAN ASSISTANT

## 2021-10-25 PROCEDURE — 25010000002 FENTANYL CITRATE (PF) 50 MCG/ML SOLUTION: Performed by: EMERGENCY MEDICINE

## 2021-10-25 PROCEDURE — 99284 EMERGENCY DEPT VISIT MOD MDM: CPT

## 2021-10-25 RX ORDER — FENTANYL CITRATE 50 UG/ML
50 INJECTION, SOLUTION INTRAMUSCULAR; INTRAVENOUS ONCE
Status: COMPLETED | OUTPATIENT
Start: 2021-10-25 | End: 2021-10-25

## 2021-10-25 RX ORDER — FAMOTIDINE 10 MG/ML
20 INJECTION, SOLUTION INTRAVENOUS ONCE
Status: DISCONTINUED | OUTPATIENT
Start: 2021-10-25 | End: 2021-10-25

## 2021-10-25 RX ORDER — SODIUM CHLORIDE 0.9 % (FLUSH) 0.9 %
10 SYRINGE (ML) INJECTION AS NEEDED
Status: DISCONTINUED | OUTPATIENT
Start: 2021-10-25 | End: 2021-10-29 | Stop reason: HOSPADM

## 2021-10-25 RX ORDER — UREA 10 %
3 LOTION (ML) TOPICAL NIGHTLY PRN
Status: DISCONTINUED | OUTPATIENT
Start: 2021-10-25 | End: 2021-10-29 | Stop reason: HOSPADM

## 2021-10-25 RX ORDER — ONDANSETRON 4 MG/1
4 TABLET, FILM COATED ORAL EVERY 6 HOURS PRN
Status: DISCONTINUED | OUTPATIENT
Start: 2021-10-25 | End: 2021-10-29 | Stop reason: HOSPADM

## 2021-10-25 RX ORDER — FENTANYL CITRATE 50 UG/ML
25 INJECTION, SOLUTION INTRAMUSCULAR; INTRAVENOUS AS NEEDED
Status: DISCONTINUED | OUTPATIENT
Start: 2021-10-25 | End: 2021-10-26

## 2021-10-25 RX ORDER — ONDANSETRON 2 MG/ML
4 INJECTION INTRAMUSCULAR; INTRAVENOUS ONCE
Status: COMPLETED | OUTPATIENT
Start: 2021-10-25 | End: 2021-10-25

## 2021-10-25 RX ORDER — SODIUM CHLORIDE 9 MG/ML
100 INJECTION, SOLUTION INTRAVENOUS CONTINUOUS
Status: DISCONTINUED | OUTPATIENT
Start: 2021-10-25 | End: 2021-10-28

## 2021-10-25 RX ORDER — ACETAMINOPHEN 325 MG/1
650 TABLET ORAL EVERY 4 HOURS PRN
Status: DISCONTINUED | OUTPATIENT
Start: 2021-10-25 | End: 2021-10-29 | Stop reason: HOSPADM

## 2021-10-25 RX ORDER — DULOXETIN HYDROCHLORIDE 30 MG/1
30 CAPSULE, DELAYED RELEASE ORAL DAILY
Qty: 90 CAPSULE | Refills: 0 | Status: SHIPPED | OUTPATIENT
Start: 2021-10-25 | End: 2022-02-15 | Stop reason: SDUPTHER

## 2021-10-25 RX ORDER — PANTOPRAZOLE SODIUM 40 MG/10ML
80 INJECTION, POWDER, LYOPHILIZED, FOR SOLUTION INTRAVENOUS ONCE
Status: COMPLETED | OUTPATIENT
Start: 2021-10-25 | End: 2021-10-25

## 2021-10-25 RX ORDER — PANTOPRAZOLE SODIUM 40 MG/10ML
80 INJECTION, POWDER, LYOPHILIZED, FOR SOLUTION INTRAVENOUS ONCE
Status: DISCONTINUED | OUTPATIENT
Start: 2021-10-25 | End: 2021-10-25 | Stop reason: SDUPTHER

## 2021-10-25 RX ORDER — ONDANSETRON 2 MG/ML
4 INJECTION INTRAMUSCULAR; INTRAVENOUS EVERY 6 HOURS PRN
Status: DISCONTINUED | OUTPATIENT
Start: 2021-10-25 | End: 2021-10-29 | Stop reason: HOSPADM

## 2021-10-25 RX ADMIN — FENTANYL CITRATE 50 MCG: 50 INJECTION INTRAMUSCULAR; INTRAVENOUS at 22:05

## 2021-10-25 RX ADMIN — ONDANSETRON 4 MG: 2 INJECTION INTRAMUSCULAR; INTRAVENOUS at 21:28

## 2021-10-25 RX ADMIN — HYDROMORPHONE HYDROCHLORIDE 1 MG: 1 INJECTION, SOLUTION INTRAMUSCULAR; INTRAVENOUS; SUBCUTANEOUS at 21:29

## 2021-10-25 RX ADMIN — SODIUM CHLORIDE, PRESERVATIVE FREE 10 ML: 5 INJECTION INTRAVENOUS at 22:05

## 2021-10-25 RX ADMIN — SODIUM CHLORIDE 1000 ML: 9 INJECTION, SOLUTION INTRAVENOUS at 21:19

## 2021-10-25 RX ADMIN — PANTOPRAZOLE SODIUM 80 MG: 40 INJECTION, POWDER, FOR SOLUTION INTRAVENOUS at 23:31

## 2021-10-25 RX ADMIN — IOPAMIDOL 100 ML: 612 INJECTION, SOLUTION INTRAVENOUS at 22:11

## 2021-10-25 NOTE — DISCHARGE INSTRUCTIONS
Drink plenty of fluids.  You may try taking a daily fiber supplement such as Metamucil.  Eat a high-fiber diet, particular increase vegetable and whole-grain foods.    Return for worsening symptoms as needed

## 2021-10-25 NOTE — TELEPHONE ENCOUNTER
Caller: Melvina Martinez    Relationship: Self      Requested Prescriptions:   Requested Prescriptions     Pending Prescriptions Disp Refills   • DULoxetine (Cymbalta) 30 MG capsule 90 capsule 0     Sig: Take 1 capsule by mouth Daily.        [facility] where [facility]  [place] [address] - [phone]  - [phone]   [phone]    Additional details provided by patient: PATIENT HAS 3 TABLETS LEFT, REQUESTING 90 DAY SUPPLY     Best call back number: [phone]     Does the patient have less than a 3 day supply:  [] Yes  [x] No    Janet Ramachandran Rep   10/25/21 12:02 EDT         
37

## 2021-10-26 ENCOUNTER — READMISSION MANAGEMENT (OUTPATIENT)
Dept: CALL CENTER | Facility: HOSPITAL | Age: 61
End: 2021-10-26

## 2021-10-26 ENCOUNTER — APPOINTMENT (OUTPATIENT)
Dept: ULTRASOUND IMAGING | Facility: HOSPITAL | Age: 61
End: 2021-10-26

## 2021-10-26 PROBLEM — K29.70 GASTRITIS: Status: ACTIVE | Noted: 2021-10-26

## 2021-10-26 LAB
ANION GAP SERPL CALCULATED.3IONS-SCNC: 9.5 MMOL/L (ref 5–15)
BUN SERPL-MCNC: 8 MG/DL (ref 8–23)
BUN/CREAT SERPL: 9.1 (ref 7–25)
CALCIUM SPEC-SCNC: 8.1 MG/DL (ref 8.6–10.5)
CHLORIDE SERPL-SCNC: 111 MMOL/L (ref 98–107)
CO2 SERPL-SCNC: 21.5 MMOL/L (ref 22–29)
CREAT SERPL-MCNC: 0.88 MG/DL (ref 0.57–1)
DEPRECATED RDW RBC AUTO: 46 FL (ref 37–54)
ERYTHROCYTE [DISTWIDTH] IN BLOOD BY AUTOMATED COUNT: 14.2 % (ref 12.3–15.4)
GFR SERPL CREATININE-BSD FRML MDRD: 66 ML/MIN/1.73
GLUCOSE BLDC GLUCOMTR-MCNC: 75 MG/DL (ref 70–130)
GLUCOSE BLDC GLUCOMTR-MCNC: 75 MG/DL (ref 70–130)
GLUCOSE SERPL-MCNC: 84 MG/DL (ref 65–99)
HCT VFR BLD AUTO: 38.7 % (ref 34–46.6)
HGB BLD-MCNC: 12.6 G/DL (ref 12–15.9)
MCH RBC QN AUTO: 29.1 PG (ref 26.6–33)
MCHC RBC AUTO-ENTMCNC: 32.6 G/DL (ref 31.5–35.7)
MCV RBC AUTO: 89.4 FL (ref 79–97)
PLATELET # BLD AUTO: 202 10*3/MM3 (ref 140–450)
PMV BLD AUTO: 9.8 FL (ref 6–12)
POTASSIUM SERPL-SCNC: 3.9 MMOL/L (ref 3.5–5.2)
RBC # BLD AUTO: 4.33 10*6/MM3 (ref 3.77–5.28)
SODIUM SERPL-SCNC: 142 MMOL/L (ref 136–145)
WBC # BLD AUTO: 6.52 10*3/MM3 (ref 3.4–10.8)

## 2021-10-26 PROCEDURE — 96361 HYDRATE IV INFUSION ADD-ON: CPT

## 2021-10-26 PROCEDURE — 76705 ECHO EXAM OF ABDOMEN: CPT

## 2021-10-26 PROCEDURE — 99244 OFF/OP CNSLTJ NEW/EST MOD 40: CPT | Performed by: PHYSICIAN ASSISTANT

## 2021-10-26 PROCEDURE — 25010000002 PROCHLORPERAZINE 10 MG/2ML SOLUTION

## 2021-10-26 PROCEDURE — 25010000002 ONDANSETRON PER 1 MG

## 2021-10-26 PROCEDURE — 25010000002 HYDROMORPHONE 1 MG/ML SOLUTION: Performed by: STUDENT IN AN ORGANIZED HEALTH CARE EDUCATION/TRAINING PROGRAM

## 2021-10-26 PROCEDURE — 96376 TX/PRO/DX INJ SAME DRUG ADON: CPT

## 2021-10-26 PROCEDURE — 82962 GLUCOSE BLOOD TEST: CPT

## 2021-10-26 PROCEDURE — G0378 HOSPITAL OBSERVATION PER HR: HCPCS

## 2021-10-26 PROCEDURE — 25010000002 HYDROMORPHONE PER 4 MG

## 2021-10-26 PROCEDURE — 25010000002 FENTANYL CITRATE (PF) 50 MCG/ML SOLUTION: Performed by: EMERGENCY MEDICINE

## 2021-10-26 PROCEDURE — 85027 COMPLETE CBC AUTOMATED: CPT

## 2021-10-26 PROCEDURE — 80048 BASIC METABOLIC PNL TOTAL CA: CPT

## 2021-10-26 PROCEDURE — 25010000002 HYDROMORPHONE PER 4 MG: Performed by: HOSPITALIST

## 2021-10-26 RX ORDER — LAMOTRIGINE 100 MG/1
100 TABLET ORAL EVERY 12 HOURS SCHEDULED
Status: DISCONTINUED | OUTPATIENT
Start: 2021-10-26 | End: 2021-10-29 | Stop reason: HOSPADM

## 2021-10-26 RX ORDER — DULOXETIN HYDROCHLORIDE 30 MG/1
30 CAPSULE, DELAYED RELEASE ORAL DAILY
Status: DISCONTINUED | OUTPATIENT
Start: 2021-10-26 | End: 2021-10-29 | Stop reason: HOSPADM

## 2021-10-26 RX ORDER — TAMOXIFEN CITRATE 10 MG/1
20 TABLET ORAL DAILY
Status: DISCONTINUED | OUTPATIENT
Start: 2021-10-26 | End: 2021-10-29 | Stop reason: HOSPADM

## 2021-10-26 RX ORDER — INSULIN LISPRO 100 [IU]/ML
0-7 INJECTION, SOLUTION INTRAVENOUS; SUBCUTANEOUS
Status: DISCONTINUED | OUTPATIENT
Start: 2021-10-26 | End: 2021-10-29 | Stop reason: HOSPADM

## 2021-10-26 RX ORDER — TERAZOSIN 1 MG/1
1 CAPSULE ORAL NIGHTLY
Status: DISCONTINUED | OUTPATIENT
Start: 2021-10-26 | End: 2021-10-29 | Stop reason: HOSPADM

## 2021-10-26 RX ORDER — QUETIAPINE FUMARATE 100 MG/1
100 TABLET, FILM COATED ORAL EVERY 8 HOURS SCHEDULED
Status: DISCONTINUED | OUTPATIENT
Start: 2021-10-26 | End: 2021-10-28

## 2021-10-26 RX ORDER — NICOTINE POLACRILEX 4 MG
15 LOZENGE BUCCAL
Status: DISCONTINUED | OUTPATIENT
Start: 2021-10-26 | End: 2021-10-29 | Stop reason: HOSPADM

## 2021-10-26 RX ORDER — HYDROMORPHONE HYDROCHLORIDE 1 MG/ML
0.25 INJECTION, SOLUTION INTRAMUSCULAR; INTRAVENOUS; SUBCUTANEOUS EVERY 4 HOURS PRN
Status: DISCONTINUED | OUTPATIENT
Start: 2021-10-26 | End: 2021-10-29 | Stop reason: HOSPADM

## 2021-10-26 RX ORDER — ACETAMINOPHEN, ASPIRIN AND CAFFEINE 250; 250; 65 MG/1; MG/1; MG/1
2 TABLET, FILM COATED ORAL ONCE
Status: COMPLETED | OUTPATIENT
Start: 2021-10-26 | End: 2021-10-26

## 2021-10-26 RX ORDER — VERAPAMIL HYDROCHLORIDE 120 MG/1
120 TABLET, FILM COATED ORAL DAILY
Status: DISCONTINUED | OUTPATIENT
Start: 2021-10-27 | End: 2021-10-29 | Stop reason: HOSPADM

## 2021-10-26 RX ORDER — PANTOPRAZOLE SODIUM 40 MG/10ML
40 INJECTION, POWDER, LYOPHILIZED, FOR SOLUTION INTRAVENOUS
Status: DISCONTINUED | OUTPATIENT
Start: 2021-10-26 | End: 2021-10-29 | Stop reason: HOSPADM

## 2021-10-26 RX ORDER — DEXTROSE MONOHYDRATE 25 G/50ML
25 INJECTION, SOLUTION INTRAVENOUS
Status: DISCONTINUED | OUTPATIENT
Start: 2021-10-26 | End: 2021-10-29 | Stop reason: HOSPADM

## 2021-10-26 RX ORDER — HYDROMORPHONE HYDROCHLORIDE 1 MG/ML
0.5 INJECTION, SOLUTION INTRAMUSCULAR; INTRAVENOUS; SUBCUTANEOUS EVERY 4 HOURS PRN
Status: DISCONTINUED | OUTPATIENT
Start: 2021-10-26 | End: 2021-10-26

## 2021-10-26 RX ORDER — PROCHLORPERAZINE EDISYLATE 5 MG/ML
5 INJECTION INTRAMUSCULAR; INTRAVENOUS ONCE
Status: COMPLETED | OUTPATIENT
Start: 2021-10-26 | End: 2021-10-26

## 2021-10-26 RX ORDER — SUCRALFATE 1 G/1
1 TABLET ORAL
Status: DISCONTINUED | OUTPATIENT
Start: 2021-10-26 | End: 2021-10-29 | Stop reason: HOSPADM

## 2021-10-26 RX ORDER — TOPIRAMATE 50 MG/1
50 TABLET, FILM COATED ORAL EVERY 12 HOURS SCHEDULED
Status: DISCONTINUED | OUTPATIENT
Start: 2021-10-26 | End: 2021-10-29 | Stop reason: HOSPADM

## 2021-10-26 RX ADMIN — SODIUM CHLORIDE 100 ML/HR: 9 INJECTION, SOLUTION INTRAVENOUS at 23:38

## 2021-10-26 RX ADMIN — ACETAMINOPHEN, ASPIRIN AND CAFFEINE 2 TABLET: 250; 250; 65 TABLET, FILM COATED ORAL at 20:38

## 2021-10-26 RX ADMIN — PROCHLORPERAZINE EDISYLATE 5 MG: 5 INJECTION INTRAMUSCULAR; INTRAVENOUS at 23:38

## 2021-10-26 RX ADMIN — TOPIRAMATE 50 MG: 50 TABLET, FILM COATED ORAL at 22:40

## 2021-10-26 RX ADMIN — PANTOPRAZOLE SODIUM 40 MG: 40 INJECTION, POWDER, FOR SOLUTION INTRAVENOUS at 20:39

## 2021-10-26 RX ADMIN — ONDANSETRON 4 MG: 2 INJECTION INTRAMUSCULAR; INTRAVENOUS at 20:39

## 2021-10-26 RX ADMIN — ONDANSETRON 4 MG: 2 INJECTION INTRAMUSCULAR; INTRAVENOUS at 06:20

## 2021-10-26 RX ADMIN — HYDROMORPHONE HYDROCHLORIDE 0.5 MG: 1 INJECTION, SOLUTION INTRAMUSCULAR; INTRAVENOUS; SUBCUTANEOUS at 14:36

## 2021-10-26 RX ADMIN — HYDROMORPHONE HYDROCHLORIDE 0.25 MG: 1 INJECTION, SOLUTION INTRAMUSCULAR; INTRAVENOUS; SUBCUTANEOUS at 20:40

## 2021-10-26 RX ADMIN — SODIUM CHLORIDE 100 ML/HR: 9 INJECTION, SOLUTION INTRAVENOUS at 00:32

## 2021-10-26 RX ADMIN — SODIUM CHLORIDE 100 ML/HR: 9 INJECTION, SOLUTION INTRAVENOUS at 11:35

## 2021-10-26 RX ADMIN — HYDROMORPHONE HYDROCHLORIDE 1 MG: 1 INJECTION, SOLUTION INTRAMUSCULAR; INTRAVENOUS; SUBCUTANEOUS at 09:53

## 2021-10-26 RX ADMIN — ONDANSETRON 4 MG: 2 INJECTION INTRAMUSCULAR; INTRAVENOUS at 00:35

## 2021-10-26 RX ADMIN — FENTANYL CITRATE 25 MCG: 50 INJECTION INTRAMUSCULAR; INTRAVENOUS at 00:32

## 2021-10-26 RX ADMIN — SUCRALFATE 1 G: 1 TABLET ORAL at 22:41

## 2021-10-26 RX ADMIN — SODIUM CHLORIDE, PRESERVATIVE FREE 10 ML: 5 INJECTION INTRAVENOUS at 09:53

## 2021-10-26 RX ADMIN — HYDROMORPHONE HYDROCHLORIDE 1 MG: 1 INJECTION, SOLUTION INTRAMUSCULAR; INTRAVENOUS; SUBCUTANEOUS at 03:07

## 2021-10-26 RX ADMIN — HYDROMORPHONE HYDROCHLORIDE 1 MG: 1 INJECTION, SOLUTION INTRAMUSCULAR; INTRAVENOUS; SUBCUTANEOUS at 06:21

## 2021-10-26 NOTE — OUTREACH NOTE
General Surgery Week 3 Survey      Responses   [facility] patient discharged from? [place]   Does the patient have one of the following disease processes/diagnoses(primary or secondary)? General Surgery   Week 3 attempt successful? No   Revoke Readmitted          Yaneth Muro RN

## 2021-10-27 ENCOUNTER — TELEPHONE (OUTPATIENT)
Dept: INTERNAL MEDICINE | Facility: CLINIC | Age: 61
End: 2021-10-27

## 2021-10-27 PROBLEM — R93.5 ABNORMAL CT OF THE ABDOMEN: Status: ACTIVE | Noted: 2021-10-25

## 2021-10-27 PROBLEM — R10.13 EPIGASTRIC PAIN: Status: ACTIVE | Noted: 2021-10-25

## 2021-10-27 LAB
ALBUMIN SERPL-MCNC: 3.6 G/DL (ref 3.5–5.2)
ANION GAP SERPL CALCULATED.3IONS-SCNC: 8.7 MMOL/L (ref 5–15)
BASOPHILS # BLD AUTO: 0.05 10*3/MM3 (ref 0–0.2)
BASOPHILS NFR BLD AUTO: 0.8 % (ref 0–1.5)
BUN SERPL-MCNC: 5 MG/DL (ref 8–23)
BUN/CREAT SERPL: 7 (ref 7–25)
CALCIUM SPEC-SCNC: 7.9 MG/DL (ref 8.6–10.5)
CHLORIDE SERPL-SCNC: 111 MMOL/L (ref 98–107)
CO2 SERPL-SCNC: 20.3 MMOL/L (ref 22–29)
CREAT SERPL-MCNC: 0.71 MG/DL (ref 0.57–1)
DEPRECATED RDW RBC AUTO: 44.4 FL (ref 37–54)
EOSINOPHIL # BLD AUTO: 0.45 10*3/MM3 (ref 0–0.4)
EOSINOPHIL NFR BLD AUTO: 7.1 % (ref 0.3–6.2)
ERYTHROCYTE [DISTWIDTH] IN BLOOD BY AUTOMATED COUNT: 13.4 % (ref 12.3–15.4)
GFR SERPL CREATININE-BSD FRML MDRD: 84 ML/MIN/1.73
GLUCOSE BLDC GLUCOMTR-MCNC: 115 MG/DL (ref 70–130)
GLUCOSE BLDC GLUCOMTR-MCNC: 70 MG/DL (ref 70–130)
GLUCOSE BLDC GLUCOMTR-MCNC: 76 MG/DL (ref 70–130)
GLUCOSE BLDC GLUCOMTR-MCNC: 80 MG/DL (ref 70–130)
GLUCOSE SERPL-MCNC: 73 MG/DL (ref 65–99)
HBA1C MFR BLD: 4.89 % (ref 4.8–5.6)
HCT VFR BLD AUTO: 39 % (ref 34–46.6)
HGB BLD-MCNC: 12.7 G/DL (ref 12–15.9)
IMM GRANULOCYTES # BLD AUTO: 0.01 10*3/MM3 (ref 0–0.05)
IMM GRANULOCYTES NFR BLD AUTO: 0.2 % (ref 0–0.5)
LYMPHOCYTES # BLD AUTO: 2.28 10*3/MM3 (ref 0.7–3.1)
LYMPHOCYTES NFR BLD AUTO: 35.8 % (ref 19.6–45.3)
MAGNESIUM SERPL-MCNC: 1.9 MG/DL (ref 1.6–2.4)
MCH RBC QN AUTO: 29.3 PG (ref 26.6–33)
MCHC RBC AUTO-ENTMCNC: 32.6 G/DL (ref 31.5–35.7)
MCV RBC AUTO: 90.1 FL (ref 79–97)
MONOCYTES # BLD AUTO: 0.44 10*3/MM3 (ref 0.1–0.9)
MONOCYTES NFR BLD AUTO: 6.9 % (ref 5–12)
NEUTROPHILS NFR BLD AUTO: 3.13 10*3/MM3 (ref 1.7–7)
NEUTROPHILS NFR BLD AUTO: 49.2 % (ref 42.7–76)
NRBC BLD AUTO-RTO: 0 /100 WBC (ref 0–0.2)
PHOSPHATE SERPL-MCNC: 2.5 MG/DL (ref 2.5–4.5)
PLATELET # BLD AUTO: 214 10*3/MM3 (ref 140–450)
PMV BLD AUTO: 9.8 FL (ref 6–12)
POTASSIUM SERPL-SCNC: 3.7 MMOL/L (ref 3.5–5.2)
RBC # BLD AUTO: 4.33 10*6/MM3 (ref 3.77–5.28)
SODIUM SERPL-SCNC: 140 MMOL/L (ref 136–145)
WBC # BLD AUTO: 6.36 10*3/MM3 (ref 3.4–10.8)

## 2021-10-27 PROCEDURE — 85025 COMPLETE CBC W/AUTO DIFF WBC: CPT | Performed by: HOSPITALIST

## 2021-10-27 PROCEDURE — 96376 TX/PRO/DX INJ SAME DRUG ADON: CPT

## 2021-10-27 PROCEDURE — 80069 RENAL FUNCTION PANEL: CPT | Performed by: HOSPITALIST

## 2021-10-27 PROCEDURE — G0378 HOSPITAL OBSERVATION PER HR: HCPCS

## 2021-10-27 PROCEDURE — 83036 HEMOGLOBIN GLYCOSYLATED A1C: CPT | Performed by: HOSPITALIST

## 2021-10-27 PROCEDURE — 25010000002 ONDANSETRON PER 1 MG

## 2021-10-27 PROCEDURE — 96361 HYDRATE IV INFUSION ADD-ON: CPT

## 2021-10-27 PROCEDURE — 99214 OFFICE O/P EST MOD 30 MIN: CPT | Performed by: INTERNAL MEDICINE

## 2021-10-27 PROCEDURE — 82962 GLUCOSE BLOOD TEST: CPT

## 2021-10-27 PROCEDURE — 83735 ASSAY OF MAGNESIUM: CPT | Performed by: HOSPITALIST

## 2021-10-27 RX ORDER — SODIUM CHLORIDE, SODIUM LACTATE, POTASSIUM CHLORIDE, CALCIUM CHLORIDE 600; 310; 30; 20 MG/100ML; MG/100ML; MG/100ML; MG/100ML
30 INJECTION, SOLUTION INTRAVENOUS CONTINUOUS
Status: CANCELLED | OUTPATIENT
Start: 2021-10-28

## 2021-10-27 RX ORDER — POLYETHYLENE GLYCOL 3350 17 G/17G
17 POWDER, FOR SOLUTION ORAL 2 TIMES DAILY
Status: DISCONTINUED | OUTPATIENT
Start: 2021-10-27 | End: 2021-10-29 | Stop reason: HOSPADM

## 2021-10-27 RX ORDER — AMOXICILLIN 250 MG
2 CAPSULE ORAL 2 TIMES DAILY
Status: DISCONTINUED | OUTPATIENT
Start: 2021-10-27 | End: 2021-10-29 | Stop reason: HOSPADM

## 2021-10-27 RX ADMIN — SODIUM CHLORIDE 100 ML/HR: 9 INJECTION, SOLUTION INTRAVENOUS at 09:49

## 2021-10-27 RX ADMIN — SODIUM CHLORIDE 100 ML/HR: 9 INJECTION, SOLUTION INTRAVENOUS at 19:25

## 2021-10-27 RX ADMIN — TERAZOSIN HYDROCHLORIDE 1 MG: 1 CAPSULE ORAL at 20:46

## 2021-10-27 RX ADMIN — TAMOXIFEN CITRATE 20 MG: 10 TABLET, FILM COATED ORAL at 11:25

## 2021-10-27 RX ADMIN — QUETIAPINE FUMARATE 100 MG: 100 TABLET ORAL at 06:38

## 2021-10-27 RX ADMIN — DOCUSATE SODIUM 50MG AND SENNOSIDES 8.6MG 2 TABLET: 8.6; 5 TABLET, FILM COATED ORAL at 20:47

## 2021-10-27 RX ADMIN — TOPIRAMATE 50 MG: 50 TABLET, FILM COATED ORAL at 20:46

## 2021-10-27 RX ADMIN — SUCRALFATE 1 G: 1 TABLET ORAL at 10:42

## 2021-10-27 RX ADMIN — DULOXETINE HYDROCHLORIDE 30 MG: 30 CAPSULE, DELAYED RELEASE ORAL at 10:42

## 2021-10-27 RX ADMIN — TOPIRAMATE 50 MG: 50 TABLET, FILM COATED ORAL at 11:25

## 2021-10-27 RX ADMIN — POLYETHYLENE GLYCOL 3350 17 G: 17 POWDER, FOR SOLUTION ORAL at 20:47

## 2021-10-27 RX ADMIN — ONDANSETRON 4 MG: 2 INJECTION INTRAMUSCULAR; INTRAVENOUS at 09:49

## 2021-10-27 RX ADMIN — PANTOPRAZOLE SODIUM 40 MG: 40 INJECTION, POWDER, FOR SOLUTION INTRAVENOUS at 06:38

## 2021-10-27 RX ADMIN — SUCRALFATE 1 G: 1 TABLET ORAL at 06:38

## 2021-10-27 RX ADMIN — ACETAMINOPHEN 650 MG: 325 TABLET, FILM COATED ORAL at 10:43

## 2021-10-27 RX ADMIN — SODIUM CHLORIDE, PRESERVATIVE FREE 10 ML: 5 INJECTION INTRAVENOUS at 09:50

## 2021-10-27 RX ADMIN — ACETAMINOPHEN 650 MG: 325 TABLET, FILM COATED ORAL at 16:58

## 2021-10-27 RX ADMIN — PANTOPRAZOLE SODIUM 40 MG: 40 INJECTION, POWDER, FOR SOLUTION INTRAVENOUS at 16:58

## 2021-10-27 RX ADMIN — LAMOTRIGINE 100 MG: 100 TABLET ORAL at 20:45

## 2021-10-27 RX ADMIN — SUCRALFATE 1 G: 1 TABLET ORAL at 16:58

## 2021-10-27 RX ADMIN — LAMOTRIGINE 100 MG: 100 TABLET ORAL at 10:40

## 2021-10-27 RX ADMIN — QUETIAPINE FUMARATE 100 MG: 100 TABLET ORAL at 20:46

## 2021-10-27 RX ADMIN — VERAPAMIL HYDROCHLORIDE 120 MG: 120 TABLET ORAL at 10:41

## 2021-10-27 RX ADMIN — SUCRALFATE 1 G: 1 TABLET ORAL at 20:45

## 2021-10-27 NOTE — ASSESSMENT & PLAN NOTE
Will check labs today to evaluate blood sugar control  Advised patient to check at home when she is having these symptoms.

## 2021-10-27 NOTE — TELEPHONE ENCOUNTER
Caller: Melvina Martinez    Relationship: Self    Best call back number: [phone]    What is the best time to reach you: ANYTIME    Who are you requesting to speak with (clinical staff, provider,  specific staff member): YOLANDA ROJAS     What was the call regarding: PATIENT'S  CALLED WANTING TO LET YOU KNOW THAT SHE IS IN THE HOSPITAL WITH HER STOMACH BEING ALL INFLAMED.      Do you require a callback: YES

## 2021-10-27 NOTE — PROGRESS NOTES
"Chief Complaint  Blood Sugar Problem (does not take blood sugar, dry mouth, frequent urination)    Subjective     {Problem List  Visit Diagnosis   Encounters  Notes  Medications  Labs  Result Review Imaging  Media :23}     Melvina Martinez presents to [facility]  History of Present Illness  Believes that she is having problems with her blood sugar. Having episodes of dry mouth and frequent urination. Does not check blood sugar at home , however.     Objective   Vital Signs:   /78   Pulse 115   Temp 98 °F (36.7 °C) (Temporal)   Ht 165.1 cm (65\")   Wt 94.1 kg (207 lb 6 oz)   SpO2 98%   BMI 34.51 kg/m²     Physical Exam  Vitals reviewed.   Constitutional:       Appearance: Normal appearance. She is well-developed.   HENT:      Head: Normocephalic and atraumatic.      Mouth/Throat:      Pharynx: No oropharyngeal exudate.   Eyes:      Conjunctiva/sclera: Conjunctivae normal.      Pupils: Pupils are equal, round, and reactive to light.   Cardiovascular:      Rate and Rhythm: Normal rate and regular rhythm.      Heart sounds: No murmur heard.  No friction rub. No gallop.    Pulmonary:      Effort: Pulmonary effort is normal.      Breath sounds: Normal breath sounds. No wheezing or rhonchi.   Skin:     General: Skin is warm and dry.   Neurological:      Mental Status: She is alert and oriented to person, place, and time.   Psychiatric:         Mood and Affect: Affect normal.        Result Review :          Procedures      Assessment and Plan    Diagnoses and all orders for this visit:    1. Type 2 diabetes mellitus without complication, without long-term current use of insulin (CMS/[facility]) (Primary)  Assessment & Plan:  Will check labs today to evaluate blood sugar control  Advised patient to check at home when she is having these symptoms.     Orders:  -     Hemoglobin A1c  -     Comprehensive metabolic panel            Follow Up   Return for Keep previously " scheduled follow up appointment.  Patient was given instructions and counseling regarding her condition or for health maintenance advice. Please see specific information pulled into the AVS if appropriate.

## 2021-10-28 ENCOUNTER — ANESTHESIA EVENT (OUTPATIENT)
Dept: GASTROENTEROLOGY | Facility: HOSPITAL | Age: 61
End: 2021-10-28

## 2021-10-28 ENCOUNTER — ANESTHESIA (OUTPATIENT)
Dept: GASTROENTEROLOGY | Facility: HOSPITAL | Age: 61
End: 2021-10-28

## 2021-10-28 LAB
ALBUMIN SERPL-MCNC: 3.7 G/DL (ref 3.5–5.2)
ALBUMIN/GLOB SERPL: 1.5 G/DL
ALP SERPL-CCNC: 69 U/L (ref 39–117)
ALT SERPL W P-5'-P-CCNC: 11 U/L (ref 1–33)
ANION GAP SERPL CALCULATED.3IONS-SCNC: 5.7 MMOL/L (ref 5–15)
AST SERPL-CCNC: 13 U/L (ref 1–32)
BASOPHILS # BLD AUTO: 0.04 10*3/MM3 (ref 0–0.2)
BASOPHILS NFR BLD AUTO: 0.7 % (ref 0–1.5)
BILIRUB SERPL-MCNC: 0.2 MG/DL (ref 0–1.2)
BUN SERPL-MCNC: 4 MG/DL (ref 8–23)
BUN/CREAT SERPL: 6.1 (ref 7–25)
CALCIUM SPEC-SCNC: 8.6 MG/DL (ref 8.6–10.5)
CHLORIDE SERPL-SCNC: 113 MMOL/L (ref 98–107)
CO2 SERPL-SCNC: 23.3 MMOL/L (ref 22–29)
CREAT SERPL-MCNC: 0.66 MG/DL (ref 0.57–1)
DEPRECATED RDW RBC AUTO: 47.6 FL (ref 37–54)
EOSINOPHIL # BLD AUTO: 0.32 10*3/MM3 (ref 0–0.4)
EOSINOPHIL NFR BLD AUTO: 5.7 % (ref 0.3–6.2)
ERYTHROCYTE [DISTWIDTH] IN BLOOD BY AUTOMATED COUNT: 13.9 % (ref 12.3–15.4)
GFR SERPL CREATININE-BSD FRML MDRD: 91 ML/MIN/1.73
GLOBULIN UR ELPH-MCNC: 2.5 GM/DL
GLUCOSE BLDC GLUCOMTR-MCNC: 101 MG/DL (ref 70–130)
GLUCOSE BLDC GLUCOMTR-MCNC: 73 MG/DL (ref 70–130)
GLUCOSE BLDC GLUCOMTR-MCNC: 75 MG/DL (ref 70–130)
GLUCOSE BLDC GLUCOMTR-MCNC: 99 MG/DL (ref 70–130)
GLUCOSE SERPL-MCNC: 102 MG/DL (ref 65–99)
HCT VFR BLD AUTO: 41.8 % (ref 34–46.6)
HGB BLD-MCNC: 12.9 G/DL (ref 12–15.9)
IMM GRANULOCYTES # BLD AUTO: 0.01 10*3/MM3 (ref 0–0.05)
IMM GRANULOCYTES NFR BLD AUTO: 0.2 % (ref 0–0.5)
LYMPHOCYTES # BLD AUTO: 2.09 10*3/MM3 (ref 0.7–3.1)
LYMPHOCYTES NFR BLD AUTO: 37.3 % (ref 19.6–45.3)
MCH RBC QN AUTO: 28.5 PG (ref 26.6–33)
MCHC RBC AUTO-ENTMCNC: 30.9 G/DL (ref 31.5–35.7)
MCV RBC AUTO: 92.5 FL (ref 79–97)
MONOCYTES # BLD AUTO: 0.42 10*3/MM3 (ref 0.1–0.9)
MONOCYTES NFR BLD AUTO: 7.5 % (ref 5–12)
NEUTROPHILS NFR BLD AUTO: 2.73 10*3/MM3 (ref 1.7–7)
NEUTROPHILS NFR BLD AUTO: 48.6 % (ref 42.7–76)
NRBC BLD AUTO-RTO: 0 /100 WBC (ref 0–0.2)
PLATELET # BLD AUTO: 199 10*3/MM3 (ref 140–450)
PMV BLD AUTO: 10.5 FL (ref 6–12)
POTASSIUM SERPL-SCNC: 3.6 MMOL/L (ref 3.5–5.2)
PROT SERPL-MCNC: 6.2 G/DL (ref 6–8.5)
RBC # BLD AUTO: 4.52 10*6/MM3 (ref 3.77–5.28)
SODIUM SERPL-SCNC: 142 MMOL/L (ref 136–145)
WBC # BLD AUTO: 5.61 10*3/MM3 (ref 3.4–10.8)

## 2021-10-28 PROCEDURE — G0378 HOSPITAL OBSERVATION PER HR: HCPCS

## 2021-10-28 PROCEDURE — 96361 HYDRATE IV INFUSION ADD-ON: CPT

## 2021-10-28 PROCEDURE — 43239 EGD BIOPSY SINGLE/MULTIPLE: CPT | Performed by: INTERNAL MEDICINE

## 2021-10-28 PROCEDURE — 88305 TISSUE EXAM BY PATHOLOGIST: CPT | Performed by: INTERNAL MEDICINE

## 2021-10-28 PROCEDURE — 25010000002 METOCLOPRAMIDE PER 10 MG: Performed by: INTERNAL MEDICINE

## 2021-10-28 PROCEDURE — 82962 GLUCOSE BLOOD TEST: CPT

## 2021-10-28 PROCEDURE — 80053 COMPREHEN METABOLIC PANEL: CPT | Performed by: INTERNAL MEDICINE

## 2021-10-28 PROCEDURE — 85025 COMPLETE CBC W/AUTO DIFF WBC: CPT | Performed by: INTERNAL MEDICINE

## 2021-10-28 PROCEDURE — 88342 IMHCHEM/IMCYTCHM 1ST ANTB: CPT | Performed by: INTERNAL MEDICINE

## 2021-10-28 PROCEDURE — 25010000002 PROPOFOL 10 MG/ML EMULSION: Performed by: ANESTHESIOLOGY

## 2021-10-28 PROCEDURE — 25010000002 ONDANSETRON PER 1 MG

## 2021-10-28 RX ORDER — PROPOFOL 10 MG/ML
VIAL (ML) INTRAVENOUS CONTINUOUS PRN
Status: DISCONTINUED | OUTPATIENT
Start: 2021-10-28 | End: 2021-10-28 | Stop reason: SURG

## 2021-10-28 RX ORDER — PROPOFOL 10 MG/ML
VIAL (ML) INTRAVENOUS AS NEEDED
Status: DISCONTINUED | OUTPATIENT
Start: 2021-10-28 | End: 2021-10-28 | Stop reason: SURG

## 2021-10-28 RX ORDER — DOCUSATE SODIUM 100 MG/1
100 CAPSULE, LIQUID FILLED ORAL 2 TIMES DAILY
Status: DISCONTINUED | OUTPATIENT
Start: 2021-10-28 | End: 2021-10-29 | Stop reason: HOSPADM

## 2021-10-28 RX ORDER — LIDOCAINE HYDROCHLORIDE 20 MG/ML
INJECTION, SOLUTION INFILTRATION; PERINEURAL AS NEEDED
Status: DISCONTINUED | OUTPATIENT
Start: 2021-10-28 | End: 2021-10-28 | Stop reason: SURG

## 2021-10-28 RX ORDER — SODIUM CHLORIDE 9 MG/ML
30 INJECTION, SOLUTION INTRAVENOUS CONTINUOUS PRN
Status: DISCONTINUED | OUTPATIENT
Start: 2021-10-28 | End: 2021-10-29 | Stop reason: HOSPADM

## 2021-10-28 RX ORDER — SODIUM CHLORIDE, SODIUM LACTATE, POTASSIUM CHLORIDE, CALCIUM CHLORIDE 600; 310; 30; 20 MG/100ML; MG/100ML; MG/100ML; MG/100ML
INJECTION, SOLUTION INTRAVENOUS CONTINUOUS PRN
Status: DISCONTINUED | OUTPATIENT
Start: 2021-10-28 | End: 2021-10-28 | Stop reason: SURG

## 2021-10-28 RX ORDER — METOCLOPRAMIDE HYDROCHLORIDE 5 MG/ML
5 INJECTION INTRAMUSCULAR; INTRAVENOUS
Status: DISCONTINUED | OUTPATIENT
Start: 2021-10-28 | End: 2021-10-29 | Stop reason: HOSPADM

## 2021-10-28 RX ORDER — BISACODYL 10 MG
10 SUPPOSITORY, RECTAL RECTAL DAILY
Status: DISCONTINUED | OUTPATIENT
Start: 2021-10-28 | End: 2021-10-29 | Stop reason: HOSPADM

## 2021-10-28 RX ADMIN — SODIUM CHLORIDE 100 ML/HR: 9 INJECTION, SOLUTION INTRAVENOUS at 06:40

## 2021-10-28 RX ADMIN — QUETIAPINE FUMARATE 100 MG: 100 TABLET ORAL at 14:26

## 2021-10-28 RX ADMIN — METOCLOPRAMIDE 5 MG: 5 INJECTION, SOLUTION INTRAMUSCULAR; INTRAVENOUS at 21:45

## 2021-10-28 RX ADMIN — DOCUSATE SODIUM 100 MG: 100 CAPSULE, LIQUID FILLED ORAL at 21:44

## 2021-10-28 RX ADMIN — TOPIRAMATE 50 MG: 50 TABLET, FILM COATED ORAL at 21:44

## 2021-10-28 RX ADMIN — PROPOFOL 150 MG: 10 INJECTION, EMULSION INTRAVENOUS at 11:17

## 2021-10-28 RX ADMIN — PANTOPRAZOLE SODIUM 40 MG: 40 INJECTION, POWDER, FOR SOLUTION INTRAVENOUS at 06:40

## 2021-10-28 RX ADMIN — Medication 200 MCG/KG/MIN: at 11:17

## 2021-10-28 RX ADMIN — SUCRALFATE 1 G: 1 TABLET ORAL at 12:19

## 2021-10-28 RX ADMIN — DOCUSATE SODIUM 50MG AND SENNOSIDES 8.6MG 2 TABLET: 8.6; 5 TABLET, FILM COATED ORAL at 21:44

## 2021-10-28 RX ADMIN — SODIUM CHLORIDE 30 ML/HR: 9 INJECTION, SOLUTION INTRAVENOUS at 09:50

## 2021-10-28 RX ADMIN — SUCRALFATE 1 G: 1 TABLET ORAL at 17:09

## 2021-10-28 RX ADMIN — LAMOTRIGINE 100 MG: 100 TABLET ORAL at 21:44

## 2021-10-28 RX ADMIN — BISACODYL 10 MG: 10 SUPPOSITORY RECTAL at 18:03

## 2021-10-28 RX ADMIN — LIDOCAINE HYDROCHLORIDE 60 MG: 20 INJECTION, SOLUTION INFILTRATION; PERINEURAL at 11:17

## 2021-10-28 RX ADMIN — PANTOPRAZOLE SODIUM 40 MG: 40 INJECTION, POWDER, FOR SOLUTION INTRAVENOUS at 17:09

## 2021-10-28 RX ADMIN — SODIUM CHLORIDE, POTASSIUM CHLORIDE, SODIUM LACTATE AND CALCIUM CHLORIDE: 600; 310; 30; 20 INJECTION, SOLUTION INTRAVENOUS at 11:09

## 2021-10-28 RX ADMIN — POLYETHYLENE GLYCOL 3350 17 G: 17 POWDER, FOR SOLUTION ORAL at 21:45

## 2021-10-28 RX ADMIN — ONDANSETRON 4 MG: 2 INJECTION INTRAMUSCULAR; INTRAVENOUS at 16:10

## 2021-10-28 RX ADMIN — TERAZOSIN HYDROCHLORIDE 1 MG: 1 CAPSULE ORAL at 21:44

## 2021-10-28 RX ADMIN — SUCRALFATE 1 G: 1 TABLET ORAL at 21:44

## 2021-10-28 NOTE — ANESTHESIA PREPROCEDURE EVALUATION
Anesthesia Evaluation     Patient summary reviewed   no history of anesthetic complications:  NPO Solid Status: > 8 hours  NPO Liquid Status: > 2 hours           Airway   Mallampati: II  TM distance: >3 FB  Neck ROM: full  No difficulty expected  Dental      Pulmonary     breath sounds clear to auscultation  (-) shortness of breath, recent URI, not a smoker  Cardiovascular     Rhythm: regular  Rate: normal    (+) hypertension,   (-) past MI, dysrhythmias, angina      Neuro/Psych  (+) headaches, psychiatric history Depression, Bipolar, Anxiety and PTSD,     (-) seizures, CVA  GI/Hepatic/Renal/Endo    (+) morbid obesity, GERD,  renal disease stones, diabetes mellitus type 2 poorly controlled,     Musculoskeletal     (-) neck stiffness  Abdominal    Substance History      OB/GYN          Other   arthritis,                    Anesthesia Plan    ASA 3     MAC       Anesthetic plan, all risks, benefits, and alternatives have been provided, discussed and informed consent has been obtained with: patient.

## 2021-10-28 NOTE — ANESTHESIA POSTPROCEDURE EVALUATION
"Patient: Melvina Martinez    Procedure Summary     Date: 10/28/21 Room / Location:  [facility]    Anesthesia Start: 1109 Anesthesia Stop: 1128    Procedure: ESOPHAGOGASTRODUODENOSCOPY with biopsies (N/A Esophagus) Diagnosis:       Epigastric pain      Abnormal CT of the abdomen      (Epigastric pain [R10.13])      (Abnormal CT of the abdomen [R93.5])    Surgeons: Luciano Salmon MD Provider: Adolph Nice DO    Anesthesia Type: MAC ASA Status: 3          Anesthesia Type: MAC    Vitals  Vitals Value Taken Time   /100 10/28/21 1147   Temp     Pulse 59 10/28/21 1147   Resp 16 10/28/21 1147   SpO2 96 % 10/28/21 1147           Post Anesthesia Care and Evaluation    Patient location during evaluation: bedside  Patient participation: waiting for patient participation  Level of consciousness: sleepy but conscious and responsive to verbal stimuli  Pain management: adequate  Airway patency: patent  Anesthetic complications: No anesthetic complications  PONV Status: NA  Cardiovascular status: acceptable and hemodynamically stable  Respiratory status: acceptable, spontaneous ventilation and nonlabored ventilation  Hydration status: acceptable    Comments: /87 (BP Location: Left arm, Patient Position: Lying)   Pulse 62   Temp 36.2 °C (97.2 °F) (Oral)   Resp 16   Ht 160 cm (63\")   Wt 91.2 kg (201 lb)   LMP  (LMP Unknown)   SpO2 97%   BMI 35.61 kg/m²       "

## 2021-10-29 ENCOUNTER — READMISSION MANAGEMENT (OUTPATIENT)
Dept: CALL CENTER | Facility: HOSPITAL | Age: 61
End: 2021-10-29

## 2021-10-29 VITALS
BODY MASS INDEX: 35.61 KG/M2 | OXYGEN SATURATION: 96 % | HEART RATE: 61 BPM | SYSTOLIC BLOOD PRESSURE: 134 MMHG | RESPIRATION RATE: 18 BRPM | HEIGHT: 63 IN | WEIGHT: 201 LBS | DIASTOLIC BLOOD PRESSURE: 87 MMHG | TEMPERATURE: 97.1 F

## 2021-10-29 PROBLEM — R10.13 EPIGASTRIC PAIN: Status: RESOLVED | Noted: 2021-10-25 | Resolved: 2021-10-29

## 2021-10-29 LAB
BACTERIA SPEC AEROBE CULT: NORMAL
BACTERIA SPEC AEROBE CULT: NORMAL
DEPRECATED RDW RBC AUTO: 41.6 FL (ref 37–54)
ERYTHROCYTE [DISTWIDTH] IN BLOOD BY AUTOMATED COUNT: 13.4 % (ref 12.3–15.4)
GLUCOSE BLDC GLUCOMTR-MCNC: 81 MG/DL (ref 70–130)
GLUCOSE BLDC GLUCOMTR-MCNC: 90 MG/DL (ref 70–130)
HCT VFR BLD AUTO: 38.8 % (ref 34–46.6)
HGB BLD-MCNC: 13 G/DL (ref 12–15.9)
MCH RBC QN AUTO: 29.2 PG (ref 26.6–33)
MCHC RBC AUTO-ENTMCNC: 33.5 G/DL (ref 31.5–35.7)
MCV RBC AUTO: 87.2 FL (ref 79–97)
PLATELET # BLD AUTO: 203 10*3/MM3 (ref 140–450)
PMV BLD AUTO: 10.3 FL (ref 6–12)
RBC # BLD AUTO: 4.45 10*6/MM3 (ref 3.77–5.28)
WBC # BLD AUTO: 6.04 10*3/MM3 (ref 3.4–10.8)

## 2021-10-29 PROCEDURE — 82962 GLUCOSE BLOOD TEST: CPT

## 2021-10-29 PROCEDURE — 25010000002 METOCLOPRAMIDE PER 10 MG: Performed by: INTERNAL MEDICINE

## 2021-10-29 PROCEDURE — G0378 HOSPITAL OBSERVATION PER HR: HCPCS

## 2021-10-29 PROCEDURE — 85027 COMPLETE CBC AUTOMATED: CPT | Performed by: INTERNAL MEDICINE

## 2021-10-29 RX ORDER — SUCRALFATE 1 G/1
1 TABLET ORAL
Qty: 120 TABLET | Refills: 0 | Status: SHIPPED | OUTPATIENT
Start: 2021-10-29 | End: 2021-12-21 | Stop reason: SDUPTHER

## 2021-10-29 RX ORDER — PANTOPRAZOLE SODIUM 40 MG/1
40 TABLET, DELAYED RELEASE ORAL 2 TIMES DAILY
Qty: 60 TABLET | Refills: 3 | Status: SHIPPED | OUTPATIENT
Start: 2021-10-29 | End: 2021-12-11 | Stop reason: SDUPTHER

## 2021-10-29 RX ORDER — POLYETHYLENE GLYCOL 3350 17 G/17G
17 POWDER, FOR SOLUTION ORAL 2 TIMES DAILY
Qty: 1020 G | Refills: 3 | Status: SHIPPED | OUTPATIENT
Start: 2021-10-29 | End: 2022-05-26

## 2021-10-29 RX ADMIN — METOCLOPRAMIDE 5 MG: 5 INJECTION, SOLUTION INTRAMUSCULAR; INTRAVENOUS at 06:49

## 2021-10-29 RX ADMIN — METOCLOPRAMIDE 5 MG: 5 INJECTION, SOLUTION INTRAMUSCULAR; INTRAVENOUS at 11:27

## 2021-10-29 RX ADMIN — VERAPAMIL HYDROCHLORIDE 120 MG: 120 TABLET ORAL at 09:08

## 2021-10-29 RX ADMIN — BISACODYL 10 MG: 10 SUPPOSITORY RECTAL at 09:19

## 2021-10-29 RX ADMIN — SUCRALFATE 1 G: 1 TABLET ORAL at 06:50

## 2021-10-29 RX ADMIN — SUCRALFATE 1 G: 1 TABLET ORAL at 11:26

## 2021-10-29 RX ADMIN — DOCUSATE SODIUM 100 MG: 100 CAPSULE, LIQUID FILLED ORAL at 09:08

## 2021-10-29 RX ADMIN — DOCUSATE SODIUM 50MG AND SENNOSIDES 8.6MG 2 TABLET: 8.6; 5 TABLET, FILM COATED ORAL at 09:08

## 2021-10-29 RX ADMIN — POLYETHYLENE GLYCOL 3350 17 G: 17 POWDER, FOR SOLUTION ORAL at 09:07

## 2021-10-29 RX ADMIN — DULOXETINE HYDROCHLORIDE 30 MG: 30 CAPSULE, DELAYED RELEASE ORAL at 09:08

## 2021-10-29 RX ADMIN — TOPIRAMATE 50 MG: 50 TABLET, FILM COATED ORAL at 09:08

## 2021-10-29 RX ADMIN — PANTOPRAZOLE SODIUM 40 MG: 40 INJECTION, POWDER, FOR SOLUTION INTRAVENOUS at 06:49

## 2021-10-29 RX ADMIN — TAMOXIFEN CITRATE 20 MG: 10 TABLET, FILM COATED ORAL at 09:08

## 2021-10-29 RX ADMIN — LAMOTRIGINE 100 MG: 100 TABLET ORAL at 09:08

## 2021-10-30 NOTE — OUTREACH NOTE
Prep Survey      Responses   [facility] patient discharged from? [place]   Is LACE score < 7 ? No   Emergency Room discharge w/ pulse ox? No   Eligibility [facility]   Date of Admission 10/25/21   Date of Discharge 10/29/21   Discharge Disposition Home or Self Care   Discharge diagnosis abdominal pain, gastritis, constipation, DM   Does the patient have one of the following disease processes/diagnoses(primary or secondary)? Other   Does the patient have Home health ordered? No   Is there a DME ordered? No   Prep survey completed? Yes          Jennifer Shelton RN

## 2021-11-01 ENCOUNTER — TRANSITIONAL CARE MANAGEMENT TELEPHONE ENCOUNTER (OUTPATIENT)
Dept: CALL CENTER | Facility: HOSPITAL | Age: 61
End: 2021-11-01

## 2021-11-01 LAB
LAB AP CASE REPORT: NORMAL
LAB AP SPECIAL STAINS: NORMAL
PATH REPORT.FINAL DX SPEC: NORMAL
PATH REPORT.GROSS SPEC: NORMAL

## 2021-11-01 NOTE — OUTREACH NOTE
Call Center TCM Note      Responses   [facility] patient discharged from? [place]   Does the patient have one of the following disease processes/diagnoses(primary or secondary)? Other   TCM attempt successful? Yes   Call start time 1325   Call end time 1328   Discharge diagnosis abdominal pain, gastritis, constipation, DM   Person spoke with today (if not patient) and relationship Patient   Meds reviewed with patient/caregiver? Yes   Is the patient having any side effects they believe may be caused by any medication additions or changes? No   Does the patient have all medications ordered at discharge? Yes   Is the patient taking all medications as directed (includes completed medication regime)? Yes   Does the patient have a primary care provider?  Yes   Does the patient have an appointment with their PCP within 7 days of discharge? Yes   Has the patient kept scheduled appointments due by today? N/A   Psychosocial issues? No   Did the patient receive a copy of their discharge instructions? Yes   Nursing interventions Reviewed instructions with patient   What is the patient's perception of their health status since discharge? Improving   Is the patient/caregiver able to teach back signs and symptoms related to disease process for when to call PCP? Yes   Is the patient/caregiver able to teach back signs and symptoms related to disease process for when to call 911? Yes   Is the patient/caregiver able to teach back the hierarchy of who to call/visit for symptoms/problems? PCP, Specialist, Home health nurse, Urgent Care, ED, 911 Yes   If the patient is a current smoker, are they able to teach back resources for cessation? Not a smoker   TCM call completed? Yes   Wrap up additional comments Pt states she is feeling so much better. Pt shares she was very happy with her care in the hospital. Pt verified PCP [facility] fu appt on 11/3/21. No questions/concerns.          Amna Whitlock RN    11/1/2021, 13:29  EDT

## 2021-11-01 NOTE — PROGRESS NOTES
Case Management Discharge Note      Final Note: Discharged to home on 10/29/21. JUDI Farfan RN CCP.         Selected Continued Care - Discharged on 10/29/2021 Admission date: 10/25/2021 - Discharge disposition: Home or Self Care    Destination    No services have been selected for the patient.              Durable Medical Equipment    No services have been selected for the patient.              Dialysis/Infusion    No services have been selected for the patient.              Home Medical Care    No services have been selected for the patient.              Therapy    No services have been selected for the patient.              Community Resources    No services have been selected for the patient.              Community & DME    No services have been selected for the patient.                       Final Discharge Disposition Code: 01 - home or self-care

## 2021-11-02 ENCOUNTER — TELEPHONE (OUTPATIENT)
Dept: GASTROENTEROLOGY | Facility: CLINIC | Age: 61
End: 2021-11-02

## 2021-11-02 NOTE — TELEPHONE ENCOUNTER
----- Message from Ananth VARNER MD sent at 11/1/2021  6:11 PM EDT -----  Regarding: RE: Path results  Will defer to infectious disease when it comes to multiple antibiotic allergies.  ----- Message -----  From: Luciano Salmon MD  Sent: 11/1/2021   8:24 AM EDT  To: Ananth VARNER MD, #  Subject: Path results                                     Path noted, H pylori positive.  She has reported allergy to both Flagyl and PCN.  Will defer treatment regimen to Dr Odell.          ----- Message -----  From: Lab, Background User  Sent: 11/1/2021   8:04 AM EDT  To: Luciano Salmon MD

## 2021-11-02 NOTE — TELEPHONE ENCOUNTER
Called pt and advised per path report that she has chronic h pylori gastritis and per Dr Odell will be referred to and infectious disease MD.  Pt verb understanding.     Message sent to Jennifer in scheduling to send referral.

## 2021-11-03 ENCOUNTER — OFFICE VISIT (OUTPATIENT)
Dept: INTERNAL MEDICINE | Facility: CLINIC | Age: 61
End: 2021-11-03

## 2021-11-03 VITALS
DIASTOLIC BLOOD PRESSURE: 80 MMHG | OXYGEN SATURATION: 99 % | TEMPERATURE: 98.3 F | HEART RATE: 70 BPM | HEIGHT: 63 IN | SYSTOLIC BLOOD PRESSURE: 126 MMHG | BODY MASS INDEX: 35.79 KG/M2 | WEIGHT: 202 LBS

## 2021-11-03 DIAGNOSIS — I10 PRIMARY HYPERTENSION: ICD-10-CM

## 2021-11-03 DIAGNOSIS — K29.70 GASTRITIS WITHOUT BLEEDING, UNSPECIFIED CHRONICITY, UNSPECIFIED GASTRITIS TYPE: Primary | ICD-10-CM

## 2021-11-03 DIAGNOSIS — A04.8 H. PYLORI INFECTION: ICD-10-CM

## 2021-11-03 PROCEDURE — 1111F DSCHRG MED/CURRENT MED MERGE: CPT | Performed by: NURSE PRACTITIONER

## 2021-11-03 PROCEDURE — 99496 TRANSJ CARE MGMT HIGH F2F 7D: CPT | Performed by: NURSE PRACTITIONER

## 2021-11-03 NOTE — ASSESSMENT & PLAN NOTE
She will follow up with GI in 1 month as directed.  She will continue on Protonix at this time.  She will also follow-up with infectious disease for her H. pylori management

## 2021-11-03 NOTE — PROGRESS NOTES
Chief Complaint  Hospital Follow Up Visit (DD 10/29/21 Gastritis )    Subjective     {Problem List  Visit Diagnosis   Encounters  Notes  Medications  Labs  Result Review Imaging  Media :23}     Melvina Martinez presents to [facility]  History of Present Illness    Objective   Vital Signs:   There were no vitals taken for this visit.    Physical Exam   Result Review :{Labs  Result Review  Imaging  Med Tab  Media  Procedures :23}   {The following data was reviewed by (Optional):95324}  {Ambulatory Labs (Optional):28124}  {Data reviewed (Optional):62940:::1}   Procedures      Assessment and Plan {CC Problem List  Visit Diagnosis   ROS  Review (Popup)  Health Maintenance  Quality  BestPractice  Medications  SmartSets  SnapShot Encounters  Media :23}   There are no diagnoses linked to this encounter.    {CD Anticipatory Guidance (Optional):80841}    {Time Spent (Optional):01092}  Follow Up {Instructions Charge Capture  Follow-up Communications :23}  No follow-ups on file.  Patient was given instructions and counseling regarding her condition or for health maintenance advice. Please see specific information pulled into the AVS if appropriate.

## 2021-11-03 NOTE — PROGRESS NOTES
Transitional Care Follow Up Visit  Subjective     Melvina is a 61 y.o. female who presents for a transitional care management visit.    Within 48 business hours after discharge our office contacted her via telephone to coordinate her care and needs.      I reviewed and discussed the details of that call along with the discharge summary, hospital problems, inpatient lab results, inpatient diagnostic studies, and consultation reports with Melvina.     Current outpatient and discharge medications have been reconciled for the patient.  Reviewed by: YOLANDA Da Silva      Date of TCM Phone Call 10/29/2021   [facility]   Date of Admission 10/25/2021   Date of Discharge 10/29/2021   Discharge Disposition Home or Self Care       Risk for Readmission (LACE) Score: 7 (10/29/2021  6:00 AM)      History of Present Illness     Course During Hospital Stay The following information was reviewed by: YOLANDA Da Silva on 11/03/2021: Brief admission history and physical.  Please refer to the H&P for full details.  A pleasant 61 years old white female with a past history of hypertension/anxiety/bipolar disorder/type 2 diabetes/migraine/PTSD status post recent cholecystectomy who presented with diffuse abdominal pain particularly upper for the last 2 weeks associated with constipation and repeated nausea and vomiting.  She does complain of lightheadedness and headache and shortness of breath.  Physical examination on admission included a temperature of 99.7 a pulse of 69 respiratory rate of 24 and blood pressure 126/85 and O2 sats of 93% on room air.  The rest of the examination is remarkable for obesity/diffuse abdominal tenderness plus minus guarding in the epigastrium/pallor.  Hospital course.  Initial evaluation in the emergency department included a CMP that was normal except a blood sugar of 105 creatinine 1.04 chloride 112 CO2 of 19.7 GFR of 54.  Lipase was normal.  CBC was normal.  Covid was negative.  UA revealed no  evidence of UTI.  CT scan of the abdomen and pelvis reveals wall thickening of the stomach suggestive of gastritis.  Patient was admitted with abdominal pain nausea and vomiting secondary to gastritis associated with abdominal distention and constipation.  She was placed on clear liquids.  GI consult was obtained and a right upper quadrant ultrasound was done and was negative.  Liver function test was normal.  Lipase was normal.  EGD was done and revealed gastritis.  Patient was empirically started on IV Protonix high-dose and p.o. sucralfate.  She was also placed on aggressive laxative regimen because of her constipation.  After that her abdominal pain has greatly improved and she had good bowel movement without melena or fresh bright blood per rectum.  CBC remained normal.  Her diet was advanced and she tolerated that well and was able to tolerate regular diet at the time of discharge.  She was counseled against alcohol and nonsteroidal anti-inflammatory medication.  She was switched to a high-dose p.o. Protonix and continuation of the sucralfate as an outpatient.  She is to follow-up with GI in 1 month.  She was noted to have an acute kidney failure at admission that was thought to be secondary to poor p.o. intake nausea and vomiting leading to prerenal azotemia and dehydration and this has resolved with IV fluid.  She has a history of type 2 diabetes.  A1c was 4.8.  She was placed on sliding scale insulin while in the hospital and was resumed on her oral hypoglycemic at the time of discharge.  She does have a history of hypertension.  There was no evidence of angina or congestive heart failure during this admission.  We stopped her Norvasc we continued her on verapamil and Hytrin and her blood pressure remained under good control.  At the time of discharge she was hemodynamically stable with stable hemoglobin and tolerating regular diet well.    Since discharge she has been feeling well. She was called by GI to  "notify her that she has h. Pylori. She has been referred to infectious disease for further management.    Normal BMs daily, soft- not taking miralax     The following portions of the patient's history were reviewed and updated as appropriate: allergies, current medications, past family history, past medical history, past social history, past surgical history and problem list.     Vitals:    11/03/21 1019   BP: 126/80   Pulse: 70   Temp: 98.3 °F (36.8 °C)   TempSrc: Temporal   SpO2: 99%   Weight: 91.6 kg (202 lb)   Height: 160 cm (63\")        Review of Systems    Objective   Physical Exam  Vitals and nursing note reviewed.   Constitutional:       General: She is not in acute distress.     Appearance: Normal appearance.   HENT:      Head: Normocephalic and atraumatic.      Right Ear: External ear normal.      Left Ear: External ear normal.      Nose: Nose normal.      Mouth/Throat:      Mouth: Mucous membranes are moist.   Eyes:      Conjunctiva/sclera: Conjunctivae normal.   Cardiovascular:      Rate and Rhythm: Normal rate and regular rhythm.      Pulses: Normal pulses.      Heart sounds: Normal heart sounds. No murmur heard.  No friction rub. No gallop.    Pulmonary:      Effort: Pulmonary effort is normal. No respiratory distress.      Breath sounds: No wheezing, rhonchi or rales.   Abdominal:      General: Bowel sounds are normal.      Palpations: Abdomen is soft.   Musculoskeletal:      Cervical back: Neck supple.   Skin:     General: Skin is warm and dry.   Neurological:      General: No focal deficit present.      Mental Status: She is alert and oriented to person, place, and time.   Psychiatric:         Mood and Affect: Mood normal.         Behavior: Behavior normal.           Assessment/Plan     Diagnoses and all orders for this visit:    1. Gastritis without bleeding, unspecified chronicity, unspecified gastritis type (Primary)  Assessment & Plan:  She will follow up with GI in 1 month as directed.  She " will continue on Protonix at this time.  She will also follow-up with infectious disease for her H. pylori management      2. H. pylori infection    3. Primary hypertension  Assessment & Plan:  She was taken off of amlodipine while in the hospital.  She has restarted this since discharge.  Blood pressures well controlled at this time.  Continue current meds.        Follow Up     Return for Keep follow-up as scheduled.

## 2021-11-03 NOTE — ASSESSMENT & PLAN NOTE
She was taken off of amlodipine while in the hospital.  She has restarted this since discharge.  Blood pressures well controlled at this time.  Continue current meds.

## 2021-11-03 NOTE — TELEPHONE ENCOUNTER
Received confirmation referral was received by Infectious disease. ID will call Pt to schedule OV     Fax#[phone]

## 2021-11-08 ENCOUNTER — READMISSION MANAGEMENT (OUTPATIENT)
Dept: CALL CENTER | Facility: HOSPITAL | Age: 61
End: 2021-11-08

## 2021-11-08 NOTE — OUTREACH NOTE
"Medical Week 2 Survey      Responses   [facility] [place] patient discharged from? [place]   Does the patient have one of the following disease processes/diagnoses(primary or secondary)? Other   Week 2 attempt successful? Yes   Call start time 1216   Discharge diagnosis abdominal pain, gastritis, constipation, DM   Call end time 1219   Meds reviewed with patient/caregiver? Yes   Is the patient taking all medications as directed (includes completed medication regime)? Yes   Has the patient kept scheduled appointments due by today? Yes   Psychosocial issues? No   Comments Pt states that she feels \"fabulous\". Denies N/V/D or abd painappetite is improving.    What is the patient's perception of their health status since discharge? Returned to baseline/stable   Is the patient/caregiver able to teach back signs and symptoms related to disease process for when to call PCP? Yes   Additional teach back comments states appetite not fully back, is careful to avoid foods triggering nausea, still taking anti-nausea meds   Week 2 Call Completed? Yes   Graduated Yes   Did the patient feel the follow up calls were helpful during their recovery period? Yes   Was the number of calls appropriate? Yes   Graduated/Revoked comments back to baseline          Grace Merchant RN  "

## 2021-11-10 PROBLEM — M19.042 PRIMARY OSTEOARTHRITIS OF LEFT HAND: Status: ACTIVE | Noted: 2021-11-10

## 2021-11-10 NOTE — PROGRESS NOTES
"Chief Complaint  Hand Pain (left middle finger has a lump that is very painful)    Subjective          Melvina Martinez presents to [facility]  History of Present Illness  Left middle finger has a lump on it, very painful. Present for several months. Has not tried anything specific for this.    Objective   Vital Signs:   /72   Pulse 84   Temp 97 °F (36.1 °C)   Ht 165.1 cm (65\")   Wt 95.6 kg (210 lb 12.8 oz)   SpO2 95%   BMI 35.08 kg/m²     Physical Exam  Vitals reviewed.   Constitutional:       Appearance: Normal appearance. She is well-developed.   HENT:      Head: Normocephalic and atraumatic.      Mouth/Throat:      Pharynx: No oropharyngeal exudate.   Cardiovascular:      Rate and Rhythm: Normal rate and regular rhythm.      Heart sounds: No murmur heard.  No friction rub. No gallop.    Pulmonary:      Effort: Pulmonary effort is normal.      Breath sounds: Normal breath sounds. No wheezing or rhonchi.   Musculoskeletal:      Left hand: Swelling (nodule over DIP joint of 3rd finger) present.   Skin:     General: Skin is warm and dry.   Neurological:      Mental Status: She is alert and oriented to person, place, and time.   Psychiatric:         Mood and Affect: Affect normal.        Result Review :          Procedures      Assessment and Plan {CC Problem List  Visit Diagnosis   ROS  Review (Popup)  Health Maintenance  Quality  BestPractice  Medications  SmartSets  SnapShot Encounters  Media :23}   Diagnoses and all orders for this visit:    1. Primary osteoarthritis of left hand (Primary)  Assessment & Plan:  Nodule consistent with osteoarthritis  Recommend symptomatic management              Follow Up   Return if symptoms worsen or fail to improve.  Patient was given instructions and counseling regarding her condition or for health maintenance advice. Please see specific information pulled into the AVS if appropriate.       "

## 2021-11-16 ENCOUNTER — OFFICE VISIT (OUTPATIENT)
Dept: GASTROENTEROLOGY | Facility: CLINIC | Age: 61
End: 2021-11-16

## 2021-11-16 VITALS
WEIGHT: 201 LBS | HEIGHT: 63 IN | BODY MASS INDEX: 35.61 KG/M2 | TEMPERATURE: 97.8 F | SYSTOLIC BLOOD PRESSURE: 113 MMHG | HEART RATE: 84 BPM | DIASTOLIC BLOOD PRESSURE: 78 MMHG

## 2021-11-16 DIAGNOSIS — K29.70 HELICOBACTER PYLORI GASTRITIS: Primary | ICD-10-CM

## 2021-11-16 DIAGNOSIS — B96.81 HELICOBACTER PYLORI GASTRITIS: Primary | ICD-10-CM

## 2021-11-16 DIAGNOSIS — R11.2 NON-INTRACTABLE VOMITING WITH NAUSEA, UNSPECIFIED VOMITING TYPE: ICD-10-CM

## 2021-11-16 DIAGNOSIS — Z12.11 ENCOUNTER FOR SCREENING FOR MALIGNANT NEOPLASM OF COLON: ICD-10-CM

## 2021-11-16 PROCEDURE — 99213 OFFICE O/P EST LOW 20 MIN: CPT | Performed by: PHYSICIAN ASSISTANT

## 2021-11-16 RX ORDER — AMLODIPINE BESYLATE 2.5 MG/1
2.5 TABLET ORAL DAILY
COMMUNITY
End: 2022-02-15 | Stop reason: SDUPTHER

## 2021-11-16 NOTE — PROGRESS NOTES
Chief Complaint  Nausea and Vomiting    Subjective          History of Present Illness    Melvina Martinez is a  61 y.o. female presents for hospital follow-up admitted from 10/25 through 10/29/2021.  She was admitted for nausea and vomiting with findings of H. pylori gastritis.  She previously had colonoscopy with Dr. Odell in 2011.    Hospital course:  Initial evaluation in the emergency department included a CMP that was normal except a blood sugar of 105 creatinine 1.04 chloride 112 CO2 of 19.7 GFR of 54.  Lipase was normal.  CBC was normal.  Covid was negative.  UA revealed no evidence of UTI.  CT scan of the abdomen and pelvis reveals wall thickening of the stomach suggestive of gastritis.  Patient was admitted with abdominal pain nausea and vomiting secondary to gastritis associated with abdominal distention and constipation.  She was placed on clear liquids.  GI consult was obtained and a right upper quadrant ultrasound was done and was negative.  Liver function test was normal.  Lipase was normal.  EGD was done and revealed gastritis.  Patient was empirically started on IV Protonix high-dose and p.o. sucralfate.  She was also placed on aggressive laxative regimen because of her constipation.  After that her abdominal pain has greatly improved and she had good bowel movement without melena or fresh bright blood per rectum.  CBC remained normal.  Her diet was advanced and she tolerated that well and was able to tolerate regular diet at the time of discharge.  She was counseled against alcohol and nonsteroidal anti-inflammatory medication.  She was switched to a high-dose p.o. Protonix and continuation of the sucralfate as an outpatient.  She is to follow-up with GI in 1 month.  She was noted to have an acute kidney failure at admission that was thought to be secondary to poor p.o. intake nausea and vomiting leading to prerenal azotemia and dehydration and this has resolved with IV fluid.  She has a history  "of type 2 diabetes.  A1c was 4.8.  She was placed on sliding scale insulin while in the hospital and was resumed on her oral hypoglycemic at the time of discharge.  She does have a history of hypertension.  There was no evidence of angina or congestive heart failure during this admission.  We stopped her Norvasc we continued her on verapamil and Hytrin and her blood pressure remained under good control.  At the time of discharge she was hemodynamically stable with stable hemoglobin and tolerating regular diet well.  ----------------------------------------------------------------------------------------    She reports continued nausea and vomiting only when she eats too much. She is eating small amounts. No abdominal pain, melena.     She has been referred to ID for H. pylori gastritis given she has allergy to penicillin--throat closing off. Flagyl caused intolerance due to severe abdominal pain and profuse vomiting. She is taking sucralfate QID and Pantoprazole 40mg BID.     EGD with Dr. Salmon on 10/28/2021 showed gastritis.  Pathology showed erosive gastropathy.  She was positive for H. pylori gastritis.    Last CN at age 51. Polyps per patient. No [facility]. With Dr. Odell.     Objective   Vital Signs:   /78   Pulse 84   Temp 97.8 °F (36.6 °C)   Ht 160 cm (63\")   Wt 91.2 kg (201 lb)   BMI 35.61 kg/m²       Physical Exam  Vitals reviewed.   Constitutional:       General: She is awake. She is not in acute distress.     Appearance: Normal appearance. She is well-developed and well-groomed.   HENT:      Head: Normocephalic and atraumatic.   Pulmonary:      Effort: Pulmonary effort is normal. No respiratory distress.   Skin:     Coloration: Skin is not pale.   Neurological:      Mental Status: She is alert and oriented to person, place, and time.      Gait: Gait normal.   Psychiatric:         Mood and Affect: Mood and affect normal.         Speech: Speech normal.         Behavior: Behavior is cooperative. "         Judgment: Judgment normal.          Result Review :   The following data was reviewed by: Cheyanne Blevins PA-C on 11/16/2021:  CBC w/diff    CBC w/Diff 10/27/21 10/28/21 10/29/21   WBC 6.36 5.61 6.04   RBC 4.33 4.52 4.45   Hemoglobin 12.7 12.9 13.0   Hematocrit 39.0 41.8 38.8   MCV 90.1 92.5 87.2   MCH 29.3 28.5 29.2   MCHC 32.6 30.9 (A) 33.5   RDW 13.4 13.9 13.4   Platelets 214 199 203   Neutrophil Rel % 49.2 48.6    Immature Granulocyte Rel % 0.2 0.2    Lymphocyte Rel % 35.8 37.3    Monocyte Rel % 6.9 7.5    Eosinophil Rel % 7.1 (A) 5.7    Basophil Rel % 0.8 0.7    (A) Abnormal value                  Assessment and Plan    Diagnoses and all orders for this visit:    1. Helicobacter pylori gastritis (Primary)    2. Encounter for screening for malignant neoplasm of colon  -     Case Request; Standing  -     Case Request    3. Non-intractable vomiting with nausea, unspecified vomiting type    We discussed findings on EGD including H. pylori gastritis.  Discussed recommendation to follow-up with ID for treatment of this given she is allergic to penicillin and intolerant to Flagyl.  She has appoint with them next week.  We will wait for the recommendations from them.  She continue PPI for now.  We will have her follow-up after treatment with them to ensure symptoms are resolving.    She is due for colonoscopy.  She reports previous was at age 51 with Dr. Odell.  She believes she had colon polyps.  Do not have those records.      Follow Up   Return in about 5 weeks (around 12/21/2021) for Cheyanne or Dr. Odell.    Dragon dictation used throughout this note.     Cheyanne Blevins PA-C

## 2021-11-22 ENCOUNTER — PRE-PROCEDURE SCREENING (OUTPATIENT)
Dept: GASTROENTEROLOGY | Facility: CLINIC | Age: 61
End: 2021-11-22

## 2021-11-22 NOTE — TELEPHONE ENCOUNTER
Last scope 10yrs ( no records)--No personal history of polyps-- No family history of polyps or colon cancer--Medications:       amLODIPine (NORVASC) 2.5 MG tablet    Blood Glucose Monitoring Suppl kit    DULoxetine (Cymbalta) 30 MG capsule    empagliflozin (Jardiance) 10 MG tablet tablet    glucose blood test strip    HYDROcodone-acetaminophen (NORCO) 5-325 MG per tablet    lamoTRIgine (LaMICtal) 100 MG tablet    Lancets misc    ondansetron (Zofran) 4 MG tablet    pantoprazole (Protonix) 40 MG EC tablet    polyethylene glycol (MIRALAX) 17 GM/SCOOP powder    prazosin (MINIPRESS) 2 MG capsule    QUEtiapine (SEROquel) 100 MG tablet    sennosides-docusate (Senokot S) 8.6-50 MG per tablet    sucralfate (CARAFATE) 1 g tablet    SUMAtriptan (Imitrex) 5 MG/ACT nasal spray    tamoxifen (NOLVADEX) 20 MG chemo tablet      Pt verbalized and understood that it would  be few weeks before been scheduled   topiramate (TOPAMAX) 50 MG tablet    verapamil (CALAN) 120 MG tablet

## 2021-11-24 ENCOUNTER — OFFICE VISIT (OUTPATIENT)
Dept: INFECTIOUS DISEASES | Facility: CLINIC | Age: 61
End: 2021-11-24

## 2021-11-24 ENCOUNTER — PATIENT ROUNDING (BHMG ONLY) (OUTPATIENT)
Dept: INFECTIOUS DISEASES | Facility: CLINIC | Age: 61
End: 2021-11-24

## 2021-11-24 ENCOUNTER — LAB (OUTPATIENT)
Dept: LAB | Facility: HOSPITAL | Age: 61
End: 2021-11-24

## 2021-11-24 ENCOUNTER — TELEPHONE (OUTPATIENT)
Dept: INFECTIOUS DISEASES | Facility: CLINIC | Age: 61
End: 2021-11-24

## 2021-11-24 VITALS
WEIGHT: 203 LBS | TEMPERATURE: 98.7 F | SYSTOLIC BLOOD PRESSURE: 96 MMHG | BODY MASS INDEX: 35.97 KG/M2 | HEART RATE: 85 BPM | HEIGHT: 63 IN | RESPIRATION RATE: 18 BRPM | DIASTOLIC BLOOD PRESSURE: 66 MMHG

## 2021-11-24 DIAGNOSIS — A04.8 H. PYLORI INFECTION: ICD-10-CM

## 2021-11-24 DIAGNOSIS — K29.50 CHRONIC GASTRITIS WITHOUT BLEEDING, UNSPECIFIED GASTRITIS TYPE: Primary | ICD-10-CM

## 2021-11-24 DIAGNOSIS — K29.50 CHRONIC GASTRITIS WITHOUT BLEEDING, UNSPECIFIED GASTRITIS TYPE: ICD-10-CM

## 2021-11-24 LAB
ALBUMIN SERPL-MCNC: 4.1 G/DL (ref 3.5–5.2)
ALBUMIN/GLOB SERPL: 1.4 G/DL
ALP SERPL-CCNC: 77 U/L (ref 39–117)
ALT SERPL W P-5'-P-CCNC: 12 U/L (ref 1–33)
ANION GAP SERPL CALCULATED.3IONS-SCNC: 8 MMOL/L (ref 5–15)
AST SERPL-CCNC: 12 U/L (ref 1–32)
BASOPHILS # BLD AUTO: 0.05 10*3/MM3 (ref 0–0.2)
BASOPHILS NFR BLD AUTO: 0.8 % (ref 0–1.5)
BILIRUB SERPL-MCNC: 0.2 MG/DL (ref 0–1.2)
BUN SERPL-MCNC: 16 MG/DL (ref 8–23)
BUN/CREAT SERPL: 16 (ref 7–25)
CALCIUM SPEC-SCNC: 9 MG/DL (ref 8.6–10.5)
CHLORIDE SERPL-SCNC: 109 MMOL/L (ref 98–107)
CO2 SERPL-SCNC: 23 MMOL/L (ref 22–29)
CREAT SERPL-MCNC: 1 MG/DL (ref 0.57–1)
DEPRECATED RDW RBC AUTO: 45.8 FL (ref 37–54)
EOSINOPHIL # BLD AUTO: 0.39 10*3/MM3 (ref 0–0.4)
EOSINOPHIL NFR BLD AUTO: 6.4 % (ref 0.3–6.2)
ERYTHROCYTE [DISTWIDTH] IN BLOOD BY AUTOMATED COUNT: 13.6 % (ref 12.3–15.4)
GFR SERPL CREATININE-BSD FRML MDRD: 57 ML/MIN/1.73
GLOBULIN UR ELPH-MCNC: 2.9 GM/DL
GLUCOSE SERPL-MCNC: 82 MG/DL (ref 65–99)
HCT VFR BLD AUTO: 41.1 % (ref 34–46.6)
HGB BLD-MCNC: 13.3 G/DL (ref 12–15.9)
IMM GRANULOCYTES # BLD AUTO: 0.02 10*3/MM3 (ref 0–0.05)
IMM GRANULOCYTES NFR BLD AUTO: 0.3 % (ref 0–0.5)
LYMPHOCYTES # BLD AUTO: 2.39 10*3/MM3 (ref 0.7–3.1)
LYMPHOCYTES NFR BLD AUTO: 39.1 % (ref 19.6–45.3)
MCH RBC QN AUTO: 29.6 PG (ref 26.6–33)
MCHC RBC AUTO-ENTMCNC: 32.4 G/DL (ref 31.5–35.7)
MCV RBC AUTO: 91.3 FL (ref 79–97)
MONOCYTES # BLD AUTO: 0.47 10*3/MM3 (ref 0.1–0.9)
MONOCYTES NFR BLD AUTO: 7.7 % (ref 5–12)
NEUTROPHILS NFR BLD AUTO: 2.79 10*3/MM3 (ref 1.7–7)
NEUTROPHILS NFR BLD AUTO: 45.7 % (ref 42.7–76)
NRBC BLD AUTO-RTO: 0 /100 WBC (ref 0–0.2)
PLATELET # BLD AUTO: 204 10*3/MM3 (ref 140–450)
PMV BLD AUTO: 10.3 FL (ref 6–12)
POTASSIUM SERPL-SCNC: 4.6 MMOL/L (ref 3.5–5.2)
PROT SERPL-MCNC: 7 G/DL (ref 6–8.5)
RBC # BLD AUTO: 4.5 10*6/MM3 (ref 3.77–5.28)
SODIUM SERPL-SCNC: 140 MMOL/L (ref 136–145)
WBC NRBC COR # BLD: 6.11 10*3/MM3 (ref 3.4–10.8)

## 2021-11-24 PROCEDURE — 80053 COMPREHEN METABOLIC PANEL: CPT

## 2021-11-24 PROCEDURE — 99204 OFFICE O/P NEW MOD 45 MIN: CPT | Performed by: INTERNAL MEDICINE

## 2021-11-24 PROCEDURE — 85025 COMPLETE CBC W/AUTO DIFF WBC: CPT

## 2021-11-24 PROCEDURE — 36415 COLL VENOUS BLD VENIPUNCTURE: CPT

## 2021-11-24 RX ORDER — TETRACYCLINE HYDROCHLORIDE 500 MG/1
500 CAPSULE ORAL 4 TIMES DAILY
Qty: 56 CAPSULE | Refills: 0 | Status: SHIPPED | OUTPATIENT
Start: 2021-11-24 | End: 2021-12-08

## 2021-11-24 RX ORDER — PANTOPRAZOLE SODIUM 40 MG/1
40 TABLET, DELAYED RELEASE ORAL 2 TIMES DAILY
Qty: 28 TABLET | Refills: 0 | Status: SHIPPED | OUTPATIENT
Start: 2021-11-24 | End: 2021-12-08

## 2021-11-24 RX ORDER — CLARITHROMYCIN 500 MG/1
500 TABLET, COATED ORAL 2 TIMES DAILY
Qty: 28 TABLET | Refills: 0 | Status: SHIPPED | OUTPATIENT
Start: 2021-11-24 | End: 2021-12-08

## 2021-11-24 NOTE — PROGRESS NOTES
"Referring Provider: Dr. Odell  Reason for clinic visits: H. pylori infection with antibiotic allergy    HPI: Melvina Martinez is a 61 y.o. female who presents to the infectious disease clinic with above complaint.  The patient states she has had issues with abdominal pain nausea and vomiting since the s.  She states if she overeats she vomits.  She states her GI symptoms got a lot worse at the beginning of October.  Her pain got significantly worse and she was admitted to the hospital at the beginning of October.  HIDA scan at that time was abnormal and the patient underwent cholecystectomy.  Despite this she continued to have symptoms and was readmitted to the hospital from  with abdominal pain nausea and vomiting.  CAT scan of the abdomen pelvis showed gastritis.  She underwent an EGD which confirmed gastritis.  H. pylori testing was positive.  After discharge she follow-up with GI.  Since discharge she has done better.  Instead of having 10 out of 10 abdominal pain she has constant 3 out of 10 epigastric abdominal pain which she describes as \"queasiness\".  She denies any fevers chills or night sweats.  She has about 1 loose bowel movement per day.    Past Medical History:   Diagnosis Date   • Acid reflux    • Anxiety disorder    • Arthritis    • Bipolar disorder (HCC)    • Depression    • Diabetes (HCC)     DOES NOT CHECK BS DAILY   • Hypertension    • Kidney stones    • Limb swelling    • Migraines    • PTSD (post-traumatic stress disorder)    • Rotator cuff tear, left    • Rotator cuff tear, left 6/10/2021   • Stress incontinence        Past Surgical History:   Procedure Laterality Date   • ABDOMINOPLASTY      Tummy tuck   • ANKLE SURGERY     • APPENDECTOMY     •  SECTION     • CHOLECYSTECTOMY N/A 10/6/2021    Procedure: CHOLECYSTECTOMY LAPAROSCOPIC;  Surgeon: Jeancarlos Carrington MD;  Location: [facility] MAIN OR;  Service: General;  Laterality: N/A;   • COLONOSCOPY     • ENDOSCOPY N/A " 10/28/2021    Procedure: ESOPHAGOGASTRODUODENOSCOPY with biopsies;  Surgeon: Luciano Salmon MD;  Location: [facility] ENDOSCOPY;  Service: Gastroenterology;  Laterality: N/A;  pre:  abnormal CT  post:  gastritis, HH,    • EYE SURGERY     • HYSTERECTOMY     • INTRAOCULAR LENS INSERTION     • KNEE SURGERY     • SHOULDER ARTHROSCOPY Left 6/10/2021    Procedure: LEFT SHOULDER ARTHROSCOPY;  Surgeon: Дмитрий Francois MD;  [facility] [facility] [place] [facility]  Service: Orthopedics;  Laterality: Left;   • SHOULDER ROTATOR CUFF REPAIR Left 6/10/2021    Procedure: SUBACROMIAL DECOMPRESSION, DISTAL CLAVICULECTOMY AND ROTATOR CUFF REPAIR;  Surgeon: Дмитрий Francois MD;  [facility] [facility] [place] [facility]  Service: Orthopedics;  Laterality: Left;   • SHOULDER SURGERY Right    • TONSILLECTOMY         Social History   reports that she has never smoked. She has never used smokeless tobacco. She reports current alcohol use. She reports that she does not use drugs.    Family History  family history includes Arthritis in her father; Breast cancer in her mother; Cancer in her mother; Dementia in her father; Diabetes in her father; Heart disease in her father; Stroke in her father.    Allergies   Allergen Reactions   • Penicillins Anaphylaxis and Shortness Of Breath   • Metronidazole Hives and Nausea Only       The medication list has been reviewed and updated.     Review of Systems  Pertinent items are noted in HPI, all other systems reviewed and negative    Vital Signs   Vitals:    11/24/21 1010   BP: 96/66   Pulse: 85   Resp: 18   Temp: 98.7 °F (37.1 °C)       Physical Exam:   General: In no acute distress  HEENT: Normocephalic, atraumatic,  no scleral icterus.   Neck: Supple, trachea is midline  Cardiovascular: Normal rate, regular rhythm, amador S1 and S2, no murmurs, rubs, or gallops    Respiratory: Lungs are clear to ascultation bilaterally   GI: Abdomen is soft, tenderness to palpation in the epigastric area, non-distended, positive bowel sounds  bilaterally  Musculoskeletal:  no edema, tenderness or deformity  Skin: No rashes   LE: no E/C/C  Neurological: Alert and oriented, moving all 4 extremities  Psychiatric: Normal mood and affect     Lab Results   Component Value Date    WBC 6.04 10/29/2021    HGB 13.0 10/29/2021    HCT 38.8 10/29/2021    MCV 87.2 10/29/2021     10/29/2021       Lab Results   Component Value Date    GLUCOSE 102 (H) 10/28/2021    BUN 4 (L) 10/28/2021    CREATININE 0.66 10/28/2021    EGFRIFNONA 91 10/28/2021    BCR 6.1 (L) 10/28/2021    CO2 23.3 10/28/2021    CALCIUM 8.6 10/28/2021    ALBUMIN 3.70 10/28/2021    LABIL2 1.4 04/13/2021    AST 13 10/28/2021    ALT 11 10/28/2021       Lab Results   Component Value Date    SEDRATE 16 04/13/2021       Lab Results   Component Value Date    CRP 9.60 (H) 04/13/2021     10/28 EGD pathology erosive gastropathy with chronic H. pylori    Assessment:  This is a 61 y.o. female who presents to clinic today for evaluation of gastritis due to H. pylori.  We talked extensively about the numerous regimens available.  We are limited as she has confirmed anaphylaxis to penicillin and vomiting with Flagyl.  We will try clarithromycin-based regiment.  Patient will take this for 2 weeks.  We will obtain a H. pylori urea breath test 4 weeks after she has completed her therapy.  The patient was instructed to hold her PPI 2 weeks prior to the test.  Patient and her  voiced understanding    Plan:   1.  Start clarithromycin 500 mg p.o. twice daily, pantoprazole 40 mg p.o. twice daily, tetracycline 500 mg p.o. 4 times a day and bismuth 524 mg p.o. 4 times a day x14 days  2.  Obtain a CBC with differential and CMP today  3.  Obtain H. pylori urea breath test 4 weeks after completing her therapy. (Patient was instructed to hold her PPI 2 weeks prior to the test)    Return to Infectious Disease clinic PRN

## 2021-11-24 NOTE — TELEPHONE ENCOUNTER
Phone with uLis Pharmacist at [facility]. He states they do not have the Tetracycline that Dr. Springer ordered for this patient but they do have the Tetracycline and Pepto combo pill called Pylera. PER DR SPRINGER: Okay to dispense the Pylera for the TCN and Pepto combo. Other meds filled as written today. Pharmacy will notify patient of the change. Same dose, same quantity to be given. MARIBELL, RN

## 2021-11-29 ENCOUNTER — OFFICE VISIT (OUTPATIENT)
Dept: ORTHOPEDIC SURGERY | Facility: CLINIC | Age: 61
End: 2021-11-29

## 2021-11-29 DIAGNOSIS — Z47.89 AFTERCARE FOLLOWING SURGERY OF THE MUSCULOSKELETAL SYSTEM: Primary | ICD-10-CM

## 2021-11-29 PROCEDURE — 99213 OFFICE O/P EST LOW 20 MIN: CPT | Performed by: PHYSICIAN ASSISTANT

## 2021-11-29 NOTE — PATIENT INSTRUCTIONS
Recommend continuation of home exercises and starting PT again to work on range of motion and strengthening.  Patient leaves for [place] at the end of December, plan to follow-up in 4 weeks for recheck.  She states [facility] [place] [facility]

## 2021-11-29 NOTE — PROGRESS NOTES
Chief Complaint  Pain of the Left Shoulder    Subjective          Melvina Martinez presents to [facility] for follow-up on left shoulder status post left shoulder scope with SCD, DCR and mini RCR of the supraspinatus tendon performed by Dr. Francois 6/10/2021.  Patient states she has regressed some due to recent hospitalization.  Initially patient had cholecystectomy, then she has been admitted 2 times since her last visit for epigastric pain and fecal impaction.  She states that she would like to restart physical therapy if possible because she has noted decreased range of motion and a little bit of worsened pain across the anterior aspect of the shoulder and distal clavicle.  Patient states she would like to do a few weeks of therapy before she goes to [place] for the winter.  She states she may be able [facility] in [place] [facility].  Early on in patient's postsurgical course she accidentally reached down to  her 4-year-old granddaughter and had significant pain in the shoulder, this prompted us to order another MRI showing a 0.3 cm partial-thickness bursal surface tear of the distal supraspinatus tendon at the humerus insertion without significant retraction.    Objective   Allergies   Allergen Reactions   • Penicillins Anaphylaxis and Shortness Of Breath   • Metronidazole Hives and Nausea Only       Vital Signs:   There were no vitals taken for this visit.      Physical Exam  Constitutional:       Appearance: Normal appearance. Patient is well-developed and normal weight.   HENT:      Head: Normocephalic.      Right Ear: Hearing and external ear normal.      Left Ear: Hearing and external ear normal.      Nose: Nose normal.   Eyes:      Conjunctiva/sclera: Conjunctivae normal.   Cardiovascular:      Rate and Rhythm: Normal rate.   Pulmonary:      Effort: Pulmonary effort is normal.      Breath sounds: No wheezing or rales.   Abdominal:      Palpations: Abdomen is soft.       Tenderness: There is no abdominal tenderness.   Musculoskeletal:      Cervical back: Normal range of motion.   Skin:     Findings: No rash.   Neurological:      Mental Status: Patient is alert and oriented to person, place, and time.   Psychiatric:         Mood and Affect: Mood and affect normal.         Judgment: Judgment normal.     Ortho Exam  Left shoulder: Skin intact, well-healed surgical incisions, tenderness anteriorly, sensation light touch intact, no swelling, flexion 150 abduction 90 internal rotation to the pocket and external rotation to 65. radial and ulnar pulses 2+, good range of motion left elbow wrist and digits.  Result Review :            Imaging Results (Most Recent)     None                Assessment and Plan    Problem List Items Addressed This Visit        Musculoskeletal and Injuries    Aftercare following surgery of the musculoskeletal system - Primary    Current Assessment & Plan     Recommend continuation of home exercises and starting PT again to work on range of motion and strengthening.  Patient leaves for [place] at the end of December, plan to follow-up in 4 weeks for recheck.  She states she may be [facility] [place] [facility]           Relevant Orders    Ambulatory Referral to Physical Therapy Evaluate and treat, POST OP; Stretching, ROM, Strengthening          Follow Up   Return in about 4 weeks (around 12/27/2021) for Recheck.  Patient Instructions   Recommend continuation of home exercises and starting PT again to work on range of motion and strengthening.  Patient leaves for [place] at the end of December, plan to follow-up in 4 weeks for recheck.  She states she may be able to [facility] [place] [facility]      Patient was given instructions and counseling regarding her condition or for health maintenance advice. Please see specific information pulled into the AVS if appropriate.

## 2021-12-01 ENCOUNTER — OFFICE VISIT (OUTPATIENT)
Dept: INTERNAL MEDICINE | Facility: CLINIC | Age: 61
End: 2021-12-01

## 2021-12-01 VITALS
BODY MASS INDEX: 35.96 KG/M2 | HEART RATE: 89 BPM | DIASTOLIC BLOOD PRESSURE: 75 MMHG | SYSTOLIC BLOOD PRESSURE: 128 MMHG | WEIGHT: 203 LBS | TEMPERATURE: 97.2 F | OXYGEN SATURATION: 99 %

## 2021-12-01 DIAGNOSIS — R11.2 NAUSEA AND VOMITING, INTRACTABILITY OF VOMITING NOT SPECIFIED, UNSPECIFIED VOMITING TYPE: Primary | ICD-10-CM

## 2021-12-01 PROCEDURE — 99213 OFFICE O/P EST LOW 20 MIN: CPT | Performed by: NURSE PRACTITIONER

## 2021-12-02 ENCOUNTER — TELEPHONE (OUTPATIENT)
Dept: INFECTIOUS DISEASES | Facility: CLINIC | Age: 61
End: 2021-12-02

## 2021-12-02 DIAGNOSIS — R19.7 DIARRHEA, UNSPECIFIED TYPE: Primary | ICD-10-CM

## 2021-12-02 PROBLEM — R11.2 NAUSEA AND VOMITING: Status: ACTIVE | Noted: 2021-12-02

## 2021-12-02 NOTE — TELEPHONE ENCOUNTER
Patient called and stated that She was seen yesterday by her PCP about her nausea and vomiting from her current meds that were prescribed by you. The PCP is wanting to know if Zofran is ok to take. PCP notes are in Epic regarding her visit yesterday. Please advise. Thanks Lucretia    Call back # For Patient [phone]    [facility] [phone]

## 2021-12-02 NOTE — PROGRESS NOTES
Chief Complaint  Diarrhea, Dizziness (when standing up or straightening up. about a week ), and Other (winded about a week )    Subjective         Melvina Martinez presents to [facility] for Nausea, diarrhea, vomiting, and some dizziness.  Patient reports that for the last week she has had the symptoms.  Patient has been on a regimen of medication to eradicate H. pylori, she does have allergies to penicillin and Flagyl.  Symptoms started after she started this regimen.  She does not have any other significant complaints.  Patient does report that she is drinking adequate fluids, but she is having 7-8 episodes of diarrhea daily, she does have approximately 2 episodes of vomiting daily most days.  She is able to eat most days, is eating more of a bland diet.  Patient does live at home with her .         Review of Systems   Constitutional: Positive for fatigue. Negative for chills and fever.   HENT: Negative for congestion, sinus pressure and sore throat.    Respiratory: Positive for shortness of breath. Negative for cough.    Gastrointestinal: Positive for diarrhea, nausea and vomiting. Negative for abdominal distention, abdominal pain, blood in stool and constipation.   Neurological: Positive for dizziness and light-headedness. Negative for weakness and headaches.       Objective   Vital Signs:   /75   Pulse 89   Temp 97.2 °F (36.2 °C)   Wt 92.1 kg (203 lb)   SpO2 99%   BMI 35.96 kg/m²     Physical Exam  Vitals and nursing note reviewed.   Constitutional:       Appearance: Normal appearance. She is normal weight.   HENT:      Head: Normocephalic and atraumatic.      Mouth/Throat:      Mouth: Mucous membranes are moist.      Pharynx: Oropharynx is clear.   Eyes:      Conjunctiva/sclera: Conjunctivae normal.      Pupils: Pupils are equal, round, and reactive to light.   Cardiovascular:      Rate and Rhythm: Normal rate and regular rhythm.   Pulmonary:      Effort: Pulmonary  effort is normal.      Breath sounds: Normal breath sounds.   Skin:     General: Skin is warm and dry.   Neurological:      General: No focal deficit present.      Mental Status: She is alert.   Psychiatric:         Mood and Affect: Mood normal.         Thought Content: Thought content normal.        Result Review :                   Diagnoses and all orders for this visit:    1. Nausea and vomiting, intractability of vomiting not specified, unspecified vomiting type (Primary)  Assessment & Plan:  I feel that likely her symptoms are related to her medication regimen.  I will reach out to Dr. Springer, see if she has any recommendations at this time.  Patient does have 1 more week of medication therapy remaining.  Patient will also call their office in the morning, we are wanting to see if it would be okay to use Zofran for her symptoms.  I did advise patient to continue to ensure she is drinking adequate fluids.  Her  who is at home with her should be able to assist her, I did advise that at this time it would be best for her to not be doing anything strenuous.  I will follow up with her tomorrow via telephone call and see how things are going.          Follow Up   No follow-ups on file.  Patient was given instructions and counseling regarding her condition or for health maintenance advice. Please see specific information pulled into the AVS if appropriate.     YOLANDA Velez  12/2/2021  This note was electronically signed.

## 2021-12-02 NOTE — ASSESSMENT & PLAN NOTE
I feel that likely her symptoms are related to her medication regimen.  I will reach out to Dr. Springer, see if she has any recommendations at this time.  Patient does have 1 more week of medication therapy remaining.  Patient will also call their office in the morning, we are wanting to see if it would be okay to use Zofran for her symptoms.  I did advise patient to continue to ensure she is drinking adequate fluids.  Her  who is at home with her should be able to assist her, I did advise that at this time it would be best for her to not be doing anything strenuous.  I will follow up with her tomorrow via telephone call and see how things are going.

## 2021-12-03 DIAGNOSIS — R19.7 DIARRHEA, UNSPECIFIED TYPE: ICD-10-CM

## 2021-12-03 LAB
027 TOXIN: NORMAL
C DIFF TOX GENS STL QL NAA+PROBE: NEGATIVE

## 2021-12-03 PROCEDURE — 87493 C DIFF AMPLIFIED PROBE: CPT | Performed by: NURSE PRACTITIONER

## 2021-12-03 RX ORDER — ONDANSETRON 4 MG/1
4 TABLET, FILM COATED ORAL EVERY 8 HOURS PRN
Qty: 20 TABLET | Refills: 0 | Status: SHIPPED | OUTPATIENT
Start: 2021-12-03 | End: 2021-12-21 | Stop reason: SDUPTHER

## 2021-12-03 NOTE — TELEPHONE ENCOUNTER
"Spoke w/pt who states that she going to take stool specimen to lab at this time; she states she is having about 7 BM's qd and that if she is not eating, she is nauseous and vomitin; she states, \"it feels like I have rocks in my stomach, but I'm muscling through\".  I asked the patient if she had started taking her zofran. Patient states she is going to be picking it up today as well.  Informed her that I will keep an eye on her c.diff test results and if the zofran does not seem to help after taking for a couple of days that we can possibly try phenergan.    "

## 2021-12-05 ENCOUNTER — PREP FOR SURGERY (OUTPATIENT)
Dept: OTHER | Facility: HOSPITAL | Age: 61
End: 2021-12-05

## 2021-12-05 DIAGNOSIS — Z12.11 ENCOUNTER FOR SCREENING FOR MALIGNANT NEOPLASM OF COLON: Primary | ICD-10-CM

## 2021-12-05 RX ORDER — SODIUM CHLORIDE, SODIUM LACTATE, POTASSIUM CHLORIDE, CALCIUM CHLORIDE 600; 310; 30; 20 MG/100ML; MG/100ML; MG/100ML; MG/100ML
30 INJECTION, SOLUTION INTRAVENOUS CONTINUOUS
Status: CANCELLED | OUTPATIENT
Start: 2021-12-05

## 2021-12-08 ENCOUNTER — TELEPHONE (OUTPATIENT)
Dept: INTERNAL MEDICINE | Facility: CLINIC | Age: 61
End: 2021-12-08

## 2021-12-08 NOTE — TELEPHONE ENCOUNTER
Caller: Melvina Martinez    Relationship: Self    Best call back number: [phone] OKAY TO LEAVE A MESSAGE ON VOICEMAIL    Requested Prescriptions:       pantoprazole (Protonix) 40 MG EC tablet   ONE TIME DAILY WITH A 90 DAY SUPPLY IS WHAT PATIENT IS REQUESTING    Pharmacy where request should be sent: [facility] [place] [facility] [place] - [address] [phone]  - [phone] FX     Additional details provided by patient: PATIENT WANTS A PRESCRIPTION FOR pantoprazole (Protonix) 40 MG EC tablet ONE TIME DAILY WITH A 90 DAY QTY PER PATIENT    Janet Gutiérrez Rep   12/08/21 08:54 EST

## 2021-12-11 RX ORDER — PANTOPRAZOLE SODIUM 40 MG/1
40 TABLET, DELAYED RELEASE ORAL 2 TIMES DAILY
Qty: 60 TABLET | Refills: 3 | Status: SHIPPED | OUTPATIENT
Start: 2021-12-11 | End: 2022-01-11 | Stop reason: SDUPTHER

## 2021-12-13 ENCOUNTER — TELEPHONE (OUTPATIENT)
Dept: INTERNAL MEDICINE | Facility: CLINIC | Age: 61
End: 2021-12-13

## 2021-12-13 ENCOUNTER — OFFICE VISIT (OUTPATIENT)
Dept: INTERNAL MEDICINE | Facility: CLINIC | Age: 61
End: 2021-12-13

## 2021-12-13 VITALS
DIASTOLIC BLOOD PRESSURE: 78 MMHG | OXYGEN SATURATION: 97 % | WEIGHT: 208 LBS | HEART RATE: 87 BPM | HEIGHT: 63 IN | BODY MASS INDEX: 36.86 KG/M2 | TEMPERATURE: 97.8 F | RESPIRATION RATE: 18 BRPM | SYSTOLIC BLOOD PRESSURE: 124 MMHG

## 2021-12-13 DIAGNOSIS — M25.561 ACUTE PAIN OF RIGHT KNEE: ICD-10-CM

## 2021-12-13 DIAGNOSIS — N17.9 AKI (ACUTE KIDNEY INJURY) (HCC): ICD-10-CM

## 2021-12-13 DIAGNOSIS — E11.9 TYPE 2 DIABETES MELLITUS WITHOUT COMPLICATION, WITHOUT LONG-TERM CURRENT USE OF INSULIN (HCC): ICD-10-CM

## 2021-12-13 DIAGNOSIS — A04.8 H. PYLORI INFECTION: Primary | ICD-10-CM

## 2021-12-13 DIAGNOSIS — K29.70 GASTRITIS, PRESENCE OF BLEEDING UNSPECIFIED, UNSPECIFIED CHRONICITY, UNSPECIFIED GASTRITIS TYPE: Chronic | ICD-10-CM

## 2021-12-13 DIAGNOSIS — Z79.899 HIGH RISK MEDICATION USE: ICD-10-CM

## 2021-12-13 DIAGNOSIS — R11.2 NAUSEA AND VOMITING, INTRACTABILITY OF VOMITING NOT SPECIFIED, UNSPECIFIED VOMITING TYPE: ICD-10-CM

## 2021-12-13 LAB
ANION GAP SERPL CALCULATED.3IONS-SCNC: 6.6 MMOL/L (ref 5–15)
BUN SERPL-MCNC: 18 MG/DL (ref 8–23)
BUN/CREAT SERPL: 19.1 (ref 7–25)
CALCIUM SPEC-SCNC: 9.4 MG/DL (ref 8.6–10.5)
CHLORIDE SERPL-SCNC: 106 MMOL/L (ref 98–107)
CO2 SERPL-SCNC: 24.4 MMOL/L (ref 22–29)
CREAT SERPL-MCNC: 0.94 MG/DL (ref 0.57–1)
GFR SERPL CREATININE-BSD FRML MDRD: 61 ML/MIN/1.73
GLUCOSE SERPL-MCNC: 81 MG/DL (ref 65–99)
HBA1C MFR BLD: 5.4 % (ref 4.8–5.6)
POTASSIUM SERPL-SCNC: 4.8 MMOL/L (ref 3.5–5.2)
SODIUM SERPL-SCNC: 137 MMOL/L (ref 136–145)

## 2021-12-13 PROCEDURE — 99214 OFFICE O/P EST MOD 30 MIN: CPT | Performed by: NURSE PRACTITIONER

## 2021-12-13 PROCEDURE — 83036 HEMOGLOBIN GLYCOSYLATED A1C: CPT | Performed by: NURSE PRACTITIONER

## 2021-12-13 PROCEDURE — 36415 COLL VENOUS BLD VENIPUNCTURE: CPT | Performed by: NURSE PRACTITIONER

## 2021-12-13 PROCEDURE — 80305 DRUG TEST PRSMV DIR OPT OBS: CPT | Performed by: NURSE PRACTITIONER

## 2021-12-13 PROCEDURE — 80048 BASIC METABOLIC PNL TOTAL CA: CPT | Performed by: NURSE PRACTITIONER

## 2021-12-13 RX ORDER — LANCETS 30 GAUGE
2 EACH MISCELLANEOUS 2 TIMES DAILY
Qty: 100 EACH | Refills: 1 | Status: SHIPPED | OUTPATIENT
Start: 2021-12-13

## 2021-12-13 NOTE — PROGRESS NOTES
"Chief Complaint  Follow up nausea/vomiting/gastritis/h. pylori    Subjective          Melvina Martinez presents to [facility]  History of Present Illness   Melvina Martinez presents today for follow up on OSMANY status post scope with GI. Recent labs reviewed from 11/24/2021 showed normal CBC, slightly low GFR of 57. The patient's baseline is in the 90s. Creatinine normal at that time.    Nausea.   The patient states she continues to experience nausea which is triggered by no particular food, despite her taking the anti-nausea medication as directed. After the procedure, she was informed she has a normal esophagus, gastritis, and there was evidence of H. pylori.  She has completed 2-week antibiotic course.  At this point she has been off of the PPI for approximately 1 week.  She has repeat H. pylori testing coming up in 1 week and then follow-up scheduled with GI    She also complains of right knee pain after recent fall.  She reports initial swelling that has improved at this time.  She reports constant pain with walking    Objective    Review of Systems   Gastrointestinal: Positive for nausea.   Musculoskeletal:        +Right knee pain.     Vital Signs:   /78 (BP Location: Right arm, Patient Position: Sitting, Cuff Size: Adult)   Pulse 87   Temp 97.8 °F (36.6 °C)   Resp 18   Ht 160 cm (63\")   Wt 94.3 kg (208 lb)   SpO2 97%   BMI 36.85 kg/m²     Physical Exam  Vitals and nursing note reviewed.   Constitutional:       General: She is not in acute distress.     Appearance: Normal appearance.   HENT:      Head: Normocephalic and atraumatic.      Right Ear: External ear normal.      Left Ear: External ear normal.      Nose: Nose normal.      Mouth/Throat:      Mouth: Mucous membranes are moist.   Eyes:      Conjunctiva/sclera: Conjunctivae normal.   Cardiovascular:      Rate and Rhythm: Normal rate and regular rhythm.      Pulses: Normal pulses.      Heart sounds: " Normal heart sounds. No murmur heard.  No friction rub. No gallop.    Pulmonary:      Effort: Pulmonary effort is normal. No respiratory distress.      Breath sounds: No wheezing, rhonchi or rales.   Musculoskeletal:      Cervical back: Neck supple.      Comments: Right medial joint line pain.   Skin:     General: Skin is warm and dry.   Neurological:      General: No focal deficit present.      Mental Status: She is alert and oriented to person, place, and time.   Psychiatric:         Mood and Affect: Mood normal.         Behavior: Behavior normal.        Result Review :                 Assessment and Plan    Diagnoses and all orders for this visit:    1. H. pylori infection (Primary)    2. Nausea and vomiting, intractability of vomiting not specified, unspecified vomiting type    3. Gastritis, presence of bleeding unspecified, unspecified chronicity, unspecified gastritis type        -     She will follow up with GI scheduled on 12/21/2021.     4. OSMANY (acute kidney injury) (HCC)        -     We will check a BMP today in the office.  -     Basic metabolic panel    5. Acute pain of right knee        -     We will obtain an x-ray in the office today.  -     XR Knee 3 View Right    6. Type 2 diabetes mellitus without complication, without long-term current use of insulin (HCC)        -     We will check A1c and refill meds. She will continue to monitor blood sugars at home for gastritis.   -     Hemoglobin A1c    7. High risk medication use  -     POC Urine Drug Screen [facility]    Other orders  -     empagliflozin (Jardiance) 10 MG tablet tablet; Take 1 tablet by mouth Daily.  Dispense: 90 tablet; Refill: 1  -     glucose blood test strip; Use as instructed  Dispense: 100 each; Refill: 1  -     Lancets misc; 2 each 2 (Two) Times a Day.  Dispense: 100 each; Refill: 1        Follow Up   No follow-ups on file.  Patient was given instructions and counseling regarding her condition or for health maintenance advice.  Please see specific information pulled into the AVS if appropriate.

## 2021-12-13 NOTE — TELEPHONE ENCOUNTER
Caller: Melvina Martinez    Relationship: Self    Best call back number: [phone]    What is the best time to reach you: ANYTIME    Who are you requesting to speak with (clinical staff, provider,  specific staff member): NURSE     What was the call regarding: PATIENT HAS A QUESTION ABOUT SOMETHING SHE HAS SEEN ON HER MY CHART. - HIGH RISK MEDICATION USE. - WHAT DOES THAT MEAN.     Do you require a callback: YES

## 2021-12-14 ENCOUNTER — TELEPHONE (OUTPATIENT)
Dept: INTERNAL MEDICINE | Facility: CLINIC | Age: 61
End: 2021-12-14

## 2021-12-14 NOTE — TELEPHONE ENCOUNTER
"Red rule verified and correct.    Pt concerned that her last OV mentioned a UDS and High risk medication     \"7. High risk medication use  -     POC Urine Drug Screen [facility]-Cup\"    States she does not use high risk medication.    Explained the federal and state laws requiring monitoring.    Pt upset as it makes her soul like a drug abuser.    Her POC UDS does not have \"negative\" by it,  she cannot see a result.    Pt was better by the end of the call.  Would like a call or MCM back from YOLANDA Mar  "

## 2021-12-14 NOTE — TELEPHONE ENCOUNTER
Hub staff attempted to follow warm transfer process and was unsuccessful     Caller: Melvina Martinez A    Relationship to patient: Self    Best call back number: [phone]     Patient is needing: PATIENT IS CALLING BACK AGAIN WANTING TO DISCUSS SOMETHING SHE SAW ON HER PROGRESS NOTES ON HER MYCHART. PATIENT WOULD LIKE A CALL AS SOON AS POSSIBLE BECAUSE SHE IS CONCERNED.

## 2021-12-21 ENCOUNTER — OFFICE VISIT (OUTPATIENT)
Dept: GASTROENTEROLOGY | Facility: CLINIC | Age: 61
End: 2021-12-21

## 2021-12-21 ENCOUNTER — LAB (OUTPATIENT)
Dept: LAB | Facility: HOSPITAL | Age: 61
End: 2021-12-21

## 2021-12-21 ENCOUNTER — TELEPHONE (OUTPATIENT)
Dept: GASTROENTEROLOGY | Facility: CLINIC | Age: 61
End: 2021-12-21

## 2021-12-21 VITALS — BODY MASS INDEX: 35.37 KG/M2 | HEIGHT: 63 IN | TEMPERATURE: 97.3 F | WEIGHT: 199.6 LBS

## 2021-12-21 DIAGNOSIS — K29.70 HELICOBACTER PYLORI GASTRITIS: Primary | ICD-10-CM

## 2021-12-21 DIAGNOSIS — A04.8 H. PYLORI INFECTION: ICD-10-CM

## 2021-12-21 DIAGNOSIS — B96.81 HELICOBACTER PYLORI GASTRITIS: Primary | ICD-10-CM

## 2021-12-21 DIAGNOSIS — Z12.11 ENCOUNTER FOR SCREENING FOR MALIGNANT NEOPLASM OF COLON: ICD-10-CM

## 2021-12-21 PROCEDURE — 83013 H PYLORI (C-13) BREATH: CPT

## 2021-12-21 PROCEDURE — 99214 OFFICE O/P EST MOD 30 MIN: CPT | Performed by: PHYSICIAN ASSISTANT

## 2021-12-21 RX ORDER — ONDANSETRON 4 MG/1
4 TABLET, FILM COATED ORAL EVERY 8 HOURS PRN
Qty: 45 TABLET | Refills: 1 | Status: SHIPPED | OUTPATIENT
Start: 2021-12-21 | End: 2022-04-08

## 2021-12-21 RX ORDER — SUCRALFATE 1 G/1
1 TABLET ORAL
Qty: 120 TABLET | Refills: 1 | Status: SHIPPED | OUTPATIENT
Start: 2021-12-21 | End: 2022-04-08

## 2021-12-21 NOTE — TELEPHONE ENCOUNTER
mark Potts for 04/07 arrive at 830-cs/patient procedure packet was given to pt in office on 12/21 pt was advised time of arrival is subject to change, [facility] will call for w/finale time

## 2021-12-21 NOTE — PROGRESS NOTES
"Chief Complaint  helicobacter pylori gastritis    Subjective          History of Present Illness    Melvina Martinez is a  61 y.o. female presents for H. pylori gastritis.  She is a patient of Dr. Odell.  Last seen by me on 11/16/21.    She was referred to ID for H. pylori gastritis given she has allergy to penicillin--throat closing off. Flagyl caused intolerance due to severe abdominal pain and profuse vomiting. She was taking sucralfate QID and Pantoprazole 40mg BID.   She is currently off both of these in preparation for repeat H. pylori breath test.    She saw ID on 11/24/2021 and they started her on course of clarithromycin, tetracycline, bismuth, and pantoprazole.  She is pending repeat of H. pylori breath test for test of cure.    Today she reports overall improvement in her symptoms.  She does have some nausea mostly when her stomach feels empty.  She admits to early satiety and occasional vomiting.  The vomiting occurs when she eats too much. She denies abdominal pain, melena, weight loss, diarrhea, dysphagia.      EGD with Dr. Salmon on 10/28/2021 showed gastritis.  Pathology showed erosive gastropathy.  She was positive for H. pylori gastritis.     Last CN at age 51. Polyps per patient. No [facility]. With Dr. Odell.     Objective   Vital Signs:   Temp 97.3 °F (36.3 °C)   Ht 160 cm (63\")   Wt 90.5 kg (199 lb 9.6 oz)   BMI 35.36 kg/m²       Physical Exam  Vitals reviewed.   Constitutional:       General: She is awake. She is not in acute distress.     Appearance: Normal appearance. She is well-developed and well-groomed.   HENT:      Head: Normocephalic and atraumatic.   Pulmonary:      Effort: Pulmonary effort is normal. No respiratory distress.   Skin:     Coloration: Skin is not pale.   Neurological:      Mental Status: She is alert and oriented to person, place, and time.      Gait: Gait normal.   Psychiatric:         Mood and Affect: Mood and affect normal.         Speech: Speech normal.         " Behavior: Behavior is cooperative.         Judgment: Judgment normal.          Result Review :   The following data was reviewed by: Cheyanne Blevins PA-C on 12/21/2021:  CMP    CMP 10/28/21 11/24/21 12/13/21   Glucose 102 (A) 82 81   BUN 4 (A) 16 18   Creatinine 0.66 1.00 0.94   eGFR Non  Am 91 57 (A) 61   Sodium 142 140 137   Potassium 3.6 4.6 4.8   Chloride 113 (A) 109 (A) 106   Calcium 8.6 9.0 9.4   Albumin 3.70 4.10    Total Bilirubin 0.2 0.2    Alkaline Phosphatase 69 77    AST (SGOT) 13 12    ALT (SGPT) 11 12    (A) Abnormal value            CBC w/diff    CBC w/Diff 10/28/21 10/29/21 11/24/21   WBC 5.61 6.04 6.11   RBC 4.52 4.45 4.50   Hemoglobin 12.9 13.0 13.3   Hematocrit 41.8 38.8 41.1   MCV 92.5 87.2 91.3   MCH 28.5 29.2 29.6   MCHC 30.9 (A) 33.5 32.4   RDW 13.9 13.4 13.6   Platelets 199 203 204   Neutrophil Rel % 48.6  45.7   Immature Granulocyte Rel % 0.2  0.3   Lymphocyte Rel % 37.3  39.1   Monocyte Rel % 7.5  7.7   Eosinophil Rel % 5.7  6.4 (A)   Basophil Rel % 0.7  0.8   (A) Abnormal value                  Assessment and Plan    Diagnoses and all orders for this visit:    1. Helicobacter pylori gastritis (Primary)    2. Encounter for screening for malignant neoplasm of colon  -     Case Request; Standing  -     Case Request    Other orders  -     ondansetron (Zofran) 4 MG tablet; Take 1 tablet by mouth Every 8 (Eight) Hours As Needed for Nausea or Vomiting.  Dispense: 45 tablet; Refill: 1  -     sucralfate (CARAFATE) 1 g tablet; Take 1 tablet by mouth 4 (Four) Times a Day Before Meals & at Bedtime.  Dispense: 120 tablet; Refill: 1      Has order from ID to do H.Pulori breath test today.  Restart Pantoprazole 40mg BID after completing breath test.  If negative breath test and symptomatic after 1 month of restarting pantoprazole, call office OR make appt with GI in [place].     Will be in [place] from Jan1 through April.  May need to follow-up with GI there if symptoms are persistent.  I did give  her a refill of sucralfate and Zofran to have on hand in [place] if she should need it.    Would recommend possible repeat EGD and/or GES if remaining symptoms are persistent despite negative H. pylori breath test.    Follow Up   Return if symptoms worsen or fail to improve.    Dragon dictation used throughout this note.     Cheyanne Blevins PA-C

## 2021-12-22 LAB — UREA BREATH TEST QL: NEGATIVE

## 2021-12-27 ENCOUNTER — OFFICE VISIT (OUTPATIENT)
Dept: ORTHOPEDIC SURGERY | Facility: CLINIC | Age: 61
End: 2021-12-27

## 2021-12-27 VITALS — HEIGHT: 63 IN | BODY MASS INDEX: 35.26 KG/M2 | HEART RATE: 78 BPM | WEIGHT: 199 LBS | OXYGEN SATURATION: 97 %

## 2021-12-27 DIAGNOSIS — Z47.89 AFTERCARE FOLLOWING SURGERY OF THE MUSCULOSKELETAL SYSTEM: Primary | ICD-10-CM

## 2021-12-27 PROCEDURE — 99213 OFFICE O/P EST LOW 20 MIN: CPT | Performed by: PHYSICIAN ASSISTANT

## 2021-12-27 RX ORDER — AZITHROMYCIN 250 MG/1
TABLET, FILM COATED ORAL
COMMUNITY
Start: 2021-12-23 | End: 2022-04-08

## 2021-12-27 NOTE — PROGRESS NOTES
"Chief Complaint  Follow-up of the Left Shoulder    Subjective          Melvina Martinez presents to [facility] for follow-up on left shoulder status post left shoulder SCD, DCR and mini RCR of the supraspinatus tendon performed by Dr. Francois 6/10/2021.  Patient has been doing some PT to help with range of motion and strengthening and she still has a lot of limitation with movement.  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  She is had 21 physical therapy visits and states she realizes she still needs therapy to strengthen the shoulder.  She is going to [place] until April.  She has been taking Motrin for pain relief.  Early on postoperatively patient reached down to  her 4-year-old granddaughter and had significant pain in the shoulder which prompted us to order an MRI showing a 0.3 cm partial-thickness bursal surface tear of the distal supraspinatus tendon at the humerus insertion without significant retraction.  Patient also states she had some regression in range of motion and strength due to hospitalization for cholecystectomy.  She had terrible pain and fecal impaction following this which kept her out of therapy for several weeks.    Objective   Allergies   Allergen Reactions   • Penicillins Anaphylaxis and Shortness Of Breath   • Metronidazole Hives and Nausea Only       Vital Signs:   Pulse 78   Ht 160 cm (63\")   Wt 90.3 kg (199 lb)   SpO2 97%   BMI 35.25 kg/m²       Physical Exam  Constitutional:       Appearance: Normal appearance. Patient is well-developed and normal weight.   HENT:      Head: Normocephalic.      Right Ear: Hearing and external ear normal.      Left Ear: Hearing and external ear normal.      Nose: Nose normal.   Eyes:      Conjunctiva/sclera: Conjunctivae normal.   Cardiovascular:      Rate and Rhythm: Normal rate.   Pulmonary:      Effort: Pulmonary effort is normal.      Breath " sounds: No wheezing or rales.   Abdominal:      Palpations: Abdomen is soft.      Tenderness: There is no abdominal tenderness.   Musculoskeletal:      Cervical back: Normal range of motion.   Skin:     Findings: No rash.   Neurological:      Mental Status: Patient is alert and oriented to person, place, and time.   Psychiatric:         Mood and Affect: Mood and affect normal.         Judgment: Judgment normal.     Ortho Exam  Left shoulder: Skin intact, well-healed surgical incisions, tenderness about the humeral head, no swelling, active flexion 110 abduction is 74 and external rotation to 70.  Passive flexion 140 and abduction 140.  Strength 2 out of 5 in the left shoulder when compared to 4 out of 5 in the right with resisted shoulder flexion and extension.  Sensation is intact and radial pulse 2+, good range of motion left elbow wrist and digits.  Result Review :            Imaging Results (Most Recent)     None                Assessment and Plan    Problem List Items Addressed This Visit        Musculoskeletal and Injuries    Aftercare following surgery of the musculoskeletal system - Primary    Current Assessment & Plan     Patient has made some improvement, she plans to discontinue PT in [place] as she is going to [place] until April.  Recommend continuation of home exercises and strengthening while in [place].  Discussed importance of maintaining range of motion and avoiding frozen shoulder.  Continue Motrin for pain relief.  Follow-up once he returned to [place] for recheck.               Follow Up   Return if symptoms worsen or fail to improve.  Patient Instructions   Patient has made some improvement, she plans to discontinue PT in [place] as she is going to [place] until April.  Recommend continuation of home exercises and strengthening while in [place].  Discussed importance of maintaining range of motion and avoiding frozen shoulder.  Continue Motrin for pain relief.  Follow-up once he returned to  [place] for recheck.    Patient was given instructions and counseling regarding her condition or for health maintenance advice. Please see specific information pulled into the AVS if appropriate.

## 2021-12-27 NOTE — PATIENT INSTRUCTIONS
Patient has made some improvement, she plans to discontinue PT in [place] as she is going to [place] until April.  Recommend continuation of home exercises and strengthening while in [place].  Discussed importance of maintaining range of motion and avoiding frozen shoulder.  Continue Motrin for pain relief.  Follow-up once he returned to [place] for recheck.

## 2022-01-11 NOTE — TELEPHONE ENCOUNTER
Caller: Melvina Martinez    Relationship: Self    Best call back number: [phone]    Requested Prescriptions:   Requested Prescriptions     Pending Prescriptions Disp Refills   • pantoprazole (Protonix) 40 MG EC tablet 60 tablet 3     Sig: Take 1 tablet by mouth 2 (Two) Times a Day.      Pharmacy where request should be sent: [id number] [address] [facility] [phone] [facility] [phone] FX     Additional details provided by patient: PATIENT IS IN [place] UNTIL April. SHE WOULD LIKE A 90 DAY SUPPLY.    Does the patient have less than a 3 day supply:  [x] Yes  [] No    Janet Zimmerman Rep   01/11/22 08:57 EST

## 2022-01-13 RX ORDER — PANTOPRAZOLE SODIUM 40 MG/1
40 TABLET, DELAYED RELEASE ORAL 2 TIMES DAILY
Qty: 180 TABLET | Refills: 0 | Status: SHIPPED | OUTPATIENT
Start: 2022-01-13 | End: 2022-04-13

## 2022-02-15 RX ORDER — DULOXETIN HYDROCHLORIDE 30 MG/1
30 CAPSULE, DELAYED RELEASE ORAL DAILY
Qty: 90 CAPSULE | Refills: 1 | Status: SHIPPED | OUTPATIENT
Start: 2022-02-15 | End: 2022-05-31 | Stop reason: SDUPTHER

## 2022-02-15 RX ORDER — AMLODIPINE BESYLATE 2.5 MG/1
2.5 TABLET ORAL DAILY
Qty: 90 TABLET | Refills: 1 | Status: SHIPPED | OUTPATIENT
Start: 2022-02-15 | End: 2022-05-31 | Stop reason: SDUPTHER

## 2022-02-15 NOTE — TELEPHONE ENCOUNTER
Caller: Melvina Martinez    Relationship: Self    Best call back number:[phone]    Requested Prescriptions:   Requested Prescriptions     Pending Prescriptions Disp Refills   • amLODIPine (NORVASC) 2.5 MG tablet       Sig: Take 1 tablet by mouth Daily.   • DULoxetine (Cymbalta) 30 MG capsule 90 capsule 0     Sig: Take 1 capsule by mouth Daily.        Pharmacy where request should be sent: [facility] [address] [place] [address] [address] [facility] [address]    Additional details provided by patient: PATIENT IS REQUESTING A 90 DAY SUPPLY ON BOTH PRESCRIPTIONS      Does the patient have less than a 3 day supply:  [] Yes  [x] No    Janet Cm Rep   02/15/22 08:48 EST

## 2022-02-15 NOTE — TELEPHONE ENCOUNTER
Sent medication to pharmacy requested. I looked into [place] and [facility] has filled both medications before in [place].

## 2022-04-06 ENCOUNTER — TELEPHONE (OUTPATIENT)
Dept: GASTROENTEROLOGY | Facility: CLINIC | Age: 62
End: 2022-04-06

## 2022-04-06 ENCOUNTER — TELEPHONE (OUTPATIENT)
Dept: INTERNAL MEDICINE | Facility: CLINIC | Age: 62
End: 2022-04-06

## 2022-04-06 RX ORDER — QUETIAPINE FUMARATE 100 MG/1
100 TABLET, FILM COATED ORAL NIGHTLY
COMMUNITY
End: 2022-04-08 | Stop reason: SDUPTHER

## 2022-04-06 RX ORDER — TRAZODONE HYDROCHLORIDE 100 MG/1
100 TABLET ORAL NIGHTLY
COMMUNITY

## 2022-04-06 RX ORDER — ONDANSETRON 4 MG/1
4 TABLET, FILM COATED ORAL EVERY 8 HOURS PRN
Qty: 10 TABLET | Refills: 0 | Status: SHIPPED | OUTPATIENT
Start: 2022-04-06

## 2022-04-06 RX ORDER — TAMOXIFEN CITRATE 10 MG/1
20 TABLET ORAL DAILY
COMMUNITY
Start: 2022-01-24 | End: 2022-05-03 | Stop reason: SDDI

## 2022-04-06 NOTE — TELEPHONE ENCOUNTER
----- Message from Janet Polk sent at 4/6/2022 12:19 PM EDT -----  Regarding: Prep  Contact: [phone]  Pt states she is doing prep for colonoscopy tomorrow, Prep is making her so sick to her stomach she wants to know if you can send some zofran to the drugstore for her ? [facility] [place] - [address] - [phone]  - [phone] FX   [address] [place] [address]   Phone:  [phone]  Fax:  [phone]

## 2022-04-06 NOTE — TELEPHONE ENCOUNTER
I sent in a prescription for the Zofran.  She can also drink some Ensure clear today that usually helps as well with the nausea and preventing dehydration.  It will not mess with her prep.  But it has to be the Ensure clear.

## 2022-04-06 NOTE — TELEPHONE ENCOUNTER
Caller: Melvina Martinez    Relationship: Self    Best call back number: [phone]    What medication are you requesting: ZOFRAN     What are your current symptoms: UPSET STOMACH     Have you had these symptoms before:    [] Yes  [x] No    Have you been treated for these symptoms before:   [] Yes  [x] No    If a prescription is needed, what is your preferred pharmacy and phone number:      [facility]  [place] [place] - [address] [facility] - [phone] [facility] [phone]

## 2022-04-07 ENCOUNTER — HOSPITAL ENCOUNTER (OUTPATIENT)
Facility: HOSPITAL | Age: 62
Setting detail: HOSPITAL OUTPATIENT SURGERY
Discharge: HOME | End: 2022-04-07
Attending: INTERNAL MEDICINE | Admitting: INTERNAL MEDICINE

## 2022-04-07 ENCOUNTER — ANESTHESIA (OUTPATIENT)
Dept: GASTROENTEROLOGY | Facility: HOSPITAL | Age: 62
End: 2022-04-07

## 2022-04-07 ENCOUNTER — ANESTHESIA EVENT (OUTPATIENT)
Dept: GASTROENTEROLOGY | Facility: HOSPITAL | Age: 62
End: 2022-04-07

## 2022-04-07 VITALS
HEIGHT: 63 IN | DIASTOLIC BLOOD PRESSURE: 77 MMHG | BODY MASS INDEX: 35.08 KG/M2 | OXYGEN SATURATION: 99 % | SYSTOLIC BLOOD PRESSURE: 141 MMHG | HEART RATE: 54 BPM | RESPIRATION RATE: 16 BRPM | WEIGHT: 198 LBS

## 2022-04-07 DIAGNOSIS — Z12.11 ENCOUNTER FOR SCREENING FOR MALIGNANT NEOPLASM OF COLON: ICD-10-CM

## 2022-04-07 LAB — GLUCOSE BLDC GLUCOMTR-MCNC: 78 MG/DL (ref 70–130)

## 2022-04-07 PROCEDURE — 45385 COLONOSCOPY W/LESION REMOVAL: CPT | Performed by: INTERNAL MEDICINE

## 2022-04-07 PROCEDURE — 82962 GLUCOSE BLOOD TEST: CPT

## 2022-04-07 PROCEDURE — S0260 H&P FOR SURGERY: HCPCS | Performed by: INTERNAL MEDICINE

## 2022-04-07 PROCEDURE — 25010000002 PROPOFOL 10 MG/ML EMULSION: Performed by: NURSE ANESTHETIST, CERTIFIED REGISTERED

## 2022-04-07 PROCEDURE — 88305 TISSUE EXAM BY PATHOLOGIST: CPT | Performed by: INTERNAL MEDICINE

## 2022-04-07 RX ORDER — PROPOFOL 10 MG/ML
VIAL (ML) INTRAVENOUS CONTINUOUS PRN
Status: DISCONTINUED | OUTPATIENT
Start: 2022-04-07 | End: 2022-04-07 | Stop reason: SURG

## 2022-04-07 RX ORDER — ONDANSETRON 2 MG/ML
4 INJECTION INTRAMUSCULAR; INTRAVENOUS ONCE AS NEEDED
Status: DISCONTINUED | OUTPATIENT
Start: 2022-04-07 | End: 2022-04-07 | Stop reason: HOSPADM

## 2022-04-07 RX ORDER — SODIUM CHLORIDE, SODIUM LACTATE, POTASSIUM CHLORIDE, CALCIUM CHLORIDE 600; 310; 30; 20 MG/100ML; MG/100ML; MG/100ML; MG/100ML
30 INJECTION, SOLUTION INTRAVENOUS CONTINUOUS PRN
Status: DISCONTINUED | OUTPATIENT
Start: 2022-04-07 | End: 2022-04-07 | Stop reason: HOSPADM

## 2022-04-07 RX ORDER — PROPOFOL 10 MG/ML
VIAL (ML) INTRAVENOUS AS NEEDED
Status: DISCONTINUED | OUTPATIENT
Start: 2022-04-07 | End: 2022-04-07 | Stop reason: SURG

## 2022-04-07 RX ORDER — LIDOCAINE HYDROCHLORIDE 20 MG/ML
INJECTION, SOLUTION INFILTRATION; PERINEURAL AS NEEDED
Status: DISCONTINUED | OUTPATIENT
Start: 2022-04-07 | End: 2022-04-07 | Stop reason: SURG

## 2022-04-07 RX ADMIN — SODIUM CHLORIDE, POTASSIUM CHLORIDE, SODIUM LACTATE AND CALCIUM CHLORIDE 30 ML/HR: 600; 310; 30; 20 INJECTION, SOLUTION INTRAVENOUS at 08:52

## 2022-04-07 RX ADMIN — PROPOFOL 80 MG: 10 INJECTION, EMULSION INTRAVENOUS at 09:14

## 2022-04-07 RX ADMIN — Medication 250 MCG/KG/MIN: at 09:14

## 2022-04-07 RX ADMIN — LIDOCAINE HYDROCHLORIDE 60 MG: 20 INJECTION, SOLUTION INFILTRATION; PERINEURAL at 09:14

## 2022-04-07 NOTE — H&P
[facility]  Pre Procedure History & Physical    Chief Complaint:   Polyps    Subjective     HPI:   This 61-year-old female presents the endoscopy suite for colonoscopic examination.  She reports a history of polyps with last colonoscopy performed 10 years ago.    Past Medical History:   Past Medical History:   Diagnosis Date   • Acid reflux    • Anxiety disorder    • Arthritis    • Bipolar disorder (HCC)    • Depression    • Diabetes (HCC)     DOES NOT CHECK BS DAILY   • Hypertension    • Kidney stones    • Limb swelling    • Migraines    • PTSD (post-traumatic stress disorder)    • Rotator cuff tear, left    • Rotator cuff tear, left 6/10/2021   • Stress incontinence        Past Surgical History:  Past Surgical History:   Procedure Laterality Date   • ABDOMINOPLASTY      Tummy tuck   • ANKLE SURGERY     • APPENDECTOMY     •  SECTION     • CHOLECYSTECTOMY N/A 10/6/2021    Procedure: CHOLECYSTECTOMY LAPAROSCOPIC;  Surgeon: Jeancarlos Carrington MD;  Location: [facility] MAIN OR;  Service: General;  Laterality: N/A;   • COLONOSCOPY     • ENDOSCOPY N/A 10/28/2021    Procedure: ESOPHAGOGASTRODUODENOSCOPY with biopsies;  Surgeon: Luciano Salmon MD;  Location: [facility] ENDOSCOPY;  Service: Gastroenterology;  Laterality: N/A;  pre:  abnormal CT  post:  gastritis, HH,    • EYE SURGERY     • HYSTERECTOMY     • INTRAOCULAR LENS INSERTION     • KNEE SURGERY     • SHOULDER ARTHROSCOPY Left 6/10/2021    Procedure: LEFT SHOULDER ARTHROSCOPY;  Surgeon: Дмитрий Francois MD;  Location: [facility] OR [facility];  Service: Orthopedics;  Laterality: Left;   • SHOULDER ROTATOR CUFF REPAIR Left 6/10/2021    Procedure: SUBACROMIAL DECOMPRESSION, DISTAL CLAVICULECTOMY AND ROTATOR CUFF REPAIR;  Surgeon: Дмитрий Francois MD;  Location: [facility] OR [facility];  Service: Orthopedics;  Laterality: Left;   • SHOULDER SURGERY Right    • TONSILLECTOMY         Family History:  Family History   Problem Relation Age of Onset   • Cancer  Mother         Unspecified   • Breast cancer Mother    • Dementia Father    • Stroke Father    • Heart disease Father    • Diabetes Father         Unspecified type   • Arthritis Father    • Malig Hyperthermia Neg Hx        Social History:   reports that she has never smoked. She has never used smokeless tobacco. She reports current alcohol use. She reports that she does not use drugs.    Medications:   No medications prior to admission.       Allergies:  Penicillins and Metronidazole    ROS:    Pertinent items are noted in HPI, all other systems reviewed and negative     Objective     not currently breastfeeding.    Physical Exam   Constitutional: Pt is oriented to person, place, and time and well-developed, well-nourished, and in no distress.   Mouth/Throat: Oropharynx is clear and moist.   Neck: Normal range of motion.   Cardiovascular: Normal rate, regular rhythm and normal heart sounds.    Pulmonary/Chest: Effort normal and breath sounds normal.   Abdominal: Soft. Nontender  Skin: Skin is warm and dry.   Psychiatric: Mood, memory, affect and judgment normal.     Assessment/Plan     Diagnosis:  Polyps    Anticipated Surgical Procedure:  Colonoscopy    The risks, benefits, and alternatives of this procedure have been discussed with the patient or the responsible party- the patient understands and agrees to proceed.

## 2022-04-07 NOTE — ANESTHESIA PREPROCEDURE EVALUATION
Anesthesia Evaluation     Patient summary reviewed and Nursing notes reviewed   no history of anesthetic complications:  NPO Solid Status: > 8 hours  NPO Liquid Status: > 4 hours           Airway   Mallampati: II  Dental      Pulmonary - negative pulmonary ROS and normal exam   Cardiovascular - normal exam    (+) hypertension 2 medications or greater,       Neuro/Psych  (+) headaches, psychiatric history Anxiety, Depression and PTSD,    GI/Hepatic/Renal/Endo    (+) obesity,  GERD,  renal disease stones, diabetes mellitus type 2,     Musculoskeletal     Abdominal    Substance History      OB/GYN          Other   arthritis,                      Anesthesia Plan    ASA 3     MAC     intravenous induction     Anesthetic plan, all risks, benefits, and alternatives have been provided, discussed and informed consent has been obtained with: patient.        CODE STATUS:

## 2022-04-07 NOTE — ANESTHESIA POSTPROCEDURE EVALUATION
"Patient: Melvina Martinez    Procedure Summary     Date: 04/07/22 Room / Location: [facility] ENDOSCOPY 8 /  [facility]    Anesthesia Start: 0909 Anesthesia Stop: 0937    Procedure: COLONOSCOPY INTO CECUM WITH COLD SNARE POLYPECTOMY (N/A ) Diagnosis:       Polyp of colon      Diverticulosis      Tortuous colon      (Encounter for screening for malignant neoplasm of colon [Z12.11])    Surgeons: Ananth Odell MD Provider: Fidencio Rosa MD    Anesthesia Type: MAC ASA Status: 3          Anesthesia Type: MAC    Vitals  Vitals Value Taken Time   /77 04/07/22 0958   Temp     Pulse 54 04/07/22 0958   Resp 16 04/07/22 0958   SpO2 99 % 04/07/22 0958           Post Anesthesia Care and Evaluation    Patient location during evaluation: bedside  Patient participation: complete - patient participated  Level of consciousness: awake and alert  Pain management: adequate  Airway patency: patent  Anesthetic complications: No anesthetic complications    Cardiovascular status: acceptable  Respiratory status: acceptable  Hydration status: acceptable    Comments: /77 (BP Location: Left arm, Patient Position: Sitting)   Pulse 54   Resp 16   Ht 160 cm (63\")   Wt 89.8 kg (198 lb)   LMP  (LMP Unknown)   SpO2 99%   BMI 35.07 kg/m²       "

## 2022-04-07 NOTE — DISCHARGE INSTRUCTIONS
For the next 24 hours patient needs to be with a responsible adult.    For 24 hours DO NOT drive, operate machinery, appliances, drink alcohol, make important decisions or sign legal documents.    Start with a light or bland diet if you are feeling sick to your stomach otherwise advance to regular diet as tolerated.    Follow recommendations on procedure report if provided by your doctor.    Call Dr Odell for problems [phone]    Problems may include but not limited to: large amounts of bleeding, trouble breathing, repeated vomiting, severe unrelieved pain, fever or chills.

## 2022-04-08 ENCOUNTER — APPOINTMENT (OUTPATIENT)
Dept: CT IMAGING | Facility: HOSPITAL | Age: 62
End: 2022-04-08

## 2022-04-08 ENCOUNTER — TELEPHONE (OUTPATIENT)
Dept: GASTROENTEROLOGY | Facility: CLINIC | Age: 62
End: 2022-04-08

## 2022-04-08 ENCOUNTER — OFFICE VISIT (OUTPATIENT)
Dept: INTERNAL MEDICINE | Facility: CLINIC | Age: 62
End: 2022-04-08

## 2022-04-08 ENCOUNTER — APPOINTMENT (OUTPATIENT)
Dept: GENERAL RADIOLOGY | Facility: HOSPITAL | Age: 62
End: 2022-04-08

## 2022-04-08 ENCOUNTER — HOSPITAL ENCOUNTER (OUTPATIENT)
Facility: HOSPITAL | Age: 62
Setting detail: OBSERVATION
Discharge: HOME | End: 2022-04-10
Attending: EMERGENCY MEDICINE | Admitting: INTERNAL MEDICINE

## 2022-04-08 VITALS
HEART RATE: 104 BPM | SYSTOLIC BLOOD PRESSURE: 146 MMHG | OXYGEN SATURATION: 96 % | HEIGHT: 63 IN | TEMPERATURE: 97.8 F | BODY MASS INDEX: 35.26 KG/M2 | WEIGHT: 199 LBS | RESPIRATION RATE: 18 BRPM | DIASTOLIC BLOOD PRESSURE: 82 MMHG

## 2022-04-08 DIAGNOSIS — R11.2 NAUSEA AND VOMITING, UNSPECIFIED VOMITING TYPE: ICD-10-CM

## 2022-04-08 DIAGNOSIS — R09.02 HYPOXIA: ICD-10-CM

## 2022-04-08 DIAGNOSIS — I26.99 ACUTE PULMONARY EMBOLISM, UNSPECIFIED PULMONARY EMBOLISM TYPE, UNSPECIFIED WHETHER ACUTE COR PULMONALE PRESENT: Primary | ICD-10-CM

## 2022-04-08 DIAGNOSIS — R07.9 CHEST PAIN, UNSPECIFIED TYPE: Primary | ICD-10-CM

## 2022-04-08 LAB
ALBUMIN SERPL-MCNC: 4.5 G/DL (ref 3.5–5.2)
ALBUMIN/GLOB SERPL: 1.6 G/DL
ALP SERPL-CCNC: 62 U/L (ref 39–117)
ALT SERPL W P-5'-P-CCNC: 14 U/L (ref 1–33)
ANION GAP SERPL CALCULATED.3IONS-SCNC: 11.7 MMOL/L (ref 5–15)
APTT PPP: 21.8 SECONDS (ref 22.2–34.2)
ARTERIAL PATENCY WRIST A: ABNORMAL
AST SERPL-CCNC: 16 U/L (ref 1–32)
BASE EXCESS BLDA CALC-SCNC: -5.6 MMOL/L (ref -2–2)
BASOPHILS # BLD AUTO: 0.01 10*3/MM3 (ref 0–0.2)
BASOPHILS NFR BLD AUTO: 0.2 % (ref 0–1.5)
BDY SITE: ABNORMAL
BILIRUB SERPL-MCNC: <0.2 MG/DL (ref 0–1.2)
BILIRUB UR QL STRIP: NEGATIVE
BUN SERPL-MCNC: 12 MG/DL (ref 8–23)
BUN/CREAT SERPL: 11.7 (ref 7–25)
CA-I BLDA-SCNC: 1.14 MMOL/L (ref 1.13–1.32)
CALCIUM SPEC-SCNC: 9.2 MG/DL (ref 8.6–10.5)
CHLORIDE BLDA-SCNC: 109 MMOL/L (ref 98–106)
CHLORIDE SERPL-SCNC: 108 MMOL/L (ref 98–107)
CLARITY UR: CLEAR
CO2 SERPL-SCNC: 18.3 MMOL/L (ref 22–29)
COHGB MFR BLD: 0.8 % (ref 0–1.5)
COLOR UR: YELLOW
CREAT SERPL-MCNC: 1.03 MG/DL (ref 0.57–1)
D DIMER PPP FEU-MCNC: 1.08 MG/L (FEU) (ref 0–0.59)
D-LACTATE SERPL-SCNC: 2.2 MMOL/L (ref 0.5–2)
D-LACTATE SERPL-SCNC: 3.5 MMOL/L (ref 0.5–2)
DEPRECATED RDW RBC AUTO: 45.6 FL (ref 37–54)
EGFRCR SERPLBLD CKD-EPI 2021: 62.4 ML/MIN/1.73
EOSINOPHIL # BLD AUTO: 0 10*3/MM3 (ref 0–0.4)
EOSINOPHIL NFR BLD AUTO: 0 % (ref 0.3–6.2)
ERYTHROCYTE [DISTWIDTH] IN BLOOD BY AUTOMATED COUNT: 13.5 % (ref 12.3–15.4)
FHHB: 1.6 % (ref 0–5)
FLUAV AG NPH QL: NEGATIVE
FLUBV AG NPH QL IA: NEGATIVE
GAS FLOW AIRWAY: 2 LPM
GLOBULIN UR ELPH-MCNC: 2.8 GM/DL
GLUCOSE BLDA-MCNC: 206 MMOL/L (ref 65–99)
GLUCOSE SERPL-MCNC: 236 MG/DL (ref 65–99)
GLUCOSE UR STRIP-MCNC: NEGATIVE MG/DL
HCO3 BLDA-SCNC: 16.8 MMOL/L (ref 22–26)
HCT VFR BLD AUTO: 34.2 % (ref 34–46.6)
HGB BLD-MCNC: 11.4 G/DL (ref 12–15.9)
HGB BLDA-MCNC: 14.5 G/DL (ref 11.7–14.6)
HGB UR QL STRIP.AUTO: NEGATIVE
HOLD SPECIMEN: NORMAL
HOLD SPECIMEN: NORMAL
IMM GRANULOCYTES # BLD AUTO: 0.01 10*3/MM3 (ref 0–0.05)
IMM GRANULOCYTES NFR BLD AUTO: 0.2 % (ref 0–0.5)
INHALED O2 CONCENTRATION: 28 %
KETONES UR QL STRIP: NEGATIVE
LAB AP CASE REPORT: NORMAL
LACTATE BLDA-SCNC: 2.68 MMOL/L (ref 0.5–2)
LEUKOCYTE ESTERASE UR QL STRIP.AUTO: NEGATIVE
LIPASE SERPL-CCNC: 41 U/L (ref 13–60)
LYMPHOCYTES # BLD AUTO: 0.58 10*3/MM3 (ref 0.7–3.1)
LYMPHOCYTES NFR BLD AUTO: 13.4 % (ref 19.6–45.3)
MAGNESIUM SERPL-MCNC: 2 MG/DL (ref 1.6–2.4)
MCH RBC QN AUTO: 30.6 PG (ref 26.6–33)
MCHC RBC AUTO-ENTMCNC: 33.3 G/DL (ref 31.5–35.7)
MCV RBC AUTO: 91.7 FL (ref 79–97)
METHGB BLD QL: 0.2 % (ref 0–1.5)
MODALITY: ABNORMAL
MONOCYTES # BLD AUTO: 0.02 10*3/MM3 (ref 0.1–0.9)
MONOCYTES NFR BLD AUTO: 0.5 % (ref 5–12)
NEUTROPHILS NFR BLD AUTO: 3.7 10*3/MM3 (ref 1.7–7)
NEUTROPHILS NFR BLD AUTO: 85.7 % (ref 42.7–76)
NITRITE UR QL STRIP: NEGATIVE
NOTE: ABNORMAL
NRBC BLD AUTO-RTO: 0 /100 WBC (ref 0–0.2)
NT-PROBNP SERPL-MCNC: 98.4 PG/ML (ref 0–900)
OXYHGB MFR BLDV: 97.4 % (ref 94–99)
PATH REPORT.FINAL DX SPEC: NORMAL
PATH REPORT.GROSS SPEC: NORMAL
PCO2 BLDA: 25.5 MM HG (ref 35–45)
PH BLDA: 7.44 PH UNITS (ref 7.35–7.45)
PH UR STRIP.AUTO: 8 [PH] (ref 5–8)
PLATELET # BLD AUTO: 147 10*3/MM3 (ref 140–450)
PMV BLD AUTO: 9.9 FL (ref 6–12)
PO2 BLD: 447 MM[HG] (ref 0–500)
PO2 BLDA: 125.2 MM HG (ref 80–100)
POTASSIUM BLDA-SCNC: 4.29 MMOL/L (ref 3.5–5)
POTASSIUM SERPL-SCNC: 4 MMOL/L (ref 3.5–5.2)
PROT SERPL-MCNC: 7.3 G/DL (ref 6–8.5)
PROT UR QL STRIP: NEGATIVE
RBC # BLD AUTO: 3.73 10*6/MM3 (ref 3.77–5.28)
SAO2 % BLDCOA: 98.4 % (ref 95–99)
SODIUM BLDA-SCNC: 139.6 MMOL/L (ref 136–146)
SODIUM SERPL-SCNC: 138 MMOL/L (ref 136–145)
SP GR UR STRIP: 1.01 (ref 1–1.03)
TROPONIN I SERPL-MCNC: 0 NG/ML (ref 0–0.6)
UROBILINOGEN UR QL STRIP: NORMAL
WBC NRBC COR # BLD: 4.32 10*3/MM3 (ref 3.4–10.8)
WHOLE BLOOD HOLD SPECIMEN: NORMAL
WHOLE BLOOD HOLD SPECIMEN: NORMAL

## 2022-04-08 PROCEDURE — 82805 BLOOD GASES W/O2 SATURATION: CPT | Performed by: EMERGENCY MEDICINE

## 2022-04-08 PROCEDURE — 87040 BLOOD CULTURE FOR BACTERIA: CPT | Performed by: EMERGENCY MEDICINE

## 2022-04-08 PROCEDURE — 99284 EMERGENCY DEPT VISIT MOD MDM: CPT

## 2022-04-08 PROCEDURE — 93000 ELECTROCARDIOGRAM COMPLETE: CPT | Performed by: NURSE PRACTITIONER

## 2022-04-08 PROCEDURE — 25010000002 KETOROLAC TROMETHAMINE PER 15 MG: Performed by: REGISTERED NURSE

## 2022-04-08 PROCEDURE — 99220 PR INITIAL OBSERVATION CARE/DAY 70 MINUTES: CPT | Performed by: INTERNAL MEDICINE

## 2022-04-08 PROCEDURE — 83690 ASSAY OF LIPASE: CPT | Performed by: EMERGENCY MEDICINE

## 2022-04-08 PROCEDURE — 96372 THER/PROPH/DIAG INJ SC/IM: CPT | Performed by: NURSE PRACTITIONER

## 2022-04-08 PROCEDURE — 83050 HGB METHEMOGLOBIN QUAN: CPT | Performed by: EMERGENCY MEDICINE

## 2022-04-08 PROCEDURE — 36415 COLL VENOUS BLD VENIPUNCTURE: CPT

## 2022-04-08 PROCEDURE — 25010000002 ONDANSETRON PER 1 MG: Performed by: EMERGENCY MEDICINE

## 2022-04-08 PROCEDURE — 85379 FIBRIN DEGRADATION QUANT: CPT | Performed by: EMERGENCY MEDICINE

## 2022-04-08 PROCEDURE — 36600 WITHDRAWAL OF ARTERIAL BLOOD: CPT | Performed by: EMERGENCY MEDICINE

## 2022-04-08 PROCEDURE — 84484 ASSAY OF TROPONIN QUANT: CPT

## 2022-04-08 PROCEDURE — 96375 TX/PRO/DX INJ NEW DRUG ADDON: CPT

## 2022-04-08 PROCEDURE — 83605 ASSAY OF LACTIC ACID: CPT | Performed by: EMERGENCY MEDICINE

## 2022-04-08 PROCEDURE — 0 IOPAMIDOL PER 1 ML: Performed by: EMERGENCY MEDICINE

## 2022-04-08 PROCEDURE — 80053 COMPREHEN METABOLIC PANEL: CPT | Performed by: EMERGENCY MEDICINE

## 2022-04-08 PROCEDURE — 83735 ASSAY OF MAGNESIUM: CPT | Performed by: EMERGENCY MEDICINE

## 2022-04-08 PROCEDURE — U0004 COV-19 TEST NON-CDC HGH THRU: HCPCS | Performed by: REGISTERED NURSE

## 2022-04-08 PROCEDURE — G0378 HOSPITAL OBSERVATION PER HR: HCPCS

## 2022-04-08 PROCEDURE — 83880 ASSAY OF NATRIURETIC PEPTIDE: CPT | Performed by: EMERGENCY MEDICINE

## 2022-04-08 PROCEDURE — 99214 OFFICE O/P EST MOD 30 MIN: CPT | Performed by: NURSE PRACTITIONER

## 2022-04-08 PROCEDURE — 96374 THER/PROPH/DIAG INJ IV PUSH: CPT

## 2022-04-08 PROCEDURE — 87804 INFLUENZA ASSAY W/OPTIC: CPT | Performed by: REGISTERED NURSE

## 2022-04-08 PROCEDURE — 71260 CT THORAX DX C+: CPT

## 2022-04-08 PROCEDURE — 85730 THROMBOPLASTIN TIME PARTIAL: CPT | Performed by: EMERGENCY MEDICINE

## 2022-04-08 PROCEDURE — 93010 ELECTROCARDIOGRAM REPORT: CPT | Performed by: INTERNAL MEDICINE

## 2022-04-08 PROCEDURE — 71045 X-RAY EXAM CHEST 1 VIEW: CPT

## 2022-04-08 PROCEDURE — 93005 ELECTROCARDIOGRAM TRACING: CPT | Performed by: EMERGENCY MEDICINE

## 2022-04-08 PROCEDURE — 81003 URINALYSIS AUTO W/O SCOPE: CPT | Performed by: EMERGENCY MEDICINE

## 2022-04-08 PROCEDURE — 25010000002 METOCLOPRAMIDE PER 10 MG: Performed by: REGISTERED NURSE

## 2022-04-08 PROCEDURE — 85025 COMPLETE CBC W/AUTO DIFF WBC: CPT

## 2022-04-08 PROCEDURE — 82375 ASSAY CARBOXYHB QUANT: CPT | Performed by: EMERGENCY MEDICINE

## 2022-04-08 PROCEDURE — 25010000002 DIPHENHYDRAMINE PER 50 MG: Performed by: REGISTERED NURSE

## 2022-04-08 RX ORDER — HEPARIN SODIUM 10000 [USP'U]/100ML
18 INJECTION, SOLUTION INTRAVENOUS
Status: DISCONTINUED | OUTPATIENT
Start: 2022-04-08 | End: 2022-04-09

## 2022-04-08 RX ORDER — ONDANSETRON 2 MG/ML
4 INJECTION INTRAMUSCULAR; INTRAVENOUS ONCE
Status: COMPLETED | OUTPATIENT
Start: 2022-04-08 | End: 2022-04-08

## 2022-04-08 RX ORDER — DIPHENHYDRAMINE HYDROCHLORIDE 50 MG/ML
25 INJECTION INTRAMUSCULAR; INTRAVENOUS ONCE
Status: COMPLETED | OUTPATIENT
Start: 2022-04-08 | End: 2022-04-08

## 2022-04-08 RX ORDER — SODIUM CHLORIDE 0.9 % (FLUSH) 0.9 %
10 SYRINGE (ML) INJECTION AS NEEDED
Status: DISCONTINUED | OUTPATIENT
Start: 2022-04-08 | End: 2022-04-10 | Stop reason: HOSPADM

## 2022-04-08 RX ORDER — KETOROLAC TROMETHAMINE 15 MG/ML
15 INJECTION, SOLUTION INTRAMUSCULAR; INTRAVENOUS ONCE
Status: COMPLETED | OUTPATIENT
Start: 2022-04-08 | End: 2022-04-08

## 2022-04-08 RX ORDER — QUETIAPINE FUMARATE 100 MG/1
100 TABLET, FILM COATED ORAL NIGHTLY
COMMUNITY

## 2022-04-08 RX ORDER — METOCLOPRAMIDE HYDROCHLORIDE 5 MG/ML
10 INJECTION INTRAMUSCULAR; INTRAVENOUS ONCE
Status: COMPLETED | OUTPATIENT
Start: 2022-04-08 | End: 2022-04-08

## 2022-04-08 RX ORDER — ONDANSETRON 2 MG/ML
4 INJECTION INTRAMUSCULAR; INTRAVENOUS EVERY 6 HOURS PRN
Status: SHIPPED | OUTPATIENT
Start: 2022-04-08

## 2022-04-08 RX ADMIN — METOCLOPRAMIDE HYDROCHLORIDE 10 MG: 5 INJECTION INTRAMUSCULAR; INTRAVENOUS at 17:41

## 2022-04-08 RX ADMIN — SODIUM CHLORIDE 1000 ML: 9 INJECTION, SOLUTION INTRAVENOUS at 17:41

## 2022-04-08 RX ADMIN — SODIUM CHLORIDE 1000 ML: 9 INJECTION, SOLUTION INTRAVENOUS at 19:41

## 2022-04-08 RX ADMIN — ONDANSETRON 4 MG: 2 INJECTION INTRAMUSCULAR; INTRAVENOUS at 16:16

## 2022-04-08 RX ADMIN — ONDANSETRON 4 MG: 2 INJECTION INTRAMUSCULAR; INTRAVENOUS at 17:40

## 2022-04-08 RX ADMIN — IOPAMIDOL 65 ML: 755 INJECTION, SOLUTION INTRAVENOUS at 19:23

## 2022-04-08 RX ADMIN — DIPHENHYDRAMINE HYDROCHLORIDE 25 MG: 50 INJECTION, SOLUTION INTRAMUSCULAR; INTRAVENOUS at 17:40

## 2022-04-08 RX ADMIN — KETOROLAC TROMETHAMINE 15 MG: 15 INJECTION, SOLUTION INTRAMUSCULAR; INTRAVENOUS at 17:40

## 2022-04-08 NOTE — TELEPHONE ENCOUNTER
Patient notified of results (hyperplastic polyp) and recommendations and verbalized understanding      Hm and cs recall 4/7/27

## 2022-04-08 NOTE — PROGRESS NOTES
"Chief Complaint  Shoulder Pain (Left Shoulder- 3-4 Months ), Shortness of Breath (Since yesterday after her colonoscopy), and chest heaviness (Since yesterday after her colonoscopy)    Subjective          Melvina Martinez presents to [facility]  History of Present Illness  She reports feeling bad since her colonocopy yesterday  She feels soa and chest heaviness that started today  Vomiting onset during visit  She feels like chest pain has worsened since arriving to office.   Elevated -125  Patient also reports feeling \"in a fog\"   states that she has not shared with him how bad she was feeling  He reports that she was given propofol yesterday which she has not previously had  Oxygen 88-94. Initially at 96 but unable to maintain that throughout the visit.    Objective   Vital Signs:   /82 (BP Location: Right arm, Patient Position: Sitting, Cuff Size: Adult)   Pulse 104   Temp 97.8 °F (36.6 °C)   Resp 18   Ht 160 cm (63\")   Wt 90.3 kg (199 lb)   SpO2 96%   BMI 35.25 kg/m²     Physical Exam  Vitals and nursing note reviewed.   Constitutional:       General: She is awake. She is in acute distress.      Appearance: Normal appearance. She is ill-appearing and toxic-appearing.   HENT:      Head: Normocephalic and atraumatic.      Right Ear: External ear normal.      Left Ear: External ear normal.      Nose: Nose normal.      Mouth/Throat:      Mouth: Mucous membranes are moist.   Eyes:      Conjunctiva/sclera: Conjunctivae normal.   Cardiovascular:      Rate and Rhythm: Regular rhythm. Tachycardia present.      Pulses: Normal pulses.      Heart sounds: Normal heart sounds. No murmur heard.    No friction rub. No gallop.   Pulmonary:      Effort: Pulmonary effort is normal. No respiratory distress.      Breath sounds: No wheezing, rhonchi or rales.   Musculoskeletal:      Cervical back: Neck supple.      Right lower leg: No edema.      Left lower leg: " No edema.   Skin:     General: Skin is warm and dry.   Neurological:      General: No focal deficit present.      Mental Status: She is oriented to person, place, and time.   Psychiatric:         Mood and Affect: Mood normal.        Result Review :            ECG 12 Lead    Date/Time: 4/8/2022 12:36 PM  Performed by: Isabela Nelson APRN  Authorized by: Isabela Nelson APRN   Comparison: not compared with previous ECG   Previous ECG: no previous ECG available  Rhythm: sinus rhythm  Rate: normal  BPM: 84  Conduction: conduction normal  ST Segments: ST segments normal  T Waves: T waves normal  QRS axis: normal  Other: no other findings                Assessment and Plan    Diagnoses and all orders for this visit:    1. Chest pain, unspecified type (Primary)  Assessment & Plan:  Concerning presentation of patient during office visit today.  Patient seemed to have some deterioration of condition while in the office. Symptoms of chest pain, shortness of breath, hypoxia, vomiting, and decreased arousal status post colonoscopy on 4/7/2022 require further work-up in the ER.  Patient transported by EMS to [facility] for eval.    Unremarkable EKG in the office  Zofran 4 mg IM given in right deltoid  Oxygen administered at 2 L prior to EMS arrival    Orders:  -     ECG 12 Lead    2. Nausea and vomiting, unspecified vomiting type  -     ondansetron (ZOFRAN) injection 4 mg    Other orders  -     Cancel: ECG 12 Lead        Report given to EMS and called to ER, ETHAN Castillo  I spent 35 minutes caring for Melvina on this date of service. This time includes time spent by me in the following activities:obtaining and/or reviewing a separately obtained history, performing a medically appropriate examination and/or evaluation , ordering medications, tests, or procedures, referring and communicating with other health care professionals , documenting information in the medical record, independently interpreting results and communicating that  information with the patient/family/caregiver and care coordination  Follow Up   Return if symptoms worsen or fail to improve.  Patient was given instructions and counseling regarding her condition or for health maintenance advice. Please see specific information pulled into the AVS if appropriate.

## 2022-04-08 NOTE — ED PROVIDER NOTES
"Patient is 61 y.o. year old female that presents to the ED for evaluation of not feeling well.  The patient was seen yesterday for a colonoscopy and she received propofol for that.  [facility] in her [facility].  The patient states that she did not feel earlier but now states that she feels nauseated short of breath and [facility] and then in her primary care provider's clinic today she developed increasing shortness of breath hypoxia and tachycardia.  The patient states that she also has a headache and has some general myalgias although she has had no fever.  Patient does not have any significant abdominal pain.  She does not have any significant chest pain..     Physical Exam     The patient appears uncomfortable her HEENT exam is normal her heart is regular rate and rhythm her lungs are clear bilaterally her abdomen is soft with mild epigastric tenderness there is no guarding rebound or hepatosplenomegaly noted.    ED Course:    /72 (BP Location: Left arm, Patient Position: Lying)   Pulse 61   Temp 97.7 °F (36.5 °C) (Oral)   Resp 18   Ht 160 cm (63\")   Wt 89.8 kg (197 lb 15.6 oz)   LMP  (LMP Unknown)   SpO2 98%   BMI 35.07 kg/m²   Results for orders placed or performed during the hospital encounter of 04/08/22   Blood Culture - Blood, Arm, Left    Specimen: Arm, Left; Blood   Result Value Ref Range    Blood Culture No growth at 24 hours    Blood Culture - Blood, Arm, Right    Specimen: Arm, Right; Blood   Result Value Ref Range    Blood Culture No growth at 24 hours    Influenza Antigen, Rapid - Swab, Nasopharynx    Specimen: Nasopharynx; Swab   Result Value Ref Range    Influenza A Ag, EIA Negative Negative    Influenza B Ag, EIA Negative Negative   COVID-19,APTIMA PANTHER(JESSICA),[facility], NP/OP SWAB IN UTM/VTM/[facility] TRANSPORT MEDIA,24 HR TAT - Swab, Nasopharynx    Specimen: Nasopharynx; Swab   Result Value Ref Range    " COVID19 Not Detected Not Detected - Ref. Range   Comprehensive Metabolic Panel    Specimen: Blood   Result Value Ref Range    Glucose 236 (H) 65 - 99 mg/dL    BUN 12 8 - 23 mg/dL    Creatinine 1.03 (H) 0.57 - 1.00 mg/dL    Sodium 138 136 - 145 mmol/L    Potassium 4.0 3.5 - 5.2 mmol/L    Chloride 108 (H) 98 - 107 mmol/L    CO2 18.3 (L) 22.0 - 29.0 mmol/L    Calcium 9.2 8.6 - 10.5 mg/dL    Total Protein 7.3 6.0 - 8.5 g/dL    Albumin 4.50 3.50 - 5.20 g/dL    ALT (SGPT) 14 1 - 33 U/L    AST (SGOT) 16 1 - 32 U/L    Alkaline Phosphatase 62 39 - 117 U/L    Total Bilirubin <0.2 0.0 - 1.2 mg/dL    Globulin 2.8 gm/dL    A/G Ratio 1.6 g/dL    BUN/Creatinine Ratio 11.7 7.0 - 25.0    Anion Gap 11.7 5.0 - 15.0 mmol/L    eGFR 62.4 >60.0 mL/min/1.73   Lipase    Specimen: Blood   Result Value Ref Range    Lipase 41 13 - 60 U/L   Urinalysis With Microscopic If Indicated (No Culture) - Urine, Clean Catch    Specimen: Urine, Clean Catch   Result Value Ref Range    Color, UA Yellow Yellow, Straw    Appearance, UA Clear Clear    pH, UA 8.0 5.0 - 8.0    Specific Gravity, UA 1.015 1.005 - 1.030    Glucose, UA Negative Negative    Ketones, UA Negative Negative    Bilirubin, UA Negative Negative    Blood, UA Negative Negative    Protein, UA Negative Negative    Leuk Esterase, UA Negative Negative    Nitrite, UA Negative Negative    Urobilinogen, UA 0.2 E.U./dL 0.2 - 1.0 E.U./dL   CBC Auto Differential    Specimen: Blood   Result Value Ref Range    WBC 4.32 3.40 - 10.80 10*3/mm3    RBC 3.73 (L) 3.77 - 5.28 10*6/mm3    Hemoglobin 11.4 (L) 12.0 - 15.9 g/dL    Hematocrit 34.2 34.0 - 46.6 %    MCV 91.7 79.0 - 97.0 fL    MCH 30.6 26.6 - 33.0 pg    MCHC 33.3 31.5 - 35.7 g/dL    RDW 13.5 12.3 - 15.4 %    RDW-SD 45.6 37.0 - 54.0 fl    MPV 9.9 6.0 - 12.0 fL    Platelets 147 140 - 450 10*3/mm3    Neutrophil % 85.7 (H) 42.7 - 76.0 %    Lymphocyte % 13.4 (L) 19.6 - 45.3 %    Monocyte % 0.5 (L) 5.0 - 12.0 %    Eosinophil % 0.0 (L) 0.3 - 6.2 %    Basophil  % 0.2 0.0 - 1.5 %    Immature Grans % 0.2 0.0 - 0.5 %    Neutrophils, Absolute 3.70 1.70 - 7.00 10*3/mm3    Lymphocytes, Absolute 0.58 (L) 0.70 - 3.10 10*3/mm3    Monocytes, Absolute 0.02 (L) 0.10 - 0.90 10*3/mm3    Eosinophils, Absolute 0.00 0.00 - 0.40 10*3/mm3    Basophils, Absolute 0.01 0.00 - 0.20 10*3/mm3    Immature Grans, Absolute 0.01 0.00 - 0.05 10*3/mm3    nRBC 0.0 0.0 - 0.2 /100 WBC   ABG with Co-Ox and Electrolytes    Specimen: Arm, Right; Arterial Blood   Result Value Ref Range    pH, Arterial 7.436 7.350 - 7.450 pH units    pCO2, Arterial 25.5 (L) 35.0 - 45.0 mm Hg    pO2, Arterial 125.2 (H) 80.0 - 100.0 mm Hg    HCO3, Arterial 16.8 (L) 22.0 - 26.0 mmol/L    Base Excess, Arterial -5.6 (L) -2.0 - 2.0 mmol/L    O2 Saturation, Arterial 98.4 95.0 - 99.0 %    Hemoglobin, Blood Gas 14.5 11.7 - 14.6 g/dL    Carboxyhemoglobin 0.8 0 - 1.5 %    Methemoglobin 0.20 0.00 - 1.50 %    Oxyhemoglobin 97.4 94 - 99 %    FHHB 1.6 0.0 - 5.0 %    Macho's Test N/A     Note      Site Arterial: right brachial     Modality Cannula - Nasal     FIO2 28 %    Flow Rate 2 lpm    Sodium, Arterial 139.6 136 - 146 mmol/L    Potassium, Arterial 4.29 3.5 - 5 mmol/L    Ionized Calcium, Arterial 1.14 1.13 - 1.32 mmol/L    Chloride, Arterial 109 (H) 98 - 106 mmol/L    Glucose, Arterial 206 (H) 65 - 99 mmol/L    Lactate, Arterial 2.68 (H) 0.5 - 2 mmol/L    PO2/FIO2 447 0 - 500   Lactic Acid, Plasma    Specimen: Blood   Result Value Ref Range    Lactate 3.5 (C) 0.5 - 2.0 mmol/L   Magnesium    Specimen: Blood   Result Value Ref Range    Magnesium 2.0 1.6 - 2.4 mg/dL   BNP    Specimen: Blood   Result Value Ref Range    proBNP 98.4 0.0 - 900.0 pg/mL   D-dimer, Quantitative    Specimen: Blood   Result Value Ref Range    D-Dimer, Quantitative 1.08 (H) 0.00 - 0.59 mg/L (FEU)   STAT Lactic Acid, Reflex    Specimen: Blood   Result Value Ref Range    Lactate 2.2 (C) 0.5 - 2.0 mmol/L   aPTT    Specimen: Blood   Result Value Ref Range    PTT 21.8 (L)  22.2 - 34.2 seconds   CBC Auto Differential    Specimen: Blood   Result Value Ref Range    WBC 7.69 3.40 - 10.80 10*3/mm3    RBC 4.25 3.77 - 5.28 10*6/mm3    Hemoglobin 13.0 12.0 - 15.9 g/dL    Hematocrit 41.5 34.0 - 46.6 %    MCV 97.6 (H) 79.0 - 97.0 fL    MCH 30.6 26.6 - 33.0 pg    MCHC 31.3 (L) 31.5 - 35.7 g/dL    RDW 15.1 12.3 - 15.4 %    RDW-SD 54.3 (H) 37.0 - 54.0 fl    MPV 10.7 6.0 - 12.0 fL    Platelets 120 (L) 140 - 450 10*3/mm3    Neutrophil % 68.8 42.7 - 76.0 %    Lymphocyte % 12.6 (L) 19.6 - 45.3 %    Monocyte % 13.3 (H) 5.0 - 12.0 %    Eosinophil % 3.8 0.3 - 6.2 %    Basophil % 0.8 0.0 - 1.5 %    Immature Grans % 0.7 (H) 0.0 - 0.5 %    Neutrophils, Absolute 5.30 1.70 - 7.00 10*3/mm3    Lymphocytes, Absolute 0.97 0.70 - 3.10 10*3/mm3    Monocytes, Absolute 1.02 (H) 0.10 - 0.90 10*3/mm3    Eosinophils, Absolute 0.29 0.00 - 0.40 10*3/mm3    Basophils, Absolute 0.06 0.00 - 0.20 10*3/mm3    Immature Grans, Absolute 0.05 0.00 - 0.05 10*3/mm3    nRBC 0.0 0.0 - 0.2 /100 WBC   Comprehensive Metabolic Panel    Specimen: Blood   Result Value Ref Range    Glucose 109 (H) 65 - 99 mg/dL    BUN 26 (H) 8 - 23 mg/dL    Creatinine 1.11 (H) 0.57 - 1.00 mg/dL    Sodium 138 136 - 145 mmol/L    Potassium 4.2 3.5 - 5.2 mmol/L    Chloride 105 98 - 107 mmol/L    CO2 24.5 22.0 - 29.0 mmol/L    Calcium 10.2 8.6 - 10.5 mg/dL    Total Protein 6.6 6.0 - 8.5 g/dL    Albumin 3.80 3.50 - 5.20 g/dL    ALT (SGPT) 12 1 - 33 U/L    AST (SGOT) 16 1 - 32 U/L    Alkaline Phosphatase 67 39 - 117 U/L    Total Bilirubin 0.6 0.0 - 1.2 mg/dL    Globulin 2.8 gm/dL    A/G Ratio 1.4 g/dL    BUN/Creatinine Ratio 23.4 7.0 - 25.0    Anion Gap 8.5 5.0 - 15.0 mmol/L    eGFR 57.0 (L) >60.0 mL/min/1.73   Magnesium    Specimen: Blood   Result Value Ref Range    Magnesium 1.6 1.6 - 2.4 mg/dL   Lactic Acid, Plasma    Specimen: Blood   Result Value Ref Range    Lactate 1.2 0.5 - 2.0 mmol/L   Basic Metabolic Panel    Specimen: Blood   Result Value Ref Range     Glucose 133 (H) 65 - 99 mg/dL    BUN 19 8 - 23 mg/dL    Creatinine 0.77 0.57 - 1.00 mg/dL    Sodium 138 136 - 145 mmol/L    Potassium 4.3 3.5 - 5.2 mmol/L    Chloride 109 (H) 98 - 107 mmol/L    CO2 19.5 (L) 22.0 - 29.0 mmol/L    Calcium 9.1 8.6 - 10.5 mg/dL    BUN/Creatinine Ratio 24.7 7.0 - 25.0    Anion Gap 9.5 5.0 - 15.0 mmol/L    eGFR 88.4 >60.0 mL/min/1.73   CBC (No Diff)    Specimen: Blood   Result Value Ref Range    WBC 9.88 3.40 - 10.80 10*3/mm3    RBC 4.47 3.77 - 5.28 10*6/mm3    Hemoglobin 13.2 12.0 - 15.9 g/dL    Hematocrit 39.8 34.0 - 46.6 %    MCV 89.0 79.0 - 97.0 fL    MCH 29.5 26.6 - 33.0 pg    MCHC 33.2 31.5 - 35.7 g/dL    RDW 13.2 12.3 - 15.4 %    RDW-SD 43.6 37.0 - 54.0 fl    MPV 10.0 6.0 - 12.0 fL    Platelets 189 140 - 450 10*3/mm3   POC Troponin I    Specimen: Blood   Result Value Ref Range    Troponin I 0.00 0.00 - 0.60 ng/mL   POC Glucose Once    Specimen: Blood   Result Value Ref Range    Glucose 128 (H) 70 - 99 mg/dL   POC Glucose Once    Specimen: Blood   Result Value Ref Range    Glucose 137 (H) 70 - 99 mg/dL   POC Glucose Once    Specimen: Blood   Result Value Ref Range    Glucose 137 (H) 70 - 99 mg/dL   POC Glucose Once    Specimen: Blood   Result Value Ref Range    Glucose 146 (H) 70 - 99 mg/dL   POC Glucose Once    Specimen: Blood   Result Value Ref Range    Glucose 100 (H) 70 - 99 mg/dL   POC Glucose Once    Specimen: Blood   Result Value Ref Range    Glucose 113 (H) 70 - 99 mg/dL   ECG 12 Lead   Result Value Ref Range    QT Interval 418 ms   Adult Transthoracic Echo Complete W/ Cont if Necessary Per Protocol   Result Value Ref Range    Target HR (85%) 136 bpm    Max. Pred. HR (100%) 160 bpm    IVRT 69.0 msec    LA Volume Index 16.5 mL/m2    Avg E/e' ratio 9.69     Ao root diam 3.4 cm    EDV(MOD-sp2) 53.0 ml    EDV(MOD-sp4) 53.0 ml    EF(MOD-bp) 54.0 %    ESV(MOD-sp2) 23.0 ml    ESV(MOD-sp4) 24.0 ml    Lat Peak E' Ranjith 9.5 cm/sec    LVPWd 0.9 cm    Med Peak E' Ranjith 7.83 cm/sec    MV dec  time 169 msec    RVIDd 2.80 cm    IVSd 0.9 cm    LA dimension(2D) 3.9 cm    LVIDd 4.6 cm    LVIDs 2.9 cm    LVOT diam 2.0 cm    MV E/A 0.9     MV A max erich 93.0 cm/sec    MV E max erich 84.0 cm/sec    TAPSE (>1.6) 2.12 cm   Green Top (Gel)   Result Value Ref Range    Extra Tube Hold for add-ons.    Lavender Top   Result Value Ref Range    Extra Tube hold for add-on    Gold Top - SST   Result Value Ref Range    Extra Tube Hold for add-ons.    Light Blue Top   Result Value Ref Range    Extra Tube hold for add-on      Medications   sodium chloride 0.9 % flush 10 mL (has no administration in time range)   heparin bolus from bag 5,000 Units (has no administration in time range)   heparin bolus from bag 2,500 Units (has no administration in time range)   sodium chloride 0.9 % flush 10 mL (10 mL Intravenous Given 4/10/22 0833)   sodium chloride 0.9 % flush 10 mL (has no administration in time range)   acetaminophen (TYLENOL) tablet 650 mg (has no administration in time range)   ondansetron (ZOFRAN) injection 4 mg (has no administration in time range)   ipratropium-albuterol (DUO-NEB) nebulizer solution 3 mL (3 mL Nebulization Given 4/10/22 1211)   apixaban (ELIQUIS) tablet 10 mg (10 mg Oral Given 4/10/22 0833)   DULoxetine (CYMBALTA) DR capsule 30 mg (30 mg Oral Given 4/10/22 0834)   lamoTRIgine (LaMICtal) tablet 100 mg (100 mg Oral Given 4/10/22 0834)   pantoprazole (PROTONIX) EC tablet 40 mg (40 mg Oral Given 4/10/22 0834)   QUEtiapine (SEROquel) tablet 50 mg (50 mg Oral Given 4/10/22 0834)   QUEtiapine (SEROquel) tablet 100 mg (100 mg Oral Given 4/9/22 2052)   topiramate (TOPAMAX) tablet 50 mg (50 mg Oral Given 4/10/22 0834)   dextrose (GLUTOSE) oral gel 24 g (has no administration in time range)   dextrose 10 % infusion (has no administration in time range)   glucagon (human recombinant) (GLUCAGEN DIAGNOSTIC) injection 1 mg (has no administration in time range)   insulin lispro (humaLOG) injection 0-7 Units (0 Units  Subcutaneous Not Given 4/10/22 1145)   tamoxifen (NOLVADEX) tablet 20 mg (20 mg Oral Given 4/10/22 0834)   traZODone (DESYREL) tablet 100 mg (100 mg Oral Given 4/9/22 2053)   sodium chloride 0.9 % bolus 1,000 mL (0 mL Intravenous Stopped 4/8/22 1941)   ondansetron (ZOFRAN) injection 4 mg (4 mg Intravenous Given 4/8/22 1740)   diphenhydrAMINE (BENADRYL) injection 25 mg (25 mg Intravenous Given 4/8/22 1740)   metoclopramide (REGLAN) injection 10 mg (10 mg Intravenous Given 4/8/22 1741)   ketorolac (TORADOL) injection 15 mg (15 mg Intravenous Given 4/8/22 1740)   sodium chloride 0.9 % bolus 1,000 mL (0 mL Intravenous Stopped 4/8/22 2049)   iopamidol (ISOVUE-370) 76 % injection 100 mL (65 mL Intravenous Given 4/8/22 1923)   heparin bolus from bag 7,300 Units (7,300 Units Intravenous Bolus from Bag 4/8/22 2159)     Adult Transthoracic Echo Complete W/ Cont if Necessary Per Protocol    Result Date: 4/10/2022  Narrative: · Calculated left ventricular EF = 54% Estimated left ventricular EF was in agreement with the calculated left ventricular EF. · Left ventricular diastolic function is consistent with (grade I) impaired relaxation. · No hemodynamically significant valvular pathology.      CT Chest With Contrast Diagnostic    Result Date: 4/8/2022  Narrative: PROCEDURE: CT CHEST W CONTRAST DIAGNOSTIC  COMPARISON:  [facility], CT, CT ANGIOGRAM ABDOMEN PELVIS, 10/24/2021, 19:57. INDICATIONS: SHORTNESS OF BREATH X 2-3 DAYS  TECHNIQUE: After obtaining the patient's consent, CT images were obtained with non-ionic intravenous contrast material.   PROTOCOL:   Standard imaging protocol performed    RADIATION:   DLP: 412.1 mGy*cm   Automated exposure control was utilized to minimize radiation dose. CONTRAST: 65cc Isovue 370 I.V.  FINDINGS:  CT demonstrates there is motion artifact.  There is bibasilar opacities probably atelectasis.  There is no mediastinal or hilar adenopathy.  There could be mild cardiomegaly.  No  significant pleural effusion.  3.4 centimeter lesion left lobe of liver likely a cyst.  There is a 2nd probable cyst.  Large left renal mass probably cyst based on previous CT.  There are right lower lobe pulmonary emboli in the proximal segmental right lower lobe pulmonary arteries.  Mild depression superior thoracic spine endplate probably degenerative.  Main pulmonary artery not significantly enlarged.  No definite right heart strain.      Impression:   1. There is acute appearing pulmonary emboli within several proximal segmental right lower lobe pulmonary arteries. 2. Other findings as above.     JOSHUA STERLING MD       Electronically Signed and Approved By: JOSHUA STERLING MD on 2022 at 20:42             XR Chest 1 View    Result Date: 2022  Narrative: PROCEDURE: XR CHEST 1 VW  COMPARISON: [facility], CT, CT CHEST W CONTRAST DIAGNOSTIC, 2022, 19:19.  [facility], CR, CHEST PA/AP & LAT 2V, 2019, 8:23.  INDICATIONS: SOA  FINDINGS:  Normal heart and mediastinal contour.  Mild opacity right lung base.  Minimal opacity left lung base.  No other acute finding.      Impression:   1. Mild bibasilar opacities greater on the right could reflect atelectasis or pneumonia.       JOSHUA STERLING MD       Electronically Signed and Approved By: JOSHUA STERLING MD on 2022 at 19:34             MRI Breast Screening W & WO Contrast    Result Date: 2022  Narrative: REVIEWING YOUR TEST RESULTS IN [facility] IS NOT A SUBSTITUTE FOR DISCUSSING THOSE RESULTS WITH YOUR HEALTH CARE PROVIDER. PLEASE CONTACT YOUR PROVIDER VIA [facility] TO DISCUSS ANY QUESTIONS OR CONCERNS YOU MAY HAVE REGARDING THESE TEST RESULTS.  RADIOLOGY REPORT FACILITY:  [facility] UNIT/AGE/GENDER: M.MRI  OP      AGE:61 Y          SEX:F PATIENT NAME/:  ISABEL RAIMADHURI    1960 UNIT NUMBER:  [id number] ACCOUNT NUMBER:  [id number] ACCESSION NUMBER:   [id number] Breast EXAMINATION(S): BILATERAL BREAST MRI WITHOUT AND WITH INTRAVENOUS CONTRAST DATE OF EXAM(S): April 5, 2022 INDICATION(S): 61 year old female. High risk Screening. TECHNIQUE: The patient was placed prone in a dedicated breast coil on a 3.0 Zaria magnet. Axial images with T1 weighting with and without fat saturation as well as inversion recovery sequence were obtained. After the administration of 19 mL of Dotarem intravenous contrast via power injector, serial axial images were obtained for subtraction and kinetic data. During acquisition of kinetic data, a high resolution axial interview sequence was performed and a similar bilateral high-resolution sagittal sequence was reconstructed from axial interview dataset. COMPARISON: Screening mammogram 9/13/2021, diagnostic imaging 9/20/2021 FINDING(S): Adequate bolus of contrast is present in the heart.  There is heterogeneous tissue  with mild  symmetric background parenchymal enhancement. No suspicious enhancement or kinetics are identified in either breast. Axillary lymph nodes are morphologically normal bilaterally. There are a few STIR intense, nonenhancing lesions are noted within lung, suggestive of benign cysts. IMPRESSION: No MRI evidence of breast malignancy. RECOMMENDATION(S):  BI-RADS Category 1, Negative, routine follow-up with continued annual screening mammography and annual screening MRI. The patient has been entered into an automated reminder system Dictated by: Sandra Guo M.D. Images and Report reviewed and interpreted by: Sandra Guo M.D. <PS><Electronically signed by: Sandra Guo M.D.> 04/05/2022 0941 D: 04/05/2022 0936 T: 04/05/2022 0936      MDM:  The patient has an acute pulmonary embolism and she will be admitted for further evaluation and treatment    Procedures      The case was discussed between the AMANDA and myself. Patient  care including, but not limited to ordered imaging, medications, and lab results were reviewed. I then  performed the substantive portion of the visit including all aspects of the medical decision making.        Shlomo Poole DO  15:19 EDT  04/10/22       Shlomo Poole,   04/10/22 1519

## 2022-04-08 NOTE — ASSESSMENT & PLAN NOTE
Concerning presentation of patient during office visit today.  Patient seemed to have some deterioration of condition while in the office. Symptoms of chest pain, shortness of breath, hypoxia, vomiting, and decreased arousal status post colonoscopy on 4/7/2022 require further work-up in the ER.  Patient transported by EMS to [facility] for eval.    Unremarkable EKG in the office  Zofran 4 mg IM given in right deltoid  Oxygen administered at 2 L prior to EMS arrival

## 2022-04-08 NOTE — TELEPHONE ENCOUNTER
----- Message from Ananth VARNER MD sent at 4/8/2022  9:02 AM EDT -----  Regarding: Biopsy results  Okay to call results, recommend follow-up colonoscopy in 5 years time.  Office follow-up sooner as needed.  ----- Message -----  From: Lab, Background User  Sent: 4/8/2022   8:03 AM EDT  To: Ananth VARNER MD

## 2022-04-08 NOTE — ED PROVIDER NOTES
Time: 4:51 PM EDT  Arrived by: Ambulance  Chief Complaint: Not feeling well  History provided by: Patient, patient's spouse    History of Present Illness:  Patient is a 61 y.o. year old female that presents to the emergency department with complaint of nausea/vomiting, chest pain, shortness of breath, severe headache that started today.   states that she has already had nausea and vomiting for 2 days, had a colonoscopy yesterday and since has started to feel worse.  [facility] and the patient reports that since then her symptoms have worsened.  She saw PCP this afternoon and during the office visit her symptoms worsened to include shortness of breath, tachycardia and hypoxia as well as vomiting.  She was treated with oxygen and Zofran injection and transported here by ambulance.  Patient denies fever but admits chills.       used: No    Illness  Location:  Nausea/vomiting, epigastric pain, headache, shortness of breath  Severity:  Moderate  Onset quality:  Sudden  Timing:  Constant  Progression:  Worsening  Chronicity:  New  Relieved by:  Nothing  Worsened by:  Movement  Ineffective treatments:  None tried  Associated symptoms: abdominal pain, chest pain, fatigue, headaches, nausea, shortness of breath and vomiting    Associated symptoms: no congestion, no cough, no diarrhea, no ear pain, no fever and no sore throat            Similar Symptoms Previously: No  Recently seen: Yes, colonoscopy yesterday, [facility] and [facility] this afternoon      Patient Care Team  Primary Care Provider: Isabela GUERRERO    Past Medical History:     Allergies   Allergen Reactions   • Penicillins Anaphylaxis and Shortness Of Breath   • Metronidazole Hives and Nausea Only     Past Medical History:   Diagnosis Date   • Acid reflux    • Anxiety disorder    • Arthritis    • Bipolar disorder (HCC)    • Depression    • Diabetes  ([facility])     DOES NOT CHECK BS DAILY   • Hypertension    • Kidney stones    • Limb swelling    • Migraines    • PTSD (post-traumatic stress disorder)    • Rotator cuff tear, left    • Rotator cuff tear, left 6/10/2021   • Stress incontinence      Past Surgical History:   Procedure Laterality Date   • ABDOMINOPLASTY      Tummy tuck   • ANKLE SURGERY     • APPENDECTOMY     •  SECTION     • CHOLECYSTECTOMY N/A 10/6/2021    Procedure: CHOLECYSTECTOMY LAPAROSCOPIC;  Surgeon: Jeancarlos Carrington MD;  Location: [facility] MAIN OR;  Service: General;  Laterality: N/A;   • COLONOSCOPY     • COLONOSCOPY N/A 2022    Procedure: COLONOSCOPY INTO CECUM WITH COLD SNARE POLYPECTOMY;  Surgeon: Ananth Odell MD;  Location: [facility] ENDOSCOPY;  Service: Gastroenterology;  Laterality: N/A;  PRE: PERSONAL H/O COLON POLYPS  POST: DIVERTICULOSIS, POLYP, HEMORRHOIDS, TORTUOUS COLON   • ENDOSCOPY N/A 10/28/2021    Procedure: ESOPHAGOGASTRODUODENOSCOPY with biopsies;  Surgeon: Luciano Salmon MD;  Location: [facility] ENDOSCOPY;  Service: Gastroenterology;  Laterality: N/A;  pre:  abnormal CT  post:  gastritis, HH,    • EYE SURGERY     • HYSTERECTOMY     • INTRAOCULAR LENS INSERTION     • KNEE SURGERY     • SHOULDER ARTHROSCOPY Left 6/10/2021    Procedure: LEFT SHOULDER ARTHROSCOPY;  Surgeon: Дмитрий Francois MD;  Location: [facility] [place] [facility] [place];  Service: Orthopedics;  Laterality: Left;   • SHOULDER ROTATOR CUFF REPAIR Left 6/10/2021    Procedure: SUBACROMIAL DECOMPRESSION, DISTAL CLAVICULECTOMY AND ROTATOR CUFF REPAIR;  Surgeon: Дмитрий Francois MD;  Location: [facility] OR [facility] [place];  Service: Orthopedics;  Laterality: Left;   • SHOULDER SURGERY Right    • TONSILLECTOMY       Family History   Problem Relation Age of Onset   • Cancer Mother         Unspecified   • Breast cancer Mother    • Dementia Father    • Stroke Father    • Heart disease Father    • Diabetes Father         Unspecified type   • Arthritis Father    • Malig Hyperthermia Neg  Hx        Home Medications:  Prior to Admission medications    Medication Sig Start Date End Date Taking? Authorizing Provider   amLODIPine (NORVASC) 2.5 MG tablet Take 1 tablet by mouth Daily. 2/15/22   Isabela Nelson APRN   azithromycin (ZITHROMAX) 250 MG tablet TAKE 2 TABLETS BY MOUTH FOR 1 DAY THEN TAKE 1 TABLET BY MOUTH EVERY DAY FOR 4 DAYS 12/23/21   Gregorio Adam MD   Blood Glucose Monitoring Suppl kit 1 kit 3 (Three) Times a Day. Lancets and blood glucose strips with machine to check blood sugars up to 3 times a day. 7/21/21   Evelia Emmanuel MD   DULoxetine (Cymbalta) 30 MG capsule Take 1 capsule by mouth Daily. 2/15/22   Isabela Nelson APRN   empagliflozin (Jardiance) 10 MG tablet tablet Take 1 tablet by mouth Daily. 12/13/21   Isabela Nelson APRN   glucose blood test strip Use as instructed 12/13/21   Isabela Nelson APRN   lamoTRIgine (LaMICtal) 100 MG tablet Take 100 mg by mouth 2 (Two) Times a Day.    Gregorio Adam MD   Lancets misc 2 each 2 (Two) Times a Day. 12/13/21   Isabela Nelson APRN   ondansetron (Zofran) 4 MG tablet Take 1 tablet by mouth Every 8 (Eight) Hours As Needed for Nausea or Vomiting. 12/21/21   Cheyanne Blevins PA-C   ondansetron (Zofran) 4 MG tablet Take 1 tablet by mouth Every 8 (Eight) Hours As Needed for Nausea or Vomiting. 4/6/22   Connie Montoya APRN   pantoprazole (Protonix) 40 MG EC tablet Take 1 tablet by mouth 2 (Two) Times a Day for 90 days. 1/13/22 4/13/22  Isabela Nelson APRN   polyethylene glycol (MIRALAX) 17 GM/SCOOP powder Dissolve 17 g (1 capful) in liquid and drink by mouth 2 (Two) Times a Day. 10/29/21   Nely Eastman MD   prazosin (MINIPRESS) 2 MG capsule Take 2 mg by mouth Every Night.    ProviderGregorio MD   QUEtiapine (SEROquel) 100 MG tablet Take 50 mg by mouth 3 (Three) Times a Day.    Provider, MD Gregorio   sennosides-docusate (Senokot S) 8.6-50 MG per tablet Take 1 tablet by mouth Daily. 10/12/21   Omar  YOLANDA Roldan   sucralfate (CARAFATE) 1 g tablet Take 1 tablet by mouth 4 (Four) Times a Day Before Meals & at Bedtime. 12/21/21   Cheyanne Blevins PA-C   SUMAtriptan (Imitrex) 5 MG/ACT nasal spray 1 spray into the nostril(s) as directed by provider Every 2 (Two) Hours As Needed for Migraine. 8/30/21   Mihaela Traylor MD   tamoxifen (NOLVADEX) 10 MG tablet Take 20 mg by mouth Daily. 1/24/22   Gregorio Adam MD   topiramate (TOPAMAX) 50 MG tablet Take 50 mg by mouth Daily.    Gregorio Adam MD   traZODone (DESYREL) 100 MG tablet Take 100 mg by mouth Every Night.    Gregorio Adam MD   verapamil (CALAN) 120 MG tablet Take 120 mg by mouth Daily.    Gregorio Adam MD   HYDROcodone-acetaminophen (NORCO) 5-325 MG per tablet Take 1 tablet by mouth Every 6 (Six) Hours As Needed for Moderate Pain . 10/7/21 4/8/22  Joshua Parker MD   QUEtiapine (SEROquel) 100 MG tablet Take 100 mg by mouth Every Night.  4/8/22  Gregorio Adam MD        Social History:   PT  reports that she has never smoked. She has never used smokeless tobacco. She reports current alcohol use. She reports that she does not use drugs.    Record Review:  I have reviewed the patient's records in [facility].     Review of Systems  Review of Systems   Constitutional: Positive for fatigue. Negative for chills and fever.   HENT: Negative for congestion, ear pain and sore throat.    Eyes: Negative for pain.   Respiratory: Positive for shortness of breath. Negative for cough and chest tightness.    Cardiovascular: Positive for chest pain. Negative for palpitations.   Gastrointestinal: Positive for abdominal pain, nausea and vomiting. Negative for diarrhea.   Genitourinary: Negative for flank pain and hematuria.   Musculoskeletal: Negative for joint swelling.   Skin: Negative for pallor.   Neurological: Positive for weakness and headaches. Negative for seizures.   All other systems reviewed and are negative.       Physical  "Exam    /74   Pulse 60   Temp 98.5 °F (36.9 °C) (Oral)   Resp 18   Ht 160 cm (63\")   Wt 91.4 kg (201 lb 8 oz)   LMP  (LMP Unknown)   SpO2 94%   BMI 35.69 kg/m²     Physical Exam  Vitals and nursing note reviewed.   Constitutional:       General: She is not in acute distress.     Appearance: Normal appearance. She is ill-appearing. She is not toxic-appearing.   HENT:      Head: Normocephalic and atraumatic.      Mouth/Throat:      Mouth: Mucous membranes are moist.   Eyes:      General: No scleral icterus.     Pupils: Pupils are equal, round, and reactive to light.   Cardiovascular:      Rate and Rhythm: Normal rate and regular rhythm.      Pulses:           Radial pulses are 2+ on the right side and 2+ on the left side.        Dorsalis pedis pulses are 2+ on the right side and 2+ on the left side.      Heart sounds: Normal heart sounds.   Pulmonary:      Effort: Pulmonary effort is normal. No tachypnea, accessory muscle usage or respiratory distress.      Breath sounds: Normal breath sounds. No wheezing or rhonchi.   Abdominal:      General: Abdomen is protuberant. Bowel sounds are normal.      Palpations: Abdomen is soft.      Tenderness: There is no abdominal tenderness. There is no guarding or rebound.   Musculoskeletal:         General: Normal range of motion.      Cervical back: Normal range of motion and neck supple.   Skin:     General: Skin is warm and dry.   Neurological:      General: No focal deficit present.      Mental Status: She is alert and oriented to person, place, and time. Mental status is at baseline.      GCS: GCS eye subscore is 4. GCS verbal subscore is 5. GCS motor subscore is 6.                  ED Course  /74   Pulse 60   Temp 98.5 °F (36.9 °C) (Oral)   Resp 18   Ht 160 cm (63\")   Wt 91.4 kg (201 lb 8 oz)   LMP  (LMP Unknown)   SpO2 94%   BMI 35.69 kg/m²   Results for orders placed or performed during the hospital encounter of 04/08/22   Influenza Antigen, Rapid " - Swab, Nasopharynx    Specimen: Nasopharynx; Swab   Result Value Ref Range    Influenza A Ag, EIA Negative Negative    Influenza B Ag, EIA Negative Negative   Comprehensive Metabolic Panel    Specimen: Blood   Result Value Ref Range    Glucose 236 (H) 65 - 99 mg/dL    BUN 12 8 - 23 mg/dL    Creatinine 1.03 (H) 0.57 - 1.00 mg/dL    Sodium 138 136 - 145 mmol/L    Potassium 4.0 3.5 - 5.2 mmol/L    Chloride 108 (H) 98 - 107 mmol/L    CO2 18.3 (L) 22.0 - 29.0 mmol/L    Calcium 9.2 8.6 - 10.5 mg/dL    Total Protein 7.3 6.0 - 8.5 g/dL    Albumin 4.50 3.50 - 5.20 g/dL    ALT (SGPT) 14 1 - 33 U/L    AST (SGOT) 16 1 - 32 U/L    Alkaline Phosphatase 62 39 - 117 U/L    Total Bilirubin <0.2 0.0 - 1.2 mg/dL    Globulin 2.8 gm/dL    A/G Ratio 1.6 g/dL    BUN/Creatinine Ratio 11.7 7.0 - 25.0    Anion Gap 11.7 5.0 - 15.0 mmol/L    eGFR 62.4 >60.0 mL/min/1.73   Lipase    Specimen: Blood   Result Value Ref Range    Lipase 41 13 - 60 U/L   Urinalysis With Microscopic If Indicated (No Culture) - Urine, Clean Catch    Specimen: Urine, Clean Catch   Result Value Ref Range    Color, UA Yellow Yellow, Straw    Appearance, UA Clear Clear    pH, UA 8.0 5.0 - 8.0    Specific Gravity, UA 1.015 1.005 - 1.030    Glucose, UA Negative Negative    Ketones, UA Negative Negative    Bilirubin, UA Negative Negative    Blood, UA Negative Negative    Protein, UA Negative Negative    Leuk Esterase, UA Negative Negative    Nitrite, UA Negative Negative    Urobilinogen, UA 0.2 E.U./dL 0.2 - 1.0 E.U./dL   CBC Auto Differential    Specimen: Blood   Result Value Ref Range    WBC 4.32 3.40 - 10.80 10*3/mm3    RBC 3.73 (L) 3.77 - 5.28 10*6/mm3    Hemoglobin 11.4 (L) 12.0 - 15.9 g/dL    Hematocrit 34.2 34.0 - 46.6 %    MCV 91.7 79.0 - 97.0 fL    MCH 30.6 26.6 - 33.0 pg    MCHC 33.3 31.5 - 35.7 g/dL    RDW 13.5 12.3 - 15.4 %    RDW-SD 45.6 37.0 - 54.0 fl    MPV 9.9 6.0 - 12.0 fL    Platelets 147 140 - 450 10*3/mm3    Neutrophil % 85.7 (H) 42.7 - 76.0 %     Lymphocyte % 13.4 (L) 19.6 - 45.3 %    Monocyte % 0.5 (L) 5.0 - 12.0 %    Eosinophil % 0.0 (L) 0.3 - 6.2 %    Basophil % 0.2 0.0 - 1.5 %    Immature Grans % 0.2 0.0 - 0.5 %    Neutrophils, Absolute 3.70 1.70 - 7.00 10*3/mm3    Lymphocytes, Absolute 0.58 (L) 0.70 - 3.10 10*3/mm3    Monocytes, Absolute 0.02 (L) 0.10 - 0.90 10*3/mm3    Eosinophils, Absolute 0.00 0.00 - 0.40 10*3/mm3    Basophils, Absolute 0.01 0.00 - 0.20 10*3/mm3    Immature Grans, Absolute 0.01 0.00 - 0.05 10*3/mm3    nRBC 0.0 0.0 - 0.2 /100 WBC   ABG with Co-Ox and Electrolytes    Specimen: Arm, Right; Arterial Blood   Result Value Ref Range    pH, Arterial 7.436 7.350 - 7.450 pH units    pCO2, Arterial 25.5 (L) 35.0 - 45.0 mm Hg    pO2, Arterial 125.2 (H) 80.0 - 100.0 mm Hg    HCO3, Arterial 16.8 (L) 22.0 - 26.0 mmol/L    Base Excess, Arterial -5.6 (L) -2.0 - 2.0 mmol/L    O2 Saturation, Arterial 98.4 95.0 - 99.0 %    Hemoglobin, Blood Gas 14.5 11.7 - 14.6 g/dL    Carboxyhemoglobin 0.8 0 - 1.5 %    Methemoglobin 0.20 0.00 - 1.50 %    Oxyhemoglobin 97.4 94 - 99 %    FHHB 1.6 0.0 - 5.0 %    Macho's Test N/A     Note      Site Arterial: right brachial     Modality Cannula - Nasal     FIO2 28 %    Flow Rate 2 lpm    Sodium, Arterial 139.6 136 - 146 mmol/L    Potassium, Arterial 4.29 3.5 - 5 mmol/L    Ionized Calcium, Arterial 1.14 1.13 - 1.32 mmol/L    Chloride, Arterial 109 (H) 98 - 106 mmol/L    Glucose, Arterial 206 (H) 65 - 99 mmol/L    Lactate, Arterial 2.68 (H) 0.5 - 2 mmol/L    PO2/FIO2 447 0 - 500   Lactic Acid, Plasma    Specimen: Blood   Result Value Ref Range    Lactate 3.5 (C) 0.5 - 2.0 mmol/L   Magnesium    Specimen: Blood   Result Value Ref Range    Magnesium 2.0 1.6 - 2.4 mg/dL   BNP    Specimen: Blood   Result Value Ref Range    proBNP 98.4 0.0 - 900.0 pg/mL   D-dimer, Quantitative    Specimen: Blood   Result Value Ref Range    D-Dimer, Quantitative 1.08 (H) 0.00 - 0.59 mg/L (FEU)   STAT Lactic Acid, Reflex    Specimen: Blood   Result  Value Ref Range    Lactate 2.2 (C) 0.5 - 2.0 mmol/L   aPTT    Specimen: Blood   Result Value Ref Range    PTT 21.8 (L) 22.2 - 34.2 seconds   POC Troponin I    Specimen: Blood   Result Value Ref Range    Troponin I 0.00 0.00 - 0.60 ng/mL   ECG 12 Lead   Result Value Ref Range    QT Interval 418 ms   Green Top (Gel)   Result Value Ref Range    Extra Tube Hold for add-ons.    Lavender Top   Result Value Ref Range    Extra Tube hold for add-on    Gold Top - SST   Result Value Ref Range    Extra Tube Hold for add-ons.    Light Blue Top   Result Value Ref Range    Extra Tube hold for add-on      Medications   sodium chloride 0.9 % flush 10 mL (has no administration in time range)   heparin bolus from bag 5,000 Units (has no administration in time range)   heparin bolus from bag 2,500 Units (has no administration in time range)   sodium chloride 0.9 % flush 10 mL (has no administration in time range)   sodium chloride 0.9 % flush 10 mL (has no administration in time range)   acetaminophen (TYLENOL) tablet 650 mg (has no administration in time range)   ondansetron (ZOFRAN) injection 4 mg (has no administration in time range)   ipratropium-albuterol (DUO-NEB) nebulizer solution 3 mL (has no administration in time range)   apixaban (ELIQUIS) tablet 10 mg (has no administration in time range)   DULoxetine (CYMBALTA) DR capsule 30 mg (has no administration in time range)   lamoTRIgine (LaMICtal) tablet 100 mg (has no administration in time range)   pantoprazole (PROTONIX) EC tablet 40 mg (has no administration in time range)   QUEtiapine (SEROquel) tablet 50 mg (has no administration in time range)   QUEtiapine (SEROquel) tablet 100 mg (has no administration in time range)   topiramate (TOPAMAX) tablet 50 mg (has no administration in time range)   dextrose (GLUTOSE) oral gel 24 g (has no administration in time range)   dextrose 10 % infusion (has no administration in time range)   glucagon (human recombinant) (GLUCAGEN  DIAGNOSTIC) injection 1 mg (has no administration in time range)   insulin lispro (humaLOG) injection 0-7 Units (has no administration in time range)   sodium chloride 0.9 % bolus 1,000 mL (0 mL Intravenous Stopped 4/8/22 1941)   ondansetron (ZOFRAN) injection 4 mg (4 mg Intravenous Given 4/8/22 1740)   diphenhydrAMINE (BENADRYL) injection 25 mg (25 mg Intravenous Given 4/8/22 1740)   metoclopramide (REGLAN) injection 10 mg (10 mg Intravenous Given 4/8/22 1741)   ketorolac (TORADOL) injection 15 mg (15 mg Intravenous Given 4/8/22 1740)   sodium chloride 0.9 % bolus 1,000 mL (0 mL Intravenous Stopped 4/8/22 2049)   iopamidol (ISOVUE-370) 76 % injection 100 mL (65 mL Intravenous Given 4/8/22 1923)   heparin bolus from bag 7,300 Units (7,300 Units Intravenous Bolus from Bag 4/8/22 2159)     CT Chest With Contrast Diagnostic    Result Date: 4/8/2022  Narrative: PROCEDURE: CT CHEST W CONTRAST DIAGNOSTIC  COMPARISON:  [facility], CT, CT ANGIOGRAM ABDOMEN PELVIS, 10/24/2021, 19:57. INDICATIONS: SHORTNESS OF BREATH X 2-3 DAYS  TECHNIQUE: After obtaining the patient's consent, CT images were obtained with non-ionic intravenous contrast material.   PROTOCOL:   Standard imaging protocol performed    RADIATION:   DLP: 412.1 mGy*cm   Automated exposure control was utilized to minimize radiation dose. CONTRAST: 65cc Isovue 370 I.V.  FINDINGS:  CT demonstrates there is motion artifact.  There is bibasilar opacities probably atelectasis.  There is no mediastinal or hilar adenopathy.  There could be mild cardiomegaly.  No significant pleural effusion.  3.4 centimeter lesion left lobe of liver likely a cyst.  There is a 2nd probable cyst.  Large left renal mass probably cyst based on previous CT.  There are right lower lobe pulmonary emboli in the proximal segmental right lower lobe pulmonary arteries.  Mild depression superior thoracic spine endplate probably degenerative.  Main pulmonary artery not significantly  enlarged.  No definite right heart strain.      Impression:   1. There is acute appearing pulmonary emboli within several proximal segmental right lower lobe pulmonary arteries. 2. Other findings as above.     JOSHUA STERLING MD       Electronically Signed and Approved By: JOSHUA STERLING MD on 2022 at 20:42             XR Chest 1 View    Result Date: 2022  Narrative: PROCEDURE: XR CHEST 1 VW  COMPARISON: [facility], CT, CT CHEST W CONTRAST DIAGNOSTIC, 2022, 19:19.  [facility], CR, CHEST PA/AP & LAT 2V, 2019, 8:23.  INDICATIONS: SOA  FINDINGS:  Normal heart and mediastinal contour.  Mild opacity right lung base.  Minimal opacity left lung base.  No other acute finding.      Impression:   1. Mild bibasilar opacities greater on the right could reflect atelectasis or pneumonia.       JOSHUA STERLING MD       Electronically Signed and Approved By: JOSHUA STERLING MD on 2022 at 19:34             MRI Breast Screening W & WO Contrast    Result Date: 2022  Narrative: REVIEWING YOUR TEST RESULTS IN [facility] IS NOT A SUBSTITUTE FOR DISCUSSING THOSE RESULTS WITH YOUR HEALTH CARE PROVIDER. PLEASE CONTACT YOUR PROVIDER VIA [email or web address] TO DISCUSS ANY QUESTIONS OR CONCERNS YOU MAY HAVE REGARDING THESE TEST RESULTS.  RADIOLOGY REPORT FACILITY:  [facility] UNIT/AGE/GENDER: M.MRI  OP      AGE:61 Y          SEX:F PATIENT NAME/:  ISABEL RAI A    1960 UNIT NUMBER:  [id number] ACCOUNT NUMBER:  [id number] ACCESSION NUMBER:  [id number] Breast EXAMINATION(S): BILATERAL BREAST MRI WITHOUT AND WITH INTRAVENOUS CONTRAST DATE OF EXAM(S): 2022 INDICATION(S): 61 year old female. High risk Screening. TECHNIQUE: The patient was placed prone in a dedicated breast coil on a 3.0 Zaria magnet. Axial images with T1 weighting with and without fat saturation as well as inversion recovery sequence were obtained. After the  administration of 19 mL of Dotarem intravenous contrast via power injector, serial axial images were obtained for subtraction and kinetic data. During acquisition of kinetic data, a high resolution axial interview sequence was performed and a similar bilateral high-resolution sagittal sequence was reconstructed from axial interview dataset. COMPARISON: Screening mammogram 2021, diagnostic imaging 2021 FINDING(S): Adequate bolus of contrast is present in the heart.  There is heterogeneous tissue  with mild  symmetric background parenchymal enhancement. No suspicious enhancement or kinetics are identified in either breast. Axillary lymph nodes are morphologically normal bilaterally. There are a few STIR intense, nonenhancing lesions are noted within lung, suggestive of benign cysts. IMPRESSION: No MRI evidence of breast malignancy. RECOMMENDATION(S):  BI-RADS Category 1, Negative, routine follow-up with continued annual screening mammography and annual screening MRI. The patient has been entered into an automated reminder system Dictated by: Sandra Guo M.D. Images and Report reviewed and interpreted by: Sandra Guo M.D. <PS><Electronically signed by: Sandra Guo M.D.> 2022 0941 D: 2022 0936 T: 2022 0936      Procedures/EKGs:  Procedures    [id number] at 1743    Rhythm: Sinus rhythm  Rate: 68  Axis: Normal P axis  Intervals: Normal  ST Segment: No pathologic elevation or depression    EKG Comparison: Significant change compared to 2022 at 1236    Interpreted by me        Medical Decision Making:                     MDM  Number of Diagnoses or Management Options     Amount and/or Complexity of Data Reviewed  Clinical lab tests: reviewed and ordered  Tests in the radiology section of CPT®: ordered and reviewed  Tests in the medicine section of CPT®: reviewed and ordered  Decide to obtain previous medical records or to obtain history from someone other than the patient: yes  Discuss the  patient with other providers: (I discussed the case with Dr. Lan for admission)    Risk of Complications, Morbidity, and/or Mortality  Presenting problems: moderate  Diagnostic procedures: low  Management options: low    Patient Progress  Patient progress: stable       Final diagnoses:   Acute pulmonary embolism, unspecified pulmonary embolism type, unspecified whether acute cor pulmonale present (HCC)   Nausea and vomiting, unspecified vomiting type   Hypoxia        Disposition:  ED Disposition     ED Disposition   Decision to Admit    Condition   --    Comment   Level of Care: Telemetry [5]   Diagnosis: Acute pulmonary embolism, unspecified pulmonary embolism type, unspecified whether acute cor pulmonale present (HCC) [[id number]]   Admitting Physician: JERRY LAN [[id number]]   Bed Request Comments: covid rule out                Carissa Marsh, APRN  04/09/22 0015

## 2022-04-09 LAB
ALBUMIN SERPL-MCNC: 3.8 G/DL (ref 3.5–5.2)
ALBUMIN/GLOB SERPL: 1.4 G/DL
ALP SERPL-CCNC: 67 U/L (ref 39–117)
ALT SERPL W P-5'-P-CCNC: 12 U/L (ref 1–33)
ANION GAP SERPL CALCULATED.3IONS-SCNC: 8.5 MMOL/L (ref 5–15)
AST SERPL-CCNC: 16 U/L (ref 1–32)
BASOPHILS # BLD AUTO: 0.06 10*3/MM3 (ref 0–0.2)
BASOPHILS NFR BLD AUTO: 0.8 % (ref 0–1.5)
BILIRUB SERPL-MCNC: 0.6 MG/DL (ref 0–1.2)
BUN SERPL-MCNC: 26 MG/DL (ref 8–23)
BUN/CREAT SERPL: 23.4 (ref 7–25)
CALCIUM SPEC-SCNC: 10.2 MG/DL (ref 8.6–10.5)
CHLORIDE SERPL-SCNC: 105 MMOL/L (ref 98–107)
CO2 SERPL-SCNC: 24.5 MMOL/L (ref 22–29)
CREAT SERPL-MCNC: 1.11 MG/DL (ref 0.57–1)
DEPRECATED RDW RBC AUTO: 54.3 FL (ref 37–54)
EGFRCR SERPLBLD CKD-EPI 2021: 57 ML/MIN/1.73
EOSINOPHIL # BLD AUTO: 0.29 10*3/MM3 (ref 0–0.4)
EOSINOPHIL NFR BLD AUTO: 3.8 % (ref 0.3–6.2)
ERYTHROCYTE [DISTWIDTH] IN BLOOD BY AUTOMATED COUNT: 15.1 % (ref 12.3–15.4)
GLOBULIN UR ELPH-MCNC: 2.8 GM/DL
GLUCOSE BLDC GLUCOMTR-MCNC: 128 MG/DL (ref 70–99)
GLUCOSE BLDC GLUCOMTR-MCNC: 137 MG/DL (ref 70–99)
GLUCOSE BLDC GLUCOMTR-MCNC: 137 MG/DL (ref 70–99)
GLUCOSE BLDC GLUCOMTR-MCNC: 146 MG/DL (ref 70–99)
GLUCOSE SERPL-MCNC: 109 MG/DL (ref 65–99)
HCT VFR BLD AUTO: 41.5 % (ref 34–46.6)
HGB BLD-MCNC: 13 G/DL (ref 12–15.9)
IMM GRANULOCYTES # BLD AUTO: 0.05 10*3/MM3 (ref 0–0.05)
IMM GRANULOCYTES NFR BLD AUTO: 0.7 % (ref 0–0.5)
LYMPHOCYTES # BLD AUTO: 0.97 10*3/MM3 (ref 0.7–3.1)
LYMPHOCYTES NFR BLD AUTO: 12.6 % (ref 19.6–45.3)
MAGNESIUM SERPL-MCNC: 1.6 MG/DL (ref 1.6–2.4)
MCH RBC QN AUTO: 30.6 PG (ref 26.6–33)
MCHC RBC AUTO-ENTMCNC: 31.3 G/DL (ref 31.5–35.7)
MCV RBC AUTO: 97.6 FL (ref 79–97)
MONOCYTES # BLD AUTO: 1.02 10*3/MM3 (ref 0.1–0.9)
MONOCYTES NFR BLD AUTO: 13.3 % (ref 5–12)
NEUTROPHILS NFR BLD AUTO: 5.3 10*3/MM3 (ref 1.7–7)
NEUTROPHILS NFR BLD AUTO: 68.8 % (ref 42.7–76)
NRBC BLD AUTO-RTO: 0 /100 WBC (ref 0–0.2)
PLATELET # BLD AUTO: 120 10*3/MM3 (ref 140–450)
PMV BLD AUTO: 10.7 FL (ref 6–12)
POTASSIUM SERPL-SCNC: 4.2 MMOL/L (ref 3.5–5.2)
PROT SERPL-MCNC: 6.6 G/DL (ref 6–8.5)
QT INTERVAL: 418 MS
RBC # BLD AUTO: 4.25 10*6/MM3 (ref 3.77–5.28)
SARS-COV-2 RNA PNL SPEC NAA+PROBE: NOT DETECTED
SODIUM SERPL-SCNC: 138 MMOL/L (ref 136–145)
WBC NRBC COR # BLD: 7.69 10*3/MM3 (ref 3.4–10.8)

## 2022-04-09 PROCEDURE — 82962 GLUCOSE BLOOD TEST: CPT

## 2022-04-09 PROCEDURE — 83735 ASSAY OF MAGNESIUM: CPT | Performed by: INTERNAL MEDICINE

## 2022-04-09 PROCEDURE — 99226 PR SBSQ OBSERVATION CARE/DAY 35 MINUTES: CPT | Performed by: INTERNAL MEDICINE

## 2022-04-09 PROCEDURE — G0378 HOSPITAL OBSERVATION PER HR: HCPCS

## 2022-04-09 PROCEDURE — 85025 COMPLETE CBC W/AUTO DIFF WBC: CPT | Performed by: INTERNAL MEDICINE

## 2022-04-09 PROCEDURE — 94799 UNLISTED PULMONARY SVC/PX: CPT

## 2022-04-09 PROCEDURE — 80053 COMPREHEN METABOLIC PANEL: CPT | Performed by: INTERNAL MEDICINE

## 2022-04-09 RX ORDER — ONDANSETRON 2 MG/ML
4 INJECTION INTRAMUSCULAR; INTRAVENOUS EVERY 6 HOURS PRN
Status: DISCONTINUED | OUTPATIENT
Start: 2022-04-09 | End: 2022-04-10 | Stop reason: HOSPADM

## 2022-04-09 RX ORDER — TOPIRAMATE 25 MG/1
50 TABLET ORAL DAILY
Status: DISCONTINUED | OUTPATIENT
Start: 2022-04-09 | End: 2022-04-10 | Stop reason: HOSPADM

## 2022-04-09 RX ORDER — LAMOTRIGINE 100 MG/1
100 TABLET ORAL 2 TIMES DAILY
Status: DISCONTINUED | OUTPATIENT
Start: 2022-04-09 | End: 2022-04-10 | Stop reason: HOSPADM

## 2022-04-09 RX ORDER — QUETIAPINE FUMARATE 25 MG/1
100 TABLET, FILM COATED ORAL NIGHTLY
Status: DISCONTINUED | OUTPATIENT
Start: 2022-04-09 | End: 2022-04-10 | Stop reason: HOSPADM

## 2022-04-09 RX ORDER — DULOXETIN HYDROCHLORIDE 30 MG/1
30 CAPSULE, DELAYED RELEASE ORAL DAILY
Status: DISCONTINUED | OUTPATIENT
Start: 2022-04-09 | End: 2022-04-10 | Stop reason: HOSPADM

## 2022-04-09 RX ORDER — SODIUM CHLORIDE 0.9 % (FLUSH) 0.9 %
10 SYRINGE (ML) INJECTION AS NEEDED
Status: DISCONTINUED | OUTPATIENT
Start: 2022-04-09 | End: 2022-04-10 | Stop reason: HOSPADM

## 2022-04-09 RX ORDER — SODIUM CHLORIDE 0.9 % (FLUSH) 0.9 %
10 SYRINGE (ML) INJECTION EVERY 12 HOURS SCHEDULED
Status: DISCONTINUED | OUTPATIENT
Start: 2022-04-09 | End: 2022-04-10 | Stop reason: HOSPADM

## 2022-04-09 RX ORDER — PANTOPRAZOLE SODIUM 40 MG/1
40 TABLET, DELAYED RELEASE ORAL 2 TIMES DAILY
Status: DISCONTINUED | OUTPATIENT
Start: 2022-04-09 | End: 2022-04-10 | Stop reason: HOSPADM

## 2022-04-09 RX ORDER — TRAZODONE HYDROCHLORIDE 100 MG/1
100 TABLET ORAL NIGHTLY
Status: DISCONTINUED | OUTPATIENT
Start: 2022-04-09 | End: 2022-04-10 | Stop reason: HOSPADM

## 2022-04-09 RX ORDER — DEXTROSE MONOHYDRATE 100 MG/ML
25 INJECTION, SOLUTION INTRAVENOUS
Status: DISCONTINUED | OUTPATIENT
Start: 2022-04-09 | End: 2022-04-10 | Stop reason: HOSPADM

## 2022-04-09 RX ORDER — QUETIAPINE FUMARATE 25 MG/1
50 TABLET, FILM COATED ORAL 2 TIMES DAILY
Status: DISCONTINUED | OUTPATIENT
Start: 2022-04-09 | End: 2022-04-10 | Stop reason: HOSPADM

## 2022-04-09 RX ORDER — ACETAMINOPHEN 325 MG/1
650 TABLET ORAL EVERY 4 HOURS PRN
Status: DISCONTINUED | OUTPATIENT
Start: 2022-04-09 | End: 2022-04-10 | Stop reason: HOSPADM

## 2022-04-09 RX ORDER — IPRATROPIUM BROMIDE AND ALBUTEROL SULFATE 2.5; .5 MG/3ML; MG/3ML
3 SOLUTION RESPIRATORY (INHALATION)
Status: DISCONTINUED | OUTPATIENT
Start: 2022-04-09 | End: 2022-04-10 | Stop reason: HOSPADM

## 2022-04-09 RX ORDER — TAMOXIFEN CITRATE 10 MG/1
20 TABLET ORAL DAILY
Status: DISCONTINUED | OUTPATIENT
Start: 2022-04-09 | End: 2022-04-10 | Stop reason: HOSPADM

## 2022-04-09 RX ORDER — NICOTINE POLACRILEX 4 MG
24 LOZENGE BUCCAL
Status: DISCONTINUED | OUTPATIENT
Start: 2022-04-09 | End: 2022-04-10 | Stop reason: HOSPADM

## 2022-04-09 RX ADMIN — TOPIRAMATE 50 MG: 25 TABLET, FILM COATED ORAL at 09:42

## 2022-04-09 RX ADMIN — PANTOPRAZOLE SODIUM 40 MG: 40 TABLET, DELAYED RELEASE ORAL at 20:53

## 2022-04-09 RX ADMIN — APIXABAN 10 MG: 5 TABLET, FILM COATED ORAL at 20:53

## 2022-04-09 RX ADMIN — QUETIAPINE FUMARATE 100 MG: 25 TABLET ORAL at 20:52

## 2022-04-09 RX ADMIN — LAMOTRIGINE 100 MG: 100 TABLET ORAL at 09:42

## 2022-04-09 RX ADMIN — Medication 10 ML: at 00:23

## 2022-04-09 RX ADMIN — QUETIAPINE FUMARATE 50 MG: 25 TABLET ORAL at 09:42

## 2022-04-09 RX ADMIN — QUETIAPINE FUMARATE 50 MG: 25 TABLET ORAL at 16:39

## 2022-04-09 RX ADMIN — PANTOPRAZOLE SODIUM 40 MG: 40 TABLET, DELAYED RELEASE ORAL at 00:23

## 2022-04-09 RX ADMIN — Medication 10 ML: at 09:43

## 2022-04-09 RX ADMIN — LAMOTRIGINE 100 MG: 100 TABLET ORAL at 00:23

## 2022-04-09 RX ADMIN — APIXABAN 10 MG: 5 TABLET, FILM COATED ORAL at 09:42

## 2022-04-09 RX ADMIN — QUETIAPINE FUMARATE 100 MG: 25 TABLET ORAL at 00:23

## 2022-04-09 RX ADMIN — Medication 10 ML: at 20:53

## 2022-04-09 RX ADMIN — PANTOPRAZOLE SODIUM 40 MG: 40 TABLET, DELAYED RELEASE ORAL at 09:42

## 2022-04-09 RX ADMIN — IPRATROPIUM BROMIDE AND ALBUTEROL SULFATE 3 ML: 2.5; .5 SOLUTION RESPIRATORY (INHALATION) at 19:15

## 2022-04-09 RX ADMIN — LAMOTRIGINE 100 MG: 100 TABLET ORAL at 21:54

## 2022-04-09 RX ADMIN — APIXABAN 10 MG: 5 TABLET, FILM COATED ORAL at 00:23

## 2022-04-09 RX ADMIN — TRAZODONE HYDROCHLORIDE 100 MG: 100 TABLET ORAL at 20:53

## 2022-04-09 RX ADMIN — IPRATROPIUM BROMIDE AND ALBUTEROL SULFATE 3 ML: 2.5; .5 SOLUTION RESPIRATORY (INHALATION) at 12:05

## 2022-04-09 RX ADMIN — DULOXETINE HYDROCHLORIDE 30 MG: 30 CAPSULE, DELAYED RELEASE ORAL at 09:42

## 2022-04-09 RX ADMIN — TAMOXIFEN CITRATE 20 MG: 10 TABLET, FILM COATED ORAL at 16:39

## 2022-04-09 NOTE — H&P
[facility] HISTORY AND PHYSICAL  Date: 2022   Patient Name: Melvina Martinez  : 1960  MRN: [id number]  Primary Care Physician:  Isabela Nelson, YOLANDA  Date of admission: 2022    Subjective   Subjective     Chief Complaint: Difficulty breathing shortness of breath    HPI:    Melvina Martinez is a 61 y.o. female past medical history of GERD, bipolar, PTSD, depression, type 2 diabetes, and recent colonoscopy who presents with shortness of breath, tachycardia at her primary care doctor's office, hypoxia, and nausea and vomiting    The patient has had a significant history of the last 6 to 9 months of nausea and vomiting.  At one point she had her gallbladder taken out.  Then he was readmitted and found to have H. pylori gastritis.  She was then seen by infectious disease for movement of H. pylori.  Fast forward to yesterday when the patient had a colonoscopy where they found several polyps but none were cancerous per the patient.  Then today she woke up feeling weak, she had shortness of breath.  It was difficult for her to take a deep breath.  [facility] was tachycardic and hypoxemic at that time.  She was also given Zofran for nausea.  She also had 2 injections in her knees for osteoarthritis.  She has had no fevers.  No chills.  However because of her symptoms the patient came to the emergency part for further evaluation.      In the emergency department the patient's temperature is 98.5, pulse of 62, blood pressure 140/79, 87% on room air 98% on 2 L.  CBC shows hemoglobin 11.4.  CMP shows a creatinine of 1.03, bicarb of 18 with no anion gap and a glucose of 236.  Lactate is 3.5.  Lipase is normal.  Influences negative.  Acemetacin Covid.  CTA of the chest shows pulmonary embolism.  Patient was started on a heparin drip.  Should be admitted to the hospital for acute hypoxemic respiratory failure due to pulmonary embolism also concern for COVID-19 due to chest  x-ray findings.    All systems reviewed abnormalities noted above    Personal History     Past Medical History:  GERD  Anxiety disorder  Bipolar disorder  Depression  Type 2 diabetes  Hypertension  Kidney stones  PTSD  Rotator cuff tear  Stress incontinence    Past Surgical History:  Abdominal plasty  Ankle surgery  Appendectomy on  section  Cholecystectomy  Colonoscopy  Endoscopy      Family History:   Arthritis in the father  Breast cancer in the mother  Cancer in the mother, dimension the father    Social History:   Never smoker  Alcohol occasionally    Home Medications:  Blood Glucose Monitoring Suppl, DULoxetine, Lancets, QUEtiapine, SUMAtriptan, amLODIPine, azithromycin, empagliflozin, glucose blood, lamoTRIgine, ondansetron, pantoprazole, polyethylene glycol, prazosin, sennosides-docusate, sucralfate, tamoxifen, topiramate, traZODone, and verapamil    Allergies:  Allergies   Allergen Reactions   • Penicillins Anaphylaxis and Shortness Of Breath   • Metronidazole Hives and Nausea Only         Objective   Objective     Vitals:   Temp:  [97.8 °F (36.6 °C)-98.5 °F (36.9 °C)] 98.5 °F (36.9 °C)  Heart Rate:  [] 62  Resp:  [18] 18  BP: (111-168)/(59-99) 148/74  Flow (L/min):  [2] 2    Physical Exam    Constitutional: Not feeling well.  Nontoxic.   Eyes: Pupils equal, sclerae anicteric, no conjunctival injection   HENT: NCAT, mucous membranes moist   Neck: Supple, no thyromegaly, no lymphadenopathy, trachea midline   Respiratory: Clear to auscultation bilaterally, nonlabored respirations    Cardiovascular: RRR, no murmurs, rubs, or gallops, palpable pedal pulses bilaterally   Gastrointestinal: Positive bowel sounds, soft.  Slightly tender.  No rebound.  No guarding.  Nondistended.   Musculoskeletal: No bilateral ankle edema, no clubbing or cyanosis to extremities   Psychiatric: Appropriate affect, cooperative   Neurologic: Oriented x 3, strength symmetric in all extremities, Cranial Nerves grossly  intact to confrontation, speech clear   Skin: No rashes     Result Review    Result Review:  I have personally reviewed the results from the time of this admission to 4/8/2022 21:08 EDT and agree with these findings:  []CTA shows pulmonary embolism  D-dimer is elevated  Non-anion gap metabolic acidosis  COVID-19 sent    Assessment/Plan   Assessment / Plan     Assessment/Plan:   Acute hypoxemic respiratory failure  Acute pulmonary embolism  COVID-19 rule out  Metabolic acidosis  Lactic acidosis  History of H. pylori gastritis treated in November 2021  PTSD  Bipolar disorder  Type 2 diabetes    Melvina is a 61-year-old female past medical history of depression and bipolar disorder who presents with shortness of breath, chest pain with deep breaths, and feeling poorly all over.  CTA of the chest shows pulmonary embolism.  She was requiring oxygen and desatted below 88% on room air.  She was initially started heparin drip but I will switch that to apixaban.  Most likely need oxygen to be discharged home tomorrow.  Patient is also had a history of nausea and vomiting and had a colonoscopy yesterday was not feeling well after that.  She received 2 L of fluid with improvement in her lactic.  We will repeat labs in the morning would expect that those will improve with resuscitation.  The patient has had ongoing owusu with nausea and vomiting over the last several months.  She had her gallbladder removed and she is also been treated for H. pylori gastritis.  At this time I think her abdominal discomfort and nausea is most likely due from not tolerating her bowel prep and her colonoscopy from the day before.  Patient will be admitted to observation due to PE and hypoxemic respiratory failure    Plan:  --Admit to hospitalist service  --Patient was originally started on heparin drip  --Switch to apixaban  --Status post 2 L of fluid for nausea in the emergency department  --She feels weak all over is unclear if this is from her  bowel prep or if she is starting develop an illness.  --COVID-19 rule out  --COVID-19 isolation  --DuoNebs for shortness of breath  --Oxygen for saturations less than 90%  --Patient was treated for headache in the ER with improvement  --Repeat lactic acid  --Sliding scale for type 2 diabetes    Disposition  --Changed from heparin drip to apixaban  --Patient has no right heart strain will not order echo  --Might be able to discharge home tomorrow on oxygen and apixaban      DVT prophylaxis:  Heparin drip switch to apixaban    CODE STATUS:     Full code    Admission Status:  I believe this patient meets observation status.    Electronically signed by Satish Lan MD, 04/08/22, 9:08 PM EDT.

## 2022-04-09 NOTE — PROGRESS NOTES
[facility]   Hospitalist Progress Note  Date: 2022  Patient Name: Melvina Martinez  : 1960  MRN: [id number]  Date of admission: 2022      Subjective   Subjective     Chief Complaint: short of air     Summary: 61 y.o. female past medical history of GERD, bipolar, PTSD, depression, type 2 diabetes, and recent colonoscopy who presents with shortness of breath, tachycardia at her primary care doctor's office, hypoxia, and nausea and vomiting     The patient has had a significant history of the last 6 to 9 months of nausea and vomiting.  At one point she had her gallbladder taken out.  Then he was readmitted and found to have H. pylori gastritis.  She was then seen by infectious disease for movement of H. pylori.  Fast forward to yesterday when the patient had a colonoscopy where they found several polyps but none were cancerous per the patient.  Then today she woke up feeling weak, she had shortness of breath.  It was difficult for her to take a deep breath.  [facility] she was tachycardic and hypoxemic at that time.  She was also given Zofran for nausea. She has had no fevers.  No chills.  However because of her symptoms the patient came to the emergency part for further evaluation.       In the emergency department the patient's temperature is 98.5, pulse of 62, blood pressure 140/79, 87% on room air 98% on 2 L.  CBC shows hemoglobin 11.4.  CMP shows a creatinine of 1.03, bicarb of 18 with no anion gap and a glucose of 236.  Lactate is 3.5.  Lipase is normal.  Influences negative.  Acemetacin Covid.  CTA of the chest shows pulmonary embolism.  Patient was started on a heparin drip.  Should be admitted to the hospital for acute hypoxemic respiratory failure due to pulmonary embolism also concern for COVID-19 due to chest x-ray findings.       Interval Followup:  Patient remains on Eliquis.  Patient states she feels much better today.  Patient is on room air.  Patient denies any  history of immobility.  Patient denies any history of any blood clots running in the family.  At this time we are awaiting echocardiogram and venous Doppler study.  Patient's  states that he sees a hematologist in [place] and would like for his wife to follow-up with them upon discharge.  I discussed with them that that is perfectly fine to see a hematologist to work-up any hematological reasons for blood clot.    Review of Systems  History obtained from the patient  General ROS: Negative for - chills, fever, malaise, or night sweats  Psychological ROS: negative for - anxiety, depression, hallucinations, or mood swings  Ophthalmic ROS: negative for - blurry vision, double vision, or itchy eyes  ENT ROS: negative for - headaches, nasal congestion, sneezing, or sore throat  Hematological and Lymphatic ROS: negative for - bleeding problems, bruising, or jaundice  Endocrine ROS: negative for - malaise/lethargy, polydipsia/polyuria, or temperature intolerance  Respiratory ROS: Positive for shortness of breath no cough   cardiovascular ROS: negative for - chest pain, dyspnea on exertion, edema, palpitations, or paroxysmal nocturnal dyspnea  Gastrointestinal ROS: Positive for chronic intermittent abdominal pain nausea and vomiting  Genito-Urinary ROS: negative for - change in urinary stream, hematuria, incontinence, or urinary frequency/urgency  Musculoskeletal ROS: negative for - joint stiffness, joint swelling, muscle pain, or muscular weakness  Neurological ROS: negative for - confusion, dizziness, gait disturbance, headaches, impaired coordination/balance, memory loss, numbness/tingling, seizures, or visual changes  Dermatological ROS: negative for dry skin and rash       Objective   Objective     Vitals:   Temp:  [97.6 °F (36.4 °C)-98.5 °F (36.9 °C)] 97.8 °F (36.6 °C)  Heart Rate:  [] 55  Resp:  [18] 18  BP: (111-172)/(59-99) 132/72  Flow (L/min):  [2] 2  Physical Exam    Constitutional: Awake, alert,  no acute distress resting in bed with  at bedside    Eyes: Pupils equal, sclerae anicteric, no conjunctival injection   HENT: NCAT, mucous membranes moist   Neck: Supple,   Respiratory: Clear to auscultation bilaterally, nonlabored respirations no w/r/r    Cardiovascular: RRR, no murmurs,   Gastrointestinal: Positive bowel sounds, soft, nontender, nondistended   Musculoskeletal: No bilateral ankle edema, no clubbing or cyanosis to extremities   Psychiatric: Appropriate affect, cooperative   Neurologic: Oriented x 3, strength symmetric in all extremitiesspeech clear   Skin: No rashes     Result Review    Result Review:  I have personally reviewed the results from the time of this admission to 4/9/2022 08:46 EDT and agree with these findings:  [x]  Laboratory   CBC    CBC 11/24/21 4/8/22 4/9/22   WBC 6.11 4.32 7.69   RBC 4.50 3.73 (A) 4.25   Hemoglobin 13.3 11.4 (A) 13.0   Hematocrit 41.1 34.2 41.5   MCV 91.3 91.7 97.6 (A)   MCH 29.6 30.6 30.6   MCHC 32.4 33.3 31.3 (A)   RDW 13.6 13.5 15.1   Platelets 204 147 120 (A)   (A) Abnormal value            BMP    BMP 12/13/21 4/8/22 4/8/22 4/9/22     1632 1738    BUN 18 12  26 (A)   Creatinine 0.94 1.03 (A)  1.11 (A)   Sodium 137 138 139.6 138   Potassium 4.8 4.0  4.2   Chloride 106 108 (A)  105   CO2 24.4 18.3 (A)  24.5   Calcium 9.4 9.2  10.2   (A) Abnormal value              [x]  Microbiology  No results found for: ACANTHNAEG, AFBCX, BPERTUSSISCX, BLOODCX  No results found for: BCIDPCR, CXREFLEX, CSFCX, CULTURETIS  No results found for: CULTURES, HSVCX, URCX  No results found for: EYECULTURE, GCCX, HSVCULTURE, LABHSV  No results found for: LEGIONELLA, MRSACX, MUMPSCX, MYCOPLASCX  No results found for: NOCARDIACX, STOOLCX  No results found for: THROATCX, UNSTIMCULT, URINECX, CULTURE, VZVCULTUR  No results found for: VIRALCULTU, WOUNDCX    [x]  Radiology  CT of chest :   1. There is acute appearing pulmonary emboli within several proximal segmental right lower lobe    pulmonary arteries.  2. Other findings as above.         []  EKG/Telemetry   []  Cardiology/Vascular   []  Pathology  []  Old records  []  Other:    Assessment/Plan   Assessment / Plan     Assessment/Plan:  Acute hypoxemic respiratory failure now on room air.   Acute pulmonary embolism  COVID-19 rule out   Metabolic acidosis  Lactic acidosis  History of H. pylori gastritis treated in November 2021  PTSD  Bipolar disorder  Type 2 diabetes     PLAN  --Continue apixaban  --COVID-19 pending.  --DuoNebs for shortness of breath  --Oxygen for saturations less than 90%  --Obtain echo and venous doppler study   --Sliding scale for type 2 diabetes     Discussed plan with RN.    DVT prophylaxis:  Medical DVT prophylaxis orders are present.    CODE STATUS:   Level Of Support Discussed With: Patient  Code Status (Patient has no pulse and is not breathing): CPR (Attempt to Resuscitate)  Medical Interventions (Patient has pulse or is breathing): Full Support        Electronically signed by Grover Fall DO, 04/09/22, 8:46 AM EDT.

## 2022-04-10 ENCOUNTER — READMISSION MANAGEMENT (OUTPATIENT)
Dept: CALL CENTER | Facility: HOSPITAL | Age: 62
End: 2022-04-10

## 2022-04-10 ENCOUNTER — APPOINTMENT (OUTPATIENT)
Dept: CARDIOLOGY | Facility: HOSPITAL | Age: 62
End: 2022-04-10

## 2022-04-10 VITALS
BODY MASS INDEX: 35.08 KG/M2 | DIASTOLIC BLOOD PRESSURE: 72 MMHG | WEIGHT: 197.97 LBS | RESPIRATION RATE: 18 BRPM | HEART RATE: 61 BPM | HEIGHT: 63 IN | TEMPERATURE: 97.7 F | SYSTOLIC BLOOD PRESSURE: 132 MMHG | OXYGEN SATURATION: 98 %

## 2022-04-10 LAB
ANION GAP SERPL CALCULATED.3IONS-SCNC: 9.5 MMOL/L (ref 5–15)
BH CV ECHO MEAS - AO ROOT DIAM: 3.4 CM
BH CV ECHO MEAS - EDV(MOD-SP2): 53 ML
BH CV ECHO MEAS - EDV(MOD-SP4): 53 ML
BH CV ECHO MEAS - EF(MOD-BP): 54 %
BH CV ECHO MEAS - ESV(MOD-SP2): 23 ML
BH CV ECHO MEAS - ESV(MOD-SP4): 24 ML
BH CV ECHO MEAS - IVSD: 0.9 CM
BH CV ECHO MEAS - LA DIMENSION(2D): 3.9 CM
BH CV ECHO MEAS - LAT PEAK E' VEL: 9.5 CM/SEC
BH CV ECHO MEAS - LVIDD: 4.6 CM
BH CV ECHO MEAS - LVIDS: 2.9 CM
BH CV ECHO MEAS - LVOT DIAM: 2 CM
BH CV ECHO MEAS - LVPWD: 0.9 CM
BH CV ECHO MEAS - MED PEAK E' VEL: 7.83 CM/SEC
BH CV ECHO MEAS - MV A MAX VEL: 93 CM/SEC
BH CV ECHO MEAS - MV DEC TIME: 169 MSEC
BH CV ECHO MEAS - MV E MAX VEL: 84 CM/SEC
BH CV ECHO MEAS - MV E/A: 0.9
BH CV ECHO MEAS - RVDD: 2.8 CM
BH CV ECHO MEAS - TAPSE (>1.6): 2.12 CM
BH CV ECHO MEASUREMENTS AVERAGE E/E' RATIO: 9.69
BUN SERPL-MCNC: 19 MG/DL (ref 8–23)
BUN/CREAT SERPL: 24.7 (ref 7–25)
CALCIUM SPEC-SCNC: 9.1 MG/DL (ref 8.6–10.5)
CHLORIDE SERPL-SCNC: 109 MMOL/L (ref 98–107)
CO2 SERPL-SCNC: 19.5 MMOL/L (ref 22–29)
CREAT SERPL-MCNC: 0.77 MG/DL (ref 0.57–1)
D-LACTATE SERPL-SCNC: 1.2 MMOL/L (ref 0.5–2)
DEPRECATED RDW RBC AUTO: 43.6 FL (ref 37–54)
EGFRCR SERPLBLD CKD-EPI 2021: 88.4 ML/MIN/1.73
ERYTHROCYTE [DISTWIDTH] IN BLOOD BY AUTOMATED COUNT: 13.2 % (ref 12.3–15.4)
GLUCOSE BLDC GLUCOMTR-MCNC: 100 MG/DL (ref 70–99)
GLUCOSE BLDC GLUCOMTR-MCNC: 113 MG/DL (ref 70–99)
GLUCOSE SERPL-MCNC: 133 MG/DL (ref 65–99)
HCT VFR BLD AUTO: 39.8 % (ref 34–46.6)
HGB BLD-MCNC: 13.2 G/DL (ref 12–15.9)
IVRT: 69 MSEC
LEFT ATRIUM VOLUME INDEX: 16.5 ML/M2
MAXIMAL PREDICTED HEART RATE: 160 BPM
MCH RBC QN AUTO: 29.5 PG (ref 26.6–33)
MCHC RBC AUTO-ENTMCNC: 33.2 G/DL (ref 31.5–35.7)
MCV RBC AUTO: 89 FL (ref 79–97)
PLATELET # BLD AUTO: 189 10*3/MM3 (ref 140–450)
PMV BLD AUTO: 10 FL (ref 6–12)
POTASSIUM SERPL-SCNC: 4.3 MMOL/L (ref 3.5–5.2)
RBC # BLD AUTO: 4.47 10*6/MM3 (ref 3.77–5.28)
SODIUM SERPL-SCNC: 138 MMOL/L (ref 136–145)
STRESS TARGET HR: 136 BPM
WBC NRBC COR # BLD: 9.88 10*3/MM3 (ref 3.4–10.8)

## 2022-04-10 PROCEDURE — 99217 PR OBSERVATION CARE DISCHARGE MANAGEMENT: CPT | Performed by: INTERNAL MEDICINE

## 2022-04-10 PROCEDURE — 94799 UNLISTED PULMONARY SVC/PX: CPT

## 2022-04-10 PROCEDURE — 83605 ASSAY OF LACTIC ACID: CPT | Performed by: INTERNAL MEDICINE

## 2022-04-10 PROCEDURE — 80048 BASIC METABOLIC PNL TOTAL CA: CPT | Performed by: INTERNAL MEDICINE

## 2022-04-10 PROCEDURE — 85027 COMPLETE CBC AUTOMATED: CPT | Performed by: INTERNAL MEDICINE

## 2022-04-10 PROCEDURE — 93306 TTE W/DOPPLER COMPLETE: CPT

## 2022-04-10 PROCEDURE — 93306 TTE W/DOPPLER COMPLETE: CPT | Performed by: INTERNAL MEDICINE

## 2022-04-10 PROCEDURE — G0378 HOSPITAL OBSERVATION PER HR: HCPCS

## 2022-04-10 PROCEDURE — 82962 GLUCOSE BLOOD TEST: CPT

## 2022-04-10 RX ADMIN — PANTOPRAZOLE SODIUM 40 MG: 40 TABLET, DELAYED RELEASE ORAL at 08:34

## 2022-04-10 RX ADMIN — TAMOXIFEN CITRATE 20 MG: 10 TABLET, FILM COATED ORAL at 08:34

## 2022-04-10 RX ADMIN — TOPIRAMATE 50 MG: 25 TABLET, FILM COATED ORAL at 08:34

## 2022-04-10 RX ADMIN — IPRATROPIUM BROMIDE AND ALBUTEROL SULFATE 3 ML: 2.5; .5 SOLUTION RESPIRATORY (INHALATION) at 12:11

## 2022-04-10 RX ADMIN — APIXABAN 10 MG: 5 TABLET, FILM COATED ORAL at 08:33

## 2022-04-10 RX ADMIN — Medication 10 ML: at 08:33

## 2022-04-10 RX ADMIN — DULOXETINE HYDROCHLORIDE 30 MG: 30 CAPSULE, DELAYED RELEASE ORAL at 08:34

## 2022-04-10 RX ADMIN — QUETIAPINE FUMARATE 50 MG: 25 TABLET ORAL at 08:34

## 2022-04-10 RX ADMIN — IPRATROPIUM BROMIDE AND ALBUTEROL SULFATE 3 ML: 2.5; .5 SOLUTION RESPIRATORY (INHALATION) at 00:24

## 2022-04-10 RX ADMIN — LAMOTRIGINE 100 MG: 100 TABLET ORAL at 08:34

## 2022-04-10 NOTE — DISCHARGE SUMMARY
Psychiatric         HOSPITALIST  DISCHARGE SUMMARY    Patient Name: Melvina Martinez  : 1960  MRN: [id number]    Date of Admission: 2022  Date of Discharge:  4/10/2022    Primary Care Physician: Isabela Nelson APRN    Consults     Date and Time Order Name Status Description    2022  8:50 PM Hospitalist (on-call MD unless specified)            Active and Resolved Hospital Problems:  Active Hospital Problems    Diagnosis POA   • Acute pulmonary embolism, unspecified pulmonary embolism type, unspecified whether acute cor pulmonale present (HCC) [I26.99] Yes      Resolved Hospital Problems   No resolved problems to display.       Hospital Course     Hospital Course:  Melvina Martinez is a 61 y.o. female  With past medical history of GERD, bipolar, PTSD, depression, type 2 diabetes, and recent colonoscopy who presents with shortness of breath, tachycardia at her primary care doctor's office, hypoxia, and nausea and vomiting The patient has had a significant history of the last 6 to 9 months of nausea and vomiting.  At one point she had her gallbladder taken out.  Then he was readmitted and found to have H. pylori gastritis.  The day prior to admission  the patient had a colonoscopy where they found several polyps but none were cancerous per the patient.  Then on the day of admission she woke up feeling weak, she had shortness of breath.  It was difficult for her to take a deep breath.  [facility] told she was tachycardic and hypoxemic at that time.  She was also given Zofran for nausea. She has had no fevers.  No chills.  However because of her symptoms the patient came to the emergency part for further evaluation.    In the emergency department the patient's temperature is 98.5, pulse of 62, blood pressure 140/79, 87% on room air 98% on 2 L.  CBC shows hemoglobin 11.4.  CMP shows a creatinine of 1.03, bicarb of 18 with no anion gap and a glucose of 236.  Lactate is 3.5.   Lipase is normal.  Influences negative.  Covid negative.  CTA of the chest showed pulmonary embolism.  Patient was started on a heparin drip.  Should be admitted to the hospital for acute hypoxemic respiratory failure due to pulmonary embolism.  Patient was transitioned over to Eliquis.  Patient feels significantly better.  Patient is not requiring any oxygen and did good on her walk test.  Patient had an echocardiogram done but is not read yet.  Venous Doppler study was ordered however they are not able to perform that over the weekend therefore this will not be done.  Patient be discharged home in stable condition.  Patient should have close follow-up with her primary care provider.  Patient's  stated that he would like her to see his hematologist and I discussed with them that that is perfectly fine and that her primary care can send a referral.  Patient should return to the ER if any worsening symptoms.  Went over the risks and benefits of the Eliquis.  Patient should not miss any doses.          Day of Discharge     Vital Signs:  Temp:  [97.2 °F (36.2 °C)-98.4 °F (36.9 °C)] 97.7 °F (36.5 °C)  Heart Rate:  [60-73] 61  Resp:  [16-20] 18  BP: (131-157)/(71-85) 132/72  Physical Exam:     Constitutional: Awake, alert, no acute distress resting in bed with  at bedside               Eyes: Pupils equal, sclerae anicteric, no conjunctival injection              HENT: NCAT, mucous membranes moist              Neck: Supple,              Respiratory: Clear to auscultation bilaterally, nonlabored respirations no w/r/r               Cardiovascular: RRR, no murmurs,              Gastrointestinal: Positive bowel sounds, soft, nontender, nondistended              Musculoskeletal: No bilateral ankle edema, no clubbing or cyanosis to extremities              Psychiatric: Appropriate affect, cooperative              Neurologic: Oriented x 3, strength symmetric in all extremitiesspeech clear              Skin: No  rashes        Discharge Details        Discharge Medications      New Medications      Instructions Start Date   apixaban 5 MG tablet tablet  Commonly known as: ELIQUIS   10 mg, Oral, Every 12 Hours Scheduled      apixaban 5 MG tablet tablet  Commonly known as: ELIQUIS   5 mg, Oral, Every 12 Hours Scheduled         Changes to Medications      Instructions Start Date   polyethylene glycol 17 GM/SCOOP powder  Commonly known as: MIRALAX  What changed:   when to take this  reasons to take this   Dissolve 17 g (1 capful) in liquid and drink by mouth 2 (Two) Times a Day.         Continue These Medications      Instructions Start Date   amLODIPine 2.5 MG tablet  Commonly known as: NORVASC   2.5 mg, Oral, Daily      Blood Glucose Monitoring Suppl kit   1 kit, Does not apply, 3 Times Daily, Lancets and blood glucose strips with machine to check blood sugars up to 3 times a day.      DULoxetine 30 MG capsule  Commonly known as: Cymbalta   30 mg, Oral, Daily      empagliflozin 10 MG tablet tablet  Commonly known as: Jardiance   10 mg, Oral, Daily      glucose blood test strip   Use as instructed      lamoTRIgine 100 MG tablet  Commonly known as: LaMICtal   100 mg, Oral, 2 Times Daily      Lancets misc   2 each, Does not apply, 2 Times Daily      ondansetron 4 MG tablet  Commonly known as: Zofran   4 mg, Oral, Every 8 Hours PRN      pantoprazole 40 MG EC tablet  Commonly known as: Protonix   40 mg, Oral, 2 Times Daily      prazosin 2 MG capsule  Commonly known as: MINIPRESS   2 mg, Oral, Nightly      QUEtiapine 100 MG tablet  Commonly known as: SEROquel   50 mg, Oral, 2 Times Daily      QUEtiapine 100 MG tablet  Commonly known as: SEROquel   100 mg, Oral, Nightly      SUMAtriptan 5 MG/ACT nasal spray  Commonly known as: Imitrex   5 mg, Nasal, Every 2 Hours PRN      tamoxifen 10 MG tablet  Commonly known as: NOLVADEX   20 mg, Oral, Daily      topiramate 50 MG tablet  Commonly known as: TOPAMAX   50 mg, Oral, Daily      traZODone  100 MG tablet  Commonly known as: DESYREL   100 mg, Oral, Nightly      verapamil 120 MG tablet  Commonly known as: CALAN   120 mg, Oral, Daily             Allergies   Allergen Reactions   • Penicillins Anaphylaxis and Shortness Of Breath   • Metronidazole Hives and Nausea Only       Discharge Disposition:  Home or Self Care    Diet:  Hospital:  Diet Order   Procedures   • Diet Regular; Cardiac, Consistent Carbohydrate       Discharge Activity:  As tolerated       CODE STATUS:  Code Status and Medical Interventions:   Ordered at: 04/08/22 2140     Level Of Support Discussed With:    Patient     Code Status (Patient has no pulse and is not breathing):    CPR (Attempt to Resuscitate)     Medical Interventions (Patient has pulse or is breathing):    Full Support         No future appointments.    Additional Instructions for the Follow-ups that You Need to Schedule     Discharge Follow-up with PCP   As directed       Currently Documented PCP:    Isabela Nelson APRN    PCP Phone Number:    [phone]     Follow Up Details: in 1 week               Pertinent  and/or Most Recent Results     PROCEDURES:   None     LAB RESULTS:      Lab 04/10/22  0609 04/09/22  0509 04/08/22 2058 04/08/22  2035 04/08/22  1747 04/08/22  1738 04/08/22  1632   WBC 9.88 7.69  --   --   --   --  4.32   HEMOGLOBIN 13.2 13.0  --   --   --   --  11.4*   HEMATOCRIT 39.8 41.5  --   --   --   --  34.2   PLATELETS 189 120*  --   --   --   --  147   NEUTROS ABS  --  5.30  --   --   --   --  3.70   IMMATURE GRANS (ABS)  --  0.05  --   --   --   --  0.01   LYMPHS ABS  --  0.97  --   --   --   --  0.58*   MONOS ABS  --  1.02*  --   --   --   --  0.02*   EOS ABS  --  0.29  --   --   --   --  0.00   MCV 89.0 97.6*  --   --   --   --  91.7   LACTATE 1.2  --   --  2.2* 3.5*  --   --    LACTATE, ARTERIAL  --   --   --   --   --  2.68*  --    APTT  --   --  21.8*  --   --   --   --          Lab 04/10/22  0609 04/09/22  0509 04/08/22  1738 04/08/22  163    SODIUM 138 138  --  138   SODIUM, ARTERIAL  --   --  139.6  --    POTASSIUM 4.3 4.2  --  4.0   CHLORIDE 109* 105  --  108*   CO2 19.5* 24.5  --  18.3*   ANION GAP 9.5 8.5  --  11.7   BUN 19 26*  --  12   CREATININE 0.77 1.11*  --  1.03*   EGFR 88.4 57.0*  --  62.4   GLUCOSE 133* 109*  --  236*   GLUCOSE, ARTERIAL  --   --  206*  --    CALCIUM 9.1 10.2  --  9.2   IONIZED CALCIUM  --   --  1.14  --    MAGNESIUM  --  1.6  --  2.0         Lab 04/09/22  0509 04/08/22  1632   TOTAL PROTEIN 6.6 7.3   ALBUMIN 3.80 4.50   GLOBULIN 2.8 2.8   ALT (SGPT) 12 14   AST (SGOT) 16 16   BILIRUBIN 0.6 <0.2   ALK PHOS 67 62   LIPASE  --  41         Lab 04/08/22  1632   PROBNP 98.4                 Lab 04/08/22  1738   PH, ARTERIAL 7.436   PCO2, ARTERIAL 25.5*   PO2 .2*   O2 SATURATION ART 98.4   FIO2 28   HCO3 ART 16.8*   BASE EXCESS ART -5.6*   CARBOXYHEMOGLOBIN 0.8     Brief Urine Lab Results  (Last result in the past 365 days)      Color   Clarity   Blood   Leuk Est   Nitrite   Protein   CREAT   Urine HCG        04/08/22 1730 Yellow   Clear   Negative   Negative   Negative   Negative               Microbiology Results (last 10 days)     Procedure Component Value - Date/Time    COVID-19,APTIMA PANTHER(JESSICA), RYANN/ AZRA, NP/OP SWAB IN UTM/VTM/SALINE TRANSPORT MEDIA,24 HR TAT - Swab, Nasopharynx [[id number]]  (Normal) Collected: 04/08/22 2145    Lab Status: Final result Specimen: Swab from Nasopharynx Updated: 04/09/22 1519     COVID19 Not Detected    Narrative:      Fact sheet for providers: https://www.fda.gov/media/498929/download     Fact sheet for patients: https://www.fda.gov/media/933272/download    Test performed by RT PCR.    Blood Culture - Blood, Arm, Left [[id number]]  (Normal) Collected: 04/08/22 1747    Lab Status: Preliminary result Specimen: Blood from Arm, Left Updated: 04/09/22 1803     Blood Culture No growth at 24 hours    Blood Culture - Blood, Arm, Right [[id number]]  (Normal) Collected: 04/08/22 1747    Lab  Status: Preliminary result Specimen: Blood from Arm, Right Updated: 04/09/22 1803     Blood Culture No growth at 24 hours    Influenza Antigen, Rapid - Swab, Nasopharynx [[id number]]  (Normal) Collected: 04/08/22 1745    Lab Status: Final result Specimen: Swab from Nasopharynx Updated: 04/08/22 1809     Influenza A Ag, EIA Negative     Influenza B Ag, EIA Negative          CT Chest With Contrast Diagnostic    Result Date: 4/8/2022  Impression:   1. There is acute appearing pulmonary emboli within several proximal segmental right lower lobe pulmonary arteries. 2. Other findings as above.     JOSHUA STERLING MD       Electronically Signed and Approved By: JOSHUA STERLING MD on 4/08/2022 at 20:42             XR Chest 1 View    Result Date: 4/8/2022  Impression:   1. Mild bibasilar opacities greater on the right could reflect atelectasis or pneumonia.       JOSHUA STERLING MD       Electronically Signed and Approved By: JOSHUA STERLING MD on 4/08/2022 at 19:34                           Labs Pending at Discharge:  Pending Labs     Order Current Status    HOLD Troponin-I Tube Collected (04/08/22 1747)    POC Troponin I with Hold Tube Collected (04/08/22 1747)    Blood Culture - Blood, Arm, Left Preliminary result    Blood Culture - Blood, Arm, Right Preliminary result            Time spent on Discharge including face to face service:  More than 35 minutes    Electronically signed by Grover Fall DO, 04/10/22, 1:00 PM EDT.

## 2022-04-10 NOTE — NURSING NOTE
Exercise Oximetry    Patient Name:Melvina Martinez   MRN: [id number]   Date: 04/10/22             ROOM AIR BASELINE   SpO2% 97   Heart Rate 57   Blood Pressure      EXERCISE ON ROOM AIR SpO2% EXERCISE ON O2 @ LPM SpO2%   1 MINUTE 93 1 MINUTE    2 MINUTES 95 2 MINUTES    3 MINUTES 96 3 MINUTES    4 MINUTES 97 4 MINUTES    5 MINUTES 96 5 MINUTES    6 MINUTES 97 6 MINUTES               Distance Walked 400 feet Distance Walked   Dyspnea (Chrissy Scale)   Dyspnea (Chrissy Scale)   Fatigue (Chrissy Scale)   Fatigue (Chrissy Scale)   SpO2% Post Exercise  97 SpO2% Post Exercise   HR Post Exercise  60 HR Post Exercise   Time to Recovery N/A Time to Recovery     Comments:

## 2022-04-10 NOTE — OUTREACH NOTE
Prep Survey    Flowsheet Row Responses   [facility] discharged from? [facility]   Is LACE score < 7 ? Yes   Emergency Room discharge w/ pulse ox? No   Eligibility [facility]   Hospital [facility]   Date of Admission 04/08/22   Date of Discharge 04/10/22   Discharge Disposition Home or Self Care   Discharge diagnosis Acute pulmonary embolism   Does the patient have one of the following disease processes/diagnoses(primary or secondary)? Other   Does the patient have Home health ordered? No   Is there a DME ordered? No   Prep survey completed? Yes          ADRIANO WADE - Registered Nurse

## 2022-04-11 ENCOUNTER — TRANSITIONAL CARE MANAGEMENT TELEPHONE ENCOUNTER (OUTPATIENT)
Dept: CALL CENTER | Facility: HOSPITAL | Age: 62
End: 2022-04-11

## 2022-04-11 PROBLEM — I51.89 GRADE I DIASTOLIC DYSFUNCTION: Status: ACTIVE | Noted: 2022-04-11

## 2022-04-11 NOTE — OUTREACH NOTE
Call Center TCM Note    Flowsheet Row Responses   [facility] patient discharged from? [facility]   Does the patient have one of the following disease processes/diagnoses(primary or secondary)? Other   TCM attempt successful? Yes   Call start time 1121   Call end time 1124   Discharge diagnosis Acute pulmonary embolism   Is patient permission given to speak with other caregiver? No   Meds reviewed with patient/caregiver? Yes   Does the patient have all medications ordered at discharge? Yes   Is the patient taking all medications as directed (includes completed medication regime)? Yes   Does the patient have a primary care provider?  Yes   Does the patient have an appointment with their PCP within 7 days of discharge? Yes   Comments regarding PCP PCP Isabela GUERRERO. Hospital follow up scheduled with PCP for Thurs 4/14/22  1130am   Has the patient kept scheduled appointments due by today? N/A   Has home health visited the patient within 72 hours of discharge? N/A   Psychosocial issues? No   Did the patient receive a copy of their discharge instructions? Yes   Nursing interventions Reviewed instructions with patient   What is the patient's perception of their health status since discharge? Improving  [Patient reports improving, just tired. ]   Is the patient/caregiver able to teach back signs and symptoms related to disease process for when to call PCP? Yes   Is the patient/caregiver able to teach back the hierarchy of who to call/visit for symptoms/problems? PCP, Specialist, Home health nurse, Urgent Care, ED, 911 Yes   If the patient is a current smoker, are they able to teach back resources for cessation? Not a smoker   TCM call completed? Yes   Wrap up additional comments Patient denies further questions or needs today.           Yana Aaron RN    4/11/2022, 11:24 EDT

## 2022-04-13 ENCOUNTER — APPOINTMENT (OUTPATIENT)
Dept: CT IMAGING | Facility: HOSPITAL | Age: 62
End: 2022-04-13

## 2022-04-13 ENCOUNTER — APPOINTMENT (OUTPATIENT)
Dept: GENERAL RADIOLOGY | Facility: HOSPITAL | Age: 62
End: 2022-04-13

## 2022-04-13 ENCOUNTER — HOSPITAL ENCOUNTER (EMERGENCY)
Facility: HOSPITAL | Age: 62
Discharge: HOME | End: 2022-04-13
Attending: EMERGENCY MEDICINE | Admitting: EMERGENCY MEDICINE

## 2022-04-13 VITALS
TEMPERATURE: 98.1 F | HEART RATE: 94 BPM | DIASTOLIC BLOOD PRESSURE: 78 MMHG | RESPIRATION RATE: 24 BRPM | SYSTOLIC BLOOD PRESSURE: 135 MMHG | OXYGEN SATURATION: 94 %

## 2022-04-13 DIAGNOSIS — I26.99 ACUTE PULMONARY EMBOLISM, UNSPECIFIED PULMONARY EMBOLISM TYPE, UNSPECIFIED WHETHER ACUTE COR PULMONALE PRESENT: ICD-10-CM

## 2022-04-13 DIAGNOSIS — I26.93 SINGLE SUBSEGMENTAL PULMONARY EMBOLISM WITHOUT ACUTE COR PULMONALE: ICD-10-CM

## 2022-04-13 DIAGNOSIS — R07.81 PLEURITIC CHEST PAIN: Primary | ICD-10-CM

## 2022-04-13 DIAGNOSIS — Z86.39 HISTORY OF DIABETES MELLITUS: ICD-10-CM

## 2022-04-13 DIAGNOSIS — Z86.79 HISTORY OF HYPERTENSION: ICD-10-CM

## 2022-04-13 LAB
ALBUMIN SERPL-MCNC: 4.1 G/DL (ref 3.5–5.2)
ALBUMIN/GLOB SERPL: 1.7 G/DL
ALP SERPL-CCNC: 59 U/L (ref 39–117)
ALT SERPL W P-5'-P-CCNC: 15 U/L (ref 1–33)
ANION GAP SERPL CALCULATED.3IONS-SCNC: 15.2 MMOL/L (ref 5–15)
AST SERPL-CCNC: 10 U/L (ref 1–32)
BACTERIA SPEC AEROBE CULT: NORMAL
BACTERIA SPEC AEROBE CULT: NORMAL
BASOPHILS # BLD AUTO: 0.01 10*3/MM3 (ref 0–0.2)
BASOPHILS NFR BLD AUTO: 0.1 % (ref 0–1.5)
BILIRUB SERPL-MCNC: 0.2 MG/DL (ref 0–1.2)
BUN SERPL-MCNC: 27 MG/DL (ref 8–23)
BUN/CREAT SERPL: 27.6 (ref 7–25)
CALCIUM SPEC-SCNC: 9.3 MG/DL (ref 8.6–10.5)
CHLORIDE SERPL-SCNC: 105 MMOL/L (ref 98–107)
CO2 SERPL-SCNC: 18.8 MMOL/L (ref 22–29)
CREAT SERPL-MCNC: 0.98 MG/DL (ref 0.57–1)
DEPRECATED RDW RBC AUTO: 40.6 FL (ref 37–54)
EGFRCR SERPLBLD CKD-EPI 2021: 66.2 ML/MIN/1.73
EOSINOPHIL # BLD AUTO: 0.08 10*3/MM3 (ref 0–0.4)
EOSINOPHIL NFR BLD AUTO: 0.8 % (ref 0.3–6.2)
ERYTHROCYTE [DISTWIDTH] IN BLOOD BY AUTOMATED COUNT: 12.8 % (ref 12.3–15.4)
GLOBULIN UR ELPH-MCNC: 2.4 GM/DL
GLUCOSE SERPL-MCNC: 109 MG/DL (ref 65–99)
HCT VFR BLD AUTO: 41.4 % (ref 34–46.6)
HGB BLD-MCNC: 13.9 G/DL (ref 12–15.9)
IMM GRANULOCYTES # BLD AUTO: 0.03 10*3/MM3 (ref 0–0.05)
IMM GRANULOCYTES NFR BLD AUTO: 0.3 % (ref 0–0.5)
LYMPHOCYTES # BLD AUTO: 3.71 10*3/MM3 (ref 0.7–3.1)
LYMPHOCYTES NFR BLD AUTO: 39.3 % (ref 19.6–45.3)
MCH RBC QN AUTO: 29.5 PG (ref 26.6–33)
MCHC RBC AUTO-ENTMCNC: 33.6 G/DL (ref 31.5–35.7)
MCV RBC AUTO: 87.9 FL (ref 79–97)
MONOCYTES # BLD AUTO: 0.67 10*3/MM3 (ref 0.1–0.9)
MONOCYTES NFR BLD AUTO: 7.1 % (ref 5–12)
NEUTROPHILS NFR BLD AUTO: 4.94 10*3/MM3 (ref 1.7–7)
NEUTROPHILS NFR BLD AUTO: 52.4 % (ref 42.7–76)
NRBC BLD AUTO-RTO: 0 /100 WBC (ref 0–0.2)
NT-PROBNP SERPL-MCNC: 67.7 PG/ML (ref 0–900)
PLATELET # BLD AUTO: 205 10*3/MM3 (ref 140–450)
PMV BLD AUTO: 10 FL (ref 6–12)
POTASSIUM SERPL-SCNC: 3.9 MMOL/L (ref 3.5–5.2)
PROT SERPL-MCNC: 6.5 G/DL (ref 6–8.5)
QT INTERVAL: 380 MS
RBC # BLD AUTO: 4.71 10*6/MM3 (ref 3.77–5.28)
SODIUM SERPL-SCNC: 139 MMOL/L (ref 136–145)
TROPONIN T SERPL-MCNC: <0.01 NG/ML (ref 0–0.03)
WBC NRBC COR # BLD: 9.44 10*3/MM3 (ref 3.4–10.8)

## 2022-04-13 PROCEDURE — 25010000002 ONDANSETRON PER 1 MG: Performed by: EMERGENCY MEDICINE

## 2022-04-13 PROCEDURE — 0 IOPAMIDOL PER 1 ML: Performed by: EMERGENCY MEDICINE

## 2022-04-13 PROCEDURE — 96375 TX/PRO/DX INJ NEW DRUG ADDON: CPT

## 2022-04-13 PROCEDURE — 84484 ASSAY OF TROPONIN QUANT: CPT | Performed by: EMERGENCY MEDICINE

## 2022-04-13 PROCEDURE — 85025 COMPLETE CBC W/AUTO DIFF WBC: CPT | Performed by: EMERGENCY MEDICINE

## 2022-04-13 PROCEDURE — 71045 X-RAY EXAM CHEST 1 VIEW: CPT

## 2022-04-13 PROCEDURE — 93005 ELECTROCARDIOGRAM TRACING: CPT | Performed by: EMERGENCY MEDICINE

## 2022-04-13 PROCEDURE — 99283 EMERGENCY DEPT VISIT LOW MDM: CPT

## 2022-04-13 PROCEDURE — 25010000002 HYDROMORPHONE PER 4 MG: Performed by: EMERGENCY MEDICINE

## 2022-04-13 PROCEDURE — 93010 ELECTROCARDIOGRAM REPORT: CPT | Performed by: INTERNAL MEDICINE

## 2022-04-13 PROCEDURE — 71275 CT ANGIOGRAPHY CHEST: CPT

## 2022-04-13 PROCEDURE — 83880 ASSAY OF NATRIURETIC PEPTIDE: CPT | Performed by: EMERGENCY MEDICINE

## 2022-04-13 PROCEDURE — 80053 COMPREHEN METABOLIC PANEL: CPT | Performed by: EMERGENCY MEDICINE

## 2022-04-13 PROCEDURE — 25010000002 HYDROMORPHONE 1 MG/ML SOLUTION: Performed by: EMERGENCY MEDICINE

## 2022-04-13 PROCEDURE — 96374 THER/PROPH/DIAG INJ IV PUSH: CPT

## 2022-04-13 PROCEDURE — 96376 TX/PRO/DX INJ SAME DRUG ADON: CPT

## 2022-04-13 RX ORDER — HYDROCODONE BITARTRATE AND ACETAMINOPHEN 7.5; 325 MG/1; MG/1
1 TABLET ORAL ONCE
Status: COMPLETED | OUTPATIENT
Start: 2022-04-13 | End: 2022-04-13

## 2022-04-13 RX ORDER — HYDROMORPHONE HYDROCHLORIDE 1 MG/ML
0.5 INJECTION, SOLUTION INTRAMUSCULAR; INTRAVENOUS; SUBCUTANEOUS ONCE
Status: COMPLETED | OUTPATIENT
Start: 2022-04-13 | End: 2022-04-13

## 2022-04-13 RX ORDER — ONDANSETRON 2 MG/ML
4 INJECTION INTRAMUSCULAR; INTRAVENOUS ONCE
Status: COMPLETED | OUTPATIENT
Start: 2022-04-13 | End: 2022-04-13

## 2022-04-13 RX ADMIN — ONDANSETRON 4 MG: 2 INJECTION INTRAMUSCULAR; INTRAVENOUS at 16:51

## 2022-04-13 RX ADMIN — HYDROMORPHONE HYDROCHLORIDE 0.5 MG: 1 INJECTION, SOLUTION INTRAMUSCULAR; INTRAVENOUS; SUBCUTANEOUS at 17:19

## 2022-04-13 RX ADMIN — SODIUM CHLORIDE 500 ML: 9 INJECTION, SOLUTION INTRAVENOUS at 16:50

## 2022-04-13 RX ADMIN — IOPAMIDOL 95 ML: 755 INJECTION, SOLUTION INTRAVENOUS at 18:05

## 2022-04-13 RX ADMIN — HYDROMORPHONE HYDROCHLORIDE 1 MG: 1 INJECTION, SOLUTION INTRAMUSCULAR; INTRAVENOUS; SUBCUTANEOUS at 16:51

## 2022-04-13 RX ADMIN — HYDROCODONE BITARTRATE AND ACETAMINOPHEN 1 TABLET: 7.5; 325 TABLET ORAL at 19:23

## 2022-04-13 NOTE — ED TRIAGE NOTES
Pt seen at [facility] and dx'd w/PE, started on Eliquis, dc'd home Monday, here for worsening soa/cp

## 2022-04-13 NOTE — DISCHARGE INSTRUCTIONS
Continue current medications as prescribed, follow-up with primary care provider tomorrow for recheck, you may call the Vascular Lab tomorrow at [phone] to schedule DVT ultrasound.  ED return for worsening symptoms as needed

## 2022-04-13 NOTE — ED PROVIDER NOTES
EMERGENCY DEPARTMENT ENCOUNTER    Room Number:  18/18  Date of encounter:  4/13/2022  PCP: Isabela Nelson APRN  Historian: Patient,       HPI:  Chief Complaint: Shortness of breath, chest pain  A complete HPI/ROS/PMH/PSH/SH/FH are unobtainable due to: None    Context: Melvina Martinez is a 61 y.o. female who presents to the ED via private vehicle for evaluation for progressive chest pain and shortness of breath since being diagnosed with pulmonary embolisms at [facility] over the weekend.  Was started on Eliquis and discharged on Sunday.  Progressive symptoms since then.  Symptoms seem to really drastically worsened today, describes chest pain as sharp in nature worse with deep breathing.  Reports shortness of breath at rest.  Compliant with medication.  Denies any leg pain or swelling.  Noted developing dizziness and lightheadedness while at home earlier today.  Has had some nausea.      MEDICAL RECORD REVIEW    Discharge summary reviewed from April 10, 2022 after being admitted April 8, 2022.  History of bipolar disorder, PTSD, depression, type 2 diabetes.  Had recent colonoscopy.     CT chest April 8 of 2022    FINDINGS:          CT demonstrates there is motion artifact.  There is bibasilar opacities probably atelectasis.    There is no mediastinal or hilar adenopathy.  There could be mild cardiomegaly.  No significant   pleural effusion.  3.4 centimeter lesion left lobe of liver likely a cyst.  There is a 2nd probable   cyst.  Large left renal mass probably cyst based on previous CT.  There are right lower lobe   pulmonary emboli in the proximal segmental right lower lobe pulmonary arteries.  Mild depression   superior thoracic spine endplate probably degenerative.  Main pulmonary artery not significantly   enlarged.  No definite right heart strain.     IMPRESSION:                 1. There is acute appearing pulmonary emboli within several proximal segmental right lower lobe   pulmonary  arteries.  2. Other findings as above.            JOSHUA STERLING MD         Electronically Signed and Approved By: JOSHUA STERLING MD on 4/08/2022 at 20:42      Adult transthoracic echo April 10, 2022    FINDINGS:          CT demonstrates there is motion artifact.  There is bibasilar opacities probably atelectasis.    There is no mediastinal or hilar adenopathy.  There could be mild cardiomegaly.  No significant   pleural effusion.  3.4 centimeter lesion left lobe of liver likely a cyst.  There is a 2nd probable   cyst.  Large left renal mass probably cyst based on previous CT.  There are right lower lobe   pulmonary emboli in the proximal segmental right lower lobe pulmonary arteries.  Mild depression   superior thoracic spine endplate probably degenerative.  Main pulmonary artery not significantly   enlarged.  No definite right heart strain.     IMPRESSION:                 1. There is acute appearing pulmonary emboli within several proximal segmental right lower lobe   pulmonary arteries.  2. Other findings as above.            JOSHUA STERLING MD         Electronically Signed and Approved By: JOSHUA STERLING MD on 4/08/2022 at 20:42        PAST MEDICAL HISTORY  Active Ambulatory Problems     Diagnosis Date Noted   • Rotator cuff tear, left 06/10/2021   • Aftercare following surgery of the musculoskeletal system 06/23/2021   • Acute pain of left shoulder 06/23/2021   • Posttraumatic stress disorder 07/26/2021   • Bipolar disorder ([facility]) 07/26/2021   • Diabetes ([facility]) 07/26/2021   • Hypertension 07/26/2021   • Morbid obesity ([facility]) 11/20/2017   • Decreased right shoulder range of motion 07/28/2021   • Tear of right supraspinatus tendon 07/28/2021   • Migraine 08/30/2021   • Intractable vomiting 10/03/2021   • Biliary dyskinesia 10/03/2021   • S/P laparoscopic cholecystectomy 10/12/2021   • Generalized abdominal pain 10/25/2021   • Gastritis 10/26/2021   • Abnormal CT of the abdomen 10/25/2021   • H. pylori  infection 2021   • Primary osteoarthritis of left hand 11/10/2021   • Nausea and vomiting 2021   • OSMANY (acute kidney injury) ([facility]) 2021   • Acute pain of right knee 2021   • Encounter for screening for malignant neoplasm of colon 2021   • Chest pain 2022   • Acute pulmonary embolism, unspecified pulmonary embolism type, unspecified whether acute cor pulmonale present ([facility]) 2022   • Grade I diastolic dysfunction 2022     Resolved Ambulatory Problems     Diagnosis Date Noted   • Epigastric pain 10/25/2021     Past Medical History:   Diagnosis Date   • Acid reflux    • Anxiety disorder    • Arthritis    • Depression    • Kidney stones    • Limb swelling    • Migraines    • PTSD (post-traumatic stress disorder)    • Stress incontinence          PAST SURGICAL HISTORY  Past Surgical History:   Procedure Laterality Date   • ABDOMINOPLASTY      Tummy tuck   • ANKLE SURGERY     • APPENDECTOMY     •  SECTION     • CHOLECYSTECTOMY N/A 10/6/2021    Procedure: CHOLECYSTECTOMY LAPAROSCOPIC;  Surgeon: Jeancarlos Carrington MD;  Location: [facility] OR;  Service: General;  Laterality: N/A;   • COLONOSCOPY     • COLONOSCOPY N/A 2022    Procedure: COLONOSCOPY INTO CECUM WITH COLD SNARE POLYPECTOMY;  Surgeon: Ananth Odell MD;  Location: [facility] ENDOSCOPY;  Service: Gastroenterology;  Laterality: N/A;  PRE: PERSONAL H/O COLON POLYPS  POST: DIVERTICULOSIS, POLYP, HEMORRHOIDS, TORTUOUS COLON   • ENDOSCOPY N/A 10/28/2021    Procedure: ESOPHAGOGASTRODUODENOSCOPY with biopsies;  Surgeon: Luciano Salmon MD;  Location: [facility] ENDOSCOPY;  Service: Gastroenterology;  Laterality: N/A;  pre:  abnormal CT  post:  gastritis, HH,    • EYE SURGERY     • HYSTERECTOMY     • INTRAOCULAR LENS INSERTION     • KNEE SURGERY     • SHOULDER ARTHROSCOPY Left 6/10/2021    Procedure: LEFT SHOULDER ARTHROSCOPY;  Surgeon: Дмитрий Francois MD;  Location: [facility];  Service: Orthopedics;   Laterality: Left;   • SHOULDER ROTATOR CUFF REPAIR Left 6/10/2021    Procedure: SUBACROMIAL DECOMPRESSION, DISTAL CLAVICULECTOMY AND ROTATOR CUFF REPAIR;  Surgeon: Дмитрий Francois MD;  Location: [facility] OR [facility];  Service: Orthopedics;  Laterality: Left;   • SHOULDER SURGERY Right    • TONSILLECTOMY           FAMILY HISTORY  Family History   Problem Relation Age of Onset   • Cancer Mother         Unspecified   • Breast cancer Mother    • Dementia Father    • Stroke Father    • Heart disease Father    • Diabetes Father         Unspecified type   • Arthritis Father    • Malig Hyperthermia Neg Hx          SOCIAL HISTORY  Social History     Socioeconomic History   • Marital status:    Tobacco Use   • Smoking status: Never Smoker   • Smokeless tobacco: Never Used   Vaping Use   • Vaping Use: Never used   Substance and Sexual Activity   • Alcohol use: Yes     Comment: rarely drinks   • Drug use: Never         ALLERGIES  Penicillins and Metronidazole        REVIEW OF SYSTEMS  Review of Systems     All systems reviewed and negative except for those discussed in HPI.       PHYSICAL EXAM    I have reviewed the triage vital signs and nursing notes.    ED Triage Vitals   Temp Heart Rate Resp BP SpO2   04/13/22 1645 04/13/22 1626 04/13/22 1626 -- 04/13/22 1626   98.1 °F (36.7 °C) 111 24  98 %      Temp src Heart Rate Source Patient Position BP Location FiO2 (%)   04/13/22 1645 -- -- -- --   Oral           Physical Exam  General: Appears uncomfortable, nondiaphoretic  HEENT: Mucous membranes moist, atraumatic, EOMI  Neck: Full ROM  Pulm: Symmetric chest rise, tachypnea, lungs CTAB  Cardiovascular: Regular rate and rhythm, intact distal pulses, no peripheral edema  GI: Soft, nontender, nondistended, no rebound, no guarding, bowel sounds present  MSK: Full ROM, no deformity  Skin: Warm, dry  Neuro: Awake, alert, oriented x 4, GCS 15, moving all extremities, no focal deficits  Psych: Calm, cooperative      N95, protective eye  goggles, and gloves used during this encounter. Patient in surgical mask.      LAB RESULTS  Recent Results (from the past 24 hour(s))   Comprehensive Metabolic Panel    Collection Time: 04/13/22  4:44 PM    Specimen: Blood   Result Value Ref Range    Glucose 109 (H) 65 - 99 mg/dL    BUN 27 (H) 8 - 23 mg/dL    Creatinine 0.98 0.57 - 1.00 mg/dL    Sodium 139 136 - 145 mmol/L    Potassium 3.9 3.5 - 5.2 mmol/L    Chloride 105 98 - 107 mmol/L    CO2 18.8 (L) 22.0 - 29.0 mmol/L    Calcium 9.3 8.6 - 10.5 mg/dL    Total Protein 6.5 6.0 - 8.5 g/dL    Albumin 4.10 3.50 - 5.20 g/dL    ALT (SGPT) 15 1 - 33 U/L    AST (SGOT) 10 1 - 32 U/L    Alkaline Phosphatase 59 39 - 117 U/L    Total Bilirubin 0.2 0.0 - 1.2 mg/dL    Globulin 2.4 gm/dL    A/G Ratio 1.7 g/dL    BUN/Creatinine Ratio 27.6 (H) 7.0 - 25.0    Anion Gap 15.2 (H) 5.0 - 15.0 mmol/L    eGFR 66.2 >60.0 mL/min/1.73   BNP    Collection Time: 04/13/22  4:44 PM    Specimen: Blood   Result Value Ref Range    proBNP 67.7 0.0 - 900.0 pg/mL   Troponin    Collection Time: 04/13/22  4:44 PM    Specimen: Blood   Result Value Ref Range    Troponin T <0.010 0.000 - 0.030 ng/mL   CBC Auto Differential    Collection Time: 04/13/22  4:44 PM    Specimen: Blood   Result Value Ref Range    WBC 9.44 3.40 - 10.80 10*3/mm3    RBC 4.71 3.77 - 5.28 10*6/mm3    Hemoglobin 13.9 12.0 - 15.9 g/dL    Hematocrit 41.4 34.0 - 46.6 %    MCV 87.9 79.0 - 97.0 fL    MCH 29.5 26.6 - 33.0 pg    MCHC 33.6 31.5 - 35.7 g/dL    RDW 12.8 12.3 - 15.4 %    RDW-SD 40.6 37.0 - 54.0 fl    MPV 10.0 6.0 - 12.0 fL    Platelets 205 140 - 450 10*3/mm3    Neutrophil % 52.4 42.7 - 76.0 %    Lymphocyte % 39.3 19.6 - 45.3 %    Monocyte % 7.1 5.0 - 12.0 %    Eosinophil % 0.8 0.3 - 6.2 %    Basophil % 0.1 0.0 - 1.5 %    Immature Grans % 0.3 0.0 - 0.5 %    Neutrophils, Absolute 4.94 1.70 - 7.00 10*3/mm3    Lymphocytes, Absolute 3.71 (H) 0.70 - 3.10 10*3/mm3    Monocytes, Absolute 0.67 0.10 - 0.90 10*3/mm3    Eosinophils, Absolute  0.08 0.00 - 0.40 10*3/mm3    Basophils, Absolute 0.01 0.00 - 0.20 10*3/mm3    Immature Grans, Absolute 0.03 0.00 - 0.05 10*3/mm3    nRBC 0.0 0.0 - 0.2 /100 WBC   ECG 12 Lead    Collection Time: 04/13/22  4:48 PM   Result Value Ref Range    QT Interval 380 ms       Ordered the above labs and independently reviewed the results.        RADIOLOGY  XR Chest 1 View    Result Date: 4/13/2022  PORTABLE CHEST 04/13/2022 AT 5:22 PM  CLINICAL HISTORY: Chest pain  The lungs are well-expanded and are free of infiltrates. There are no pleural effusions. The cardiomediastinal silhouette is unremarkable. There is no pneumothorax.  IMPRESSIONS: No evidence of acute disease within the chest.  This report was finalized on 4/13/2022 5:37 PM by Dr. Fidencio Cooley M.D.        I ordered the above noted radiological studies. Reviewed by me.  See dictation for official radiology interpretation.      PROCEDURES    Procedures  EKG    EKG Time: 1648  Rhythm/Rate: Sinus rhythm with a rate of 77  Normal axis  Normal intervals  No acute ischemic changes  No STEMI     Interpreted Contemporaneously by me, independently viewed  No emergent changes compared to April 8 of 2022      MEDICATIONS GIVEN IN ER    Medications   HYDROcodone-acetaminophen (NORCO) 7.5-325 MG per tablet 1 tablet (has no administration in time range)   HYDROmorphone (DILAUDID) injection 1 mg (1 mg Intravenous Given 4/13/22 1651)   ondansetron (ZOFRAN) injection 4 mg (4 mg Intravenous Given 4/13/22 1651)   sodium chloride 0.9 % bolus 500 mL (0 mL Intravenous Stopped 4/13/22 1720)   HYDROmorphone (DILAUDID) injection 0.5 mg (0.5 mg Intravenous Given 4/13/22 1719)   iopamidol (ISOVUE-370) 76 % injection 95 mL (95 mL Intravenous Given 4/13/22 1805)         PROGRESS, DATA ANALYSIS, CONSULTS, AND MEDICAL DECISION MAKING    All labs have been independently reviewed by me.  All radiology studies have been reviewed by me and discussed with radiologist dictating the report.   EKG's  independently viewed and interpreted by me.  Discussion below represents my analysis of pertinent findings related to patient's condition, differential diagnosis, treatment plan and final disposition.    Initial concern for new or progression of underlying PE, pneumothorax, pulmonary infarct, pneumonia, pleurisy, ACS, renal failure, electrolyte normalities, among others.    ED Course as of 04/13/22 1911 Wed Apr 13, 2022   1706 WBC: 9.44 [DC]   1706 Hemoglobin: 13.9 [DC]   1840 Glucose(!): 109 [DC]   1840 BUN(!): 27 [DC]   1840 Creatinine: 0.98 [DC]   1840 Sodium: 139 [DC]   1840 Potassium: 3.9 [DC]   1840 Troponin T: <0.010 [DC]   1840 proBNP: 67.7 [DC]   1840 ALT (SGPT): 15 [DC]   1840 AST (SGOT): 10 [DC]   1840 Alkaline Phosphatase: 59 [DC]   1840 Total Bilirubin: 0.2 [DC]   1840 XR Chest 1 View  reviewed [DC]   1853 Patient reevaluated, significantly more comfortable at this time, updated her on the reassuring blood and imaging findings at this point with near complete resolution of the previously seen pulmonary emboli in the right lower lobe from the eighth.  Vital signs are stable at this time.  No evidence of a pneumothorax, heart failure, renal failure, pneumonia.  Patient and  are comfortable going home, they have a follow-up appoint with PCP tomorrow.  Recommended they maintain this appointment.  ED return for worsening symptoms as needed. [DC]      ED Course User Index  [DC] Amilcar Snow MD       AS OF 19:11 EDT VITALS:    BP - (!) 156/103  HR - 82  TEMP - 98.1 °F (36.7 °C) (Oral)  02 SATS - 97%        DIAGNOSIS  Final diagnoses:   Pleuritic chest pain   Single subsegmental pulmonary embolism without acute cor pulmonale (HCC)   History of diabetes mellitus   History of hypertension   Acute pulmonary embolism, unspecified pulmonary embolism type, unspecified whether acute cor pulmonale present (HCC)         DISPOSITION  DISCHARGE    Patient discharged in stable condition.    Reviewed  implications of results, diagnosis, meds, responsibility to follow up, warning signs and symptoms of possible worsening, potential complications and reasons to return to ER.    Patient/Family voiced understanding of above instructions.    Discussed plan for discharge, as there is no emergent indication for admission. Patient referred to primary care provider for BP management due to today's BP. Pt/family is agreeable and understands need for follow up and repeat testing.  Pt is aware that discharge does not mean that nothing is wrong but it indicates no emergency is present that requires admission and they must continue care with follow-up as given below or physician of their choice.     FOLLOW-UP  [facility] Emergency Department  [address]  [place] [address] [phone]    As needed, If symptoms worsen    Isabela Nelson, APRN  [address] [address] [phone]    Go on 4/14/2022  As scheduled         Medication List      Changed    polyethylene glycol 17 GM/SCOOP powder  Commonly known as: MIRALAX  Dissolve 17 g (1 capful) in liquid and drink by mouth 2 (Two) Times a Day.  What changed:   · when to take this  · reasons to take this                       Amilcar Snow MD  04/13/22 1911

## 2022-04-14 ENCOUNTER — OFFICE VISIT (OUTPATIENT)
Dept: INTERNAL MEDICINE | Facility: CLINIC | Age: 62
End: 2022-04-14

## 2022-04-14 VITALS
WEIGHT: 200.8 LBS | HEART RATE: 96 BPM | RESPIRATION RATE: 18 BRPM | BODY MASS INDEX: 35.58 KG/M2 | HEIGHT: 63 IN | TEMPERATURE: 98.6 F | DIASTOLIC BLOOD PRESSURE: 62 MMHG | SYSTOLIC BLOOD PRESSURE: 99 MMHG | OXYGEN SATURATION: 98 %

## 2022-04-14 DIAGNOSIS — R53.83 FATIGUE, UNSPECIFIED TYPE: ICD-10-CM

## 2022-04-14 DIAGNOSIS — I26.99 ACUTE PULMONARY EMBOLISM, UNSPECIFIED PULMONARY EMBOLISM TYPE, UNSPECIFIED WHETHER ACUTE COR PULMONALE PRESENT: Primary | ICD-10-CM

## 2022-04-14 DIAGNOSIS — R45.0 FEELING JITTERY: ICD-10-CM

## 2022-04-14 DIAGNOSIS — R07.9 CHEST PAIN, UNSPECIFIED TYPE: ICD-10-CM

## 2022-04-14 PROCEDURE — 82728 ASSAY OF FERRITIN: CPT | Performed by: STUDENT IN AN ORGANIZED HEALTH CARE EDUCATION/TRAINING PROGRAM

## 2022-04-14 PROCEDURE — 83540 ASSAY OF IRON: CPT | Performed by: STUDENT IN AN ORGANIZED HEALTH CARE EDUCATION/TRAINING PROGRAM

## 2022-04-14 PROCEDURE — 84439 ASSAY OF FREE THYROXINE: CPT | Performed by: STUDENT IN AN ORGANIZED HEALTH CARE EDUCATION/TRAINING PROGRAM

## 2022-04-14 PROCEDURE — 99214 OFFICE O/P EST MOD 30 MIN: CPT | Performed by: STUDENT IN AN ORGANIZED HEALTH CARE EDUCATION/TRAINING PROGRAM

## 2022-04-14 PROCEDURE — 84466 ASSAY OF TRANSFERRIN: CPT | Performed by: STUDENT IN AN ORGANIZED HEALTH CARE EDUCATION/TRAINING PROGRAM

## 2022-04-14 PROCEDURE — 84443 ASSAY THYROID STIM HORMONE: CPT | Performed by: STUDENT IN AN ORGANIZED HEALTH CARE EDUCATION/TRAINING PROGRAM

## 2022-04-14 PROCEDURE — 36415 COLL VENOUS BLD VENIPUNCTURE: CPT | Performed by: STUDENT IN AN ORGANIZED HEALTH CARE EDUCATION/TRAINING PROGRAM

## 2022-04-15 ENCOUNTER — HOSPITAL ENCOUNTER (OUTPATIENT)
Dept: CARDIOLOGY | Facility: HOSPITAL | Age: 62
Discharge: HOME | End: 2022-04-15
Admitting: EMERGENCY MEDICINE

## 2022-04-15 DIAGNOSIS — I26.99 ACUTE PULMONARY EMBOLISM, UNSPECIFIED PULMONARY EMBOLISM TYPE, UNSPECIFIED WHETHER ACUTE COR PULMONALE PRESENT: ICD-10-CM

## 2022-04-15 LAB
BH CV LOWER VASCULAR LEFT COMMON FEMORAL AUGMENT: NORMAL
BH CV LOWER VASCULAR LEFT COMMON FEMORAL COMPETENT: NORMAL
BH CV LOWER VASCULAR LEFT COMMON FEMORAL COMPRESS: NORMAL
BH CV LOWER VASCULAR LEFT COMMON FEMORAL PHASIC: NORMAL
BH CV LOWER VASCULAR LEFT COMMON FEMORAL SPONT: NORMAL
BH CV LOWER VASCULAR LEFT DISTAL FEMORAL COMPRESS: NORMAL
BH CV LOWER VASCULAR LEFT GASTRONEMIUS COMPRESS: NORMAL
BH CV LOWER VASCULAR LEFT GREATER SAPH AK COMPRESS: NORMAL
BH CV LOWER VASCULAR LEFT GREATER SAPH BK COMPRESS: NORMAL
BH CV LOWER VASCULAR LEFT LESSER SAPH COMPRESS: NORMAL
BH CV LOWER VASCULAR LEFT MID FEMORAL AUGMENT: NORMAL
BH CV LOWER VASCULAR LEFT MID FEMORAL COMPETENT: NORMAL
BH CV LOWER VASCULAR LEFT MID FEMORAL COMPRESS: NORMAL
BH CV LOWER VASCULAR LEFT MID FEMORAL PHASIC: NORMAL
BH CV LOWER VASCULAR LEFT MID FEMORAL SPONT: NORMAL
BH CV LOWER VASCULAR LEFT PERONEAL COMPRESS: NORMAL
BH CV LOWER VASCULAR LEFT POPLITEAL AUGMENT: NORMAL
BH CV LOWER VASCULAR LEFT POPLITEAL COMPETENT: NORMAL
BH CV LOWER VASCULAR LEFT POPLITEAL COMPRESS: NORMAL
BH CV LOWER VASCULAR LEFT POPLITEAL PHASIC: NORMAL
BH CV LOWER VASCULAR LEFT POPLITEAL SPONT: NORMAL
BH CV LOWER VASCULAR LEFT POSTERIOR TIBIAL COMPRESS: NORMAL
BH CV LOWER VASCULAR LEFT PROFUNDA FEMORAL COMPRESS: NORMAL
BH CV LOWER VASCULAR LEFT PROXIMAL FEMORAL COMPRESS: NORMAL
BH CV LOWER VASCULAR LEFT SAPHENOFEMORAL JUNCTION COMPRESS: NORMAL
BH CV LOWER VASCULAR RIGHT COMMON FEMORAL AUGMENT: NORMAL
BH CV LOWER VASCULAR RIGHT COMMON FEMORAL COMPETENT: NORMAL
BH CV LOWER VASCULAR RIGHT COMMON FEMORAL COMPRESS: NORMAL
BH CV LOWER VASCULAR RIGHT COMMON FEMORAL PHASIC: NORMAL
BH CV LOWER VASCULAR RIGHT COMMON FEMORAL SPONT: NORMAL
BH CV LOWER VASCULAR RIGHT DISTAL FEMORAL COMPRESS: NORMAL
BH CV LOWER VASCULAR RIGHT GASTRONEMIUS COMPRESS: NORMAL
BH CV LOWER VASCULAR RIGHT GREATER SAPH AK COMPRESS: NORMAL
BH CV LOWER VASCULAR RIGHT GREATER SAPH BK COMPRESS: NORMAL
BH CV LOWER VASCULAR RIGHT LESSER SAPH COMPRESS: NORMAL
BH CV LOWER VASCULAR RIGHT MID FEMORAL AUGMENT: NORMAL
BH CV LOWER VASCULAR RIGHT MID FEMORAL COMPETENT: NORMAL
BH CV LOWER VASCULAR RIGHT MID FEMORAL COMPRESS: NORMAL
BH CV LOWER VASCULAR RIGHT MID FEMORAL PHASIC: NORMAL
BH CV LOWER VASCULAR RIGHT MID FEMORAL SPONT: NORMAL
BH CV LOWER VASCULAR RIGHT PERONEAL COMPRESS: NORMAL
BH CV LOWER VASCULAR RIGHT POPLITEAL AUGMENT: NORMAL
BH CV LOWER VASCULAR RIGHT POPLITEAL COMPETENT: NORMAL
BH CV LOWER VASCULAR RIGHT POPLITEAL COMPRESS: NORMAL
BH CV LOWER VASCULAR RIGHT POPLITEAL PHASIC: NORMAL
BH CV LOWER VASCULAR RIGHT POPLITEAL SPONT: NORMAL
BH CV LOWER VASCULAR RIGHT POSTERIOR TIBIAL COMPRESS: NORMAL
BH CV LOWER VASCULAR RIGHT PROFUNDA FEMORAL COMPRESS: NORMAL
BH CV LOWER VASCULAR RIGHT PROXIMAL FEMORAL COMPRESS: NORMAL
BH CV LOWER VASCULAR RIGHT SAPHENOFEMORAL JUNCTION COMPRESS: NORMAL
FERRITIN SERPL-MCNC: 237 NG/ML (ref 13–150)
IRON 24H UR-MRATE: 64 MCG/DL (ref 37–145)
IRON SATN MFR SERPL: 18 % (ref 20–50)
MAXIMAL PREDICTED HEART RATE: 160 BPM
STRESS TARGET HR: 136 BPM
T4 FREE SERPL-MCNC: 1.23 NG/DL (ref 0.93–1.7)
TIBC SERPL-MCNC: 356 MCG/DL (ref 298–536)
TRANSFERRIN SERPL-MCNC: 239 MG/DL (ref 200–360)
TSH SERPL DL<=0.05 MIU/L-ACNC: 1.72 UIU/ML (ref 0.27–4.2)

## 2022-04-15 PROCEDURE — 93970 EXTREMITY STUDY: CPT

## 2022-04-18 ENCOUNTER — TELEPHONE (OUTPATIENT)
Dept: INTERNAL MEDICINE | Facility: CLINIC | Age: 62
End: 2022-04-18

## 2022-04-18 RX ORDER — QUETIAPINE FUMARATE 100 MG/1
100 TABLET, FILM COATED ORAL NIGHTLY
Status: CANCELLED | OUTPATIENT
Start: 2022-04-18

## 2022-04-18 NOTE — TELEPHONE ENCOUNTER
Caller: Melvina Martinez    Relationship: Self    Best call back number: [phone]    Requested Prescriptions:   PANTOPRAZOLE 40 MG  Requested Prescriptions     Pending Prescriptions Disp Refills   • QUEtiapine (SEROquel) 100 MG tablet       Sig: Take 1 tablet by mouth Every Night.        [facility]arm[facility] where [facility] be sent[facility]  [place] [address] - [phone] [facility] [phone] FX     Does the patient have less than a 3 day supply:  [x] Yes  [] No    Janet Curry Rep   04/18/22 12:41 EDT

## 2022-04-20 RX ORDER — PANTOPRAZOLE SODIUM 40 MG/1
40 TABLET, DELAYED RELEASE ORAL DAILY
Qty: 90 TABLET | Refills: 1 | Status: SHIPPED | OUTPATIENT
Start: 2022-04-20

## 2022-04-21 ENCOUNTER — OFFICE VISIT (OUTPATIENT)
Dept: INTERNAL MEDICINE | Facility: CLINIC | Age: 62
End: 2022-04-21

## 2022-04-21 VITALS
OXYGEN SATURATION: 97 % | WEIGHT: 203 LBS | HEART RATE: 109 BPM | DIASTOLIC BLOOD PRESSURE: 74 MMHG | BODY MASS INDEX: 35.97 KG/M2 | TEMPERATURE: 97.5 F | SYSTOLIC BLOOD PRESSURE: 124 MMHG | HEIGHT: 63 IN | RESPIRATION RATE: 18 BRPM

## 2022-04-21 DIAGNOSIS — G89.29 CHRONIC PAIN OF BOTH SHOULDERS: ICD-10-CM

## 2022-04-21 DIAGNOSIS — M25.512 CHRONIC PAIN OF BOTH SHOULDERS: ICD-10-CM

## 2022-04-21 DIAGNOSIS — M25.511 CHRONIC PAIN OF BOTH SHOULDERS: ICD-10-CM

## 2022-04-21 DIAGNOSIS — I26.99 PULMONARY EMBOLISM, UNSPECIFIED CHRONICITY, UNSPECIFIED PULMONARY EMBOLISM TYPE, UNSPECIFIED WHETHER ACUTE COR PULMONALE PRESENT: Primary | ICD-10-CM

## 2022-04-21 PROCEDURE — 99495 TRANSJ CARE MGMT MOD F2F 14D: CPT | Performed by: NURSE PRACTITIONER

## 2022-04-21 NOTE — PROGRESS NOTES
Transitional Care Follow Up Visit  Subjective     Melvina is a 61 y.o. female who presents for a transitional care management visit.    Within 48 business hours after discharge our office contacted her via telephone to coordinate her care and needs.      I reviewed and discussed the details of that call along with the discharge summary, hospital problems, inpatient lab results, inpatient diagnostic studies, and consultation reports with Melvina.     Current outpatient and discharge medications have been reconciled for the patient.  Reviewed by: YOLANDA Da Silva      Date of TCM Phone Call 4/10/2022   [facility]   Date of Admission 4/8/2022   Date of Discharge 4/10/2022   Discharge Disposition Home or Self Care       Risk for Readmission (LACE) Score: 5 (4/10/2022  6:00 AM)      History of Present Illness     Course During Hospital Stay The following information was reviewed by: YOLANDA Da Silva on 04/21/2022: Melvina Martinez is a 61 y.o. female  With past medical history of GERD, bipolar, PTSD, depression, type 2 diabetes, and recent colonoscopy who presents with shortness of breath, tachycardia at her primary care doctor's office, hypoxia, and nausea and vomiting The patient has had a significant history of the last 6 to 9 months of nausea and vomiting.  At one point she had her gallbladder taken out.  Then he was readmitted and found to have H. pylori gastritis.  The day prior to admission  the patient had a colonoscopy where they found several polyps but none were cancerous per the patient.  Then on the day of admission she woke up feeling weak, she had shortness of breath.  It was difficult for her to take a deep breath.  [facility] was tachycardic and hypoxemic at that time.  She was also given Zofran for nausea. She has had no fevers.  No chills.  However because of her symptoms the patient came to the emergency part for further evaluation.    In the emergency department the  "patient's temperature is 98.5, pulse of 62, blood pressure 140/79, 87% on room air 98% on 2 L.  CBC shows hemoglobin 11.4.  CMP shows a creatinine of 1.03, bicarb of 18 with no anion gap and a glucose of 236.  Lactate is 3.5.  Lipase is normal.  Influences negative.  Covid negative.  CTA of the chest showed pulmonary embolism.  Patient was started on a heparin drip.  Should be admitted to the hospital for acute hypoxemic respiratory failure due to pulmonary embolism.  Patient was transitioned over to Eliquis.  Patient feels significantly better.  Patient is not requiring any oxygen and did good on her walk test.  Patient had an echocardiogram done but is not read yet.  Venous Doppler study was ordered however they are not able to perform that over the weekend therefore this will not be done.  Patient be discharged home in stable condition.  Patient should have close follow-up with her primary care provider.  Patient's  stated that he would like her to see his hematologist and I discussed with them that that is perfectly fine and that her primary care can send a referral.  Patient should return to the ER if any worsening symptoms.  Went over the risks and benefits of the Eliquis.  Patient should not miss any doses.     On 4/13 she went to [facility] ER in [place]. Discharged   Normal bilateral LE dopplars    She reports still feeling soa with activity but improving    Bilateral shoulder pain L>R  Wanting to restart PT        The following portions of the patient's history were reviewed and updated as appropriate: allergies, current medications, past family history, past medical history, past social history, past surgical history and problem list.     Vitals:    04/21/22 1507   BP: 124/74   BP Location: Right arm   Patient Position: Sitting   Cuff Size: Adult   Pulse: 109   Resp: 18   Temp: 97.5 °F (36.4 °C)   SpO2: 97%   Weight: 92.1 kg (203 lb)   Height: 160 cm (62.99\")       Review of Systems    Objective "   Physical Exam  Vitals and nursing note reviewed.   Constitutional:       General: She is not in acute distress.     Appearance: Normal appearance.   HENT:      Head: Normocephalic and atraumatic.      Right Ear: External ear normal.      Left Ear: External ear normal.      Nose: Nose normal.      Mouth/Throat:      Mouth: Mucous membranes are moist.   Eyes:      Conjunctiva/sclera: Conjunctivae normal.   Cardiovascular:      Rate and Rhythm: Normal rate and regular rhythm.      Pulses: Normal pulses.      Heart sounds: Normal heart sounds. No murmur heard.    No friction rub. No gallop.   Pulmonary:      Effort: Pulmonary effort is normal. No respiratory distress.      Breath sounds: No wheezing, rhonchi or rales.   Musculoskeletal:      Cervical back: Neck supple.      Right lower leg: No edema.      Left lower leg: No edema.   Skin:     General: Skin is warm and dry.   Neurological:      General: No focal deficit present.      Mental Status: She is alert and oriented to person, place, and time.   Psychiatric:         Mood and Affect: Mood normal.         Behavior: Behavior normal.           Assessment/Plan     Diagnoses and all orders for this visit:    1. Pulmonary embolism, unspecified chronicity, unspecified pulmonary embolism type, unspecified whether acute cor pulmonale present (HCC) (Primary)  Comments:  She has f/u's scheduled with cardiolog and hematology as requested. doing well with eliquis. discussed risks  Orders:  -     Ambulatory Referral to Cardiology  -     Ambulatory Referral to Hematology    2. Chronic pain of both shoulders  Comments:  PT referral placed  Orders:  -     empagliflozin (Jardiance) 10 MG tablet tablet; Take 1 tablet by mouth Daily.  Dispense: 90 tablet; Refill: 1  -     Ambulatory Referral to Physical Therapy Evaluate and treat    Other orders  -     apixaban (ELIQUIS) 5 MG tablet tablet; Take 1 tablet by mouth Every 12 (Twelve) Hours.  Dispense: 60 tablet; Refill:  0        Follow Up     Return in about 6 weeks (around 6/2/2022).

## 2022-04-27 NOTE — TELEPHONE ENCOUNTER
Red rule verified and correct.    Pt calling for refills; advised pantoprazole has already been sent and the Seroquel needs to be sent from psych.    Patient verbalized understanding.    She forgot the Seroquel comes from another provider.

## 2022-05-02 ENCOUNTER — APPOINTMENT (OUTPATIENT)
Dept: LAB | Facility: HOSPITAL | Age: 62
End: 2022-05-02

## 2022-05-03 ENCOUNTER — LAB (OUTPATIENT)
Dept: LAB | Facility: HOSPITAL | Age: 62
End: 2022-05-03

## 2022-05-03 ENCOUNTER — CONSULT (OUTPATIENT)
Dept: ONCOLOGY | Facility: CLINIC | Age: 62
End: 2022-05-03

## 2022-05-03 VITALS
BODY MASS INDEX: 33.57 KG/M2 | OXYGEN SATURATION: 99 % | HEART RATE: 95 BPM | RESPIRATION RATE: 18 BRPM | HEIGHT: 65 IN | DIASTOLIC BLOOD PRESSURE: 82 MMHG | SYSTOLIC BLOOD PRESSURE: 122 MMHG | WEIGHT: 201.5 LBS | TEMPERATURE: 97.5 F

## 2022-05-03 DIAGNOSIS — I26.99 ACUTE PULMONARY EMBOLISM, UNSPECIFIED PULMONARY EMBOLISM TYPE, UNSPECIFIED WHETHER ACUTE COR PULMONALE PRESENT: Primary | ICD-10-CM

## 2022-05-03 DIAGNOSIS — R07.81 PLEURITIC CHEST PAIN: ICD-10-CM

## 2022-05-03 DIAGNOSIS — M79.662 PAIN OF LEFT CALF: ICD-10-CM

## 2022-05-03 DIAGNOSIS — I26.99 OTHER ACUTE PULMONARY EMBOLISM WITHOUT ACUTE COR PULMONALE: Primary | ICD-10-CM

## 2022-05-03 LAB
BASOPHILS # BLD AUTO: 0.04 10*3/MM3 (ref 0–0.2)
BASOPHILS NFR BLD AUTO: 0.6 % (ref 0–1.5)
DEPRECATED RDW RBC AUTO: 46.2 FL (ref 37–54)
EOSINOPHIL # BLD AUTO: 0.13 10*3/MM3 (ref 0–0.4)
EOSINOPHIL NFR BLD AUTO: 1.9 % (ref 0.3–6.2)
ERYTHROCYTE [DISTWIDTH] IN BLOOD BY AUTOMATED COUNT: 13.7 % (ref 12.3–15.4)
HCT VFR BLD AUTO: 39.7 % (ref 34–46.6)
HGB BLD-MCNC: 13 G/DL (ref 12–15.9)
IMM GRANULOCYTES # BLD AUTO: 0.04 10*3/MM3 (ref 0–0.05)
IMM GRANULOCYTES NFR BLD AUTO: 0.6 % (ref 0–0.5)
LYMPHOCYTES # BLD AUTO: 2.92 10*3/MM3 (ref 0.7–3.1)
LYMPHOCYTES NFR BLD AUTO: 43.1 % (ref 19.6–45.3)
MCH RBC QN AUTO: 29.8 PG (ref 26.6–33)
MCHC RBC AUTO-ENTMCNC: 32.7 G/DL (ref 31.5–35.7)
MCV RBC AUTO: 91.1 FL (ref 79–97)
MONOCYTES # BLD AUTO: 0.47 10*3/MM3 (ref 0.1–0.9)
MONOCYTES NFR BLD AUTO: 6.9 % (ref 5–12)
NEUTROPHILS NFR BLD AUTO: 3.18 10*3/MM3 (ref 1.7–7)
NEUTROPHILS NFR BLD AUTO: 46.9 % (ref 42.7–76)
NRBC BLD AUTO-RTO: 0 /100 WBC (ref 0–0.2)
PLATELET # BLD AUTO: 178 10*3/MM3 (ref 140–450)
PMV BLD AUTO: 10.6 FL (ref 6–12)
RBC # BLD AUTO: 4.36 10*6/MM3 (ref 3.77–5.28)
WBC NRBC COR # BLD: 6.78 10*3/MM3 (ref 3.4–10.8)

## 2022-05-03 PROCEDURE — 99244 OFF/OP CNSLTJ NEW/EST MOD 40: CPT | Performed by: INTERNAL MEDICINE

## 2022-05-03 PROCEDURE — 36415 COLL VENOUS BLD VENIPUNCTURE: CPT

## 2022-05-03 PROCEDURE — 85025 COMPLETE CBC W/AUTO DIFF WBC: CPT

## 2022-05-03 RX ORDER — PREDNISONE 10 MG/1
TABLET ORAL
Qty: 21 TABLET | Refills: 0 | Status: SHIPPED | OUTPATIENT
Start: 2022-05-03 | End: 2022-05-26

## 2022-05-03 NOTE — PROGRESS NOTES
Subjective     REASON FOR CONSULTATION:  Provide an opinion on any further workup or treatment on:    Pulmonary embolism                       REQUESTING PHYSICIAN: Pat Stover MD    RECORDS OBTAINED: Records of the patients history including those obtained from the referring provider were reviewed and summarized in detail.    HISTORY OF PRESENT ILLNESS:    Melvina Martinez is a 61 y.o. patient who was referred for evaluation of newly diagnosed pulmonary embolism.     Patient reports that she was found to be at increased risk for development of breast cancer.  This is due to her increased breast density as well as family history of breast cancer. She was placed on chemoprophylaxis with Tamoxifen about 4 months ago.     Patient had a colonoscopy on 4/7/2022. On 4/8/2022, she started having shortness of breath. She also had chest pain in the retrosternal area. She was found to have a positive D-Dimer. CT angiogram revealed pulmonary embolism. She was admitted and started on IV heparin. She was hypoxemic and tachycardic. She was discharged on 4/10/2022 on Eliquis 10 mg twice daily.    Patient presented to the ER on 4/13/2022 with severe chest pain. It was sharp in nature. It worsened with deep inspiration and coughing. It was difficult for her to take a deep breath. CT angiogram revealed no new PE. Venous doppler revealed no DVT.    Patient did not have pain or swelling in the legs. No preceding trauma to the LEs. No recent travel.    Patient reports some improvement in the shortness of breath since she was started on anticoagulation. However, she continues to have the sharp pain in the chest.     Patient has no prior history of thrombosis prior to this episode.     REVIEW OF SYSTEMS:  Review of Systems   Constitutional: Positive for chills and fatigue. Negative for fever and unexpected weight change.   HENT: Negative for nosebleeds and voice change.    Eyes: Positive for visual disturbance.   Respiratory: Positive  for shortness of breath. Negative for cough.    Cardiovascular: Positive for chest pain and leg swelling.   Gastrointestinal: Positive for constipation, nausea and vomiting. Negative for abdominal pain, blood in stool and diarrhea.   Endocrine: Positive for polydipsia.   Genitourinary: Negative for frequency and hematuria.   Musculoskeletal: Positive for arthralgias. Negative for back pain and joint swelling.   Skin: Negative for rash.   Neurological: Positive for dizziness and headaches.   Hematological: Negative for adenopathy. Does not bruise/bleed easily.   Psychiatric/Behavioral: Negative for dysphoric mood. The patient is not nervous/anxious.      Past Medical History:   Diagnosis Date   • Acid reflux    • Anxiety disorder    • Arthritis    • Bipolar disorder (HCC)    • Carpal tunnel syndrome    • Depression    • Diabetes (HCC)     DOES NOT CHECK BS DAILY   • H/O Panic disorder    • Hirsutism    • History of back pain    • Hyperlipidemia    • Hypertension    • Kidney stones    • Limb swelling    • Migraines    • PTSD (post-traumatic stress disorder)    • Rotator cuff tear, left    • Rotator cuff tear, left 06/10/2021   • Sleep apnea    • Stress incontinence    • TIA (transient ischemic attack)        Past Surgical History:   Procedure Laterality Date   • ABDOMINOPLASTY      Tummy tuck   • ANKLE SURGERY     • APPENDECTOMY      25 yrs. old when pregnant   •  SECTION     • CHOLECYSTECTOMY N/A 10/06/2021    Procedure: CHOLECYSTECTOMY LAPAROSCOPIC;  Surgeon: Jeancarlos Carrington MD;  Location: [facility] OR;  Service: General;  Laterality: N/A;   • COLONOSCOPY     • COLONOSCOPY N/A 2022    Procedure: COLONOSCOPY INTO CECUM WITH COLD SNARE POLYPECTOMY;  Surgeon: Ananth Odell MD;  Location: [facility] ENDOSCOPY;  Service: Gastroenterology;  Laterality: N/A;  PRE: PERSONAL H/O COLON POLYPS  POST: DIVERTICULOSIS, POLYP, HEMORRHOIDS, TORTUOUS COLON   • ENDOSCOPY N/A 10/28/2021    Procedure:  ESOPHAGOGASTRODUODENOSCOPY with biopsies;  Surgeon: Luciano Salmon MD;  Location: [facility] ENDOSCOPY;  Service: Gastroenterology;  Laterality: N/A;  pre:  abnormal CT  post:  gastritis, HH,    • EYE SURGERY      childhood; muscle   • FRACTURE SURGERY      tibia and fibula fracture   • HYSTERECTOMY     • INTRAOCULAR LENS INSERTION     • KNEE ARTHROSCOPY W/ MENISCAL REPAIR Right    • KNEE SURGERY     • SHOULDER ARTHROSCOPY Left 06/10/2021    Procedure: LEFT SHOULDER ARTHROSCOPY;  Surgeon: Дмитрий Francois MD;  [place] [facility] [place] [facility] [place]  Service: [place]opedics;  Laterality: Left;   • SHOULDER ROTATOR CUFF REPAIR Left 06/10/2021    Procedure: SUBACROMIAL DECOMPRESSION, DISTAL CLAVICULECTOMY AND ROTATOR CUFF REPAIR;  Surgeon: Дмитрий Francois MD;  [place] [facility] [place] [facility] [place]  Service: Orthopedics;  Laterality: Left;   • SHOULDER SURGERY Right    • TONSILLECTOMY     • TUBAL ABDOMINAL LIGATION         Social History     Socioeconomic History   • Marital status:    Tobacco Use   • Smoking status: Never Smoker   • Smokeless tobacco: Never Used   Vaping Use   • Vaping Use: Never used   Substance and Sexual Activity   • Alcohol use: Yes     Comment: rarely drinks   • Drug use: Never       Family History   Problem Relation Age of Onset   • Cancer Mother         Unspecified   • Breast cancer Mother    • Hypertension Father    • Dementia Father    • Stroke Father    • Heart disease Father    • Diabetes Father         Unspecified type   • Arthritis Father    • Alzheimer's disease Father    • Heart disease Brother    • Breast cancer Paternal Aunt    • Throat cancer Paternal Aunt    • Breast cancer Paternal Aunt    • Breast cancer Paternal Grandmother    • Malig Hyperthermia Neg Hx         MEDICATIONS:    Current Outpatient Medications:   •  amLODIPine (NORVASC) 2.5 MG tablet, Take 1 tablet by mouth Daily., Disp: 90 tablet, Rfl: 1  •  apixaban (ELIQUIS) 5 MG tablet tablet, Take 1 tablet by mouth Every 12 (Twelve) Hours.,  Disp: 60 tablet, Rfl: 0  •  Blood Glucose Monitoring Suppl kit, 1 kit 3 (Three) Times a Day. Lancets and blood glucose strips with machine to check blood sugars up to 3 times a day., Disp: 1 each, Rfl: 0  •  DULoxetine (Cymbalta) 30 MG capsule, Take 1 capsule by mouth Daily., Disp: 90 capsule, Rfl: 1  •  empagliflozin (Jardiance) 10 MG tablet tablet, Take 1 tablet by mouth Daily., Disp: 90 tablet, Rfl: 1  •  glucose blood test strip, Use as instructed, Disp: 100 each, Rfl: 1  •  lamoTRIgine (LaMICtal) 100 MG tablet, Take 100 mg by mouth 2 (Two) Times a Day., Disp: , Rfl:   •  Lancets misc, 2 each 2 (Two) Times a Day., Disp: 100 each, Rfl: 1  •  ondansetron (Zofran) 4 MG tablet, Take 1 tablet by mouth Every 8 (Eight) Hours As Needed for Nausea or Vomiting., Disp: 10 tablet, Rfl: 0  •  pantoprazole (Protonix) 40 MG EC tablet, Take 1 tablet by mouth Daily., Disp: 90 tablet, Rfl: 1  •  polyethylene glycol (MIRALAX) 17 GM/SCOOP powder, Dissolve 17 g (1 capful) in liquid and drink by mouth 2 (Two) Times a Day. (Patient taking differently: Take 17 g by mouth 2 (Two) Times a Day As Needed.), Disp: 1020 g, Rfl: 3  •  prazosin (MINIPRESS) 2 MG capsule, Take 2 mg by mouth Every Night., Disp: , Rfl:   •  QUEtiapine (SEROquel) 100 MG tablet, Take 100 mg by mouth Every Night., Disp: , Rfl:   •  SUMAtriptan (Imitrex) 5 MG/ACT nasal spray, 1 spray into the nostril(s) as directed by provider Every 2 (Two) Hours As Needed for Migraine., Disp: 6 each, Rfl: 2  •  tamoxifen (NOLVADEX) 10 MG tablet, Take 20 mg by mouth Daily., Disp: , Rfl:   •  topiramate (TOPAMAX) 50 MG tablet, Take 50 mg by mouth Daily., Disp: , Rfl:   •  traZODone (DESYREL) 100 MG tablet, Take 100 mg by mouth Every Night., Disp: , Rfl:   •  verapamil (CALAN) 120 MG tablet, Take 120 mg by mouth Daily., Disp: , Rfl:     Current Facility-Administered Medications:   •  ondansetron (ZOFRAN) injection 4 mg, 4 mg, Intravenous, Q6H PRN, Isabela Nelson, APRN, 4 mg at 04/08/22  "1616     ALLERGIES:  Allergies   Allergen Reactions   • Penicillins Anaphylaxis and Shortness Of Breath   • Metronidazole Hives and Nausea Only      Objective   VITAL SIGNS:  Vitals:    05/03/22 1357   Pulse: 95   Resp: 18   Temp: 97.5 °F (36.4 °C)   TempSrc: Temporal   SpO2: 99%   Weight: 91.4 kg (201 lb 8 oz)   Height: 164 cm (64.57\")  Comment: NEW HT   PainSc:   5   PainLoc: Chest     Wt Readings from Last 3 Encounters:   05/03/22 91.4 kg (201 lb 8 oz)   04/21/22 92.1 kg (203 lb)   04/14/22 91.1 kg (200 lb 12.8 oz)     PHYSICAL EXAMINATION  GENERAL:  The patient appears in fair general condition, not in acute distress.  SKIN: No skin rashes. No ecchymosis.  HEAD:  Normocephalic.  EYES:  No Jaundice. No Pallor. Pupils equal. EOMI.  NECK:  Supple with Good ROM. No Thyromegaly. No Masses.  CHEST: Normal respiratory effort. Lungs clear to auscultation. Patient unable to take deep breaths due to pain.  CARDIAC:  Normal S1 & S2. No murmur.   ABDOMEN:  Soft. No tenderness. No Hepatomegaly. No Splenomegaly. No masses.  EXTREMITIES:  Left calf tenderness. No size difference between the legs.   NEUROLOGICAL:  No Focal neurological deficits.     RESULT REVIEW:   Results from last 7 days   Lab Units 05/03/22  1340   WBC 10*3/mm3 6.78   NEUTROS ABS 10*3/mm3 3.18   HEMOGLOBIN g/dL 13.0   HEMATOCRIT % 39.7   PLATELETS 10*3/mm3 178     Lab Results   Component Value Date    FERRITIN 237.00 (H) 04/14/2022    IRON 64 04/14/2022    TIBC 356 04/14/2022     Lab Results   Component Value Date    SEDRATE 16 04/13/2021     Lab Results   Component Value Date    DDIMER 1.08 (H) 04/08/2022     CT chest on 4/8/2022:  There is acute appearing pulmonary emboli within several proximal segmental right lower lobe   pulmonary arteries.    CT angiogram chest on 4/13/2022:  · Findings of pulmonary embolism within the right basilar segmental pulmonary artery.    Venous Doppler on 4/15/2022:  · Normal bilateral lower extremity venous Doppler " scan    Assessment/Plan   *Acute pulmonary embolism involving   · Patient presented to the ER on 4/7/2022 with shortness of breath.  · CT chest on 4/8/2022 revealed acute pulmonary emboli within several proximal segmental right lower lobe pulmonary arteries.  · She was started on Eliquis.  · Patient was on Tamoxifen (for chemoprophylaxis due to increased risk for breast cancer). Tamoxifen was discontinued.   · Patient presented to the ER on 4/13/2022 with severe chest pain. It was pleuritic in nature.   · CT angiogram chest on 4/13/2022 revealed a right basilar segmental pulmonary artery diagnosed on 4/13/2022. Non new pulmonary embolism.   · Venous Doppler on 4/15/2022 showed no evidence of lower extremity deep vein thrombosis.   · Patient is tolerating Eliquis except for mild easy bruising.   · This is considered to be a provoked episode.  · I explained to the patient that since this is a provoked episode, I would recommend a 6 month course of anticoagulation. She would not require long term anticoagulation.     *Chest pain.  · The pain is pleuritic in nature.  · Although the shortness of breath improved with anticoagulation, she continues to have severe chest pain. She is having difficulty with deep inspiration.  · This favors the chest pain being due to pleuritic inflammation following the pulmonary embolism and possible development of pulmonary infarction.  · Since the patient cannot take NSAIDS while on anticoagulation, I recommended a short course of steroid therapy with Prednisone.     *Left calf tenderness on exam today.   · This was concerning for a DVT.   · Venous doppler was obtained today. There is no evidence of DVT.  · The pain is likely muscular in nature.     PLAN:    1.  Continue Eliquis 5 mg BID.  2.  Prednisone 20 mg daily x 7 days followed by 10 mg daily x 7 days.   3.  Follow up in 1 month with a CBC and CMP.  4.  Plan to repeat a CT angiogram chest in October 2022 (6 months).        Tad MCKEON  MD Cristian  05/03/22

## 2022-05-09 ENCOUNTER — TELEPHONE (OUTPATIENT)
Dept: ONCOLOGY | Facility: CLINIC | Age: 62
End: 2022-05-09

## 2022-05-09 NOTE — TELEPHONE ENCOUNTER
Caller: PATRICIO RAI     Relationship: [employer]     Best call back number: [phone]    Requested Prescriptions:   Requested Prescriptions      No prescriptions requested or ordered in this encounter    JAMARI 5 MG TAB     Pharmacy where request should be sent:    [facility] [phone]  PHARMACY STATES THEY DID NOT GET THIS YET    Does the patient have less than a 3 day supply:  [x] Yes  [] No    Ginger Meier   05/09/22 11:58 EDT           ”

## 2022-05-23 ENCOUNTER — TREATMENT (OUTPATIENT)
Dept: PHYSICAL THERAPY | Facility: CLINIC | Age: 62
End: 2022-05-23

## 2022-05-23 DIAGNOSIS — M25.511 BILATERAL SHOULDER PAIN, UNSPECIFIED CHRONICITY: Primary | ICD-10-CM

## 2022-05-23 DIAGNOSIS — M25.512 BILATERAL SHOULDER PAIN, UNSPECIFIED CHRONICITY: Primary | ICD-10-CM

## 2022-05-23 PROCEDURE — 97161 PT EVAL LOW COMPLEX 20 MIN: CPT | Performed by: PHYSICAL THERAPIST

## 2022-05-23 PROCEDURE — 97110 THERAPEUTIC EXERCISES: CPT | Performed by: PHYSICAL THERAPIST

## 2022-05-23 NOTE — PROGRESS NOTES
Physical Therapy Initial Evaluation and Plan of Care      Patient: Melvina Martinez   : 1960  Diagnosis/ICD-10 Code:  Bilateral shoulder pain, unspecified chronicity [M25.511, M25.512]  Referring practitioner: YOLANDA Da Silva  Date of Initial Visit: 2022  Today's Date: 2022  Patient seen for 1 sessions           Subjective Questionnaire: QuickDASH: 36=40-59% limitation      Subjective Evaluation    History of Present Illness  Mechanism of injury: Patient is a 61 yr old female referred to physical therapy with diagnosis of chronic bilateral shoulder pain. Patient had shoulder surgery on both shoulders last year, but unable to complete therapy on left shoulder.  Patient reports right shoulder doing well, but still having difficulty with left shoulder. Patient states that she is left hand dominate.     Pain  Current pain rating: 3  At worst pain ratin  Location: Left anterior shoulder   Quality: sharp and throbbing  Relieving factors: rest and medications    Patient Goals  Patient goals for therapy: decreased pain, increased motion and increased strength             Objective          Active Range of Motion   Left Shoulder   Flexion: 80 degrees with pain  Abduction: 70 degrees with pain  External rotation BTH: C4 with pain  Internal rotation BTB: L4 with pain    Right Shoulder   External rotation BTH: T2   Internal rotation BTB: L1     Passive Range of Motion   Left Shoulder   Flexion: 140 degrees   Abduction: 120 degrees     Strength/Myotome Testing     Left Shoulder     Planes of Motion   Flexion: 3-   Abduction: 2+   External rotation at 0°: 2+   Internal rotation at 0°: 3-           Assessment & Plan     Assessment  Impairments: abnormal or restricted ROM, activity intolerance, impaired physical strength, lacks appropriate home exercise program and pain with function  Functional Limitations: carrying objects, lifting, pulling, uncomfortable because of pain, reaching behind back, reaching  overhead and unable to perform repetitive tasks  Assessment details: Pt presents with limitations, noted by evaluation that impede patient's ability to use left shoulder/UE for ADLs.  The skills of a therapist will be required to safely and effectively implement the following treatment plan to restore maximal level of function.    Prognosis: good    Goals  Plan Goals: 1. The patient has limited ROM of the left shoulder.    LTG 1: 8 weeks:  The patient will demonstrate 160 degrees of left shoulder flexion, 140 degrees of shoulder abduction, 70 degrees of shoulder external rotation, and shoulder internal rotation to 70 to allow the patient to reach into upper kitchen cabinets and manipulate clothing behind the back with greater ease.   STATUS:  New   STG 1a: 4 weeks:  The patient will demonstrate 140 degrees of left shoulder flexion, 120 degrees of shoulder abduction, 60 degrees of shoulder external rotation, and shoulder internal rotation to 60 to allow the patient to reach into upper kitchen cabinets and manipulate clothing behind the back with greater ease.   STATUS:  New      2. The patient has limited strength of the left shoulder.   LTG 2: 8 weeks:  The patient will demonstrate 4/5 strength for left shoulder flexion, abduction, external rotation, and internal rotation in order to demonstrate improved shoulder stability.   STATUS:  New   STG 2a: 4 weeks:  The patient will demonstrate 3/5 strength for left shoulder flexion, abduction, external rotation, and internal rotation.   STATUS:  New   STG2b:  2 weeks:  The patient will be independent with home exercises.    STATUS:  New      3. The patient complains of pain to the left shoulder.   LTG 3: 8 weeks:  The patient will report a pain rating of 2/10 or better in order to improve sleep quality and tolerance to performance of activities of daily living.   STATUS:  New   STG 3a: 4 weeks:  The patient will report a pain rating of 4/10 or better.    STATUS:  New       4. Carrying, Moving, and Handling Objects Functional Limitation     LTG 4: 8 weeks:  The patient will demonstrate 1-19% limitation by achieving a score of 19 on the Quick DASH.   STATUS:  New   STG 4a: 4 weeks:  The patient will demonstrate 20-39% limitation by achieving a score of 27 on the Quick DASH.     STATUS:  New     TREATMENT: Manual therapy, therapeutic exercise, home exercise instruction, and modalities as needed to include: electrical stimulation, ultrasound, moist heat, and ice.     Plan  Therapy options: will be seen for skilled therapy services  Planned therapy interventions: manual therapy, soft tissue mobilization, spinal/joint mobilization, strengthening, stretching, therapeutic activities, postural training, functional ROM exercises and home exercise program  Frequency: 2x week  Duration in weeks: 8  Treatment plan discussed with: patient      History # of Personal Factors and/or Comorbidities: LOW (0)  Examination of Body System(s): # of elements: LOW (1-2)  Clinical Presentation: STABLE   Clinical Decision Making: LOW       Visit Diagnoses:    ICD-10-CM ICD-9-CM   1. Bilateral shoulder pain, unspecified chronicity  M25.511 719.41    M25.512        Timed:  Manual Therapy:         mins  11352;  Therapeutic Exercise:    10     mins  16325;     Neuromuscular Ángela:        mins  70584;    Therapeutic Activity:          mins  28624;     Gait Training:           mins  40771;     Ultrasound:          mins  34691;    Electrical Stimulation:         mins  03676 ( );    Untimed:  Electrical Stimulation:         mins  06882 ( );  Mechanical Traction:         mins  45025;    PT evaluation     Low Eval                        35   Mins  45760  Mod Eval                             Mins  35881  High Eval                           Mins  27503    Timed Treatment:  10    mins   Total Treatment:     45   mins    PT SIGNATURE: Anahi Rivera, PT     Electronically singed 5/23/2022    [place] PT license:  [id number]        Initial Certification  Certification Period: 5/23/2022 thru 8/20/2022  I certify that the therapy services are furnished while this patient is under my care.  The services outlined above are required by this patient, and will be reviewed every 90 days.    PHYSICIAN: Isabela Nelson APRN  NPI: [id number]                                      DATE:     Please sign and return via fax to [phone]  Thank you, [facility] Physical Therapy.

## 2022-05-25 ENCOUNTER — TREATMENT (OUTPATIENT)
Dept: PHYSICAL THERAPY | Facility: CLINIC | Age: 62
End: 2022-05-25

## 2022-05-25 DIAGNOSIS — M25.511 BILATERAL SHOULDER PAIN, UNSPECIFIED CHRONICITY: Primary | ICD-10-CM

## 2022-05-25 DIAGNOSIS — M25.512 BILATERAL SHOULDER PAIN, UNSPECIFIED CHRONICITY: Primary | ICD-10-CM

## 2022-05-25 PROCEDURE — 97110 THERAPEUTIC EXERCISES: CPT | Performed by: PHYSICAL THERAPIST

## 2022-05-25 NOTE — PROGRESS NOTES
Physical Therapy Daily Treatment Note      Patient: Melvina Martinez   : 1960  Referring practitioner: YOLANDA Da Silva  Date of Initial Visit: Type: THERAPY  Noted: 2022  Today's Date: 2022  Patient seen for 2 sessions           Subjective   Melvina Martinez reports: left shoulder pain 4-5/10.      Objective   See Exercise, Manual, and Modality Logs for complete treatment.       Assessment & Plan     Assessment  Prognosis details: Patient c/o much pain and great difficulty with exercises of left shoulder, william. Flexion over 90 degrees.         Visit Diagnoses:    ICD-10-CM ICD-9-CM   1. Bilateral shoulder pain, unspecified chronicity  M25.511 719.41    M25.512        Progress per Plan of Care           Timed:  Manual Therapy:         mins  37954;  Therapeutic Exercise:    30     mins  96793;     Neuromuscular Ángela:        mins  67979;    Therapeutic Activity:          mins  02658;     Gait Training:           mins  60624;     Ultrasound:          mins  55564;    Electrical Stimulation:         mins  73131 ( );    Untimed:  Electrical Stimulation:         mins  51405 ( );  Mechanical Traction:         mins  15271;     Timed Treatment:   30   mins   Total Treatment:     30   mins  Anahi Rivera PT    Electronically singed 2022      [place] PT license: [id number]  Physical Therapist

## 2022-05-26 ENCOUNTER — OFFICE VISIT (OUTPATIENT)
Dept: CARDIOLOGY | Facility: CLINIC | Age: 62
End: 2022-05-26

## 2022-05-26 VITALS
HEIGHT: 65 IN | HEART RATE: 70 BPM | BODY MASS INDEX: 33.99 KG/M2 | WEIGHT: 204 LBS | DIASTOLIC BLOOD PRESSURE: 80 MMHG | SYSTOLIC BLOOD PRESSURE: 124 MMHG

## 2022-05-26 DIAGNOSIS — I26.99 ACUTE PULMONARY EMBOLISM, UNSPECIFIED PULMONARY EMBOLISM TYPE, UNSPECIFIED WHETHER ACUTE COR PULMONALE PRESENT: ICD-10-CM

## 2022-05-26 DIAGNOSIS — R07.2 PRECORDIAL PAIN: Primary | ICD-10-CM

## 2022-05-26 PROCEDURE — 99204 OFFICE O/P NEW MOD 45 MIN: CPT | Performed by: INTERNAL MEDICINE

## 2022-05-26 RX ORDER — SUCRALFATE 1 G/1
1 TABLET ORAL 4 TIMES DAILY
COMMUNITY

## 2022-05-26 NOTE — PROGRESS NOTES
Melvina Martinez  1960  Date of Office Visit: 05/26/22  Encounter Provider: Dominguez Multani MD  Place of Service: [facility] CARDIOLOGY      CHIEF COMPLAINT:  Low risk pulmonary embolus  Chest pain  Dyspnea on exertion      HISTORY OF PRESENT ILLNESS: I had the pleasure of seeing Melvina Martinez in consultation.  She is a very pleasant 61-year-old female with a medical history of diabetes mellitus, essential hypertension, who presented in April with report of worsening shortness of breath and sharp chest discomfort.  She states that the chest pain was increasing with deep inspiration and her shortness of breath was present both at rest and with activity.  She reported shortness of breath as well when she bends over.  She came into [facility] with these complaints and was noted to have a segmental pulmonary embolus documented in the right lower lobe and multiple branches.  Her RV size was normal.  She had a transthoracic echocardiogram below with normal left ventricular and right ventricular size and systolic function and no segmental wall motion abnormalities.  She was placed on Eliquis therapy which she has continued.  She has followed with hematology and did receive prednisone therapy due to continued pleuritic chest pain.    She had a repeat CTA of the chest in our emergency room with even less PE documented in the segmental branch right lower lobe.  She does not feel like she is recovering appropriately and is here for evaluation.     Review of Systems   Constitutional: Negative for fever and malaise/fatigue.   HENT: Negative for nosebleeds and sore throat.    Eyes: Negative for blurred vision and double vision.   Cardiovascular: Negative for chest pain, claudication, palpitations and syncope.   Respiratory: Negative for cough, shortness of breath and snoring.    Endocrine: Negative for cold intolerance, heat intolerance and polydipsia.   Skin: Negative for itching, poor wound  healing and rash.   Musculoskeletal: Negative for joint pain, joint swelling, muscle weakness and myalgias.   Gastrointestinal: Negative for abdominal pain, melena, nausea and vomiting.   Neurological: Negative for light-headedness, loss of balance, seizures, vertigo and weakness.   Psychiatric/Behavioral: Negative for altered mental status and depression.          Past Medical History:   Diagnosis Date   • Acid reflux    • Anxiety disorder    • Arthritis    • Bipolar disorder ([facility])    • Carpal tunnel syndrome    • Deep vein thrombosis ([facility]) April 2022   • Depression    • Diabetes ([facility])     DOES NOT CHECK BS DAILY   • H/O Panic disorder    • Hirsutism    • History of back pain    • Hyperlipidemia    • Hypertension    • Kidney stones    • Limb swelling    • Migraines    • PTSD (post-traumatic stress disorder)    • Pulmonary embolism ([facility])    • Rotator cuff tear, left    • Rotator cuff tear, left 06/10/2021   • Sleep apnea    • Stress incontinence    • Tattoos    • TIA (transient ischemic attack)        The following portions of the patient's history were reviewed and updated as appropriate: Social history , Family history and Surgical history     Current Outpatient Medications on File Prior to Visit   Medication Sig Dispense Refill   • amLODIPine (NORVASC) 2.5 MG tablet Take 1 tablet by mouth Daily. 90 tablet 1   • apixaban (ELIQUIS) 5 MG tablet tablet Take 1 tablet by mouth Every 12 (Twelve) Hours. 60 tablet 3   • Blood Glucose Monitoring Suppl kit 1 kit 3 (Three) Times a Day. Lancets and blood glucose strips with machine to check blood sugars up to 3 times a day. 1 each 0   • DULoxetine (Cymbalta) 30 MG capsule Take 1 capsule by mouth Daily. 90 capsule 1   • empagliflozin (Jardiance) 10 MG tablet tablet Take 1 tablet by mouth Daily. 90 tablet 1   • glucose blood test strip Use as instructed 100 each 1   • lamoTRIgine (LaMICtal) 100 MG tablet Take 100 mg by mouth 2 (Two) Times a Day.     • Lancets misc 2 each 2  "(Two) Times a Day. 100 each 1   • ondansetron (Zofran) 4 MG tablet Take 1 tablet by mouth Every 8 (Eight) Hours As Needed for Nausea or Vomiting. 10 tablet 0   • pantoprazole (Protonix) 40 MG EC tablet Take 1 tablet by mouth Daily. 90 tablet 1   • polyethylene glycol (MIRALAX) 17 GM/SCOOP powder Dissolve 17 g (1 capful) in liquid and drink by mouth 2 (Two) Times a Day. (Patient taking differently: Take 17 g by mouth 2 (Two) Times a Day As Needed.) 1020 g 3   • prazosin (MINIPRESS) 2 MG capsule Take 2 mg by mouth Every Night.     • predniSONE (DELTASONE) 10 MG tablet 2 tablets daily x 7 days then 1 tablet daily x 7 days 21 tablet 0   • QUEtiapine (SEROquel) 100 MG tablet Take 100 mg by mouth Every Night.     • SUMAtriptan (Imitrex) 5 MG/ACT nasal spray 1 spray into the nostril(s) as directed by provider Every 2 (Two) Hours As Needed for Migraine. 6 each 2   • topiramate (TOPAMAX) 50 MG tablet Take 50 mg by mouth Daily.     • traZODone (DESYREL) 100 MG tablet Take 100 mg by mouth Every Night.     • verapamil (CALAN) 120 MG tablet Take 120 mg by mouth Daily.       Current Facility-Administered Medications on File Prior to Visit   Medication Dose Route Frequency Provider Last Rate Last Admin   • ondansetron (ZOFRAN) injection 4 mg  4 mg Intravenous Q6H PRN Isabela Nelson APRN   4 mg at 04/08/22 1616       Allergies   Allergen Reactions   • Penicillins Anaphylaxis and Shortness Of Breath   • Metronidazole Hives and Nausea Only       Vitals:    05/26/22 1149   Height: 163.8 cm (64.5\")     Body mass index is 34.05 kg/m².   Constitutional:       Appearance: Well-developed.   Eyes:      General: No scleral icterus.     Conjunctiva/sclera: Conjunctivae normal.   HENT:      Head: Normocephalic and atraumatic.   Neck:      Thyroid: No thyromegaly.      Vascular: Normal carotid pulses. No carotid bruit, hepatojugular reflux or JVD.      Trachea: No tracheal deviation.   Pulmonary:      Effort: No respiratory distress.      " Breath sounds: Normal breath sounds. No decreased breath sounds. No wheezing. No rhonchi. No rales.   Chest:      Chest wall: Not tender to palpatation.   Cardiovascular:      Normal rate. Regular rhythm.      No gallop.   Pulses:     Carotid: 2+ bilaterally.     Radial: 2+ bilaterally.     Femoral: 2+ bilaterally.     Dorsalis pedis: 2+ bilaterally.     Posterior tibial: 2+ bilaterally.  Edema:     Peripheral edema absent.   Abdominal:      General: Bowel sounds are normal. There is no distension.      Palpations: Abdomen is soft.      Tenderness: There is no abdominal tenderness.   Musculoskeletal:         General: No deformity.      Cervical back: Normal range of motion and neck supple. Skin:     Findings: No erythema or rash.   Neurological:      Mental Status: Alert and oriented to person, place, and time.      Sensory: No sensory deficit.   Psychiatric:         Behavior: Behavior normal.            Lab Results   Component Value Date    WBC 6.78 05/03/2022    HGB 13.0 05/03/2022    HCT 39.7 05/03/2022    MCV 91.1 05/03/2022     05/03/2022       Lab Results   Component Value Date    GLUCOSE 109 (H) 04/13/2022    BUN 27 (H) 04/13/2022    CREATININE 0.98 04/13/2022    EGFRIFNONA 61 12/13/2021    BCR 27.6 (H) 04/13/2022    K 3.9 04/13/2022    CO2 18.8 (L) 04/13/2022    CALCIUM 9.3 04/13/2022    ALBUMIN 4.10 04/13/2022    LABIL2 1.4 04/13/2021    AST 10 04/13/2022    ALT 15 04/13/2022       Lab Results   Component Value Date    GLUCOSE 109 (H) 04/13/2022    CALCIUM 9.3 04/13/2022     04/13/2022    K 3.9 04/13/2022    CO2 18.8 (L) 04/13/2022     04/13/2022    BUN 27 (H) 04/13/2022    CREATININE 0.98 04/13/2022    EGFRIFNONA 61 12/13/2021    BCR 27.6 (H) 04/13/2022    ANIONGAP 15.2 (H) 04/13/2022       Lab Results   Component Value Date    CHLPL 194 06/28/2019    TRIG 90 06/28/2019    HDL 52 06/28/2019     (H) 06/28/2019       Procedures       Results for orders placed during the hospital  encounter of 04/08/22    Adult Transthoracic Echo Complete W/ Cont if Necessary Per Protocol    Interpretation Summary  · Calculated left ventricular EF = 54% Estimated left ventricular EF was in agreement with the calculated left ventricular EF.  · Left ventricular diastolic function is consistent with (grade I) impaired relaxation.  · No hemodynamically significant valvular pathology.      DISCUSSION/SUMMARY  Very pleasant 61-year-old female with a medical history of segmental pulmonary embolus without evidence of cor pulmonale, essential hypertension, hyperlipidemia, who presents to me for evaluation.  She continues to complain of chest pain and dyspnea on exertion which seems to be out of proportion to the degree of pulmonary embolus that she actually had.  Her RVSP and right ventricular function have previously been documented to be normal.    I would recommend continued anticoagulant therapy for 6 months, however in the setting of her continued chest pain and dyspnea on exertion it seems to be significant I do think that a stress test is reasonable.  I cannot explain her degree of dyspnea with segmental PE on the right that was already documented to be improving.    1.  Right lower lobe segmental pulmonary embolus: No evidence of acute cor pulmonale.  Continue anticoagulation for 6 months.  Hematology work-up ongoing.    2.  LOPEZ/chest pain: Seems to be out of proportion to the amount of clot that she actually had.  As such I would recommend that we get her set up for a Lexiscan stress test.

## 2022-05-31 ENCOUNTER — HOSPITAL ENCOUNTER (OUTPATIENT)
Dept: CARDIOLOGY | Facility: HOSPITAL | Age: 62
Discharge: HOME | End: 2022-05-31
Admitting: INTERNAL MEDICINE

## 2022-05-31 ENCOUNTER — TELEPHONE (OUTPATIENT)
Dept: INTERNAL MEDICINE | Facility: CLINIC | Age: 62
End: 2022-05-31

## 2022-05-31 VITALS — BODY MASS INDEX: 34.82 KG/M2 | WEIGHT: 203.93 LBS | HEIGHT: 64 IN

## 2022-05-31 DIAGNOSIS — R07.2 PRECORDIAL PAIN: ICD-10-CM

## 2022-05-31 LAB
BH CV NUCLEAR PRIOR STUDY: 2
BH CV REST NUCLEAR ISOTOPE DOSE: 10.3 MCI
BH CV STRESS BP STAGE 1: NORMAL
BH CV STRESS COMMENTS STAGE 1: NORMAL
BH CV STRESS DOSE REGADENOSON STAGE 1: 0.4
BH CV STRESS DURATION MIN STAGE 1: 0
BH CV STRESS DURATION SEC STAGE 1: 10
BH CV STRESS HR STAGE 1: 120
BH CV STRESS NUCLEAR ISOTOPE DOSE: 35.2 MCI
BH CV STRESS PROTOCOL 1: NORMAL
BH CV STRESS RECOVERY BP: NORMAL MMHG
BH CV STRESS RECOVERY HR: 82 BPM
BH CV STRESS STAGE 1: 1
LV EF NUC BP: 63 %
MAXIMAL PREDICTED HEART RATE: 160 BPM
PERCENT MAX PREDICTED HR: 75 %
STRESS BASELINE BP: NORMAL MMHG
STRESS BASELINE HR: 111 BPM
STRESS PERCENT HR: 88 %
STRESS POST EXERCISE DUR SEC: 10 SEC
STRESS POST PEAK BP: NORMAL MMHG
STRESS POST PEAK HR: 120 BPM
STRESS TARGET HR: 136 BPM

## 2022-05-31 PROCEDURE — 93017 CV STRESS TEST TRACING ONLY: CPT

## 2022-05-31 PROCEDURE — 93016 CV STRESS TEST SUPVJ ONLY: CPT | Performed by: INTERNAL MEDICINE

## 2022-05-31 PROCEDURE — 0 TECHNETIUM TETROFOSMIN KIT: Performed by: INTERNAL MEDICINE

## 2022-05-31 PROCEDURE — 93018 CV STRESS TEST I&R ONLY: CPT | Performed by: INTERNAL MEDICINE

## 2022-05-31 PROCEDURE — 25010000002 REGADENOSON 0.4 MG/5ML SOLUTION: Performed by: INTERNAL MEDICINE

## 2022-05-31 PROCEDURE — 78452 HT MUSCLE IMAGE SPECT MULT: CPT

## 2022-05-31 PROCEDURE — 78452 HT MUSCLE IMAGE SPECT MULT: CPT | Performed by: INTERNAL MEDICINE

## 2022-05-31 PROCEDURE — A9502 TC99M TETROFOSMIN: HCPCS | Performed by: INTERNAL MEDICINE

## 2022-05-31 RX ORDER — DULOXETIN HYDROCHLORIDE 30 MG/1
30 CAPSULE, DELAYED RELEASE ORAL DAILY
Qty: 90 CAPSULE | Refills: 1 | Status: SHIPPED | OUTPATIENT
Start: 2022-05-31

## 2022-05-31 RX ORDER — AMLODIPINE BESYLATE 2.5 MG/1
2.5 TABLET ORAL DAILY
Qty: 90 TABLET | Refills: 1 | Status: SHIPPED | OUTPATIENT
Start: 2022-05-31 | End: 2022-06-16 | Stop reason: SDUPTHER

## 2022-05-31 RX ADMIN — TETROFOSMIN 1 DOSE: 1.38 INJECTION, POWDER, LYOPHILIZED, FOR SOLUTION INTRAVENOUS at 08:26

## 2022-05-31 RX ADMIN — REGADENOSON 0.4 MG: 0.08 INJECTION, SOLUTION INTRAVENOUS at 08:26

## 2022-05-31 RX ADMIN — TETROFOSMIN 1 DOSE: 1.38 INJECTION, POWDER, LYOPHILIZED, FOR SOLUTION INTRAVENOUS at 07:44

## 2022-05-31 NOTE — TELEPHONE ENCOUNTER
Caller: Melvina Martinez    Relationship: Self    Best call back number: [phone]    Requested Prescriptions:   Requested Prescriptions     Pending Prescriptions Disp Refills   • amLODIPine (NORVASC) 2.5 MG tablet 90 tablet 1     Sig: Take 1 tablet by mouth Daily.   • DULoxetine (Cymbalta) 30 MG capsule 90 capsule 1     Sig: Take 1 capsule by mouth Daily.        Pharmacy where request should be sent: [facility] [place] [facility] [place] - [address] [facility] [phone] [facility] [phone] FX     Additional details provided by patient: PATIENT HAS A FOUR DAY SUPPLY. PLEASE CALL IN NEW 90 PRESCRIPTIONS FOR THESE MEDICATIONS ASAP TODAY.    Does the patient have less than a 3 day supply:  [] Yes  [x] No    Janet Najera Rep   05/31/22 11:38 EDT

## 2022-06-02 ENCOUNTER — TELEMEDICINE (OUTPATIENT)
Dept: INTERNAL MEDICINE | Facility: CLINIC | Age: 62
End: 2022-06-02

## 2022-06-02 DIAGNOSIS — G89.29 CHRONIC PAIN OF BOTH SHOULDERS: ICD-10-CM

## 2022-06-02 DIAGNOSIS — R07.9 CHEST PAIN, UNSPECIFIED TYPE: ICD-10-CM

## 2022-06-02 DIAGNOSIS — M25.512 CHRONIC PAIN OF BOTH SHOULDERS: ICD-10-CM

## 2022-06-02 DIAGNOSIS — M25.511 CHRONIC PAIN OF BOTH SHOULDERS: ICD-10-CM

## 2022-06-02 DIAGNOSIS — I26.99 OTHER ACUTE PULMONARY EMBOLISM WITHOUT ACUTE COR PULMONALE: Primary | ICD-10-CM

## 2022-06-02 PROCEDURE — 99214 OFFICE O/P EST MOD 30 MIN: CPT | Performed by: NURSE PRACTITIONER

## 2022-06-02 RX ORDER — IBUPROFEN 800 MG/1
800 TABLET ORAL EVERY 6 HOURS PRN
COMMUNITY
Start: 2022-05-26 | End: 2022-06-16

## 2022-06-02 RX ORDER — CHLORHEXIDINE GLUCONATE 0.12 MG/ML
RINSE ORAL
COMMUNITY
Start: 2022-05-26

## 2022-06-02 NOTE — PROGRESS NOTES
Chief Complaint  Follow-up (6 Week) and  Pulmonary embolism, unspecified chronicity, unspecified pu  You have chosen to receive care through a telehealth visit.  Do you consent to use a video/audio connection for your medical care today? Yes    Subjective          Melvina Martinez presents to [facility]  History of Present Illness  Pulmonary embolism-taking eliquis x6 mths  Has seen cardiology and hematology  Improvement on US  Chest pain has improved some. Still has soa with activity. She reports improvement with rest.   She has started PT for her shoulder x2 wks. Reports pain following sessions currently. Plans to continue  Objective   Vital Signs:   There were no vitals taken for this visit.    Physical Exam  Vitals and nursing note reviewed.   Constitutional:       General: She is not in acute distress.     Appearance: Normal appearance.   HENT:      Head: Normocephalic and atraumatic.      Right Ear: External ear normal.      Left Ear: External ear normal.      Nose: Nose normal.      Mouth/Throat:      Mouth: Mucous membranes are moist.   Eyes:      Conjunctiva/sclera: Conjunctivae normal.   Cardiovascular:      Rate and Rhythm: Normal rate and regular rhythm.      Pulses: Normal pulses.      Heart sounds: Normal heart sounds. No murmur heard.    No friction rub. No gallop.   Pulmonary:      Effort: Pulmonary effort is normal. No respiratory distress.      Breath sounds: No wheezing, rhonchi or rales.   Musculoskeletal:      Cervical back: Neck supple.   Skin:     General: Skin is warm and dry.   Neurological:      General: No focal deficit present.      Mental Status: She is alert and oriented to person, place, and time.   Psychiatric:         Mood and Affect: Mood normal.         Behavior: Behavior normal.        Result Review :          Procedures      Assessment and Plan    Diagnoses and all orders for this visit:    1. Other acute pulmonary embolism without  acute cor pulmonale (HCC) (Primary)  Comments:  continue on eliquis. has f/u with cardiology and hematology    2. Chest pain, unspecified type  Comments:  improving. negative stress test--reviewed    3. Chronic pain of both shoulders  Comments:  she will continue with PT at this time.              Follow Up   Return in about 3 months (around 9/2/2022).  Patient was given instructions and counseling regarding her condition or for health maintenance advice. Please see specific information pulled into the AVS if appropriate.

## 2022-06-09 ENCOUNTER — TELEPHONE (OUTPATIENT)
Dept: CARDIOLOGY | Facility: CLINIC | Age: 62
End: 2022-06-09

## 2022-06-09 NOTE — TELEPHONE ENCOUNTER
----- Message from Dominguez Multani MD sent at 6/3/2022 11:15 AM EDT -----  Please let her know that her stress test looks fine.  No issues here.  Thanks.

## 2022-06-10 ENCOUNTER — TELEPHONE (OUTPATIENT)
Dept: PHYSICAL THERAPY | Facility: CLINIC | Age: 62
End: 2022-06-10

## 2022-06-10 NOTE — TELEPHONE ENCOUNTER
Therapist called patient in regards to no shows to last two scheduled appts.  Left message with call back number to therapy clinic.

## 2022-06-14 ENCOUNTER — DOCUMENTATION (OUTPATIENT)
Dept: PHYSICAL THERAPY | Facility: CLINIC | Age: 62
End: 2022-06-14

## 2022-06-14 NOTE — PROGRESS NOTES
Patient has no showed to last 3 scheduled appts; message left after last no show.  Patient will be discharged at this time.

## 2022-06-16 RX ORDER — AMLODIPINE BESYLATE 2.5 MG/1
2.5 TABLET ORAL DAILY
Qty: 30 TABLET | Refills: 0 | Status: SHIPPED | OUTPATIENT
Start: 2022-06-16

## 2022-06-16 NOTE — TELEPHONE ENCOUNTER
Called patient, she stated she is out of town helping her daughter and she is out of her medications. .  She is needing refills sent to an out of state pharmacy in [place]. I don't know if this is an option. Patient says she has had RX sent to her out of state before.     Please advise.

## 2022-06-16 NOTE — TELEPHONE ENCOUNTER
Caller: Melvina Martinez    Relationship: Self    Best call back number: [phone]    Requested Prescriptions:       amLODIPine (NORVASC) 2.5 MG tablet  2.5 mg, Daily     apixaban (ELIQUIS) 5 MG tablet tablet  5 mg, Every 12 Hours Scheduled         Pharmacy where request should be sent: [id number] - [address] [facility] [phone] [facility] [phone] FX     Additional details provided by patient: PATIENT IS OUT OF ELIQUIS  Does the patient have less than a 3 day supply:  [x] Yes  [] No    Janet Gutiérrez Rep   06/16/22 08:55 EDT

## 2022-06-17 ENCOUNTER — PRIOR AUTHORIZATION (OUTPATIENT)
Dept: INTERNAL MEDICINE | Facility: CLINIC | Age: 62
End: 2022-06-17

## 2022-06-17 ENCOUNTER — TELEPHONE (OUTPATIENT)
Dept: INTERNAL MEDICINE | Facility: CLINIC | Age: 62
End: 2022-06-17

## 2022-06-17 NOTE — TELEPHONE ENCOUNTER
Message from plan when attempting to start a pa: Drug is covered by current benefit plan. No further PA activity needed

## 2022-06-17 NOTE — TELEPHONE ENCOUNTER
Called patient, and patient asked me to call the pharmacy and inform them.   Called the pharmacy and they stated they don't know who she talked to because it doesn't require a PA, the RX is very expensive and most people pay a lot out of pocket for the medication.   I inquired if it could be more because she is out of state and the pharmacy said it shouldn't matter and it would depend on if she met her deductible for the year.   Called patient to inform her, unable to reach patientPOOJA with information.

## 2022-06-17 NOTE — TELEPHONE ENCOUNTER
Pt called and said her Eliquis needs a PA and she is completely out. Please advise what you want pt to do.     Jennifer - have you seen PA or started it for med yet?     CRUZ - pt is out of state now helping daughter.

## 2022-07-11 ENCOUNTER — TELEPHONE (OUTPATIENT)
Dept: ONCOLOGY | Facility: CLINIC | Age: 62
End: 2022-07-11

## 2022-07-11 NOTE — TELEPHONE ENCOUNTER
Caller: Melvina Martinez    Relationship: Self    Best call back number: [phone]    Requested Prescriptions:   Requested Prescriptions     Pending Prescriptions Disp Refills   • apixaban (ELIQUIS) 5 MG tablet tablet 30 tablet 0     Sig: Take 1 tablet by mouth Every 12 (Twelve) Hours.        [facility] be sent[facility] [place] [place]  [address] [facility] [phone] [facility] [phone] FX     Additional details provided by patient:     Does the patient have less than a 3 day supply:  [x] Yes  [] No    Janet Robins Rep   07/11/22 12:13 EDT

## 2022-07-11 NOTE — TELEPHONE ENCOUNTER
Informed patient that YOLANDA Ma has been prescribing this medication, so she will need to contact her office to request a 90 day refill. Patient v/u.

## 2022-07-11 NOTE — TELEPHONE ENCOUNTER
Caller: Melvina Martinez    Relationship: Self    Best call back number: [phone]    Requested Prescription:    ELIQUIS 5 MG  90 DAY SUPPLY    Pharmacy where request should be sent:    [facility] [place] [facility]  [place] - [address] [facility] - [phone]  - [phone] FX      Does the patient have less than a 3 day supply:  [] Yes  [x] No    Janet BECKER Rep   07/11/22 10:57 EDT

## 2022-12-23 ENCOUNTER — TELEPHONE (OUTPATIENT)
Dept: ONCOLOGY | Facility: CLINIC | Age: 62
End: 2022-12-23

## 2022-12-23 NOTE — TELEPHONE ENCOUNTER
Caller: Gabriela Martinez    Relationship: Emergency Contact    Best call back number: [phone]    What is the best time to reach you: ANYTIME    Who are you requesting to speak with (clinical staff, provider,  specific staff member): SCHEDULING    What was the call regarding: GABRIELA CALLING TO R/S PTS 5/2/23 APPT - WOULD LIKE IT 4/17-4/21 RIGHT AFTER LUNCH IF POSSIBLE.     Do you require a callback: YES

## 2023-01-09 ENCOUNTER — TELEPHONE (OUTPATIENT)
Dept: ONCOLOGY | Facility: CLINIC | Age: 63
End: 2023-01-09

## 2023-01-09 NOTE — TELEPHONE ENCOUNTER
Caller: PATRICIO    Relationship to patient:     Best call back number: [phone]    Patient is needing: R/S 5-2-23 LAB AND F/U APPT TO 5-25-23 OR 5-26-23.

## 2023-05-16 ENCOUNTER — TELEPHONE (OUTPATIENT)
Dept: ONCOLOGY | Facility: CLINIC | Age: 63
End: 2023-05-16

## 2023-05-16 NOTE — TELEPHONE ENCOUNTER
Caller: Martinez, Kay MADHURI    Relationship: Self    Best call back number: [phone]  What is the best time to reach you: ANY    Who are you requesting to speak with (clinical staff, provider,  specific staff member): SCHEDULING        What was the call regarding: PATIENT ISABEL CALLED TO CANCEL HER APPT  ON 5/23/23 AND SAID SHE'S MOVED TO [place] PERMANENTLY. SHE SAID SHE'S LIVED IN [place] THE PAST YEAR.     Do you require a callback: NO

## 2023-06-30 NOTE — ASSESSMENT & PLAN NOTE
Recommend the patient restart PT, continue home exercises, anti-inflammatory as needed and follow-up in 6 weeks for recheck.  
78

## 2024-11-18 NOTE — PROGRESS NOTES
"   Progress Note      Patient Name: Melvina Martinez   Patient ID: [id number]   Sex: Female   YOB: 1960    Primary Care Provider: Isabela GUERRERO    Visit Date: May 28, 2021    Provider: YOLANDA Oliva   Location: [facility]   Location Address: [address] [place] [address], [facility] [address]  [place], [place]  [id number]   Location Phone: [phone]          Chief Complaint  · F/U  · \"no concerns but she is getting surg. on rt shoulder June 10th\"       History Of Present Illness  Melvina Martniez is a 60 year old /White female who presents for evaluation and treatment of:      chronic pain--started Cymbalta recently 2 wks ago. she has noticed improvement of pain in her legs. denies issues with interactions with other medications    doing well with anxiety/depression    left shoulder surgery scheduled on trena 10Dr. Been    [facility]. She reports having xrays and has had MRIs. she reports that her left has meniscus tearing, right with arthritis and meniscus tearing as well. Awaiting apt with ortho       Past Medical History  Disease Name Date Onset Notes   Anxiety disorder --  --    Arthritis --  --    Bipolar Disorder --  --    Depression --  --    Diabetes --  --    Hypertension --  --    Kidney Stones --  --    Limb Swelling --  --    PTSD --  --    Reflux --  --    Screening Mammogram 09/2019 normal, repeat in 1 year         Past Surgical History  Procedure Name Date Notes   Ankle surgery --  --    Appendectomy --  --    Cesarian Section --  --    Colonoscopy --  --    Eye Implant --  yes   EYE SURGERY --  --    Hysterectomy --  --    Knee surgery --  --    Tonsillectomy --  --    Tummy tuck --  --          Medication List  Name Date Started Instructions   amlodipine 2.5 mg oral tablet 04/22/2021 take 1 tablet (2.5 mg) by oral route once daily   Cymbalta 30 mg oral capsule,delayed release(/EC) 05/14/2021 take 1 capsule (30 mg) by oral route once daily " Pn1 for 12/5 can do same day at 10 if available cannot do 3.      Please call to reschedule.   for 30 days   Imitrex 5 mg/actuation nasal spray,non-aerosol 02/09/2021 spray 1 spray (5 mg) by intranasal route once; if headache returns, dose may be repeated once after 2 hours, not to exceed 40 mg per day   Jardiance 10 mg oral tablet 04/19/2021 TAKE ONE TABLET BY MOUTH EVERY MORNING   lamotrigine 100 mg oral tablet  take 1 tablet (100 mg) by oral route 2 times per day   pantoprazole 40 mg oral tablet,delayed release (DR/EC) 11/25/2020 take 1 tablet (40 mg) by oral route once daily   prazosin 2 mg oral capsule  take 1 capsule (2 mg) by oral route 2 times per day   quetiapine 100 mg oral tablet  take 1 tablet (100 mg) by oral route 3 times per day   topiramate 50 mg oral tablet  take 1 tablet (50 mg) by oral route 2 times per day         Allergy List  Allergen Name Date Reaction Notes   Flagyl --  --  --    PENICILLINS --  --  --        Allergies Reconciled  Family Medical History  Disease Name Relative/Age Notes   Dementia Conditions Father/   --    Stroke Father/   Father   Heart Disease Father/   Father   Cancer, Unspecified Mother/   Mother   Diabetes, unspecified type Father/   Father   Breast Neoplasm Mother/   --    Diabetes Father/   --    Family history of Arthritis Father/   Father         Social History  Finding Status Start/Stop Quantity Notes   Alcohol Use Current some day --/-- --  rarely drinks   lives with spouse --  --/-- --  --    . --  --/-- --  --    Recreational Drug Use Never --/-- --  no   Retired. --  --/-- --  --    Tobacco Never --/-- --  never smoker   Working --  --/-- --  --          Immunizations  NameDate Admin Mfg Trade Name Lot Number Route Inj VIS Given VIS Publication   Hepatitis B06/24/2020 [employer] ENGERIX-B-ADULT [id number] IM RD 06/24/2020    Comments: pt tolerated well   Tdap04/09/2015 NE Not Entered  NE NE 02/19/2019 02/24/2015   Comments:          Review of Systems  · Constitutional  o Denies  o : fever, fatigue, weight loss, weight gain  · Cardiovascular  o Denies  o :  "lower extremity edema, claudication, chest pressure, palpitations  · Respiratory  o Denies  o : shortness of breath, wheezing, cough, hemoptysis, dyspnea on exertion  · Gastrointestinal  o Denies  o : nausea, vomiting, diarrhea, constipation, abdominal pain      Vitals  Date Time BP Position Site L\R Cuff Size HR RR TEMP (F) WT  HT  BMI kg/m2 BSA m2 O2 Sat FR L/min FiO2 HC       04/13/2021 03:13 /72 Sitting    84 - R  99.9 209lbs 0oz 5'  5\" 34.78 2.09 98 %  21%    04/26/2021 11:40 /70 Sitting    83 - R 18 98.5 208lbs 9oz 5'  5\" 34.71 2.08 97 %  21%    05/17/2021 03:49 PM      90 - R   209lbs 2oz 5'  5\" 34.8 2.09 97 %      05/28/2021 08:30 /82 Sitting    83 - R  97.3 205lbs 16oz 5'  5\" 34.28 2.07 97 %  21%          Physical Examination  · Constitutional  o Appearance  o : no acute distress, well-nourished  · Head and Face  o Head  o :   § Inspection  § : atraumatic, normocephalic  · Eyes  o Eyes  o : extraocular movements intact, no scleral icterus, no conjunctival injection  · Ears, Nose, Mouth and Throat  o Ears  o :   § External Ears  § : normal  o Nose  o :   § Intranasal Exam  § : nares patent  o Oral Cavity  o :   § Oral Mucosa  § : moist mucous membranes  · Respiratory  o Respiratory Effort  o : breathing comfortably, symmetric chest rise  o Auscultation of Lungs  o : clear to asculatation bilaterally, no wheezes, rales, or rhonchii  · Cardiovascular  o Heart  o :   § Auscultation of Heart  § : regular rate and rhythm, no murmurs, rubs, or gallops  o Peripheral Vascular System  o :   § Extremities  § : no edema  · Neurologic  o Mental Status Examination  o :   § Orientation  § : grossly oriented to person, place and time  o Gait and Station  o :   § Gait Screening  § : normal gait  · Psychiatric  o General  o : normal mood and affect              Assessment  · Fibromyalgia     729.1/M79.7  pain improved with Cymbalta. will continue current dose at this time.       Plan  · Orders  o ACO-39: " Current medications updated and reviewed (1159F, ) - - 05/28/2021  · Medications  o Medications have been Reconciled  o Transition of Care or Provider Policy  · Instructions  o Patient was educated/instructed on their diagnosis, treatment and medications prior to discharge from the clinic today.  o Call the office with any concerns or questions.  · Disposition  o Call or Return if symptoms worsen or persist.  o Follow up in 3 months  o Prescriptions sent electronically            Electronically Signed by: YOLANDA Oliva -Author on May 28, 2021 01:59:14 PM

## 2025-02-04 ENCOUNTER — TELEPHONE (OUTPATIENT)
Dept: ONCOLOGY | Facility: CLINIC | Age: 65
End: 2025-02-04
Payer: OTHER GOVERNMENT

## 2025-02-04 NOTE — TELEPHONE ENCOUNTER
Caller: PATRICIO    Relationship: Spouse    Best call back number: [phone]    What is the best time to reach you: ASAP    Who are you requesting to speak with (clinical staff, provider,  specific staff member): SCHEDULING      What was the call regarding: PT IS IN [place], WILL BE BACK MID APRIL AND NEEDS FOLLOW UP APPT WITH DR ODONNELL, PLEASE CALL TO SCHEDULE.  ASKING FOR ANYWHERE FROM 14TH THROUGH [address] OF APRIL.

## 2025-02-04 NOTE — TELEPHONE ENCOUNTER
Spoke with patients  and advised him that since it has been 3yrs we would need an new patient referral. Chinmay weeks/maryann

## 2025-04-09 ENCOUNTER — APPOINTMENT (OUTPATIENT)
Dept: CT IMAGING | Facility: HOSPITAL | Age: 65
DRG: 066 | End: 2025-04-09
Payer: MEDICARE

## 2025-04-09 ENCOUNTER — APPOINTMENT (OUTPATIENT)
Dept: GENERAL RADIOLOGY | Facility: HOSPITAL | Age: 65
DRG: 066 | End: 2025-04-09
Payer: MEDICARE

## 2025-04-09 ENCOUNTER — HOSPITAL ENCOUNTER (INPATIENT)
Facility: HOSPITAL | Age: 65
LOS: 2 days | Discharge: HOME | DRG: 066 | End: 2025-04-11
Attending: EMERGENCY MEDICINE | Admitting: STUDENT IN AN ORGANIZED HEALTH CARE EDUCATION/TRAINING PROGRAM
Payer: MEDICARE

## 2025-04-09 ENCOUNTER — APPOINTMENT (OUTPATIENT)
Dept: MRI IMAGING | Facility: HOSPITAL | Age: 65
DRG: 066 | End: 2025-04-09
Payer: MEDICARE

## 2025-04-09 DIAGNOSIS — Z78.9 DECREASED ACTIVITIES OF DAILY LIVING (ADL): ICD-10-CM

## 2025-04-09 DIAGNOSIS — R26.2 DIFFICULTY IN WALKING: ICD-10-CM

## 2025-04-09 DIAGNOSIS — R13.10 DYSPHAGIA, UNSPECIFIED TYPE: ICD-10-CM

## 2025-04-09 DIAGNOSIS — R11.2 NAUSEA AND VOMITING, UNSPECIFIED VOMITING TYPE: ICD-10-CM

## 2025-04-09 DIAGNOSIS — I63.9 CEREBROVASCULAR ACCIDENT (CVA), UNSPECIFIED MECHANISM: Primary | ICD-10-CM

## 2025-04-09 LAB
25(OH)D3 SERPL-MCNC: 45 NG/ML (ref 30–100)
ABO GROUP BLD: NORMAL
ABO GROUP BLD: NORMAL
ALBUMIN SERPL-MCNC: 4.3 G/DL (ref 3.5–5.2)
ALBUMIN/GLOB SERPL: 1.4 G/DL
ALP SERPL-CCNC: 97 U/L (ref 39–117)
ALT SERPL W P-5'-P-CCNC: 18 U/L (ref 1–33)
ANION GAP SERPL CALCULATED.3IONS-SCNC: 10 MMOL/L (ref 5–15)
APTT PPP: 27.9 SECONDS (ref 24.2–34.2)
AST SERPL-CCNC: 20 U/L (ref 1–32)
BACTERIA UR QL AUTO: ABNORMAL /HPF
BASOPHILS # BLD AUTO: 0.04 10*3/MM3 (ref 0–0.2)
BASOPHILS NFR BLD AUTO: 0.6 % (ref 0–1.5)
BILIRUB SERPL-MCNC: 0.3 MG/DL (ref 0–1.2)
BILIRUB UR QL STRIP: NEGATIVE
BLD GP AB SCN SERPL QL: NEGATIVE
BUN SERPL-MCNC: 12 MG/DL (ref 8–23)
BUN/CREAT SERPL: 12.8 (ref 7–25)
CALCIUM SPEC-SCNC: 9.4 MG/DL (ref 8.6–10.5)
CHLORIDE SERPL-SCNC: 108 MMOL/L (ref 98–107)
CHOLEST SERPL-MCNC: 139 MG/DL (ref 0–200)
CLARITY UR: CLEAR
CO2 SERPL-SCNC: 23 MMOL/L (ref 22–29)
COLOR UR: YELLOW
CREAT SERPL-MCNC: 0.94 MG/DL (ref 0.57–1)
DEPRECATED RDW RBC AUTO: 51.6 FL (ref 37–54)
EGFRCR SERPLBLD CKD-EPI 2021: 68.3 ML/MIN/1.73
EOSINOPHIL # BLD AUTO: 0.16 10*3/MM3 (ref 0–0.4)
EOSINOPHIL NFR BLD AUTO: 2.5 % (ref 0.3–6.2)
ERYTHROCYTE [DISTWIDTH] IN BLOOD BY AUTOMATED COUNT: 15 % (ref 12.3–15.4)
GLOBULIN UR ELPH-MCNC: 3 GM/DL
GLUCOSE BLDC GLUCOMTR-MCNC: 93 MG/DL (ref 70–99)
GLUCOSE BLDC GLUCOMTR-MCNC: 95 MG/DL (ref 70–99)
GLUCOSE SERPL-MCNC: 91 MG/DL (ref 65–99)
GLUCOSE UR STRIP-MCNC: NEGATIVE MG/DL
HBA1C MFR BLD: 4.9 % (ref 4.8–5.6)
HCT VFR BLD AUTO: 40.9 % (ref 34–46.6)
HDLC SERPL-MCNC: 58 MG/DL (ref 40–60)
HGB BLD-MCNC: 13.1 G/DL (ref 12–15.9)
HGB UR QL STRIP.AUTO: NEGATIVE
HOLD SPECIMEN: NORMAL
HOLD SPECIMEN: NORMAL
HYALINE CASTS UR QL AUTO: ABNORMAL /LPF
IMM GRANULOCYTES # BLD AUTO: 0.01 10*3/MM3 (ref 0–0.05)
IMM GRANULOCYTES NFR BLD AUTO: 0.2 % (ref 0–0.5)
INR PPP: 1.02 (ref 0.86–1.15)
KETONES UR QL STRIP: NEGATIVE
LDLC SERPL CALC-MCNC: 65 MG/DL (ref 0–100)
LDLC/HDLC SERPL: 1.11 {RATIO}
LEUKOCYTE ESTERASE UR QL STRIP.AUTO: ABNORMAL
LYMPHOCYTES # BLD AUTO: 3.34 10*3/MM3 (ref 0.7–3.1)
LYMPHOCYTES NFR BLD AUTO: 52.3 % (ref 19.6–45.3)
MCH RBC QN AUTO: 29.7 PG (ref 26.6–33)
MCHC RBC AUTO-ENTMCNC: 32 G/DL (ref 31.5–35.7)
MCV RBC AUTO: 92.7 FL (ref 79–97)
MONOCYTES # BLD AUTO: 0.48 10*3/MM3 (ref 0.1–0.9)
MONOCYTES NFR BLD AUTO: 7.5 % (ref 5–12)
NEUTROPHILS NFR BLD AUTO: 2.36 10*3/MM3 (ref 1.7–7)
NEUTROPHILS NFR BLD AUTO: 36.9 % (ref 42.7–76)
NITRITE UR QL STRIP: NEGATIVE
NRBC BLD AUTO-RTO: 0 /100 WBC (ref 0–0.2)
PH UR STRIP.AUTO: 8 [PH] (ref 5–8)
PLATELET # BLD AUTO: 201 10*3/MM3 (ref 140–450)
PMV BLD AUTO: 10 FL (ref 6–12)
POTASSIUM SERPL-SCNC: 4.4 MMOL/L (ref 3.5–5.2)
PROT SERPL-MCNC: 7.3 G/DL (ref 6–8.5)
PROT UR QL STRIP: NEGATIVE
PROTHROMBIN TIME: 13.8 SECONDS (ref 11.8–14.9)
QT INTERVAL: 395 MS
QTC INTERVAL: 442 MS
RBC # BLD AUTO: 4.41 10*6/MM3 (ref 3.77–5.28)
RBC # UR STRIP: ABNORMAL /HPF
REF LAB TEST METHOD: ABNORMAL
RH BLD: POSITIVE
RH BLD: POSITIVE
SODIUM SERPL-SCNC: 141 MMOL/L (ref 136–145)
SP GR UR STRIP: 1.03 (ref 1–1.03)
SQUAMOUS #/AREA URNS HPF: ABNORMAL /HPF
T&S EXPIRATION DATE: NORMAL
TRIGL SERPL-MCNC: 82 MG/DL (ref 0–150)
TSH SERPL DL<=0.05 MIU/L-ACNC: 0.83 UIU/ML (ref 0.27–4.2)
UROBILINOGEN UR QL STRIP: ABNORMAL
VLDLC SERPL-MCNC: 16 MG/DL (ref 5–40)
WBC # UR STRIP: ABNORMAL /HPF
WBC NRBC COR # BLD AUTO: 6.39 10*3/MM3 (ref 3.4–10.8)
WHOLE BLOOD HOLD COAG: NORMAL
WHOLE BLOOD HOLD SPECIMEN: NORMAL

## 2025-04-09 PROCEDURE — 99291 CRITICAL CARE FIRST HOUR: CPT

## 2025-04-09 PROCEDURE — 93005 ELECTROCARDIOGRAM TRACING: CPT | Performed by: EMERGENCY MEDICINE

## 2025-04-09 PROCEDURE — 80061 LIPID PANEL: CPT | Performed by: STUDENT IN AN ORGANIZED HEALTH CARE EDUCATION/TRAINING PROGRAM

## 2025-04-09 PROCEDURE — 82948 REAGENT STRIP/BLOOD GLUCOSE: CPT

## 2025-04-09 PROCEDURE — 99223 1ST HOSP IP/OBS HIGH 75: CPT | Performed by: STUDENT IN AN ORGANIZED HEALTH CARE EDUCATION/TRAINING PROGRAM

## 2025-04-09 PROCEDURE — 70496 CT ANGIOGRAPHY HEAD: CPT

## 2025-04-09 PROCEDURE — 80053 COMPREHEN METABOLIC PANEL: CPT | Performed by: EMERGENCY MEDICINE

## 2025-04-09 PROCEDURE — 25810000003 SODIUM CHLORIDE 0.9 % SOLUTION: Performed by: STUDENT IN AN ORGANIZED HEALTH CARE EDUCATION/TRAINING PROGRAM

## 2025-04-09 PROCEDURE — 86900 BLOOD TYPING SEROLOGIC ABO: CPT | Performed by: EMERGENCY MEDICINE

## 2025-04-09 PROCEDURE — 36415 COLL VENOUS BLD VENIPUNCTURE: CPT | Performed by: EMERGENCY MEDICINE

## 2025-04-09 PROCEDURE — 85025 COMPLETE CBC W/AUTO DIFF WBC: CPT | Performed by: EMERGENCY MEDICINE

## 2025-04-09 PROCEDURE — 71045 X-RAY EXAM CHEST 1 VIEW: CPT

## 2025-04-09 PROCEDURE — 99222 1ST HOSP IP/OBS MODERATE 55: CPT | Performed by: PSYCHIATRY & NEUROLOGY

## 2025-04-09 PROCEDURE — 86900 BLOOD TYPING SEROLOGIC ABO: CPT

## 2025-04-09 PROCEDURE — 94799 UNLISTED PULMONARY SVC/PX: CPT

## 2025-04-09 PROCEDURE — 25010000002 ENOXAPARIN PER 10 MG: Performed by: STUDENT IN AN ORGANIZED HEALTH CARE EDUCATION/TRAINING PROGRAM

## 2025-04-09 PROCEDURE — 85730 THROMBOPLASTIN TIME PARTIAL: CPT | Performed by: EMERGENCY MEDICINE

## 2025-04-09 PROCEDURE — 86850 RBC ANTIBODY SCREEN: CPT | Performed by: EMERGENCY MEDICINE

## 2025-04-09 PROCEDURE — 70551 MRI BRAIN STEM W/O DYE: CPT

## 2025-04-09 PROCEDURE — 84443 ASSAY THYROID STIM HORMONE: CPT | Performed by: STUDENT IN AN ORGANIZED HEALTH CARE EDUCATION/TRAINING PROGRAM

## 2025-04-09 PROCEDURE — 82948 REAGENT STRIP/BLOOD GLUCOSE: CPT | Performed by: STUDENT IN AN ORGANIZED HEALTH CARE EDUCATION/TRAINING PROGRAM

## 2025-04-09 PROCEDURE — 82306 VITAMIN D 25 HYDROXY: CPT | Performed by: STUDENT IN AN ORGANIZED HEALTH CARE EDUCATION/TRAINING PROGRAM

## 2025-04-09 PROCEDURE — 70450 CT HEAD/BRAIN W/O DYE: CPT

## 2025-04-09 PROCEDURE — 0042T HC CT CEREBRAL PERFUSION W/WO CONTRAST: CPT

## 2025-04-09 PROCEDURE — 81001 URINALYSIS AUTO W/SCOPE: CPT | Performed by: EMERGENCY MEDICINE

## 2025-04-09 PROCEDURE — 25510000001 IOPAMIDOL PER 1 ML: Performed by: EMERGENCY MEDICINE

## 2025-04-09 PROCEDURE — 94761 N-INVAS EAR/PLS OXIMETRY MLT: CPT

## 2025-04-09 PROCEDURE — 86901 BLOOD TYPING SEROLOGIC RH(D): CPT

## 2025-04-09 PROCEDURE — 83036 HEMOGLOBIN GLYCOSYLATED A1C: CPT | Performed by: STUDENT IN AN ORGANIZED HEALTH CARE EDUCATION/TRAINING PROGRAM

## 2025-04-09 PROCEDURE — 70498 CT ANGIOGRAPHY NECK: CPT

## 2025-04-09 PROCEDURE — 85610 PROTHROMBIN TIME: CPT | Performed by: EMERGENCY MEDICINE

## 2025-04-09 PROCEDURE — 86901 BLOOD TYPING SEROLOGIC RH(D): CPT | Performed by: EMERGENCY MEDICINE

## 2025-04-09 RX ORDER — LAMOTRIGINE 100 MG/1
200 TABLET ORAL 2 TIMES DAILY
Status: DISCONTINUED | OUTPATIENT
Start: 2025-04-09 | End: 2025-04-11 | Stop reason: HOSPADM

## 2025-04-09 RX ORDER — SODIUM CHLORIDE 9 MG/ML
40 INJECTION, SOLUTION INTRAVENOUS AS NEEDED
Status: DISCONTINUED | OUTPATIENT
Start: 2025-04-09 | End: 2025-04-11 | Stop reason: HOSPADM

## 2025-04-09 RX ORDER — PANTOPRAZOLE SODIUM 40 MG/1
40 TABLET, DELAYED RELEASE ORAL DAILY
Status: DISCONTINUED | OUTPATIENT
Start: 2025-04-10 | End: 2025-04-11 | Stop reason: HOSPADM

## 2025-04-09 RX ORDER — CLOPIDOGREL BISULFATE 75 MG/1
75 TABLET ORAL DAILY
Status: DISCONTINUED | OUTPATIENT
Start: 2025-04-10 | End: 2025-04-11 | Stop reason: HOSPADM

## 2025-04-09 RX ORDER — DIPHENOXYLATE HYDROCHLORIDE AND ATROPINE SULFATE 2.5; .025 MG/1; MG/1
1 TABLET ORAL DAILY
COMMUNITY
Start: 2025-02-20

## 2025-04-09 RX ORDER — ATORVASTATIN CALCIUM 40 MG/1
80 TABLET, FILM COATED ORAL NIGHTLY
Status: DISCONTINUED | OUTPATIENT
Start: 2025-04-09 | End: 2025-04-11 | Stop reason: HOSPADM

## 2025-04-09 RX ORDER — TOPIRAMATE 25 MG/1
50 TABLET, FILM COATED ORAL 2 TIMES DAILY
Status: DISCONTINUED | OUTPATIENT
Start: 2025-04-09 | End: 2025-04-11 | Stop reason: HOSPADM

## 2025-04-09 RX ORDER — ONDANSETRON 2 MG/ML
4 INJECTION INTRAMUSCULAR; INTRAVENOUS EVERY 6 HOURS PRN
Status: DISCONTINUED | OUTPATIENT
Start: 2025-04-09 | End: 2025-04-11 | Stop reason: HOSPADM

## 2025-04-09 RX ORDER — QUETIAPINE FUMARATE 100 MG/1
100 TABLET, FILM COATED ORAL NIGHTLY
COMMUNITY

## 2025-04-09 RX ORDER — SODIUM CHLORIDE 9 MG/ML
75 INJECTION, SOLUTION INTRAVENOUS CONTINUOUS
Status: ACTIVE | OUTPATIENT
Start: 2025-04-09 | End: 2025-04-10

## 2025-04-09 RX ORDER — QUETIAPINE FUMARATE 25 MG/1
50 TABLET, FILM COATED ORAL
Status: DISCONTINUED | OUTPATIENT
Start: 2025-04-10 | End: 2025-04-11 | Stop reason: HOSPADM

## 2025-04-09 RX ORDER — SODIUM CHLORIDE 0.9 % (FLUSH) 0.9 %
10 SYRINGE (ML) INJECTION AS NEEDED
Status: DISCONTINUED | OUTPATIENT
Start: 2025-04-09 | End: 2025-04-11 | Stop reason: HOSPADM

## 2025-04-09 RX ORDER — AMLODIPINE BESYLATE 5 MG/1
2.5 TABLET ORAL DAILY
Status: DISCONTINUED | OUTPATIENT
Start: 2025-04-10 | End: 2025-04-11 | Stop reason: HOSPADM

## 2025-04-09 RX ORDER — PRAZOSIN HYDROCHLORIDE 1 MG/1
5 CAPSULE ORAL NIGHTLY
Status: DISCONTINUED | OUTPATIENT
Start: 2025-04-09 | End: 2025-04-11 | Stop reason: HOSPADM

## 2025-04-09 RX ORDER — TRAZODONE HYDROCHLORIDE 100 MG/1
100 TABLET ORAL NIGHTLY
Status: DISCONTINUED | OUTPATIENT
Start: 2025-04-09 | End: 2025-04-11 | Stop reason: HOSPADM

## 2025-04-09 RX ORDER — QUETIAPINE FUMARATE 100 MG/1
100 TABLET, FILM COATED ORAL NIGHTLY
Status: DISCONTINUED | OUTPATIENT
Start: 2025-04-10 | End: 2025-04-11 | Stop reason: HOSPADM

## 2025-04-09 RX ORDER — FOLIC ACID 1 MG/1
1 TABLET ORAL DAILY
COMMUNITY
Start: 2025-02-20

## 2025-04-09 RX ORDER — SODIUM CHLORIDE 0.9 % (FLUSH) 0.9 %
10 SYRINGE (ML) INJECTION EVERY 12 HOURS SCHEDULED
Status: DISCONTINUED | OUTPATIENT
Start: 2025-04-09 | End: 2025-04-11 | Stop reason: HOSPADM

## 2025-04-09 RX ORDER — SUMATRIPTAN 50 MG/1
100 TABLET, FILM COATED ORAL EVERY 12 HOURS PRN
Status: DISCONTINUED | OUTPATIENT
Start: 2025-04-09 | End: 2025-04-11 | Stop reason: HOSPADM

## 2025-04-09 RX ORDER — FOLIC ACID 1 MG/1
1 TABLET ORAL DAILY
Status: DISCONTINUED | OUTPATIENT
Start: 2025-04-10 | End: 2025-04-11 | Stop reason: HOSPADM

## 2025-04-09 RX ORDER — CLOPIDOGREL BISULFATE 75 MG/1
75 TABLET ORAL DAILY
COMMUNITY
Start: 2025-02-20 | End: 2025-04-11 | Stop reason: HOSPADM

## 2025-04-09 RX ORDER — ENOXAPARIN SODIUM 100 MG/ML
40 INJECTION SUBCUTANEOUS DAILY
Status: DISCONTINUED | OUTPATIENT
Start: 2025-04-09 | End: 2025-04-11 | Stop reason: HOSPADM

## 2025-04-09 RX ORDER — IOPAMIDOL 755 MG/ML
100 INJECTION, SOLUTION INTRAVASCULAR
Status: COMPLETED | OUTPATIENT
Start: 2025-04-09 | End: 2025-04-09

## 2025-04-09 RX ORDER — ATORVASTATIN CALCIUM 80 MG/1
80 TABLET, FILM COATED ORAL NIGHTLY
COMMUNITY
Start: 2025-02-20

## 2025-04-09 RX ADMIN — TRAZODONE HYDROCHLORIDE 100 MG: 100 TABLET ORAL at 20:25

## 2025-04-09 RX ADMIN — IOPAMIDOL 100 ML: 755 INJECTION, SOLUTION INTRAVENOUS at 11:59

## 2025-04-09 RX ADMIN — Medication 10 ML: at 20:27

## 2025-04-09 RX ADMIN — LAMOTRIGINE 200 MG: 100 TABLET ORAL at 20:26

## 2025-04-09 RX ADMIN — ENOXAPARIN SODIUM 40 MG: 100 INJECTION SUBCUTANEOUS at 18:27

## 2025-04-09 RX ADMIN — PRAZOSIN HYDROCHLORIDE 5 MG: 1 CAPSULE ORAL at 20:25

## 2025-04-09 RX ADMIN — TOPIRAMATE 50 MG: 25 TABLET, FILM COATED ORAL at 20:26

## 2025-04-09 RX ADMIN — ATORVASTATIN CALCIUM 80 MG: 40 TABLET, FILM COATED ORAL at 20:25

## 2025-04-09 RX ADMIN — SODIUM CHLORIDE 75 ML/HR: 9 INJECTION, SOLUTION INTRAVENOUS at 18:27

## 2025-04-09 NOTE — CASE MANAGEMENT/SOCIAL WORK
Discharge Planning Assessment   Jackie     Patient Name: Melvina Martinez  MRN: [id number]  Today's Date: 4/9/2025    Admit Date: 4/9/2025        Discharge Needs Assessment       Row Name 04/09/25 1452       Living Environment    People in Home spouse (P)     Current Living Arrangements home (P)     Potentially Unsafe Housing Conditions none (P)     In the past 12 months has the electric, gas, oil, or water company threatened to shut off services in your home? No (P)     Primary Care Provided by self (P)     Provides Primary Care For no one (P)     Family Caregiver if Needed spouse (P)     Quality of Family Relationships helpful;involved;supportive (P)     Able to Return to Prior Arrangements no (P)        Resource/Environmental Concerns    Resource/Environmental Concerns none (P)     Transportation Concerns none (P)        Transportation Needs    In the past 12 months, has lack of transportation kept you from medical appointments or from getting medications? no (P)     In the past 12 months, has lack of transportation kept you from meetings, work, or from getting things needed for daily living? No (P)        Food Insecurity    Within the past 12 months, you worried that your food would run out before you got the money to buy more. Never true (P)     Within the past 12 months, the food you bought just didn't last and you didn't have money to get more. Never true (P)        Transition Planning    Patient/Family Anticipates Transition to home (P)     Patient/Family Anticipated Services at Transition none (P)     Transportation Anticipated family or friend will provide (P)        Discharge Needs Assessment    Readmission Within the Last 30 Days no previous admission in last 30 days (P)     Equipment Currently Used at Home cane, quad (P)     Concerns to be Addressed no discharge needs identified (P)     Do you want help finding or keeping work or a job? I do not need or want help (P)     Do you want help with school or  training? For example, starting or completing job training or getting a high school diploma, GED or equivalent No (P)     Anticipated Changes Related to Illness none (P)     Equipment Needed After Discharge none (P)                    Discharge Plan    No documentation.                      Demographic Summary       Row Name 04/09/25 1450       General Information    Admission Type inpatient (P)     Preferred Language English (P)                    Functional Status       Row Name 04/09/25 1451       Physical Activity    On average, how many days per week do you engage in moderate to strenuous exercise (like a brisk walk)? 7 days (P)     On average, how many minutes do you engage in exercise at this level? 60 min (P)     Number of minutes of exercise per week 420 (P)        Assessment of Health Literacy    How often do you have someone help you read hospital materials? Never (P)     How often do you have problems learning about your medical condition because of difficulty understanding written information? Never (P)     How often do you have a problem understanding what is told to you about your medical condition? Never (P)     How confident are you filling out medical forms by yourself? Extremely (P)     Health Literacy Excellent (P)        Functional Status, IADL    Medications independent (P)     Meal Preparation independent (P)     Housekeeping independent (P)     Laundry independent (P)     Shopping independent (P)     If for any reason you need help with day-to-day activities such as bathing, preparing meals, shopping, managing finances, etc., do you get the help you need? I don't need any help (P)                    Psychosocial       Row Name 04/09/25 1451       Mental Health    Little interest or pleasure in doing things Not at all (P)     Feeling down, depressed, or hopeless Several days (P)        Stress    Do you feel stress - tense, restless, nervous, or anxious, or unable to sleep at night because your  mind is troubled all the time - these days? Only a littl (P)        Coping/Stress    Sources of Support spouse (P)        Developmental Stage (Eriksson's Stages of Development)    Developmental Stage Stage 7 (35-65 years/Middle Adulthood) Generativity vs. Stagnation (P)                    Abuse/Neglect       Row Name 04/09/25 1454       Personal Safety    Feels Unsafe at Home or Work/School no (P)     Feels Threatened by Someone no (P)     Does Anyone Try to Keep You From Having Contact with Others or Doing Things Outside Your Home? no (P)     Physical Signs of Abuse Present no (P)                    Legal       Row Name 04/09/25 4643       Financial Resource Strain    How hard is it for you to pay for the very basics like food, housing, medical care, and heating? Not hard (P)                    Substance Abuse    No documentation.                  Patient Forms    No documentation.                 Sw met with pt and pt spouse at bedside on this date. Pt lives at home with . Pt periodically uses a cane. Pt does not use any outpatient services. Pt denies any needs at this time.    Adela Quintero, Social Work Student

## 2025-04-09 NOTE — H&P
[facility]  Date: 2025   Patient Name: Melvina Martinez  : 1960  MRN: [id number]  Primary Care Physician:  Provider, No Known  Date of admission: 2025    Subjective   Subjective     Chief Complaint: Left-sided weakness    HPI:    Melvina Martinez is a 64 y.o. female with medical history significant for HTN, HLD, prior TIA, migraines, and mood disorders, who presented for left-sided weakness    Patient was last seen normal around 9 AM on 25.  She was eating breakfast at the time.  She was trying to rise up around 10 AM but could not.  She had sudden onset of left leg and left arm weakness.  She denies fever, no chills, no chest pain, no abdominal pain.    Personal History     Past Medical History:  Past Medical History:   Diagnosis Date    Acid reflux     Anxiety disorder     Arthritis     Bipolar disorder     Carpal tunnel syndrome     Deep vein thrombosis 2022    Depression     Diabetes     DOES NOT CHECK BS DAILY    H/O Panic disorder     Hirsutism     History of back pain     Hyperlipidemia     Hypertension     Kidney stones     Limb swelling     Migraines     PTSD (post-traumatic stress disorder)     Pulmonary embolism     Rotator cuff tear, left     Rotator cuff tear, left 06/10/2021    Sleep apnea     Stress incontinence     Tattoos     TIA (transient ischemic attack)            Past Surgical History:  Past Surgical History:   Procedure Laterality Date    ABDOMINOPLASTY      Tummy tuck    ANKLE SURGERY      APPENDECTOMY      25 yrs. old when pregnant     SECTION      CHOLECYSTECTOMY N/A 10/06/2021    Procedure: CHOLECYSTECTOMY LAPAROSCOPIC;  Surgeon: Jeancarlos Carrington MD;  Location: [facility] OR;  Service: General;  Laterality: N/A;    COLONOSCOPY      COLONOSCOPY N/A 2022    Procedure: COLONOSCOPY INTO CECUM WITH COLD SNARE POLYPECTOMY;  Surgeon: Ananth Odell MD;  Location: [facility] ENDOSCOPY;  Service: Gastroenterology;   Laterality: N/A;  PRE: PERSONAL H/O COLON POLYPS  POST: DIVERTICULOSIS, POLYP, HEMORRHOIDS, TORTUOUS COLON    ENDOSCOPY N/A 10/28/2021    Procedure: ESOPHAGOGASTRODUODENOSCOPY with biopsies;  Surgeon: Luciano Salmon MD;  Location: [facility] ENDOSCOPY;  Service: Gastroenterology;  Laterality: N/A;  pre:  abnormal CT  post:  gastritis, HH,     EYE SURGERY      childhood; muscle    FRACTURE SURGERY      tibia and fibula fracture    HYSTERECTOMY      INTRAOCULAR LENS INSERTION      KNEE ARTHROSCOPY W/ MENISCAL REPAIR Right     KNEE SURGERY      SHOULDER ARTHROSCOPY Left 06/10/2021    Procedure: LEFT SHOULDER ARTHROSCOPY;  Surgeon: Дмитрий Francois MD;  Location: [facility] OR [facility];  Service: Orthopedics;  Laterality: Left;    SHOULDER ROTATOR CUFF REPAIR Left 06/10/2021    Procedure: SUBACROMIAL DECOMPRESSION, DISTAL CLAVICULECTOMY AND ROTATOR CUFF REPAIR;  Surgeon: Дмитрий Francois MD;  Location: [facility] OR [facility];  Service: Orthopedics;  Laterality: Left;    SHOULDER SURGERY Right     TONSILLECTOMY      TUBAL ABDOMINAL LIGATION         Family History:   Family History   Problem Relation Age of Onset    Cancer Mother         Unspecified    Breast cancer Mother     Clotting disorder Mother     Hypertension Father     Dementia Father     Stroke Father     Heart disease Father     Diabetes Father         Unspecified type    Arthritis Father     Alzheimer's disease Father     Heart disease Brother     Breast cancer Paternal Aunt     Throat cancer Paternal Aunt     Breast cancer Paternal Aunt     Breast cancer Paternal Grandmother     Malig Hyperthermia Neg Hx        Social History:   Social History     Socioeconomic History    Marital status:    Tobacco Use    Smoking status: Never    Smokeless tobacco: Never   Vaping Use    Vaping status: Never Used   Substance and Sexual Activity    Alcohol use: Yes     Comment: rarely drinks    Drug use: Never    Sexual activity: Not Currently     Partners: Male     Birth  control/protection: Post-menopausal       Home Medications:  QUEtiapine, SUMAtriptan, amLODIPine, atorvastatin, clopidogrel, folic acid, lamoTRIgine, multivitamin, pantoprazole, prazosin, topiramate, and traZODone    Allergies:  Allergies   Allergen Reactions    Penicillins Anaphylaxis and Shortness Of Breath    Metronidazole Hives and Nausea Only       Review of Systems   All systems were reviewed and negative except for indicated in HPI    Objective   Objective     Vitals:   Temp:  [98.1 °F (36.7 °C)] 98.1 °F (36.7 °C)  Heart Rate:  [69-99] 87  Resp:  [18] 18  BP: (124-153)/(79-96) 142/93    Physical Exam    Constitutional: Awake, alert, no acute distress   Eyes: Pupils equal, sclerae anicteric, no conjunctival injection   HENT: NCAT, mucous membranes moist   Neck: Supple, no thyromegaly, no lymphadenopathy, trachea midline   Respiratory: Clear to auscultation bilaterally, nonlabored respirations    Cardiovascular: RRR, no murmurs, rubs, or gallops, palpable pedal pulses bilaterally   Gastrointestinal: Positive bowel sounds, soft, nontender, nondistended   Musculoskeletal: No bilateral ankle edema, no clubbing or cyanosis to extremities   Psychiatric: Appropriate affect, cooperative   Neurologic: Oriented x 3, strength symmetric in all extremities, Cranial Nerves grossly intact to confrontation, speech clear   Skin: No rashes     Result Review    Result Review:  I have personally reviewed the results from the time of this admission to 4/9/2025 14:47 EDT and agree with these findings:  [x]  Laboratory  [x]  Microbiology  [x]  Radiology  [x]  EKG/Telemetry   []  Cardiology/Vascular   []  Pathology  []  Old records  []  Other:    Laboratory Studies:  Recent Results (from the past 24 hours)   Comprehensive Metabolic Panel    Collection Time: 04/09/25 11:34 AM    Specimen: Blood   Result Value Ref Range    Glucose 91 65 - 99 mg/dL    BUN 12 8 - 23 mg/dL    Creatinine 0.94 0.57 - 1.00 mg/dL    Sodium 141 136 - 145  mmol/L    Potassium 4.4 3.5 - 5.2 mmol/L    Chloride 108 (H) 98 - 107 mmol/L    CO2 23.0 22.0 - 29.0 mmol/L    Calcium 9.4 8.6 - 10.5 mg/dL    Total Protein 7.3 6.0 - 8.5 g/dL    Albumin 4.3 3.5 - 5.2 g/dL    ALT (SGPT) 18 1 - 33 U/L    AST (SGOT) 20 1 - 32 U/L    Alkaline Phosphatase 97 39 - 117 U/L    Total Bilirubin 0.3 0.0 - 1.2 mg/dL    Globulin 3.0 gm/dL    A/G Ratio 1.4 g/dL    BUN/Creatinine Ratio 12.8 7.0 - 25.0    Anion Gap 10.0 5.0 - 15.0 mmol/L    eGFR 68.3 >60.0 mL/min/1.73   Protime-INR    Collection Time: 04/09/25 11:34 AM    Specimen: Blood   Result Value Ref Range    Protime 13.8 11.8 - 14.9 Seconds    INR 1.02 0.86 - 1.15   aPTT    Collection Time: 04/09/25 11:34 AM    Specimen: Blood   Result Value Ref Range    PTT 27.9 24.2 - 34.2 seconds   Type & Screen    Collection Time: 04/09/25 11:34 AM    Specimen: Blood   Result Value Ref Range    ABO Type A     RH type Positive     Antibody Screen Negative     T&S Expiration Date 4/16/2025 11:59:00 PM    Green Top (Gel)    Collection Time: 04/09/25 11:34 AM   Result Value Ref Range    Extra Tube Hold for add-ons.    Lavender Top    Collection Time: 04/09/25 11:34 AM   Result Value Ref Range    Extra Tube hold for add-on    Gold Top - SST    Collection Time: 04/09/25 11:34 AM   Result Value Ref Range    Extra Tube Hold for add-ons.    CBC Auto Differential    Collection Time: 04/09/25 11:34 AM    Specimen: Blood   Result Value Ref Range    WBC 6.39 3.40 - 10.80 10*3/mm3    RBC 4.41 3.77 - 5.28 10*6/mm3    Hemoglobin 13.1 12.0 - 15.9 g/dL    Hematocrit 40.9 34.0 - 46.6 %    MCV 92.7 79.0 - 97.0 fL    MCH 29.7 26.6 - 33.0 pg    MCHC 32.0 31.5 - 35.7 g/dL    RDW 15.0 12.3 - 15.4 %    RDW-SD 51.6 37.0 - 54.0 fl    MPV 10.0 6.0 - 12.0 fL    Platelets 201 140 - 450 10*3/mm3    Neutrophil % 36.9 (L) 42.7 - 76.0 %    Lymphocyte % 52.3 (H) 19.6 - 45.3 %    Monocyte % 7.5 5.0 - 12.0 %    Eosinophil % 2.5 0.3 - 6.2 %    Basophil % 0.6 0.0 - 1.5 %    Immature Grans %  0.2 0.0 - 0.5 %    Neutrophils, Absolute 2.36 1.70 - 7.00 10*3/mm3    Lymphocytes, Absolute 3.34 (H) 0.70 - 3.10 10*3/mm3    Monocytes, Absolute 0.48 0.10 - 0.90 10*3/mm3    Eosinophils, Absolute 0.16 0.00 - 0.40 10*3/mm3    Basophils, Absolute 0.04 0.00 - 0.20 10*3/mm3    Immature Grans, Absolute 0.01 0.00 - 0.05 10*3/mm3    nRBC 0.0 0.0 - 0.2 /100 WBC   Light Blue Top    Collection Time: 04/09/25 11:34 AM   Result Value Ref Range    Extra Tube Hold for add-ons.    ECG 12 Lead ED Triage Standing Order; Acute Stroke (Onset <12 hrs)    Collection Time: 04/09/25 12:17 PM   Result Value Ref Range    QT Interval 395 ms    QTC Interval 442 ms   Urinalysis With Microscopic If Indicated (No Culture) - Urine, Clean Catch    Collection Time: 04/09/25 12:23 PM    Specimen: Urine, Clean Catch   Result Value Ref Range    Color, UA Yellow Yellow, Straw    Appearance, UA Clear Clear    pH, UA 8.0 5.0 - 8.0    Specific Gravity, UA 1.027 1.005 - 1.030    Glucose, UA Negative Negative    Ketones, UA Negative Negative    Bilirubin, UA Negative Negative    Blood, UA Negative Negative    Protein, UA Negative Negative    Leuk Esterase, UA Moderate (2+) (A) Negative    Nitrite, UA Negative Negative    Urobilinogen, UA 0.2 E.U./dL 0.2 - 1.0 E.U./dL   Urinalysis, Microscopic Only - Urine, Clean Catch    Collection Time: 04/09/25 12:23 PM    Specimen: Urine, Clean Catch   Result Value Ref Range    RBC, UA 0-2 None Seen, 0-2 /HPF    WBC, UA 3-5 (A) None Seen, 0-2 /HPF    Bacteria, UA None Seen None Seen /HPF    Squamous Epithelial Cells, UA 0-2 None Seen, 0-2 /HPF    Hyaline Casts, UA None Seen None Seen /LPF    Methodology Automated Microscopy    ABO RH Specimen Verification    Collection Time: 04/09/25  1:34 PM    Specimen: Arm, Left; Blood   Result Value Ref Range    ABO Type A     RH type Positive        Imaging:  CT Angiogram Head w AI Analysis of LVO  Result Date: 4/9/2025  Narrative: CT ANGIOGRAM HEAD W AI ANALYSIS OF LVO, CT  ANGIOGRAM NECK Date of Exam: 4/9/2025 11:43 AM EDT Indication: Neuro Deficit, acute, Stroke suspected Neuro deficit, acute, stroke suspected. Comparison: None available. Technique: CTA of the head was performed after the uneventful intravenous administration of iodinated contrast. Reconstructed coronal and sagittal images were also obtained. In addition, a 3-D volume rendered image was created for interpretation. Automated exposure control and iterative reconstruction methods were used. FINDINGS: Vascular Findings: The right common carotid, internal carotid, middle cerebral, anterior cerebral, vertebral, and posterior cerebral arteries are patent without abrupt cut off or aneurysmal dilation. The left common carotid, internal carotid, middle cerebral, anterior cerebral, vertebral, and posterior cerebral arteries are patent without abrupt cut off or aneurysmal dilation. Basilar artery appears patent and appears unremarkable. Non-vascular Findings: For description of nonvascular intracranial findings, please refer to the noncontrast head CT performed the same date. No acute abnormality is identified within the visualized soft tissue or bony structures of the neck. Chronic moderate height loss of C7 The visualized lung apices are clear.     Impression: 1.No acute abnormality is identified within the large arteries of the head or neck. 2.No significant stenosis of the bilateral internal carotid arteries. 3.Chronic moderate height loss of C7. This was also present on chest CT from 4/13/2022. Electronically Signed: Mani Ibanez MD  4/9/2025 1:01 PM EDT  Workstation ID: [id number]    CT Angiogram Neck  Result Date: 4/9/2025  Narrative: CT ANGIOGRAM HEAD W AI ANALYSIS OF LVO, CT ANGIOGRAM NECK Date of Exam: 4/9/2025 11:43 AM EDT Indication: Neuro Deficit, acute, Stroke suspected Neuro deficit, acute, stroke suspected. Comparison: None available. Technique: CTA of the head was performed after the uneventful intravenous  administration of iodinated contrast. Reconstructed coronal and sagittal images were also obtained. In addition, a 3-D volume rendered image was created for interpretation. Automated exposure control and iterative reconstruction methods were used. FINDINGS: Vascular Findings: The right common carotid, internal carotid, middle cerebral, anterior cerebral, vertebral, and posterior cerebral arteries are patent without abrupt cut off or aneurysmal dilation. The left common carotid, internal carotid, middle cerebral, anterior cerebral, vertebral, and posterior cerebral arteries are patent without abrupt cut off or aneurysmal dilation. Basilar artery appears patent and appears unremarkable. Non-vascular Findings: For description of nonvascular intracranial findings, please refer to the noncontrast head CT performed the same date. No acute abnormality is identified within the visualized soft tissue or bony structures of the neck. Chronic moderate height loss of C7 The visualized lung apices are clear.     Impression: 1.No acute abnormality is identified within the large arteries of the head or neck. 2.No significant stenosis of the bilateral internal carotid arteries. 3.Chronic moderate height loss of C7. This was also present on chest CT from 4/13/2022. Electronically Signed: Mani Ibanez MD  4/9/2025 1:01 PM EDT  Workstation ID: [id number]    XR Chest 1 View  Result Date: 4/9/2025  Narrative: XR CHEST 1 VW Date of Exam: 4/9/2025 12:10 PM EDT Indication: Acute Stroke Protocol (Onset < 12 hrs) Comparison: AP chest x-ray 4/13/2022 Findings: Lungs are well expanded and appear clear. Cardiomediastinal contours are within normal limits.     Impression: Impression: No acute cardiopulmonary abnormality is identified. Electronically Signed: Mague Leavitt  4/9/2025 12:33 PM EDT  Workstation ID: [id number]    CT CEREBRAL PERFUSION WITH & WITHOUT CONTRAST  Result Date: 4/9/2025  Narrative: CT CEREBRAL PERFUSION W WO CONTRAST Date of  Exam: 4/9/2025 11:43 AM EDT Indication: Neuro Deficit, acute, Stroke suspected Neuro deficit, acute, stroke suspected.  Comparison: None available. Technique: Axial CT images of the brain were obtained prior to and after the uneventful intravenous administration of iodinated contrast. Core blood volume, core blood flow, mean transit time, and Tmax images were obtained utilizing the Rapid software protocol. A limited CT angiogram of the head was also performed to measure the blood vessel density. The radiation dose reduction device was turned on for each scan per the ALARA (As Low as Reasonably Achievable) protocol. FINDINGS: CT Perfusion: CBF (<30%) volume: 0 mL Tmax (>6.0s) volume: 0 mL Mismatch volume: 0 mL Mismatch ratio: None     Impression: No CT perfusion findings to suggest territorial ischemia or core infarct. Electronically Signed: Mani Ibanez MD  4/9/2025 12:09 PM EDT  Workstation ID: [id number]    CT Head Without Contrast Stroke Protocol  Result Date: 4/9/2025  Narrative: CT HEAD WO CONTRAST STROKE PROTOCOL Date of Exam: 4/9/2025 11:24 AM EDT Indication: Neuro Deficit, acute, Stroke suspected Neuro deficit, acute, stroke suspected. Comparison: August 30, 2017 Technique: Axial CT images were obtained of the head without contrast administration.  Reconstructed coronal and sagittal images were also obtained. Automated exposure control and iterative construction methods were used. Findings: No acute intracranial hemorrhage or extra-axial collection is identified. The ventricles appear normal in caliber, with no evidence of mass effect or midline shift. The basal cisterns appear patent. The gray-white differentiation appears preserved. The calvarium appear intact. The paranasal sinuses are clear. The mastoid air cells are well-aerated.     Impression: Impression: No acute intracranial process identified. Electronically Signed: Fidencio Garces MD  4/9/2025 11:38 AM EDT  Workstation ID: [id number]    Mammo  Diagnostic Digital Tomosynthesis Left With CAD  Result Date: 3/18/2025  Narrative:     EXAM: Digital diagnostic mammography, LEFT 2D with CAD and 3D tomosynthesis.                Bilateral survey breast ultrasound.  DATE OF EXAM: March 18, 2025  INDICATION: Patient fell when she had a stroke. Tender palpable lump in the upper inner quadrant of the left breast for 2 months. Skin over the lump marked with a triangular radiopaque marker.  Family history of breast cancer (mother with bilateral breast cancer at age 80, maternal grandmother at age 60, paternal aunt at age 50, paternal aunt at age 50, paternal grandmother at age unknown).  PHYSICAL EXAMINATION: Palpable fullness overlying upper left rib, upper inner quadrant left breast.  COMPARISON: Bilateral mammogram date October 15, 2024, August 21, 2023.  TECHNIQUE: Mammographic 2D images of the breast was obtained digitally and CAD was used in reviewing the images. Mammographic 3D tomosynthesis images of the breast was obtained digitally.  CC, LM and MLO views of the left breast were obtained.  High-resolution ultrasound imaging of each breast (all 4 quadrants and retroareolar region) and each axilla was performed in survey fashion. Particular attention was given to the area of palpable concern in the upper inner quadrant of the left breast.  BREAST DENSITY:  The breast is heterogeneously dense, which may obscure small masses.  FINDINGS:  No spiculated masses or suspicious microcalcifications are identified in the left breast on the mammogram.  --------------------------------------------------------------------------------------  Background echotexture is homogeneous-fibroglandular.  Ultrasound images on the right demonstrate no dominant cyst, suspicious mass or architectural distortion. A benign intramammary lymph node is present at 9:00, 12 cm from the nipple.  No abnormal right axillary lymphadenopathy is identified on images obtained.  Ultrasound images on the  left demonstrate no suspicious mass or architectural distortion. There is no specific sonographic abnormality identified in the area of palpable concern, 11:00 left breast, 15 cm from the nipple.  No abnormal left axillary lymphadenopathy is identified on images obtained.    Impression:      1.   No sonographic features specific for malignancy, right breast.      2.   No mammographic or sonographic features specific for malignancy, left breast. No significant change in the mammographic appearance of the left breast when compared with prior studies.       RECOMMENDATION:  Routine annual 3-D tomosynthesis mammography and screening breast ultrasound are recommended for this patient if she remains asymptomatic.  She is due for her annual bilateral mammogram in October 2025.  Findings and recommendation were discussed with the patient by Dr. Hirsch on the afternoon of March 18, 2025.  OVERALL ASSESSMENT:  BENIGN.  A reminder letter will be scheduled.  10-20% of cancers are invisible on a mammogram. A clinical breast exam is  recommended if she has not had one in the last 12 months. A palpable lump  needs clinical evaluation despite a negative mammogram.  Created by: Yana Hirsch MD  Signed by: Yana Hirsch MD  Signed on: 3/18/2025 14:38 EDT  Location: [id number]           US breast bilateral  Result Date: 3/18/2025  Narrative:     EXAM: Digital diagnostic mammography, LEFT 2D with CAD and 3D tomosynthesis.                Bilateral survey breast ultrasound.  DATE OF EXAM: March 18, 2025  INDICATION: Patient fell when she had a stroke. Tender palpable lump in the upper inner quadrant of the left breast for 2 months. Skin over the lump marked with a triangular radiopaque marker.  Family history of breast cancer (mother with bilateral breast cancer at age 80, maternal grandmother at age 60, paternal aunt at age 50, paternal aunt at age 50, paternal grandmother at age unknown).  PHYSICAL EXAMINATION: Palpable fullness  overlying upper left rib, upper inner quadrant left breast.  COMPARISON: Bilateral mammogram date October 15, 2024, August 21, 2023.  TECHNIQUE: Mammographic 2D images of the breast was obtained digitally and CAD was used in reviewing the images. Mammographic 3D tomosynthesis images of the breast was obtained digitally.  CC, LM and MLO views of the left breast were obtained.  High-resolution ultrasound imaging of each breast (all 4 quadrants and retroareolar region) and each axilla was performed in survey fashion. Particular attention was given to the area of palpable concern in the upper inner quadrant of the left breast.  BREAST DENSITY:  The breast is heterogeneously dense, which may obscure small masses.  FINDINGS:  No spiculated masses or suspicious microcalcifications are identified in the left breast on the mammogram.  --------------------------------------------------------------------------------------  Background echotexture is homogeneous-fibroglandular.  Ultrasound images on the right demonstrate no dominant cyst, suspicious mass or architectural distortion. A benign intramammary lymph node is present at 9:00, 12 cm from the nipple.  No abnormal right axillary lymphadenopathy is identified on images obtained.  Ultrasound images on the left demonstrate no suspicious mass or architectural distortion. There is no specific sonographic abnormality identified in the area of palpable concern, 11:00 left breast, 15 cm from the nipple.  No abnormal left axillary lymphadenopathy is identified on images obtained.    Impression:      1.   No sonographic features specific for malignancy, right breast.      2.   No mammographic or sonographic features specific for malignancy, left breast. No significant change in the mammographic appearance of the left breast when compared with prior studies.       RECOMMENDATION:  Routine annual 3-D tomosynthesis mammography and screening breast ultrasound are recommended for this  patient if she remains asymptomatic.  She is due for her annual bilateral mammogram in October 2025.  Findings and recommendation were discussed with the patient by Dr. Hirsch on the afternoon of March 18, 2025.  OVERALL ASSESSMENT:  BENIGN.  A reminder letter will be scheduled.  10-20% of cancers are invisible on a mammogram. A clinical breast exam is  recommended if she has not had one in the last 12 months. A palpable lump  needs clinical evaluation despite a negative mammogram.  Created by: Yana Hirsch MD  Signed by: Yana Hirsch MD  Signed on: 3/18/2025 14:38 EDT  Location: [id number]             EKG: (my review): Sinus rhythm, heart rate 75, QTc 442.  No ST elevation or depression    Assessment & Plan   Assessment / Plan     - I have discussed the case with the ED physician.  I have seen patient at bedside.  I have independently reviewed her EKG, labs, imaging, record    # Strokelike symptoms with left-sided weakness  # Other chronic problems: HTN, HLD, prior TIA, migraines, and mood disorders    - NIH stroke scale score was 2.  However, patient has mostly back to her baseline with no significant residual deficit. No tPA per neurology.  - Admit to stroke / telemetry unit with frequent neurochecks  - Checking brain MRI without contrast and echocardiogram with bubble study  - Checking TSH, A1c, lipid panel, and vitamin D level  - Continue home Plavix 75 mg and atorvastatin 80 mg daily  - PT/OT/speech eval.  Head of bed 30 degrees  - Appreciate further recs from neurology    Resume home medications when appropriate.  DVT prophylaxis with Lovenox.    CODE STATUS:    Code Status (Patient has no pulse and is not breathing): CPR (Attempt to Resuscitate)  Medical Interventions (Patient has pulse or is breathing): Full Support      Admission Status:  I believe this patient meets admission status.    Electronically signed by Keith Mendez MD, 04/09/25, 2:37 PM EDT.

## 2025-04-09 NOTE — CONSULTS
[facility]  [facility]      Patient Name:   Melvina Randhawa  YOB: 1960  Identification Number:   MRN - [id number]  Date of Service:   04/09/2025 11:24:44    Diagnosis:        I63.89 - Cerebrovascular accident (CVA) due to other mechanism ([facility])    Impression:       Patient presented with left leg weakness and double vision. Head CT shows no acute process. Head CT shows no acute process.    Our recommendations are outlined below.    Recommendations:          Stroke/Telemetry Floor        Neuro Checks        Bedside Swallow Eval        DVT Prophylaxis        IV Fluids, Normal Saline        Head of Bed 30 Degrees        Euglycemia and Avoid Hyperthermia (PRN Acetaminophen)        Initiate Clopidogrel 75 mg daily    Sign Out:        Discussed with Emergency Department Provider        ------------------------------------------------------------------------------    Advanced Imaging:  CTA Head and Neck Completed.    CTP Completed.    LVO:No    Patient in not a candidate for [place]      Metrics:  Last Known Well: 04/09/2025 09:00:00  Dispatch Time: 04/09/2025 11:23:23  Arrival Time: 04/09/2025 11:21:00  Initial Response Time: 04/09/2025 11:31:10  Symptoms: Left sided weakness.  Initial patient interaction: 04/09/2025 12:01:00  NIHSS Assessment Completed: 04/09/2025 12:03:50  Patient is not a candidate for Thrombolytic.  Thrombolytic Medical Decision: 04/09/2025 12:03:54  Patient was not deemed candidate for Thrombolytic because of following reasons:  Significant head trauma or stroke in previous 3 months .    CT head showed no acute hemorrhage or acute core infarct.    Primary Provider Notified of Diagnostic Impression and Management Plan on: 04/09/2025 12:07:04        ------------------------------------------------------------------------------    History of Present Illness:  Patient is a 64 year old Female.    Patient was brought by EMS for symptoms of Left sided  weakness.  Past medical history of stroke, Diabetes Mellitus, Hypertension presented with concern of gait disturbances. History was provided by the spouse at [facility]. Last seen normal was around 0900 when she was doing break fast. Patient sat down and tried to rise around 1000 and couldnt       Past Medical History:       Hypertension       Hyperlipidemia       Stroke    Medications:    No Anticoagulant use   Antiplatelet use: Yes plavix  Reviewed EMR for current medications    Allergies:   Reviewed    Social History:  Drug Use: No    Family History:    There is no family history of premature cerebrovascular disease pertinent to this consultation    ROS :  14 Points Review of Systems was performed and was negative except mentioned in HPI.    Past Surgical History:  There Is No Surgical History Contributory To Today’s Visit         Examination:  BP(153/79), Pulse(73), Blood Glucose(81)  1A: Level of Consciousness - Alert; keenly responsive + 0  1B: Ask Month and Age - Both Questions Right + 0  1C: Blink Eyes & Squeeze Hands - Performs 1 Task + 1  2: Test Horizontal Extraocular Movements - Normal + 0  3: Test Visual Fields - No Visual Loss + 0  4: Test Facial Palsy (Use Grimace if Obtunded) - Normal symmetry + 0  5A: Test Left Arm Motor Drift - No Drift for 10 Seconds + 0  5B: Test Right Arm Motor Drift - No Drift for 10 Seconds + 0  6A: Test Left Leg Motor Drift - Drift, but doesn't hit bed + 1  6B: Test Right Leg Motor Drift - No Drift for 5 Seconds + 0  7: Test Limb Ataxia (FNF/Heel-Shin) - No Ataxia + 0  8: Test Sensation - Normal; No sensory loss + 0  9: Test Language/Aphasia - Normal; No aphasia + 0  10: Test Dysarthria - Normal + 0  11: Test Extinction/Inattention - No abnormality + 0    NIHSS Score: 2      Pre-Morbid Modified [place] Scale:  1 Points = No significant disability despite symptoms; able to carry out all usual duties and activities    Spoke with : Dr Richards  I reviewed the available imaging via  Rapid and initiated discussion with the primary provider    This consult was conducted in real time using interactive audio and video technology. Patient was informed of the technology being used for this visit and agreed to proceed. Patient located in hospital and provider located at home/office setting.      Patient is being evaluated for possible acute neurologic impairment and high probability of imminent or life-threatening deterioration. I spent total of 30 minutes providing care to this patient, including time for face to face visit via telemedicine, review of medical records, imaging studies and discussion of findings with providers, the patient and/or family.      Dr Jared Keller      [facility]  For Inpatient follow-up with [facility] physician please call [facility] at [phone]. As we are not an outpatient service for any post hospital discharge needs please contact the hospital for assistance.  If you have any questions for the [facility] physicians or need to reconsult for clinical or diagnostic changes please contact us via [facility] at [phone].

## 2025-04-09 NOTE — PLAN OF CARE
Goal Outcome Evaluation:  Plan of Care Reviewed With: patient        Progress: improving  Outcome Evaluation: No acute events this shift. Call light in reach.

## 2025-04-09 NOTE — ED PROVIDER NOTES
Time: 1:35 PM EDT  Date of encounter:  2025  Independent Historian/Clinical History and Information was obtained by:   Patient and EMS    History is limited by: N/A    Chief Complaint: Left-sided weakness      History of Present Illness:  Patient is a 64 y.o. year old female who presents to the emergency department for evaluation of strokelike symptoms with sudden onset of left leg and left arm weakness.  Patient reports she was try to get a bed when she was unable to stand and had to fall back down to the bed.  EMS picked up patient reports left arm and leg weakness.      Patient Care Team  Primary Care Provider: Provider, No Known    Past Medical History:     Allergies   Allergen Reactions    Penicillins Anaphylaxis and Shortness Of Breath    Metronidazole Hives and Nausea Only     Past Medical History:   Diagnosis Date    Acid reflux     Anxiety disorder     Arthritis     Bipolar disorder     Carpal tunnel syndrome     Deep vein thrombosis 2022    Depression     Diabetes     DOES NOT CHECK BS DAILY    H/O Panic disorder     Hirsutism     History of back pain     Hyperlipidemia     Hypertension     Kidney stones     Limb swelling     Migraines     PTSD (post-traumatic stress disorder)     Pulmonary embolism     Rotator cuff tear, left     Rotator cuff tear, left 06/10/2021    Sleep apnea     Stress incontinence     Tattoos     TIA (transient ischemic attack)      Past Surgical History:   Procedure Laterality Date    ABDOMINOPLASTY      Tummy tuck    ANKLE SURGERY      APPENDECTOMY      25 yrs. old when pregnant     SECTION      CHOLECYSTECTOMY N/A 10/06/2021    Procedure: CHOLECYSTECTOMY LAPAROSCOPIC;  Surgeon: Jeancarlos Carrington MD;  Location: [facility] OR;  Service: General;  Laterality: N/A;    COLONOSCOPY      COLONOSCOPY N/A 2022    Procedure: COLONOSCOPY INTO CECUM WITH COLD SNARE POLYPECTOMY;  Surgeon: Ananth Odell MD;  Location: [facility] ENDOSCOPY;  Service:  Gastroenterology;  Laterality: N/A;  PRE: PERSONAL H/O COLON POLYPS  POST: DIVERTICULOSIS, POLYP, HEMORRHOIDS, TORTUOUS COLON    ENDOSCOPY N/A 10/28/2021    Procedure: ESOPHAGOGASTRODUODENOSCOPY with biopsies;  Surgeon: Luciano Salmon MD;  [place] [facility] [place];  Service: Gastroenterology;  Laterality: N/A;  pre:  abnormal CT  post:  gastritis, HH,     EYE SURGERY      childhood; muscle    FRACTURE SURGERY      tibia and fibula fracture    HYSTERECTOMY      INTRAOCULAR LENS INSERTION      KNEE ARTHROSCOPY W/ MENISCAL REPAIR Right     KNEE SURGERY      SHOULDER ARTHROSCOPY Left 06/10/2021    Procedure: LEFT SHOULDER ARTHROSCOPY;  Surgeon: Дмитрий Francois MD;  [place] [facility] [place] [facility] [place]  Service: [place]opedics;  Laterality: Left;    SHOULDER ROTATOR CUFF REPAIR Left 06/10/2021    Procedure: SUBACROMIAL DECOMPRESSION, DISTAL CLAVICULECTOMY AND ROTATOR CUFF REPAIR;  Surgeon: Дмитрий Francois MD;  [place] [facility] [place] [facility] [place]  Service: Orthopedics;  Laterality: Left;    SHOULDER SURGERY Right     TONSILLECTOMY      TUBAL ABDOMINAL LIGATION       Family History   Problem Relation Age of Onset    Cancer Mother         Unspecified    Breast cancer Mother     Clotting disorder Mother     Hypertension Father     Dementia Father     Stroke Father     Heart disease Father     Diabetes Father         Unspecified type    Arthritis Father     Alzheimer's disease Father     Heart disease Brother     Breast cancer Paternal Aunt     Throat cancer Paternal Aunt     Breast cancer Paternal Aunt     Breast cancer Paternal Grandmother     Malig Hyperthermia Neg Hx        Home Medications:  Prior to Admission medications    Medication Sig Start Date End Date Taking? Authorizing Provider   amLODIPine (NORVASC) 2.5 MG tablet Take 1 tablet by mouth Daily. 6/16/22  Yes Isabela Nelson APRN   atorvastatin (LIPITOR) 80 MG tablet Take 1 tablet by mouth Every Night. 2/20/25  Yes Provider, MD Gregorio   clopidogrel (PLAVIX) 75 MG tablet  Take 1 tablet by mouth Daily. 2/20/25  Yes Gregorio Adam MD   folic acid (FOLVITE) 1 MG tablet Take 1 tablet by mouth Daily. 2/20/25  Yes Gregorio Adam MD   lamoTRIgine (LaMICtal) 200 MG tablet Take 1 tablet by mouth 2 (Two) Times a Day.   Yes Gregorio Adam MD   multivitamin tablet tablet Take 1 tablet by mouth Daily. 2/20/25  Yes Gregorio Adam MD   pantoprazole (Protonix) 40 MG EC tablet Take 1 tablet by mouth Daily. 4/20/22  Yes Isabela Nelson APRN   prazosin (MINIPRESS) 5 MG capsule Take 1 capsule by mouth Every Night.   Yes Gregorio Adam MD   QUEtiapine (SEROquel) 100 MG tablet Take 0.5 tablets by mouth 2 (Two) Times a Day. Pt takes 50mg qam, 50mg midday and 100mg hs   Yes Gregorio Adam MD   QUEtiapine (SEROquel) 100 MG tablet Take 1 tablet by mouth Every Night. Pt takes 50mg qam, 50mg midday and 100mg hs   Yes Gregorio Adam MD   SUMAtriptan (Imitrex) 5 MG/ACT nasal spray 1 spray into the nostril(s) as directed by provider Every 2 (Two) Hours As Needed for Migraine. 8/30/21  Yes Mihaela Traylor MD   topiramate (TOPAMAX) 50 MG tablet Take 1 tablet by mouth 2 (Two) Times a Day.   Yes Gregorio Adam MD   traZODone (DESYREL) 100 MG tablet Take 1 tablet by mouth Every Night.   Yes Gregorio Adam MD   apixaban (ELIQUIS) 5 MG tablet tablet Take 1 tablet by mouth Every 12 (Twelve) Hours. 7/11/22 4/9/25  Isabela Nelson APRN   Blood Glucose Monitoring Suppl kit 1 kit 3 (Three) Times a Day. Lancets and blood glucose strips with machine to check blood sugars up to 3 times a day. 7/21/21 4/9/25  Evelia Emmanuel MD   chlorhexidine (PERIDEX) 0.12 % solution  5/26/22 4/9/25  Gregorio Adam MD   DULoxetine (Cymbalta) 30 MG capsule Take 1 capsule by mouth Daily. 5/31/22 4/9/25  Isabela Nelson APRN   empagliflozin (Jardiance) 10 MG tablet tablet Take 1 tablet by mouth Daily. 4/21/22 4/9/25  Isabela Nelson APRN   glucose blood test strip  "Use as instructed 12/13/21 4/9/25  Isabela Nelson APRN   Lancets misc 2 each 2 (Two) Times a Day. 12/13/21 4/9/25  Isabela Nelson APRN   ondansetron (Zofran) 4 MG tablet Take 1 tablet by mouth Every 8 (Eight) Hours As Needed for Nausea or Vomiting. 4/6/22 4/9/25  Connie Montoya APRN   sucralfate (CARAFATE) 1 g tablet Take 1 g by mouth 4 (Four) Times a Day.  4/9/25  ProviderGregorio MD   verapamil (CALAN) 120 MG tablet Take 120 mg by mouth Daily.  4/9/25  Provider, MD Gregorio        Social History:   Social History     Tobacco Use    Smoking status: Never    Smokeless tobacco: Never   Vaping Use    Vaping status: Never Used   Substance Use Topics    Alcohol use: Yes     Comment: rarely drinks    Drug use: Never         Review of Systems:  Review of Systems   Constitutional:  Negative for chills and fever.   HENT:  Negative for congestion, rhinorrhea and sore throat.    Eyes:  Negative for pain and visual disturbance.   Respiratory:  Negative for apnea, cough, chest tightness and shortness of breath.    Cardiovascular:  Negative for chest pain and palpitations.   Gastrointestinal:  Negative for abdominal pain, diarrhea, nausea and vomiting.   Genitourinary:  Negative for difficulty urinating and dysuria.   Musculoskeletal:  Negative for joint swelling and myalgias.   Skin:  Negative for color change.   Neurological:  Positive for weakness. Negative for seizures and headaches.   Psychiatric/Behavioral: Negative.     All other systems reviewed and are negative.       Physical Exam:  /85   Pulse 79   Temp 98.1 °F (36.7 °C)   Resp 18   Ht 160 cm (63\")   Wt 78.2 kg (172 lb 6.4 oz)   LMP  (LMP Unknown)   SpO2 95%   BMI 30.54 kg/m²     Physical Exam  Vitals and nursing note reviewed.   Constitutional:       General: She is not in acute distress.     Appearance: Normal appearance. She is not toxic-appearing.   HENT:      Head: Normocephalic and atraumatic.      Jaw: There is normal jaw " occlusion.   Eyes:      General: Lids are normal.      Extraocular Movements: Extraocular movements intact.      Conjunctiva/sclera: Conjunctivae normal.      Pupils: Pupils are equal, round, and reactive to light.   Cardiovascular:      Rate and Rhythm: Normal rate and regular rhythm.      Pulses: Normal pulses.      Heart sounds: Normal heart sounds.   Pulmonary:      Effort: Pulmonary effort is normal. No respiratory distress.      Breath sounds: Normal breath sounds. No wheezing or rhonchi.   Abdominal:      General: Abdomen is flat.      Palpations: Abdomen is soft.      Tenderness: There is no abdominal tenderness. There is no guarding or rebound.   Musculoskeletal:         General: Normal range of motion.      Cervical back: Normal range of motion and neck supple.      Right lower leg: No edema.      Left lower leg: No edema.   Skin:     General: Skin is warm and dry.   Neurological:      Mental Status: She is alert and oriented to person, place, and time.      Comments: Left leg minimal movement from stretcher  Left arm able to be lifted approximately [facility].   Psychiatric:         Mood and Affect: Mood normal.                    Medical Decision Making:      Comorbidities that affect care:    Stroke    External Notes reviewed:    None      The following orders were placed and all results were independently analyzed by me:  Orders Placed This Encounter   Procedures    CT Head Without Contrast Stroke Protocol    CT Angiogram Head w AI Analysis of LVO    CT Angiogram Neck    CT CEREBRAL PERFUSION WITH & WITHOUT CONTRAST    XR Chest 1 View    Douglas Draw    Comprehensive Metabolic Panel    Protime-INR    aPTT    Urinalysis With Microscopic If Indicated (No Culture) - Urine, Clean Catch    CBC Auto Differential    Urinalysis, Microscopic Only - Urine, Clean Catch    NPO Diet NPO Type: Strict NPO    Initiate Department's Acute Stroke Process (Team D, Code 19, etc)    Perform NIH Stroke Scale    Measure Actual  Weight    Head of Bed 30 Degrees or Less    Undress and Gown    Continuous Pulse Oximetry    Vital Signs    Neuro Checks    Notify Provider for SBP <80 or >200    Notify Provider for SBP >140 (For Hemorrhagic Stroke)    No Hypotonic Fluids    Nursing Dysphagia Screening (Complete Prior to Giving anything PO)    RN to Place Order SLP Consult (IF swallow screen failed) - Eval & Treat Choosing Reason of RN Dysphagia Screen Failed    Inpatient Hospitalist Consult    Oxygen Therapy- Nasal Cannula; Titrate 1-6 LPM Per SpO2; 90 - 95%    POC Glucose Once    ECG 12 Lead ED Triage Standing Order; Acute Stroke (Onset <12 hrs)    Type & Screen    ABO RH Specimen Verification    Insert Large Bore Peripheral IV - Right AC Preferred    CBC & Differential    Green Top (Gel)    Lavender Top    Gold Top - SST    Light Blue Top    Extra Tubes    Light Blue Top       Medications Given in the Emergency Department:  Medications   sodium chloride 0.9 % flush 10 mL (has no administration in time range)   iopamidol (ISOVUE-370) 76 % injection 100 mL (100 mL Intravenous Given 4/9/25 1159)        ED Course:         Labs:    Lab Results (last 24 hours)       Procedure Component Value Units Date/Time    CBC & Differential [[id number]]  (Abnormal) Collected: 04/09/25 1134    Specimen: Blood Updated: 04/09/25 1141    Narrative:      The following orders were created for panel order CBC & Differential.  Procedure                               Abnormality         Status                     ---------                               -----------         ------                     CBC Auto Differential[[id number]]        Abnormal            Final result                 Please view results for these tests on the individual orders.    Comprehensive Metabolic Panel [[id number]]  (Abnormal) Collected: 04/09/25 1134    Specimen: Blood Updated: 04/09/25 1200     Glucose 91 mg/dL      BUN 12 mg/dL      Creatinine 0.94 mg/dL      Sodium 141 mmol/L      Potassium  4.4 mmol/L      Chloride 108 mmol/L      CO2 23.0 mmol/L      Calcium 9.4 mg/dL      Total Protein 7.3 g/dL      Albumin 4.3 g/dL      ALT (SGPT) 18 U/L      AST (SGOT) 20 U/L      Alkaline Phosphatase 97 U/L      Total Bilirubin 0.3 mg/dL      Globulin 3.0 gm/dL      A/G Ratio 1.4 g/dL      BUN/Creatinine Ratio 12.8     Anion Gap 10.0 mmol/L      eGFR 68.3 mL/min/1.73     Narrative:      GFR Categories in Chronic Kidney Disease (CKD)      GFR Category          GFR (mL/min/1.73)    Interpretation  G1                     90 or greater         Normal or high (1)  G2                      60-89                Mild decrease (1)  G3a                   45-59                Mild to moderate decrease  G3b                   30-44                Moderate to severe decrease  G4                    15-29                Severe decrease  G5                    14 or less           Kidney failure          (1)In the absence of evidence of kidney disease, neither GFR category G1 or G2 fulfill the criteria for CKD.    eGFR calculation 2021 CKD-EPI creatinine equation, which does not include race as a factor    Protime-INR [[id number]]  (Normal) Collected: 04/09/25 1134    Specimen: Blood Updated: 04/09/25 1313     Protime 13.8 Seconds      INR 1.02    Narrative:      Suggested Therapeutic Ranges For Oral Anticoagulant Therapy:  Level of Therapy                      INR Target Range  Standard Dose                            2.0-3.0  High Dose                                2.5-3.5  Patients not receiving anticoagulant  Therapy Normal Range                     0.86-1.15    aPTT [[id number]]  (Normal) Collected: 04/09/25 1134    Specimen: Blood Updated: 04/09/25 1313     PTT 27.9 seconds     CBC Auto Differential [[id number]]  (Abnormal) Collected: 04/09/25 1134    Specimen: Blood Updated: 04/09/25 1141     WBC 6.39 10*3/mm3      RBC 4.41 10*6/mm3      Hemoglobin 13.1 g/dL      Hematocrit 40.9 %      MCV 92.7 fL      MCH 29.7 pg      MCHC  32.0 g/dL      RDW 15.0 %      RDW-SD 51.6 fl      MPV 10.0 fL      Platelets 201 10*3/mm3      Neutrophil % 36.9 %      Lymphocyte % 52.3 %      Monocyte % 7.5 %      Eosinophil % 2.5 %      Basophil % 0.6 %      Immature Grans % 0.2 %      Neutrophils, Absolute 2.36 10*3/mm3      Lymphocytes, Absolute 3.34 10*3/mm3      Monocytes, Absolute 0.48 10*3/mm3      Eosinophils, Absolute 0.16 10*3/mm3      Basophils, Absolute 0.04 10*3/mm3      Immature Grans, Absolute 0.01 10*3/mm3      nRBC 0.0 /100 WBC     Urinalysis With Microscopic If Indicated (No Culture) - Urine, Clean Catch [[id number]]  (Abnormal) Collected: 04/09/25 1223    Specimen: Urine, Clean Catch Updated: 04/09/25 1250     Color, UA Yellow     Appearance, UA Clear     pH, UA 8.0     Specific Gravity, UA 1.027     Glucose, UA Negative     Ketones, UA Negative     Bilirubin, UA Negative     Blood, UA Negative     Protein, UA Negative     Leuk Esterase, UA Moderate (2+)     Nitrite, UA Negative     Urobilinogen, UA 0.2 E.U./dL    Urinalysis, Microscopic Only - Urine, Clean Catch [[id number]]  (Abnormal) Collected: 04/09/25 1223    Specimen: Urine, Clean Catch Updated: 04/09/25 1250     RBC, UA 0-2 /HPF      WBC, UA 3-5 /HPF      Bacteria, UA None Seen /HPF      Squamous Epithelial Cells, UA 0-2 /HPF      Hyaline Casts, UA None Seen /LPF      Methodology Automated Microscopy             Imaging:    CT Angiogram Head w AI Analysis of LVO  Result Date: 4/9/2025  CT ANGIOGRAM HEAD W AI ANALYSIS OF LVO, CT ANGIOGRAM NECK Date of Exam: 4/9/2025 11:43 AM EDT Indication: Neuro Deficit, acute, Stroke suspected Neuro deficit, acute, stroke suspected. Comparison: None available. Technique: CTA of the head was performed after the uneventful intravenous administration of iodinated contrast. Reconstructed coronal and sagittal images were also obtained. In addition, a 3-D volume rendered image was created for interpretation. Automated exposure control and iterative  reconstruction methods were used. FINDINGS: Vascular Findings: The right common carotid, internal carotid, middle cerebral, anterior cerebral, vertebral, and posterior cerebral arteries are patent without abrupt cut off or aneurysmal dilation. The left common carotid, internal carotid, middle cerebral, anterior cerebral, vertebral, and posterior cerebral arteries are patent without abrupt cut off or aneurysmal dilation. Basilar artery appears patent and appears unremarkable. Non-vascular Findings: For description of nonvascular intracranial findings, please refer to the noncontrast head CT performed the same date. No acute abnormality is identified within the visualized soft tissue or bony structures of the neck. Chronic moderate height loss of C7 The visualized lung apices are clear.     1.No acute abnormality is identified within the large arteries of the head or neck. 2.No significant stenosis of the bilateral internal carotid arteries. 3.Chronic moderate height loss of C7. This was also present on chest CT from 4/13/2022. Electronically Signed: Mani Ibanez MD  4/9/2025 1:01 PM EDT  Workstation ID: [id number]    CT Angiogram Neck  Result Date: 4/9/2025  CT ANGIOGRAM HEAD W AI ANALYSIS OF LVO, CT ANGIOGRAM NECK Date of Exam: 4/9/2025 11:43 AM EDT Indication: Neuro Deficit, acute, Stroke suspected Neuro deficit, acute, stroke suspected. Comparison: None available. Technique: CTA of the head was performed after the uneventful intravenous administration of iodinated contrast. Reconstructed coronal and sagittal images were also obtained. In addition, a 3-D volume rendered image was created for interpretation. Automated exposure control and iterative reconstruction methods were used. FINDINGS: Vascular Findings: The right common carotid, internal carotid, middle cerebral, anterior cerebral, vertebral, and posterior cerebral arteries are patent without abrupt cut off or aneurysmal dilation. The left common carotid,  internal carotid, middle cerebral, anterior cerebral, vertebral, and posterior cerebral arteries are patent without abrupt cut off or aneurysmal dilation. Basilar artery appears patent and appears unremarkable. Non-vascular Findings: For description of nonvascular intracranial findings, please refer to the noncontrast head CT performed the same date. No acute abnormality is identified within the visualized soft tissue or bony structures of the neck. Chronic moderate height loss of C7 The visualized lung apices are clear.     1.No acute abnormality is identified within the large arteries of the head or neck. 2.No significant stenosis of the bilateral internal carotid arteries. 3.Chronic moderate height loss of C7. This was also present on chest CT from 4/13/2022. Electronically Signed: Mani Ibanez MD  4/9/2025 1:01 PM EDT  Workstation ID: [id number]    XR Chest 1 View  Result Date: 4/9/2025  XR CHEST 1 VW Date of Exam: 4/9/2025 12:10 PM EDT Indication: Acute Stroke Protocol (Onset < 12 hrs) Comparison: AP chest x-ray 4/13/2022 Findings: Lungs are well expanded and appear clear. Cardiomediastinal contours are within normal limits.     Impression: No acute cardiopulmonary abnormality is identified. Electronically Signed: Mague Leavitt  4/9/2025 12:33 PM EDT  Workstation ID: [id number]    CT CEREBRAL PERFUSION WITH & WITHOUT CONTRAST  Result Date: 4/9/2025  CT CEREBRAL PERFUSION W WO CONTRAST Date of Exam: 4/9/2025 11:43 AM EDT Indication: Neuro Deficit, acute, Stroke suspected Neuro deficit, acute, stroke suspected.  Comparison: None available. Technique: Axial CT images of the brain were obtained prior to and after the uneventful intravenous administration of iodinated contrast. Core blood volume, core blood flow, mean transit time, and Tmax images were obtained utilizing the Rapid software protocol. A limited CT angiogram of the head was also performed to measure the blood vessel density. The radiation dose reduction  device was turned on for each scan per the ALARA (As Low as Reasonably Achievable) protocol. FINDINGS: CT Perfusion: CBF (<30%) volume: 0 mL Tmax (>6.0s) volume: 0 mL Mismatch volume: 0 mL Mismatch ratio: None     No CT perfusion findings to suggest territorial ischemia or core infarct. Electronically Signed: Mani Ibanez MD  4/9/2025 12:09 PM EDT  Workstation ID: [id number]    CT Head Without Contrast Stroke Protocol  Result Date: 4/9/2025  CT HEAD WO CONTRAST STROKE PROTOCOL Date of Exam: 4/9/2025 11:24 AM EDT Indication: Neuro Deficit, acute, Stroke suspected Neuro deficit, acute, stroke suspected. Comparison: August 30, 2017 Technique: Axial CT images were obtained of the head without contrast administration.  Reconstructed coronal and sagittal images were also obtained. Automated exposure control and iterative construction methods were used. Findings: No acute intracranial hemorrhage or extra-axial collection is identified. The ventricles appear normal in caliber, with no evidence of mass effect or midline shift. The basal cisterns appear patent. The gray-white differentiation appears preserved. The calvarium appear intact. The paranasal sinuses are clear. The mastoid air cells are well-aerated.     Impression: No acute intracranial process identified. Electronically Signed: Fidencio Garces MD  4/9/2025 11:38 AM EDT  Workstation ID: [id number]        Differential Diagnosis and Discussion:    Stroke: Differential diagnosis in this patient with signs of possible ischemic stroke include TIA or ischemic stroke, hemorrhagic stroke, hypoglycemic episode, toxic or metabolic encephalopathy, paresthesias.    PROCEDURES:    Labs were collected in the emergency department and all labs were reviewed and interpreted by me.  X-ray were performed in the emergency department and all X-ray impressions were independently interpreted by me.  An EKG was performed and the EKG was interpreted by me.  CT scan was performed in the  emergency department and the CT scan radiology impression was interpreted by me.    ECG 12 Lead ED Triage Standing Order; Acute Stroke (Onset <12 hrs)   Preliminary Result   HEART RATE=75  bpm   RR Yavevjdp=689  ms   MI Cvszbqwv=150  ms   P Horizontal Axis=-7  deg   P Front Axis=37  deg   QRSD Interval=83  ms   QT Rxepjxyv=501  ms   GWaK=643  ms   QRS Axis=26  deg   T Wave Axis=28  deg   - NORMAL ECG -   Sinus rhythm   Date and Time of Study:2025-04-09 12:17:11        My interpretation of the EKG shows normal sinus rhythm, normal rate, normal QT, no acute ischemia    Procedures    MDM  Number of Diagnoses or Management Options  Cerebrovascular accident (CVA), unspecified mechanism  Diagnosis management comments: In summary this is a 64-year-old female who presents to the emergency department for evaluation of stroke workup.  CT brain and CTAs reviewed by me and discussed with neurology are unremarkable negative for acute pathology.  CBC independently reviewed and interpreted by me and shows no critical abnormalities.  CMP independently reviewed and interpreted by me and shows no critical abnormalities.  Patient case has been discussed with the hospitalist team who will admit to the hospital for further evaluation and continuation of treatment.             The patient presents with symptoms concerning for a stroke. The patient was evaluated by me immediately upon arrival. Extensive history and physical was obtained. Family was present to give further insight into patients onset of symptoms.  CT scan findings were discussed with radiology. The case was also discussed at length with telehealth neurology. Nursing staff was instructed on how to proceed with patient care. Patient was then placed on the cardiac monitor and monitored for arrhythmia that could be contributing to stroke like symptoms. EKG was performed and evaluated by me. Patient's blood pressure was monitored and controled consistent with stroke guidelines.  Treatment option's for patient's symptoms include TNKase however after inclusion and exclusion criteria was weighed and discussion with neurologist patient was deemed not a candidate. The patient's mental status and neurological symptoms were monitored throughout their stay for worsening decline.     Total Critical Care time of 40 minutes. Total critical care time documented does not include time spent on separately billed procedures for services of nurses or physician assistants. I personally saw and examined the patient. I have reviewed all diagnostic interpretations and treatment plans as written. I was present for the key portions of any procedures performed and the inclusive time noted in any critical care statement. Critical care time includes patient management by me, time spent at the patients bedside,  time to review lab and imaging results, discussing patient care, documentation in the medical record, and time spent with family or caregiver.          Patient Care Considerations:    MRI: I considered ordering an MRI however this can be accomplished inpatient      Consultants/Shared Management Plan:    Hospitalist: I have discussed the case with Dr. Mendez who agrees to accept the patient for admission.  Consultant: I have discussed the case with teleneurology who agrees to consult on the patient.    Social Determinants of Health:    Patient is independent, reliable, and has access to care.       Disposition and Care Coordination:    Admit:   Through independent evaluation of the patient's history, physical, and imperical data, the patient meets criteria for inpatient admission to the hospital.        Final diagnoses:   Cerebrovascular accident (CVA), unspecified mechanism        ED Disposition       ED Disposition   Decision to Admit    Condition   --    Comment   --               This medical record created using voice recognition software.             Cory Richards MD  04/09/25 2209

## 2025-04-10 ENCOUNTER — TELEPHONE (OUTPATIENT)
Dept: ONCOLOGY | Facility: CLINIC | Age: 65
End: 2025-04-10
Payer: MEDICARE

## 2025-04-10 LAB
GLUCOSE BLDC GLUCOMTR-MCNC: 81 MG/DL (ref 70–99)
HOLD SPECIMEN: NORMAL
WHOLE BLOOD HOLD SPECIMEN: NORMAL

## 2025-04-10 PROCEDURE — 97165 OT EVAL LOW COMPLEX 30 MIN: CPT

## 2025-04-10 PROCEDURE — 25010000002 ENOXAPARIN PER 10 MG: Performed by: STUDENT IN AN ORGANIZED HEALTH CARE EDUCATION/TRAINING PROGRAM

## 2025-04-10 PROCEDURE — 99239 HOSP IP/OBS DSCHRG MGMT >30: CPT | Performed by: STUDENT IN AN ORGANIZED HEALTH CARE EDUCATION/TRAINING PROGRAM

## 2025-04-10 PROCEDURE — 97161 PT EVAL LOW COMPLEX 20 MIN: CPT

## 2025-04-10 PROCEDURE — 99232 SBSQ HOSP IP/OBS MODERATE 35: CPT | Performed by: PSYCHIATRY & NEUROLOGY

## 2025-04-10 PROCEDURE — 80175 DRUG SCREEN QUAN LAMOTRIGINE: CPT | Performed by: STUDENT IN AN ORGANIZED HEALTH CARE EDUCATION/TRAINING PROGRAM

## 2025-04-10 PROCEDURE — 92610 EVALUATE SWALLOWING FUNCTION: CPT

## 2025-04-10 RX ADMIN — ATORVASTATIN CALCIUM 80 MG: 40 TABLET, FILM COATED ORAL at 20:56

## 2025-04-10 RX ADMIN — QUETIAPINE FUMARATE 50 MG: 25 TABLET ORAL at 10:02

## 2025-04-10 RX ADMIN — TOPIRAMATE 50 MG: 25 TABLET, FILM COATED ORAL at 20:56

## 2025-04-10 RX ADMIN — QUETIAPINE FUMARATE 50 MG: 25 TABLET ORAL at 15:18

## 2025-04-10 RX ADMIN — QUETIAPINE FUMARATE 100 MG: 100 TABLET ORAL at 20:58

## 2025-04-10 RX ADMIN — LAMOTRIGINE 200 MG: 100 TABLET ORAL at 10:03

## 2025-04-10 RX ADMIN — PRAZOSIN HYDROCHLORIDE 5 MG: 1 CAPSULE ORAL at 20:55

## 2025-04-10 RX ADMIN — FOLIC ACID 1 MG: 1 TABLET ORAL at 10:02

## 2025-04-10 RX ADMIN — Medication 10 ML: at 10:05

## 2025-04-10 RX ADMIN — LAMOTRIGINE 200 MG: 100 TABLET ORAL at 20:56

## 2025-04-10 RX ADMIN — ENOXAPARIN SODIUM 40 MG: 100 INJECTION SUBCUTANEOUS at 10:03

## 2025-04-10 RX ADMIN — TRAZODONE HYDROCHLORIDE 100 MG: 100 TABLET ORAL at 20:56

## 2025-04-10 RX ADMIN — Medication 10 ML: at 20:59

## 2025-04-10 RX ADMIN — AMLODIPINE BESYLATE 2.5 MG: 5 TABLET ORAL at 10:02

## 2025-04-10 RX ADMIN — CLOPIDOGREL BISULFATE 75 MG: 75 TABLET, FILM COATED ORAL at 10:02

## 2025-04-10 RX ADMIN — TOPIRAMATE 50 MG: 25 TABLET, FILM COATED ORAL at 10:02

## 2025-04-10 RX ADMIN — PANTOPRAZOLE SODIUM 40 MG: 40 TABLET, DELAYED RELEASE ORAL at 10:02

## 2025-04-10 NOTE — TELEPHONE ENCOUNTER
Spoke with patients  and advised that Melvina's scheduled appt is MD next new patient opening. Chinmay kimble

## 2025-04-10 NOTE — PLAN OF CARE
Goal Outcome Evaluation:  Plan of Care Reviewed With: patient        Progress: no change  Outcome Evaluation: Patient reports no pain and demonstrates no new onset of deficits or limitations in ADL/Transfer performance; Patient does not demonstrate need for further rehab services or any equipment for discharge from hosptial ;Eval only for OT services; safe to return home upon MD approval.    Anticipated Discharge Disposition (OT): home

## 2025-04-10 NOTE — TELEPHONE ENCOUNTER
Caller: Ananth Martinez    Relationship to patient: Emergency Contact    Best call back number: [phone]    Chief complaint: SPOUSE WANTED TO SEE IF PATIENT CAN COME SOONER    Type of visit: NEW      If rescheduling, when is the original appointment: 5-19-25

## 2025-04-10 NOTE — PLAN OF CARE
Goal Outcome Evaluation:  Plan of Care Reviewed With: patient, spouse        Progress: improving  Outcome Evaluation: Episode of bradycardia, asymptomatic. MD aware. VSS.

## 2025-04-10 NOTE — DISCHARGE SUMMARY
Date of Discharge:  4/10/2025    Discharge Diagnosis: stroke    Presenting Problem/History of Present Illness  Active Hospital Problems    Diagnosis  POA    **Stroke [I63.9]  Yes      Resolved Hospital Problems   No resolved problems to display.        Hospital Course  Patient is a 64 y.o. female with history of stroke diabetes hypertension GERD anxiety bipolar disorder carpal tunnel syndrome depression history of DVT panic disorder symptoms of kidney stones migraines PTSD PE sleep apnea stress incontinence TIA presents the emergency department with neurologic symptoms including left-sided weakness.  Symptoms started in the morning around 9 AM.  Symptoms had resolved.  MRI was negative for acute stroke.  Patient was previously on Eliquis for multiple TIAs in the past.  She apparently failed aspirin and Plavix before.  She was for some reason taken off of Eliquis and placed on Plavix again.  I will restart Eliquis at discharge as I do not have another antiplatelet that has been adequate enough for secondary prevention for this patient    Procedures Performed         Consults:   Consults       Date and Time Order Name Status Description    4/9/2025  4:09 PM Inpatient Neurology Consult Stroke      4/9/2025  1:54 PM Inpatient Hospitalist Consult                Condition on Discharge: Fair    Vital Signs  Temp:  [97.5 °F (36.4 °C)-98.1 °F (36.7 °C)] 97.9 °F (36.6 °C)  Heart Rate:  [70-85] 82  Resp:  [16-18] 18  BP: (105-143)/(65-91) 119/81    Physical Exam:     General Appearance:  Alert, cooperative, in no acute distress   Head:  Normocephalic, without obvious abnormality, atraumatic   Eyes:  Lids and lashes normal, conjunctivae and sclerae normal, no icterus, no pallor, corneas clear, PERRLA   Ears:  Ears appear intact with no abnormalities noted   Throat:  No oral lesions, no thrush, oral mucosa moist   Neck:  No adenopathy, supple, trachea midline, no thyromegaly, no carotid bruit, no JVD   Back:  No kyphosis  present, no scoliosis present, no skin lesions, erythema or scars, no tenderness to percussion, or palpation, range of motion normal   Lungs:  Clear to auscultation,respirations regular, even and unlabored    Heart:  Regular rhythm and normal rate, normal S1 and S2, no murmur, no gallop, no rub, no click   Breast Exam:  Deferred   Abdomen:  Normal bowel sounds, no masses, no organomegaly, soft non-tender, non-distended, no guarding, no rebound tenderness   Genitalia:  Deferred   Extremities:  Moves all extremities well, no edema, no cyanosis, no redness   Pulses:  Pulses palpable and equal bilaterally   Skin:  No bleeding, bruising or rash   Lymph nodes:  No palpable adenopathy   Neurologic:  Cranial nerves 2 - 12 grossly intact, sensation intact, DTR present and equal bilaterally       Discharge Disposition  Home or Self Care    Discharge Medications     Discharge Medications        New Medications        Instructions Start Date   apixaban 5 MG tablet tablet  Commonly known as: ELIQUIS   5 mg, Oral, 2 Times Daily             Continue These Medications        Instructions Start Date   amLODIPine 2.5 MG tablet  Commonly known as: NORVASC   2.5 mg, Oral, Daily      atorvastatin 80 MG tablet  Commonly known as: LIPITOR   80 mg, Nightly      folic acid 1 MG tablet  Commonly known as: FOLVITE   1 mg, Daily      lamoTRIgine 200 MG tablet  Commonly known as: LaMICtal   200 mg, 2 Times Daily      multivitamin tablet tablet   1 each, Daily      pantoprazole 40 MG EC tablet  Commonly known as: Protonix   40 mg, Oral, Daily      prazosin 5 MG capsule  Commonly known as: MINIPRESS   5 mg, Nightly      QUEtiapine 100 MG tablet  Commonly known as: SEROquel   50 mg, 2 Times Daily      QUEtiapine 100 MG tablet  Commonly known as: SEROquel   100 mg, Nightly      SUMAtriptan 5 MG/ACT nasal spray  Commonly known as: Imitrex   5 mg, Nasal, Every 2 Hours PRN      topiramate 50 MG tablet  Commonly known as: TOPAMAX   50 mg, 2 Times  Daily      traZODone 100 MG tablet  Commonly known as: DESYREL   100 mg, Nightly             Stop These Medications      clopidogrel 75 MG tablet  Commonly known as: PLAVIX              Discharge Diet:   Diet Instructions       Diet: Regular/House Diet; Regular (IDDSI 7); Thin (IDDSI 0)      Discharge Diet: Regular/House Diet    Texture: Regular (IDDSI 7)    Fluid Consistency: Thin (IDDSI 0)            Activity at Discharge:   Activity Instructions       Activity as Tolerated              Follow-up Appointments  Future Appointments   Date Time Provider Department Center   4/15/2025  3:00 PM Mary Toro APRN [facility]  [id number]   5/19/2025 10:50 AM LAB CHAIR 2 SANY[facility] SALCEDO  [facility]   5/19/2025 11:20 AM Tad Foster MD [facility]     Additional Instructions for the Follow-ups that You Need to Schedule       Discharge Follow-up with PCP   As directed       Currently Documented PCP:    Provider, No Known    PCP Phone Number:    None     Follow Up Details: 1 week                Test Results Pending at Discharge  Pending Labs       Order Current Status    Lamotrigine Level In process             Jeancarlos Poole MD  04/10/25  16:26 EDT    Time: Discharge 35 min

## 2025-04-10 NOTE — PLAN OF CARE
Goal Outcome Evaluation:  Plan of Care Reviewed With: patient        Progress: improving  Outcome Evaluation: No acute events this shift. Waiting for Echo and then patient can discharge. Call light in reach

## 2025-04-10 NOTE — THERAPY EVALUATION
Acute Care - Speech Language Pathology   Swallow Initial Evaluation [facility]     Patient Name: Melvina Martinez  : 1960  MRN: [id number]  Today's Date: 4/10/2025               Admit Date: 2025    Visit Dx:     ICD-10-CM ICD-9-CM   1. Cerebrovascular accident (CVA), unspecified mechanism  I63.9 434.91   2. Nausea and vomiting, unspecified vomiting type  R11.2 787.01   3. Decreased activities of daily living (ADL)  Z78.9 V49.89   4. Difficulty in walking  R26.2 719.7   5. Dysphagia, unspecified type  R13.10 787.20     Patient Active Problem List   Diagnosis    Rotator cuff tear, left    Aftercare following surgery of the musculoskeletal system    Chronic pain of both shoulders    Posttraumatic stress disorder    Bipolar disorder    Diabetes    Hypertension    Morbid obesity    Decreased right shoulder range of motion    Tear of right supraspinatus tendon    Migraine    Intractable vomiting    Biliary dyskinesia    S/P laparoscopic cholecystectomy    Generalized abdominal pain    Gastritis    Abnormal CT of the abdomen    H. pylori infection    Primary osteoarthritis of left hand    Nausea and vomiting    OSMANY (acute kidney injury)    Acute pain of right knee    Encounter for screening for malignant neoplasm of colon    Chest pain    Pulmonary embolism    Grade I diastolic dysfunction    Stroke     Past Medical History:   Diagnosis Date    Acid reflux     Anxiety disorder     Arthritis     Bipolar disorder     Carpal tunnel syndrome     Deep vein thrombosis 2022    Depression     Diabetes     DOES NOT CHECK BS DAILY    H/O Panic disorder     Hirsutism     History of back pain     Hyperlipidemia     Hypertension     Kidney stones     Limb swelling     Migraines     PTSD (post-traumatic stress disorder)     Pulmonary embolism     Rotator cuff tear, left     Rotator cuff tear, left 06/10/2021    Sleep apnea     Stress incontinence     Tattoos     TIA (transient ischemic attack)      Past Surgical History:    Procedure Laterality Date    ABDOMINOPLASTY      Tummy tuck    ANKLE SURGERY      APPENDECTOMY      25 yrs. old when pregnant     SECTION      CHOLECYSTECTOMY N/A 10/06/2021    Procedure: CHOLECYSTECTOMY LAPAROSCOPIC;  Surgeon: Jeancarlos Carrington MD;  Location: [facility] MAIN OR;  Service: General;  Laterality: N/A;    COLONOSCOPY      COLONOSCOPY N/A 2022    Procedure: COLONOSCOPY INTO CECUM WITH COLD SNARE POLYPECTOMY;  Surgeon: Ananth Odell MD;  Location:  [facility];  Service: Gastroenterology;  Laterality: N/A;  PRE: PERSONAL H/O COLON POLYPS  POST: DIVERTICULOSIS, POLYP, HEMORRHOIDS, TORTUOUS COLON    ENDOSCOPY N/A 10/28/2021    Procedure: ESOPHAGOGASTRODUODENOSCOPY with biopsies;  Surgeon: Luciano aSlmon MD;  Location:  [facility];  Service: Gastroenterology;  Laterality: N/A;  pre:  abnormal CT  post:  gastritis, HH,     EYE SURGERY      childhood; muscle    FRACTURE SURGERY      tibia and fibula fracture    HYSTERECTOMY      INTRAOCULAR LENS INSERTION      KNEE ARTHROSCOPY W/ MENISCAL REPAIR Right     KNEE SURGERY      SHOULDER ARTHROSCOPY Left 06/10/2021    Procedure: LEFT SHOULDER ARTHROSCOPY;  Surgeon: Дмитрий Francois MD;  [place] [facility] [place] [facility]  Service: Orthopedics;  Laterality: Left;    SHOULDER ROTATOR CUFF REPAIR Left 06/10/2021    Procedure: SUBACROMIAL DECOMPRESSION, DISTAL CLAVICULECTOMY AND ROTATOR CUFF REPAIR;  Surgeon: Дмитрий Francois MD;  [place] [facility] [place] [facility] [place];  Service: Orthopedics;  Laterality: Left;    SHOULDER SURGERY Right     TONSILLECTOMY      TUBAL ABDOMINAL LIGATION         SLP Recommendation and Plan  SLP Swallowing Diagnosis: swallow WFL/no suspected pharyngeal impairment (04/10/25 1054)  SLP Diet Recommendation: regular textures, thin liquids (04/10/25 1054)  Recommended Precautions and Strategies: upright posture during/after eating (04/10/25 1054)  SLP Rec. for Method of Medication Administration: meds whole (04/10/25 1054)      Monitor for Signs of Aspiration: notify SLP if any concerns (04/10/25 1054)     Swallow Criteria for Skilled Therapeutic Interventions Met: no problems identified which require skilled intervention, baseline status (04/10/25 1054)  Anticipated Discharge Disposition (SLP): No further SLP services warranted (04/10/25 1054)     Therapy Frequency (Swallow): evaluation only (04/10/25 1054)                                                     SWALLOW EVALUATION (Last 72 Hours)       SLP Adult Swallow Evaluation       Row Name 04/10/25 1054                   Rehab Evaluation    Document Type evaluation  -SN        Subjective Information no complaints  -SN        Patient Observations alert;cooperative  -SN        Patient Effort good  -SN        Symptoms Noted During/After Treatment none  -SN        Oral Care patient refused intervention  -SN           General Information    Current Method of Nutrition regular textures;thin liquids  -SN        Prior Level of Function-Swallowing no diet consistency restrictions;safe, efficient swallowing in all situations  -SN        Plans/Goals Discussed with patient  -SN        Barriers to Rehab none identified  -SN        Patient's Goals for Discharge return home  -SN           Oral Motor Structure and Function    Dentition Assessment natural, present and adequate  -SN        Secretion Management WNL/WFL  -SN        Mucosal Quality moist, healthy  -SN        Gag Response WFL  -SN        Volitional Swallow WFL  -SN        Volitional Cough WFL  -SN           Oral Musculature and Cranial Nerve Assessment    Oral Motor General Assessment WFL  -SN           General Eating/Swallowing Observations    Respiratory Support Currently in Use room air  -SN        Eating/Swallowing Skills self-fed  -SN        Positioning During Eating upright 90 degree;upright in bed  -SN        Utensils Used spoon;cup;straw  -SN        Consistencies Trialed regular textures;pureed;thin liquids  -SN           Clinical  "Swallow Eval    Oral Prep Phase WFL  -SN        Oral Transit WFL  -SN        Oral Residue WFL  -SN        Pharyngeal Phase no overt signs/symptoms of pharyngeal impairment  -SN        Esophageal Phase unremarkable  -SN        Clinical Swallow Evaluation Summary Patient is a 64-year-old female admitted [facility] on/9/25 secondary to stroke.  Patient reports history of CVA/TIA.  Patient also reports history of speech-language deficits following previous stroke.  She does state that she has intermittent word finding difficulty.  Patient reports onset of generalized weakness at home.  MRI did not reveal any acute infarct per report.  Patient states that she feels \"back to normal\".  Speech pathology dysphagia evaluation did not reveal any swallowing deficits.  Oral motor examination within normal limits.  Laryngeal elevation noted to palpation at 5/5.  Patient did not demonstrate any overt clinical signs or symptoms of aspiration with consistencies at bedside.  Silent aspiration cannot be ruled out.  -SN           SLP Evaluation Clinical Impression    SLP Swallowing Diagnosis swallow WFL/no suspected pharyngeal impairment  -SN        Swallow Criteria for Skilled Therapeutic Interventions Met no problems identified which require skilled intervention;baseline status  -SN           Recommendations    Therapy Frequency (Swallow) evaluation only  -SN        SLP Diet Recommendation regular textures;thin liquids  -SN        Recommended Precautions and Strategies upright posture during/after eating  -SN        SLP Rec. for Method of Medication Administration meds whole  -SN        Monitor for Signs of Aspiration notify SLP if any concerns  -SN                  User Key  (r) = Recorded By, (t) = Taken By, (c) = Cosigned By      Initials Name Effective Dates    Sandie Asif MS-CCC/SLP, CNT 03/31/21 -                     EDUCATION  The patient has been educated in the following areas:   Dysphagia (Swallowing " Impairment).     Patient scored level 7 of 7 on functional communication measures for swallowing, indicating 0% limitation in function.  Patient does not require any further skilled speech pathology services at this time.  If patient should demonstrate decline in status please reconsult speech pathology.           Time Calculation:    Time Calculation- SLP       Row Name 04/10/25 1054             Time Calculation- SLP    SLP Stop Time 1050  -SN      SLP Received On 04/10/25  -SN         Untimed Charges    75509-[id number] Eval Oral Pharyng Swallow Minutes 60  -SN         Total Minutes    Untimed Charges Total Minutes 60  -SN       Total Minutes 60  -SN                User Key  (r) = Recorded By, (t) = Taken By, (c) = Cosigned By      Initials Name Provider Type    SN Sandie Manning MS-CCC/SLP, BRYSON Speech and Language Pathologist                    Therapy Charges for Today       Code Description Service Date Service Provider Modifiers Qty    [id number] HC ST EVAL ORAL PHARYNG SWALLOW 4 4/10/2025 Sandie Manning MS-CCC/SLP, BYRSON GN 1                 PATRICE Mars/BRYSON MURRY  4/10/2025

## 2025-04-10 NOTE — THERAPY EVALUATION
Patient Name: Melvina Martinez  : 1960    MRN: [id number]                              Today's Date: 4/10/2025       Admit Date: 2025    Visit Dx:     ICD-10-CM ICD-9-CM   1. Cerebrovascular accident (CVA), unspecified mechanism  I63.9 434.91   2. Nausea and vomiting, unspecified vomiting type  R11.2 787.01   3. Decreased activities of daily living (ADL)  Z78.9 V49.89     Patient Active Problem List   Diagnosis    Rotator cuff tear, left    Aftercare following surgery of the musculoskeletal system    Chronic pain of both shoulders    Posttraumatic stress disorder    Bipolar disorder    Diabetes    Hypertension    Morbid obesity    Decreased right shoulder range of motion    Tear of right supraspinatus tendon    Migraine    Intractable vomiting    Biliary dyskinesia    S/P laparoscopic cholecystectomy    Generalized abdominal pain    Gastritis    Abnormal CT of the abdomen    H. pylori infection    Primary osteoarthritis of left hand    Nausea and vomiting    OSMANY (acute kidney injury)    Acute pain of right knee    Encounter for screening for malignant neoplasm of colon    Chest pain    Pulmonary embolism    Grade I diastolic dysfunction    Stroke     Past Medical History:   Diagnosis Date    Acid reflux     Anxiety disorder     Arthritis     Bipolar disorder     Carpal tunnel syndrome     Deep vein thrombosis 2022    Depression     Diabetes     DOES NOT CHECK BS DAILY    H/O Panic disorder     Hirsutism     History of back pain     Hyperlipidemia     Hypertension     Kidney stones     Limb swelling     Migraines     PTSD (post-traumatic stress disorder)     Pulmonary embolism     Rotator cuff tear, left     Rotator cuff tear, left 06/10/2021    Sleep apnea     Stress incontinence     Tattoos     TIA (transient ischemic attack)      Past Surgical History:   Procedure Laterality Date    ABDOMINOPLASTY      Tummy tuck    ANKLE SURGERY      APPENDECTOMY      25 yrs. old when pregnant     SECTION    "   CHOLECYSTECTOMY N/A 10/06/2021    Procedure: CHOLECYSTECTOMY LAPAROSCOPIC;  Surgeon: Jeancarlos Carrington MD;  Location: [facility] MAIN OR;  Service: General;  Laterality: N/A;    COLONOSCOPY      COLONOSCOPY N/A 04/07/2022    Procedure: COLONOSCOPY INTO CECUM WITH COLD SNARE POLYPECTOMY;  Surgeon: Ananth Odell MD;  [facility];  Service: Gastroenterology;  Laterality: N/A;  PRE: PERSONAL H/O COLON POLYPS  POST: DIVERTICULOSIS, POLYP, HEMORRHOIDS, TORTUOUS COLON    ENDOSCOPY N/A 10/28/2021    Procedure: ESOPHAGOGASTRODUODENOSCOPY with biopsies;  Surgeon: Luciano Salmon MD;  Location:  [facility];  Service: Gastroenterology;  Laterality: N/A;  pre:  abnormal CT  post:  gastritis, HH,     EYE SURGERY      childhood; muscle    FRACTURE SURGERY      tibia and fibula fracture    HYSTERECTOMY      INTRAOCULAR LENS INSERTION      KNEE ARTHROSCOPY W/ MENISCAL REPAIR Right     KNEE SURGERY      SHOULDER ARTHROSCOPY Left 06/10/2021    Procedure: LEFT SHOULDER ARTHROSCOPY;  Surgeon: Дмитрий Francois MD;  Location: [facility] OR [facility] [place] [place];  Service: Orthopedics;  Laterality: Left;    SHOULDER ROTATOR CUFF REPAIR Left 06/10/2021    Procedure: SUBACROMIAL DECOMPRESSION, DISTAL CLAVICULECTOMY AND ROTATOR CUFF REPAIR;  Surgeon: Дмитрий Francois MD;  Location: [facility] OR [facility] [place] [place];  Service: Orthopedics;  Laterality: Left;    SHOULDER SURGERY Right     TONSILLECTOMY      TUBAL ABDOMINAL LIGATION        General Information       Row Name 04/10/25 0939          OT Time and Intention    Subjective Information no complaints  -EG     Document Type evaluation  -EG     Mode of Treatment individual therapy;occupational therapy  -EG     Patient Effort excellent  -EG       Row Name 04/10/25 0939          General Information    Patient Profile Reviewed yes  Lives alone; \"seldom used a cane for arthritis days in knee\"; no other AD owned; walk-in shower with seat; Ind. with all ADL/IADL tasks; no home O2; stands to groom  -EG  "    Prior Level of Function independent:;ADL's;all household mobility;community mobility;work  -EG     Existing Precautions/Restrictions no known precautions/restrictions  -EG     Barriers to Rehab none identified  -EG       Row Name 04/10/25 0939          Occupational Profile    Reason for Services/Referral (Occupational Profile) Patient is a 64-year-old female admitted to [facility] on 4/9/2025.  OT was consulted due to TIA.  OT to assess for new onset of deficits and limitations in ADL/transfer performance.  No previous OT services for current condition.  -EG       Row Name 04/10/25 0939          Living Environment    Current Living Arrangements home  -EG     People in Home spouse  -EG       Row Name 04/10/25 0939          Home Main Entrance    Number of Stairs, Main Entrance two  -EG     Stair Railings, Main Entrance railings safe and in good condition;railings on both sides of stairs  -EG       Row Name 04/10/25 0939          Stairs Within Home, Primary    Number of Stairs, Within Home, Primary none  -EG       Row Name 04/10/25 0939          Cognition    Orientation Status (Cognition) oriented x 4  -EG       Row Name 04/10/25 0939          Safety Issues/Impairments Affecting Functional Mobility    Impairments Affecting Function (Mobility) other (see comments)  none  -EG               User Key  (r) = Recorded By, (t) = Taken By, (c) = Cosigned By      Initials Name Provider Type    EG Argelia Odell, OT Occupational Therapist                     Mobility/ADL's       Row Name 04/10/25 0942          Bed Mobility    Bed Mobility bed mobility (all) activities  -EG     All Activities, [place] (Bed Mobility) independent  -EG       Row Name 04/10/25 0942          Transfers    Comment, (Transfers) Ind. without the use of an AD for all transfers  -EG       Row Name 04/10/25 0942          Functional Mobility    Functional Mobility- Ind. Level independent  -EG     Functional Mobility- Comment Patient able  to walk around unit on eval no AD; Able to manage and push own IV pole without LOB  -EG       Row Name 04/10/25 0942          Activities of Daily Living    BADL Assessment/Intervention bathing;upper body dressing;lower body dressing;grooming;feeding;toileting  -EG       Row Name 04/10/25 0942          Bathing Assessment/Intervention    [place] Level (Bathing) bathing skills;upper body;lower body;set up  -EG       Row Name 04/10/25 0942          Upper Body Dressing Assessment/Training    [place] Level (Upper Body Dressing) upper body dressing skills;independent  -EG       Row Name 04/10/25 0942          Lower Body Dressing Assessment/Training    [place] Level (Lower Body Dressing) lower body dressing skills;independent  -EG       Row Name 04/10/25 0942          Grooming Assessment/Training    [place] Level (Grooming) grooming skills;modified independence  -EG     Position (Grooming) sink side;unsupported standing  -EG       [facility] Name 04/10/25 0942          Self-Feeding Assessment/Training    [place] Level (Feeding) feeding skills;set up  -EG       Row Name 04/10/25 0942          Toileting Assessment/Training    [place] Level (Toileting) toileting skills;independent  -EG               User Key  (r) = Recorded By, (t) = Taken By, (c) = Cosigned By      Initials Name Provider Type    EG Argelia Odell OT Occupational Therapist                   Obj/Interventions       [facility] Name 04/10/25 0943          Sensory Assessment (Somatosensory)    Sensory Assessment (Somatosensory) UE sensation intact  -EG       Row Name 04/10/25 0943          Vision Assessment/Intervention    Visual Impairment/Limitations WFL;corrective lenses full-time  -[facility] Name 04/10/25 0943          Range of Motion Comprehensive    General Range of Motion bilateral upper extremity ROM WNL  -EG       Row Name 04/10/25 0943          Strength Comprehensive (MMT)    Comment, General Manual Muscle Testing (MMT) Assessment  4/5 BUE's grossly all major joints  -EG       Row Name 04/10/25 0943          Motor Skills    Motor Skills coordination;functional endurance  -EG     Coordination WFL  -EG     Functional Endurance good- on room air  -EG       Row Name 04/10/25 0943          Balance    Balance Assessment sit to stand dynamic balance;standing dynamic balance  -EG     Sit to Stand Dynamic Balance independent  -EG     Dynamic Standing Balance independent  -EG               User Key  (r) = Recorded By, (t) = Taken By, (c) = Cosigned By      Initials Name Provider Type    EG Argelia Odell, GOMEZ Occupational Therapist                   Goals/Plan    No documentation.                  Clinical Impression       Row Name 04/10/25 0944          Pain Assessment    Pretreatment Pain Rating 0/10 - no pain  -EG     Posttreatment Pain Rating 0/10 - no pain  -EG       Row Name 04/10/25 0944          Plan of Care Review    Plan of Care Reviewed With patient  -EG     Progress no change  -EG     Outcome Evaluation Patient reports no pain and demonstrates no new onset of deficits or limitations in ADL/Transfer performance; Patient does not demonstrate need for further rehab services or any equipment for discharge from hosptial ;Eval only for OT services; safe to return home upon MD approval.  -EG       Row Name 04/10/25 0944          Therapy Assessment/Plan (OT)    Rehab Potential (OT) other (see comments)  eval only  -EG     Criteria for Skilled Therapeutic Interventions Met (OT) does not meet criteria for skilled intervention  -EG     Therapy Frequency (OT) evaluation only  -EG       Row Name 04/10/25 0944          Therapy Plan Review/Discharge Plan (OT)    Equipment Needs Upon Discharge (OT) --  no equipment needed  -EG     Anticipated Discharge Disposition (OT) home  -EG       Row Name 04/10/25 0944          Positioning and Restraints    Pre-Treatment Position in bed  -EG     Post Treatment Position bed  -EG     In Bed call light within  reach;encouraged to call for assist;sitting EOB  -EG               User Key  (r) = Recorded By, (t) = Taken By, (c) = Cosigned By      Initials Name Provider Type    Argelia Modi OT Occupational Therapist                   Outcome Measures       Row Name 04/10/25 0945          How much help from another is currently needed...    Putting on and taking off regular lower body clothing? 4  -EG     Bathing (including washing, rinsing, and drying) 4  -EG     Toileting (which includes using toilet bed pan or urinal) 4  -EG     Putting on and taking off regular upper body clothing 4  -EG     Taking care of personal grooming (such as brushing teeth) 4  -EG     Eating meals 4  -EG     AM-PAC 6 Clicks Score (OT) 24  -EG       Row Name 04/10/25 0736          How much help from another person do you currently need...    Turning from your back to your side while in flat bed without using bedrails? 4  -RS     Moving from lying on back to sitting on the side of a flat bed without bedrails? 4  -RS     Moving to and from a bed to a chair (including a wheelchair)? 4  -RS     Standing up from a chair using your arms (e.g., wheelchair, bedside chair)? 4  -RS     Climbing 3-5 steps with a railing? 4  -RS     To walk in hospital room? 4  -RS     AM-PAC 6 Clicks Score (PT) 24  -RS       Row Name 04/10/25 0945          Functional Assessment    Outcome Measure Options AM-PAC 6 Clicks Daily Activity (OT);Optimal Instrument  -EG       Row Name 04/10/25 0945          Optimal Instrument    Optimal Instrument Optimal - 3  -EG     Bending/Stooping 1  -EG     Standing 1  -EG     Reaching 1  -EG     From the list, choose the 3 activities you would most like to be able to do without any difficulty Standing;Reaching;Bending/stooping  -EG     Total Score Optimal - 3 3  -EG               User Key  (r) = Recorded By, (t) = Taken By, (c) = Cosigned By      Initials Name Provider Type    RS Huong Diaz, RN Registered Nurse    Argelia Modi,  OT Occupational Therapist                    Occupational Therapy Education       Title: PT OT SLP Therapies (Done)       Topic: Occupational Therapy (Done)       Point: ADL training (Done)       Learning Progress Summary            Patient Eager, E, VU by EG at 4/10/2025 0946    Comment: Educated on OT services  Educated on general safety and fall prevention                      Point: Home exercise program (Done)       Learning Progress Summary            Patient Eager, E, VU by EG at 4/10/2025 0946    Comment: Educated on OT services  Educated on general safety and fall prevention                      Point: Precautions (Done)       Learning Progress Summary            Patient Eager, E, VU by EG at 4/10/2025 0946    Comment: Educated on OT services  Educated on general safety and fall prevention                      Point: Body mechanics (Done)       Learning Progress Summary            Patient Eager, E, VU by EG at 4/10/2025 0946    Comment: Educated on OT services  Educated on general safety and fall prevention                                      User Key       Initials Effective Dates Name Provider Type Discipline    EG 09/14/22 -  Argelia Odell OT Occupational Therapist OT                  OT Recommendation and Plan  Therapy Frequency (OT): evaluation only  Plan of Care Review  Plan of Care Reviewed With: patient  Progress: no change  Outcome Evaluation: Patient reports no pain and demonstrates no new onset of deficits or limitations in ADL/Transfer performance; Patient does not demonstrate need for further rehab services or any equipment for discharge from hosptial ;Eval only for OT services; safe to return home upon MD approval.     Time Calculation:   Evaluation Complexity (OT)  Review Occupational Profile/Medical/Therapy History Complexity: brief/low complexity  Assessment, Occupational Performance/Identification of Deficit Complexity: 1-3 performance deficits  Clinical Decision Making Complexity  (OT): problem focused assessment/low complexity  Overall Complexity of Evaluation (OT): low complexity     Time Calculation- OT       Row Name 04/10/25 0946             Time Calculation- OT    OT Received On 04/10/25  -EG      OT Goal Re-Cert Due Date 04/10/25  -EG         Untimed Charges    OT Eval/Re-eval Minutes 34  -EG         Total Minutes    Untimed Charges Total Minutes 34  -EG       Total Minutes 34  -EG                User Key  (r) = Recorded By, (t) = Taken By, (c) = Cosigned By      Initials Name Provider Type    EG Argelia Odell OT Occupational Therapist                  Therapy Charges for Today       Code Description Service Date Service Provider Modifiers Qty    73512602469 HC OT EVAL LOW COMPLEXITY 3 4/10/2025 Argelia Odell OT GO 1                 Argelia Odell OT  4/10/2025

## 2025-04-10 NOTE — PROGRESS NOTES
[facility]  [facility] Consult [facility]    Routine Consult Follow-Up    Patient Name:   Melvina Martinez  YOB: 1960  Identification Number:   MRN - [id number]  Date of Service:   04/10/2025 13:20:11    Diagnosis        G45.9 - Transient cerebral ischemic attack, unspecified    Impression  Patient is a 64-year-old female with Past medical history of stroke, diabetes, hypertension, GERD, anxiety, bipolar, carpal tunnel syndrome, depression, history of DVT, panic disorder, hirsutism, kidney stones, migraines, PTSD, history of PE, sleep apnea, stress incontinence, TIA. MRI brain imaging without evidence of acute infarct. Patient reports she is going to be restarted on Eliquis and Plavix discontinued. Possible TIA. Cannot rule out infectious metabolic etiologies. Recommend outpatient cardiac workup including loop recorder/Holter monitor. She has risk factors. Echocardiogram is pending. If any concern for PFO recommend lower extremity venous Dopplers. Please recall in the interim as needed.    Our recommendations are outlined below    Nursing Recommendations :  Continue with Telemetry    Consultations :  Physical therapy/Occupational therapy    DVT Prophylaxis :  Choice of Primary Team    Disposition :  No further recommendationsOutpatient Neurology follow up in 1-3 weeks    Subjective  Patient states she feels better. She states recent event was much milder than those in the past. States she got up from the couch felt disoriented, legs were not working and she fell down. She called her daughter-in-law who is a nurse who told her to lay on the couch for 5 minutes and if she got up and still continue to have symptoms to call currently she is feels back to baseline, has been ambulating without difficulty. She states she is going to be placed back on Eliquis and taken off Plavix. She has never had outpatient cardiac monitoring.    Hospital Course  Patient is a 64-year-old female  presenting with left leg weakness and double vision. Past medical history of stroke, diabetes, hypertension, GERD, anxiety, bipolar, carpal tunnel syndrome, depression, history of DVT, panic disorder, hirsutism, kidney stones, migraines, PTSD, history of PE, sleep apnea, stress incontinence, TIA. Presenting with reported gait disturbances. Patient reported she was trying to get to bed and she was unable to stand and fell back down to the bed.    Imaging  MRI brain without contrast: Findings:  No acute infarct or abnormal restricted diffusion.    There is no evidence of hemorrhage.  No mass effect, edema or midline shift    Mild periventricular and subcortical white matter T2/FLAIR hyperintensities, nonspecific but most likely represents chronic small vessel ischemic changes.    No extra-axial fluid collection.    The ventricles are normal in size and configuration.  The visualized flow voids are unremarkable.  Partially empty sella. Otherwise midline structures are unremarkable.    The visualized orbits are unremarkable.  The visualized paranasal sinuses and mastoid air cells are clear.    The visualized soft tissues are unremarkable.  No acute osseous abnormality.    IMPRESSION:  Impression:  1.No evidence of acute infarct or hemorrhage.  2.Mild chronic and senescent changes of the brain are noted.    CTA head and neck: 1.No acute abnormality is identified within the large arteries of the head or neck.  2.No significant stenosis of the bilateral internal carotid arteries.  3.Chronic moderate height loss of C7. This was also present on chest CT from 4/13/2022.    Labs  Glucose 93      Examination  BP(116/71), Pulse(84), Temp(98.1), Resp(18),  1A: Level of Consciousness - Alert; keenly responsive + 0  1B: Ask Month and Age - Both Questions Right + 0  1C: Blink Eyes & Squeeze Hands - Performs Both Tasks + 0  2: Test Horizontal Extraocular Movements - Normal + 0  3: Test Visual Fields - No Visual Loss + 0  4: Test  Facial Palsy (Use Grimace if Obtunded) - Normal symmetry + 0  5A: Test Left Arm Motor Drift - No Drift for 10 Seconds + 0  5B: Test Right Arm Motor Drift - No Drift for 10 Seconds + 0  6A: Test Left Leg Motor Drift - No Drift for 5 Seconds + 0  6B: Test Right Leg Motor Drift - No Drift for 5 Seconds + 0  7: Test Limb Ataxia (FNF/Heel-Shin) - No Ataxia + 0  8: Test Sensation - Normal; No sensory loss + 0  9: Test Language/Aphasia - Normal; No aphasia + 0  10: Test Dysarthria - Normal + 0  11: Test Extinction/Inattention - No abnormality + 0    NIHSS Score: 0          This consult was conducted in real time using interactive audio and video technology. Patient was informed of the technology being used for this visit and agreed to proceed. Patient located in hospital and provider located at home/office setting.    Telehealth Neurology consultation was provided. I spent minutes providing telehealth care. This includes time spent for face to face visit via telemedicine, review of medical records, imaging studies and discussion of findings with providers, the patient and/or family.      Dr Kayla Hutchins      [facility]  For Inpatient follow-up with [facility] physician please call [facility] at [phone]. As we are not an outpatient service for any post hospital discharge needs please contact the hospital for assistance.  If you have any questions for the [facility] physicians or need to reconsult for clinical or diagnostic changes please contact us via [facility] at [phone]

## 2025-04-10 NOTE — CONSULTS
Consult received per Stroke Protocol. Patient without a noted DM diagnosis, with a current HbA1c of 4.9%, and an estimated average glucose of 94 mg/dl. Blood glucose values have remained WDL, with a low of 91 mg/dl, and a high of 95 mg/dl.

## 2025-04-10 NOTE — THERAPY EVALUATION
Acute Care - Physical Therapy Initial Evaluation  LALO Mejia     Patient Name: Melvina Martinez  : 1960  MRN: [id number]  Today's Date: 4/10/2025     Admit date: 2025     Referring Physician: Jeancarlos Poole MD     Surgery Date:* No surgery found *             Visit Dx:     ICD-10-CM ICD-9-CM   1. Cerebrovascular accident (CVA), unspecified mechanism  I63.9 434.91   2. Nausea and vomiting, unspecified vomiting type  R11.2 787.01   3. Decreased activities of daily living (ADL)  Z78.9 V49.89   4. Difficulty in walking  R26.2 719.7     Patient Active Problem List   Diagnosis    Rotator cuff tear, left    Aftercare following surgery of the musculoskeletal system    Chronic pain of both shoulders    Posttraumatic stress disorder    Bipolar disorder    Diabetes    Hypertension    Morbid obesity    Decreased right shoulder range of motion    Tear of right supraspinatus tendon    Migraine    Intractable vomiting    Biliary dyskinesia    S/P laparoscopic cholecystectomy    Generalized abdominal pain    Gastritis    Abnormal CT of the abdomen    H. pylori infection    Primary osteoarthritis of left hand    Nausea and vomiting    OSMANY (acute kidney injury)    Acute pain of right knee    Encounter for screening for malignant neoplasm of colon    Chest pain    Pulmonary embolism    Grade I diastolic dysfunction    Stroke     Past Medical History:   Diagnosis Date    Acid reflux     Anxiety disorder     Arthritis     Bipolar disorder     Carpal tunnel syndrome     Deep vein thrombosis 2022    Depression     Diabetes     DOES NOT CHECK BS DAILY    H/O Panic disorder     Hirsutism     History of back pain     Hyperlipidemia     Hypertension     Kidney stones     Limb swelling     Migraines     PTSD (post-traumatic stress disorder)     Pulmonary embolism     Rotator cuff tear, left     Rotator cuff tear, left 06/10/2021    Sleep apnea     Stress incontinence     Tattoos     TIA (transient ischemic attack)      Past  Surgical History:   Procedure Laterality Date    ABDOMINOPLASTY      Tummy tuck    ANKLE SURGERY      APPENDECTOMY      25 yrs. old when pregnant     SECTION      CHOLECYSTECTOMY N/A 10/06/2021    Procedure: CHOLECYSTECTOMY LAPAROSCOPIC;  Surgeon: Jeancarlos Carrington MD;  Location: [facility] MAIN OR;  Service: General;  Laterality: N/A;    COLONOSCOPY      COLONOSCOPY N/A 2022    Procedure: COLONOSCOPY INTO CECUM WITH COLD SNARE POLYPECTOMY;  Surgeon: Ananth Odell MD;  [facility];  Service: Gastroenterology;  Laterality: N/A;  PRE: PERSONAL H/O COLON POLYPS  POST: DIVERTICULOSIS, POLYP, HEMORRHOIDS, TORTUOUS COLON    ENDOSCOPY N/A 10/28/2021    Procedure: ESOPHAGOGASTRODUODENOSCOPY with biopsies;  Surgeon: Luciano Salmon MD;  [facility];  Service: Gastroenterology;  Laterality: N/A;  pre:  abnormal CT  post:  gastritis, HH,     EYE SURGERY      childhood; muscle    FRACTURE SURGERY      tibia and fibula fracture    HYSTERECTOMY      INTRAOCULAR LENS INSERTION      KNEE ARTHROSCOPY W/ MENISCAL REPAIR Right     KNEE SURGERY      SHOULDER ARTHROSCOPY Left 06/10/2021    Procedure: LEFT SHOULDER ARTHROSCOPY;  Surgeon: Дмитрий Francois MD;  Location: [facility] OR [facility] [place];  Service: Orthopedics;  Laterality: Left;    SHOULDER ROTATOR CUFF REPAIR Left 06/10/2021    Procedure: SUBACROMIAL DECOMPRESSION, DISTAL CLAVICULECTOMY AND ROTATOR CUFF REPAIR;  Surgeon: Дмитрий Francois MD;  Location: [facility] OR [facility] [place];  Service: Orthopedics;  Laterality: Left;    SHOULDER SURGERY Right     TONSILLECTOMY      TUBAL ABDOMINAL LIGATION       PT Assessment (Last 12 Hours)       PT Evaluation and Treatment       Row Name 04/10/25 1000          Physical Therapy Time and Intention    Subjective Information no complaints  -RIKA     Document Type evaluation  -RIKA     Mode of Treatment individual therapy;physical therapy  -RIKA     Patient Effort good  -RIKA       Row Name 04/10/25 1000          General  Information    Patient Observations alert;cooperative;agree to therapy  -RIKA     Prior Level of Function independent:;all household mobility;community mobility  -RIKA     Equipment Currently Used at Home none  -RIKA     Existing Precautions/Restrictions no known precautions/restrictions  -RIKA     Barriers to Rehab none identified  -RIKA       Row Name 04/10/25 1000          Living Environment    Current Living Arrangements home  -RIKA       Row Name 04/10/25 1000          Cognition    Orientation Status (Cognition) oriented x 3  -RIKA       Row Name 04/10/25 1000          Range of Motion (ROM)    Range of Motion ROM is WFL  -RIKA       [facility] Name 04/10/25 1000          Strength (Manual Muscle Testing)    Strength (Manual Muscle Testing) strength is [facility] Name 04/10/25 1000          Bed Mobility    Bed Mobility bed mobility (all) activities;supine-sit  -RIKA     All Activities, [place] (Bed Mobility) independent  -RIKA     Supine-Sit [place] (Bed Mobility) independent  -RIAK       Row Name 04/10/25 1000          Transfers    Transfers sit-stand transfer  -RIKA       Row Name 04/10/25 1000          Sit-Stand Transfer    Sit-Stand [place] (Transfers) independent  -RIKA       Row Name 04/10/25 1000          Gait/Stairs (Locomotion)    Gait/Stairs Locomotion gait/ambulation independence  -RIKA     [place] Level (Gait) independent  -RIKA     Distance in Feet (Gait) 100  -RIKA       Row Name 04/10/25 1000          Balance    Balance Assessment standing dynamic balance  -RIKA     Dynamic Standing Balance independent  -RIKA       [facility] Name 04/10/25 1000          Plan of Care Review    Plan of Care Reviewed With patient  -RIKA     Outcome Evaluation Pt did not demonstrate any safety or mobility deficits during the initial evaluation.  Pt is safe to continue ambulating within their room independently until their discharge from the hospital.  Pt will be discharged from PT services at this time.  -RIKA       Row Name 04/10/25 1000           Therapy Assessment/Plan (PT)    Criteria for Skilled Interventions Met (PT) no problems identified which require skilled intervention  -RIKA     Therapy Frequency (PT) evaluation only  -RIKA       Row Name 04/10/25 1000          PT Evaluation Complexity    History, PT Evaluation Complexity no personal factors and/or comorbidities  -RIKA     Examination of Body Systems (PT Eval Complexity) total of 4 or more elements  -RIKA     Clinical Presentation (PT Evaluation Complexity) stable  -RIKA     Clinical Decision Making (PT Evaluation Complexity) low complexity  -RIKA     Overall Complexity (PT Evaluation Complexity) low complexity  -RIKA       Row Name 04/10/25 1000          Therapy Plan Review/Discharge Plan (PT)    Therapy Plan Review (PT) evaluation/treatment results reviewed;participants voiced agreement with care plan;participants included;patient  -RIKA       Row Name 04/10/25 1000          Physical Therapy Goals    Problem Specific Goal Selection (PT) problem specific goal 1, PT  -RIKA       Row Name 04/10/25 1000          Problem Specific Goal 1 (PT)    Problem Specific Goal 1 (PT) Complete PT evaluation  -RIKA     Time Frame (Problem Specific Goal 1, PT) 1 day  -RIKA     Progress/Outcome (Problem Specific Goal 1, PT) goal met  -RIKA               User Key  (r) = Recorded By, (t) = Taken By, (c) = Cosigned By      Initials Name Provider Type    RIKACharles Roberts, PT Physical Therapist                      PT Recommendation and Plan  Anticipated Discharge Disposition (PT): home  Therapy Frequency (PT): evaluation only  Plan of Care Reviewed With: patient  Outcome Evaluation: Pt did not demonstrate any safety or mobility deficits during the initial evaluation.  Pt is safe to continue ambulating within their room independently until their discharge from the hospital.  Pt will be discharged from PT services at this time.   Outcome Measures       Row Name 04/10/25 1000             How much help from another person do you currently  need...    Turning from your back to your side while in flat bed without using bedrails? 4  -RIKA      Moving from lying on back to sitting on the side of a flat bed without bedrails? 4  -RIKA      Moving to and from a bed to a chair (including a wheelchair)? 4  -RIKA      Standing up from a chair using your arms (e.g., wheelchair, bedside chair)? 4  -RIKA      Climbing 3-5 steps with a railing? 4  -RIKA      To walk in hospital room? 4  -RIKA      AM-PAC 6 Clicks Score (PT) 24  -RIKA                User Key  (r) = Recorded By, (t) = Taken By, (c) = Cosigned By      Initials Name Provider Type    Charles Dent, PT Physical Therapist                     Time Calculation:    PT Charges       Row Name 04/10/25 1021             Time Calculation    PT Received On 04/10/25  -RIKA         Untimed Charges    PT Eval/Re-eval Minutes 20  -RIKA         Total Minutes    Untimed Charges Total Minutes 20  -RIKA       Total Minutes 20  -RIKA                User Key  (r) = Recorded By, (t) = Taken By, (c) = Cosigned By      Initials Name Provider Type    Charles Dent, PT Physical Therapist                      PT G-Codes  Outcome Measure Options: AM-PAC 6 Clicks Daily Activity (OT), Optimal Instrument  AM-PAC 6 Clicks Score (PT): 24  AM-PAC 6 Clicks Score (OT): 24    Charles Munroe, ANIBAL  4/10/2025

## 2025-04-10 NOTE — PAYOR COMM NOTE
"Isabel Rai (64 y.o. Female)       Date of Birth   1960    Social Security Number       Address   [address] [place], [place] [address]    Home Phone   [phone]    MRN   [id number]       [employer]   Confucianist    Marital Status                               Admission Date   4/9/2025    Admission Type   Emergency    Admitting Provider   Keith Mendez MD    Attending Provider   Keith Mendez MD    Department, Room/Bed   [facility] CARE UNIT, 208/1       Discharge Date       Discharge Disposition       Discharge Destination                                 Attending Provider: Keith Mendez MD    Allergies: Penicillins, Metronidazole    Isolation: None   Infection: None   Code Status: CPR    Ht: 160 cm (63\")   Wt: 78.2 kg (172 lb 6.4 oz)    Admission Cmt: None   Principal Problem: Stroke [I63.9]                   Active Insurance as of 4/9/2025       Primary Coverage       Payor Plan Insurance Group Employer/Plan Group    MEDICARE MEDICARE A & B        Payor Plan Address Payor Plan Phone Number Payor Plan Fax Number Effective Dates    [address] [phone]  8/1/2024 - None Entered    [facility] [id number]         Subscriber Name Subscriber Birth Date Member ID       ISABEL RAI 1960 [id number]               Secondary Coverage       Payor Plan Insurance Group Employer/Plan Group     FOR LIFE  FOR LIFE  SUP         Payor Plan Address Payor Plan Phone Number Payor Plan Fax Number Effective Dates    [address] [phone]  4/1/2025 - None Entered    [facility] [address]         Subscriber Name Subscriber Birth Date Member ID       ISABEL RAI 1960 [id number]               Tertiary Coverage       Payor Plan Insurance Group Employer/Plan Group    [facility] CCN OPTUM        Payor Plan Address Payor Plan Phone Number Payor Plan Fax Number Effective Dates    [address] [phone]  4/9/2025 - None Entered    [facility] [id number]         " Assessment/Plan:    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  I have discussed the positive urine culture for acid-fast bacillus with Infectious Disease  They have recommended further evaluation with bladder biopsy and trans urethral biopsy of the prostate to exclude invasive tuberculosis infection versus symptoms from recurrent carcinoma in situ  If the patient does have granulomatous cystitis from BCG he will require 9 months of antituberculosis therapy  Referral to Infectious Disease has already been made  The options were discussed with the patient  He has elected to proceed with cystoscopy with bladder and prostate biopsies  The procedure was described in detail  Risks were discussed and consent form signed  Diagnoses and all orders for this visit:    Malignant neoplasm of urinary bladder, unspecified site [facility])  -     Case request operating room: Flip Tsang, and transurethral prostate biopsies; Standing  -     Case request operating room: Flip Tsang, and transurethral prostate biopsies    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  -     Case request operating room: Flip Tsang, and transurethral prostate biopsies; Standing  -     Case request operating room: Flip Tsang, and transurethral prostate biopsies    Other orders  -     calcitriol (ROCALTROL) 0 25 mcg capsule  -     Diet NPO; Sips with meds; Standing  -     Place sequential compression device; Standing  -     Cystoscopy          Subjective:      Patient ID: Manuel Rodgers is a 69 y o  male  Chief complaint:  Dysuria, difficulty voiding     HPI:  41-year-old male who is followed at [facility] for carcinoma in situ the bladder  He is receiving maintenance BCG treatments  He received his last BCG in June  He reports that he has been having more more symptoms after his maintenance BCG treatments    At the present time he has difficulty voiding with slow urinary stream, Subscriber Name Subscriber Birth Date Member ID       ISABEL RAI 1960 [id number]                     Emergency Contacts        (Rel.) Home Phone Work Phone Mobile Phone    Ananth Rai (Spouse) -- -- [phone]                 History & Physical        Do, MD Keith at 25 1437           [facility]  Date: [facility]   Patient Name: Isabel Rai  : 1960  MRN: [id number]  Primary Care Physician:  Provider, No Known  Date of admission: 2025    Subjective  Subjective     Chief Complaint: Left-sided weakness    HPI:    Isabel Rai is a 64 y.o. female with medical history significant for HTN, HLD, prior TIA, migraines, and mood disorders, who presented for left-sided weakness    Patient was last seen normal around 9 AM on 25.  She was eating breakfast at the time.  She was trying to rise up around 10 AM but could not.  She had sudden onset of left leg and left arm weakness.  She denies fever, no chills, no chest pain, no abdominal pain.    Personal History     Past Medical History:  Past Medical History:   Diagnosis Date    Acid reflux     Anxiety disorder     Arthritis     Bipolar disorder     Carpal tunnel syndrome     Deep vein thrombosis 2022    Depression     Diabetes     DOES NOT CHECK BS DAILY    H/O Panic disorder     Hirsutism     History of back pain     Hyperlipidemia     Hypertension     Kidney stones     Limb swelling     Migraines     PTSD (post-traumatic stress disorder)     Pulmonary embolism     Rotator cuff tear, left     Rotator cuff tear, left 06/10/2021    Sleep apnea     Stress incontinence     Tattoos     TIA (transient ischemic attack)            Past Surgical History:  Past Surgical History:   Procedure Laterality Date    ABDOMINOPLASTY      Tummy tuck    ANKLE SURGERY      APPENDECTOMY      25 yrs. old when pregnant     SECTION      CHOLECYSTECTOMY N/A 10/06/2021    Procedure: CHOLECYSTECTOMY  frequency, bladder pain and dysuria  He is very unhappy with his voiding pattern  He is getting up frequently at night to urinate  The voiding pain causes difficulty sleeping  He has no fever, flank pain and there is no gross hematuria  Since his last visit he has been treated with ciprofloxacin with only minimal improvement  A urine culture is negative  Urine cytology was benign  Urine culture for acid-fast bacillus is positive  The patient is seen today for cystoscopy  The following portions of the patient's history were reviewed and updated as appropriate: allergies, current medications, past family history, past medical history, past social history, past surgical history and problem list     Review of Systems   Constitutional: Negative  Negative for chills, diaphoresis, fatigue and fever  HENT: Negative  Eyes: Negative  Respiratory: Negative  Cardiovascular: Negative  Endocrine: Negative  Genitourinary:        See HPI   Musculoskeletal: Negative  Skin: Negative  Allergic/Immunologic: Negative  Neurological: Negative  Hematological: Negative  Psychiatric/Behavioral: Negative  Objective:      /68   Temp 98 4 °F (36 9 °C)   Ht 6' 4" (1 93 m)   Wt 109 kg (240 lb)   BMI 29 21 kg/m²          Physical Exam  Vitals signs reviewed  Constitutional:       General: He is not in acute distress  Appearance: Normal appearance  He is well-developed and normal weight  He is not ill-appearing, toxic-appearing or diaphoretic  HENT:      Head: Normocephalic and atraumatic  Eyes:      General: No scleral icterus  Conjunctiva/sclera: Conjunctivae normal    Neck:      Musculoskeletal: Neck supple  Cardiovascular:      Rate and Rhythm: Normal rate  Pulmonary:      Effort: Pulmonary effort is normal    Abdominal:      General: Bowel sounds are normal  There is no distension  Palpations: Abdomen is soft  There is no mass  Tenderness:  There is no LAPAROSCOPIC;  Surgeon: Jeancarlos Carrington MD;  Location: [facility] MAIN OR;  Service: General;  Laterality: N/A;    COLONOSCOPY      COLONOSCOPY N/A 04/07/2022    Procedure: COLONOSCOPY INTO CECUM WITH COLD SNARE POLYPECTOMY;  Surgeon: Ananth Odell MD;  [facility];  Service: Gastroenterology;  Laterality: N/A;  PRE: PERSONAL H/O COLON POLYPS  POST: DIVERTICULOSIS, POLYP, HEMORRHOIDS, TORTUOUS COLON    ENDOSCOPY N/A 10/28/2021    Procedure: ESOPHAGOGASTRODUODENOSCOPY with biopsies;  Surgeon: Luciano Salmon MD;  Location: [facility] ENDOSCOPY;  Service: Gastroenterology;  Laterality: N/A;  pre:  abnormal CT  post:  gastritis, HH,     EYE SURGERY      childhood; muscle    FRACTURE SURGERY      tibia and fibula fracture    HYSTERECTOMY      INTRAOCULAR LENS INSERTION      KNEE ARTHROSCOPY W/ MENISCAL REPAIR Right     KNEE SURGERY      SHOULDER ARTHROSCOPY Left 06/10/2021    Procedure: LEFT SHOULDER ARTHROSCOPY;  Surgeon: Дмитрий Francois MD;  [place] [facility] [place] [facility];  Service: Orthopedics;  Laterality: Left;    SHOULDER ROTATOR CUFF REPAIR Left 06/10/2021    Procedure: SUBACROMIAL DECOMPRESSION, DISTAL CLAVICULECTOMY AND ROTATOR CUFF REPAIR;  Surgeon: Дмитрий Francois MD;  [place] [facility];  Service: Orthopedics;  Laterality: Left;    SHOULDER SURGERY Right     TONSILLECTOMY      TUBAL ABDOMINAL LIGATION         Family History:   Family History   Problem Relation Age of Onset    Cancer Mother         Unspecified    Breast cancer Mother     Clotting disorder Mother     Hypertension Father     Dementia Father     Stroke Father     Heart disease Father     Diabetes Father         Unspecified type    Arthritis Father     Alzheimer's disease Father     Heart disease Brother     Breast cancer Paternal Aunt     Throat cancer Paternal Aunt     Breast cancer Paternal Aunt     Breast cancer Paternal Grandmother     Malig Hyperthermia Neg Hx        Social History:   Social History  abdominal tenderness  There is no right CVA tenderness, left CVA tenderness, guarding or rebound  Hernia: No hernia is present  Genitourinary:     Penis: Normal  No phimosis or hypospadias  Scrotum/Testes: Normal          Right: Mass not present  Left: Mass not present  Skin:     General: Skin is warm and dry  Neurological:      Mental Status: He is alert and oriented to person, place, and time  Psychiatric:         Behavior: Behavior normal          Thought Content: Thought content normal          Judgment: Judgment normal            Cystoscopy    Date/Time: 9/30/2020 4:37 PM  Performed by: Misha Burroughs MD  Authorized by: Misha Burroughs MD     Procedure details: cystoscopy    Patient tolerance: Patient tolerated the procedure well with no immediate complications    Additional Procedure Details:      Patient presents for cystoscopy  I have discussed the reasons for doing the test, and the potential risks and complications  Patient expressed understanding, and signed informed consent document  The patient was carefully  positioned supine on the examining table  Sterile preparation was performed on the urethra  Xylocaine jelly was instilled and left  Indwelling for the procedure  The 13 Amharic flexible cystoscope was passed with the following findings:      Urethra:  Normal without stricture    Prostate:  lateral lobes occlusive x 3 cm                  median lobe- small    Bladder: Moderate trabeculation with areas of erythema, no papillary lesions, tumor, or stones  Residual urine: Moderate    Patient tolerated the procedure well and was escorted from the examining table      Socioeconomic History    Marital status:    Tobacco Use    Smoking status: Never    Smokeless tobacco: Never   Vaping Use    Vaping status: Never Used   Substance and Sexual Activity    Alcohol use: Yes     Comment: rarely drinks    Drug use: Never    Sexual activity: Not Currently     Partners: Male     Birth control/protection: Post-menopausal       Home Medications:  QUEtiapine, SUMAtriptan, amLODIPine, atorvastatin, clopidogrel, folic acid, lamoTRIgine, multivitamin, pantoprazole, prazosin, topiramate, and traZODone    Allergies:  Allergies   Allergen Reactions    Penicillins Anaphylaxis and Shortness Of Breath    Metronidazole Hives and Nausea Only       Review of Systems   All systems were reviewed and negative except for indicated in HPI    Objective  Objective     Vitals:   Temp:  [98.1 °F (36.7 °C)] 98.1 °F (36.7 °C)  Heart Rate:  [69-99] 87  Resp:  [18] 18  BP: (124-153)/(79-96) 142/93    Physical Exam    Constitutional: Awake, alert, no acute distress   Eyes: Pupils equal, sclerae anicteric, no conjunctival injection   HENT: NCAT, mucous membranes moist   Neck: Supple, no thyromegaly, no lymphadenopathy, trachea midline   Respiratory: Clear to auscultation bilaterally, nonlabored respirations    Cardiovascular: RRR, no murmurs, rubs, or gallops, palpable pedal pulses bilaterally   Gastrointestinal: Positive bowel sounds, soft, nontender, nondistended   Musculoskeletal: No bilateral ankle edema, no clubbing or cyanosis to extremities   Psychiatric: Appropriate affect, cooperative   Neurologic: Oriented x 3, strength symmetric in all extremities, Cranial Nerves grossly intact to confrontation, speech clear   Skin: No rashes     Result Review   Result Review:  I have personally reviewed the results from the time of this admission to 4/9/2025 14:47 EDT and agree with these findings:  [x]  Laboratory  [x]  Microbiology  [x]  Radiology  [x]  EKG/Telemetry   []  Cardiology/Vascular   []  Pathology  []   Old records  []  Other:    Laboratory Studies:  Recent Results (from the past 24 hours)   Comprehensive Metabolic Panel    Collection Time: 04/09/25 11:34 AM    Specimen: Blood   Result Value Ref Range    Glucose 91 65 - 99 mg/dL    BUN 12 8 - 23 mg/dL    Creatinine 0.94 0.57 - 1.00 mg/dL    Sodium 141 136 - 145 mmol/L    Potassium 4.4 3.5 - 5.2 mmol/L    Chloride 108 (H) 98 - 107 mmol/L    CO2 23.0 22.0 - 29.0 mmol/L    Calcium 9.4 8.6 - 10.5 mg/dL    Total Protein 7.3 6.0 - 8.5 g/dL    Albumin 4.3 3.5 - 5.2 g/dL    ALT (SGPT) 18 1 - 33 U/L    AST (SGOT) 20 1 - 32 U/L    Alkaline Phosphatase 97 39 - 117 U/L    Total Bilirubin 0.3 0.0 - 1.2 mg/dL    Globulin 3.0 gm/dL    A/G Ratio 1.4 g/dL    BUN/Creatinine Ratio 12.8 7.0 - 25.0    Anion Gap 10.0 5.0 - 15.0 mmol/L    eGFR 68.3 >60.0 mL/min/1.73   Protime-INR    Collection Time: 04/09/25 11:34 AM    Specimen: Blood   Result Value Ref Range    Protime 13.8 11.8 - 14.9 Seconds    INR 1.02 0.86 - 1.15   aPTT    Collection Time: 04/09/25 11:34 AM    Specimen: Blood   Result Value Ref Range    PTT 27.9 24.2 - 34.2 seconds   Type & Screen    Collection Time: 04/09/25 11:34 AM    Specimen: Blood   Result Value Ref Range    ABO Type A     RH type Positive     Antibody Screen Negative     T&S Expiration Date 4/16/2025 11:59:00 PM    Green Top (Gel)    Collection Time: 04/09/25 11:34 AM   Result Value Ref Range    Extra Tube Hold for add-ons.    Lavender Top    Collection Time: 04/09/25 11:34 AM   Result Value Ref Range    Extra Tube hold for add-on    Gold Top - SST    Collection Time: 04/09/25 11:34 AM   Result Value Ref Range    Extra Tube Hold for add-ons.    CBC Auto Differential    Collection Time: 04/09/25 11:34 AM    Specimen: Blood   Result Value Ref Range    WBC 6.39 3.40 - 10.80 10*3/mm3    RBC 4.41 3.77 - 5.28 10*6/mm3    Hemoglobin 13.1 12.0 - 15.9 g/dL    Hematocrit 40.9 34.0 - 46.6 %    MCV 92.7 79.0 - 97.0 fL    MCH 29.7 26.6 - 33.0 pg    MCHC 32.0 31.5 - 35.7  g/dL    RDW 15.0 12.3 - 15.4 %    RDW-SD 51.6 37.0 - 54.0 fl    MPV 10.0 6.0 - 12.0 fL    Platelets 201 140 - 450 10*3/mm3    Neutrophil % 36.9 (L) 42.7 - 76.0 %    Lymphocyte % 52.3 (H) 19.6 - 45.3 %    Monocyte % 7.5 5.0 - 12.0 %    Eosinophil % 2.5 0.3 - 6.2 %    Basophil % 0.6 0.0 - 1.5 %    Immature Grans % 0.2 0.0 - 0.5 %    Neutrophils, Absolute 2.36 1.70 - 7.00 10*3/mm3    Lymphocytes, Absolute 3.34 (H) 0.70 - 3.10 10*3/mm3    Monocytes, Absolute 0.48 0.10 - 0.90 10*3/mm3    Eosinophils, Absolute 0.16 0.00 - 0.40 10*3/mm3    Basophils, Absolute 0.04 0.00 - 0.20 10*3/mm3    Immature Grans, Absolute 0.01 0.00 - 0.05 10*3/mm3    nRBC 0.0 0.0 - 0.2 /100 WBC   Light Blue Top    Collection Time: 04/09/25 11:34 AM   Result Value Ref Range    Extra Tube Hold for add-ons.    ECG 12 Lead ED Triage Standing Order; Acute Stroke (Onset <12 hrs)    Collection Time: 04/09/25 12:17 PM   Result Value Ref Range    QT Interval 395 ms    QTC Interval 442 ms   Urinalysis With Microscopic If Indicated (No Culture) - Urine, Clean Catch    Collection Time: 04/09/25 12:23 PM    Specimen: Urine, Clean Catch   Result Value Ref Range    Color, UA Yellow Yellow, Straw    Appearance, UA Clear Clear    pH, UA 8.0 5.0 - 8.0    Specific Gravity, UA 1.027 1.005 - 1.030    Glucose, UA Negative Negative    Ketones, UA Negative Negative    Bilirubin, UA Negative Negative    Blood, UA Negative Negative    Protein, UA Negative Negative    Leuk Esterase, UA Moderate (2+) (A) Negative    Nitrite, UA Negative Negative    Urobilinogen, UA 0.2 E.U./dL 0.2 - 1.0 E.U./dL   Urinalysis, Microscopic Only - Urine, Clean Catch    Collection Time: 04/09/25 12:23 PM    Specimen: Urine, Clean Catch   Result Value Ref Range    RBC, UA 0-2 None Seen, 0-2 /HPF    WBC, UA 3-5 (A) None Seen, 0-2 /HPF    Bacteria, UA None Seen None Seen /HPF    Squamous Epithelial Cells, UA 0-2 None Seen, 0-2 /HPF    Hyaline Casts, UA None Seen None Seen /LPF    Methodology Automated  Microscopy    ABO RH Specimen Verification    Collection Time: 04/09/25  1:34 PM    Specimen: Arm, Left; Blood   Result Value Ref Range    ABO Type A     RH type Positive        Imaging:  CT Angiogram Head w AI Analysis of LVO  Result Date: 4/9/2025  Narrative: CT ANGIOGRAM HEAD W AI ANALYSIS OF LVO, CT ANGIOGRAM NECK Date of Exam: 4/9/2025 11:43 AM EDT Indication: Neuro Deficit, acute, Stroke suspected Neuro deficit, acute, stroke suspected. Comparison: None available. Technique: CTA of the head was performed after the uneventful intravenous administration of iodinated contrast. Reconstructed coronal and sagittal images were also obtained. In addition, a 3-D volume rendered image was created for interpretation. Automated exposure control and iterative reconstruction methods were used. FINDINGS: Vascular Findings: The right common carotid, internal carotid, middle cerebral, anterior cerebral, vertebral, and posterior cerebral arteries are patent without abrupt cut off or aneurysmal dilation. The left common carotid, internal carotid, middle cerebral, anterior cerebral, vertebral, and posterior cerebral arteries are patent without abrupt cut off or aneurysmal dilation. Basilar artery appears patent and appears unremarkable. Non-vascular Findings: For description of nonvascular intracranial findings, please refer to the noncontrast head CT performed the same date. No acute abnormality is identified within the visualized soft tissue or bony structures of the neck. Chronic moderate height loss of C7 The visualized lung apices are clear.     Impression: 1.No acute abnormality is identified within the large arteries of the head or neck. 2.No significant stenosis of the bilateral internal carotid arteries. 3.Chronic moderate height loss of C7. This was also present on chest CT from 4/13/2022. Electronically Signed: Mani Ibanez MD  4/9/2025 1:01 PM EDT  Workstation ID: [id number]    CT Angiogram Neck  Result Date:  4/9/2025  Narrative: CT ANGIOGRAM HEAD W AI ANALYSIS OF LVO, CT ANGIOGRAM NECK Date of Exam: 4/9/2025 11:43 AM EDT Indication: Neuro Deficit, acute, Stroke suspected Neuro deficit, acute, stroke suspected. Comparison: None available. Technique: CTA of the head was performed after the uneventful intravenous administration of iodinated contrast. Reconstructed coronal and sagittal images were also obtained. In addition, a 3-D volume rendered image was created for interpretation. Automated exposure control and iterative reconstruction methods were used. FINDINGS: Vascular Findings: The right common carotid, internal carotid, middle cerebral, anterior cerebral, vertebral, and posterior cerebral arteries are patent without abrupt cut off or aneurysmal dilation. The left common carotid, internal carotid, middle cerebral, anterior cerebral, vertebral, and posterior cerebral arteries are patent without abrupt cut off or aneurysmal dilation. Basilar artery appears patent and appears unremarkable. Non-vascular Findings: For description of nonvascular intracranial findings, please refer to the noncontrast head CT performed the same date. No acute abnormality is identified within the visualized soft tissue or bony structures of the neck. Chronic moderate height loss of C7 The visualized lung apices are clear.     Impression: 1.No acute abnormality is identified within the large arteries of the head or neck. 2.No significant stenosis of the bilateral internal carotid arteries. 3.Chronic moderate height loss of C7. This was also present on chest CT from 4/13/2022. Electronically Signed: Mani Ibanez MD  4/9/2025 1:01 PM EDT  Workstation ID: [id number]    XR Chest 1 View  Result Date: 4/9/2025  Narrative: XR CHEST 1 VW Date of Exam: 4/9/2025 12:10 PM EDT Indication: Acute Stroke Protocol (Onset < 12 hrs) Comparison: AP chest x-ray 4/13/2022 Findings: Lungs are well expanded and appear clear. Cardiomediastinal contours are within  normal limits.     Impression: Impression: No acute cardiopulmonary abnormality is identified. Electronically Signed: Mague Leavitt  4/9/2025 12:33 PM EDT  Workstation ID: [id number]    CT CEREBRAL PERFUSION WITH & WITHOUT CONTRAST  Result Date: 4/9/2025  Narrative: CT CEREBRAL PERFUSION W WO CONTRAST Date of Exam: 4/9/2025 11:43 AM EDT Indication: Neuro Deficit, acute, Stroke suspected Neuro deficit, acute, stroke suspected.  Comparison: None available. Technique: Axial CT images of the brain were obtained prior to and after the uneventful intravenous administration of iodinated contrast. Core blood volume, core blood flow, mean transit time, and Tmax images were obtained utilizing the Rapid software protocol. A limited CT angiogram of the head was also performed to measure the blood vessel density. The radiation dose reduction device was turned on for each scan per the ALARA (As Low as Reasonably Achievable) protocol. FINDINGS: CT Perfusion: CBF (<30%) volume: 0 mL Tmax (>6.0s) volume: 0 mL Mismatch volume: 0 mL Mismatch ratio: None     Impression: No CT perfusion findings to suggest territorial ischemia or core infarct. Electronically Signed: Mani Ibanez MD  4/9/2025 12:09 PM EDT  Workstation ID: [id number]    CT Head Without Contrast Stroke Protocol  Result Date: 4/9/2025  Narrative: CT HEAD WO CONTRAST STROKE PROTOCOL Date of Exam: 4/9/2025 11:24 AM EDT Indication: Neuro Deficit, acute, Stroke suspected Neuro deficit, acute, stroke suspected. Comparison: August 30, 2017 Technique: Axial CT images were obtained of the head without contrast administration.  Reconstructed coronal and sagittal images were also obtained. Automated exposure control and iterative construction methods were used. Findings: No acute intracranial hemorrhage or extra-axial collection is identified. The ventricles appear normal in caliber, with no evidence of mass effect or midline shift. The basal cisterns appear patent. The gray-white  differentiation appears preserved. The calvarium appear intact. The paranasal sinuses are clear. The mastoid air cells are well-aerated.     Impression: Impression: No acute intracranial process identified. Electronically Signed: Fidencio Garces MD  4/9/2025 11:38 AM EDT  Workstation ID: [id number]    Mammo Diagnostic Digital Tomosynthesis Left With CAD  Result Date: 3/18/2025  Narrative:     EXAM: Digital diagnostic mammography, LEFT 2D with CAD and 3D tomosynthesis.                Bilateral survey breast ultrasound.  DATE OF EXAM: March 18, 2025  INDICATION: Patient fell when she had a stroke. Tender palpable lump in the upper inner quadrant of the left breast for 2 months. Skin over the lump marked with a triangular radiopaque marker.  Family history of breast cancer (mother with bilateral breast cancer at age 80, maternal grandmother at age 60, paternal aunt at age 50, paternal aunt at age 50, paternal grandmother at age unknown).  PHYSICAL EXAMINATION: Palpable fullness overlying upper left rib, upper inner quadrant left breast.  COMPARISON: Bilateral mammogram date October 15, 2024, August 21, 2023.  TECHNIQUE: Mammographic 2D images of the breast was obtained digitally and CAD was used in reviewing the images. Mammographic 3D tomosynthesis images of the breast was obtained digitally.  CC, LM and MLO views of the left breast were obtained.  High-resolution ultrasound imaging of each breast (all 4 quadrants and retroareolar region) and each axilla was performed in survey fashion. Particular attention was given to the area of palpable concern in the upper inner quadrant of the left breast.  BREAST DENSITY:  The breast is heterogeneously dense, which may obscure small masses.  FINDINGS:  No spiculated masses or suspicious microcalcifications are identified in the left breast on the mammogram.  --------------------------------------------------------------------------------------  Background echotexture is  homogeneous-fibroglandular.  Ultrasound images on the right demonstrate no dominant cyst, suspicious mass or architectural distortion. A benign intramammary lymph node is present at 9:00, 12 cm from the nipple.  No abnormal right axillary lymphadenopathy is identified on images obtained.  Ultrasound images on the left demonstrate no suspicious mass or architectural distortion. There is no specific sonographic abnormality identified in the area of palpable concern, 11:00 left breast, 15 cm from the nipple.  No abnormal left axillary lymphadenopathy is identified on images obtained.    Impression:      1.   No sonographic features specific for malignancy, right breast.      2.   No mammographic or sonographic features specific for malignancy, left breast. No significant change in the mammographic appearance of the left breast when compared with prior studies.       RECOMMENDATION:  Routine annual 3-D tomosynthesis mammography and screening breast ultrasound are recommended for this patient if she remains asymptomatic.  She is due for her annual bilateral mammogram in October 2025.  Findings and recommendation were discussed with the patient by Dr. Hirsch on the afternoon of March 18, 2025.  OVERALL ASSESSMENT:  BENIGN.  A reminder letter will be scheduled.  10-20% of cancers are invisible on a mammogram. A clinical breast exam is  recommended if she has not had one in the last 12 months. A palpable lump  needs clinical evaluation despite a negative mammogram.  Created by: Yana Hirsch MD  Signed by: Yana Hirsch MD  Signed on: 3/18/2025 14:38 EDT  Location: [id number]           US breast bilateral  Result Date: 3/18/2025  Narrative:     EXAM: Digital diagnostic mammography, LEFT 2D with CAD and 3D tomosynthesis.                Bilateral survey breast ultrasound.  DATE OF EXAM: March 18, 2025  INDICATION: Patient fell when she had a stroke. Tender palpable lump in the upper inner quadrant of the left breast for 2  months. Skin over the lump marked with a triangular radiopaque marker.  Family history of breast cancer (mother with bilateral breast cancer at age 80, maternal grandmother at age 60, paternal aunt at age 50, paternal aunt at age 50, paternal grandmother at age unknown).  PHYSICAL EXAMINATION: Palpable fullness overlying upper left rib, upper inner quadrant left breast.  COMPARISON: Bilateral mammogram date October 15, 2024, August 21, 2023.  TECHNIQUE: Mammographic 2D images of the breast was obtained digitally and CAD was used in reviewing the images. Mammographic 3D tomosynthesis images of the breast was obtained digitally.  CC, LM and MLO views of the left breast were obtained.  High-resolution ultrasound imaging of each breast (all 4 quadrants and retroareolar region) and each axilla was performed in survey fashion. Particular attention was given to the area of palpable concern in the upper inner quadrant of the left breast.  BREAST DENSITY:  The breast is heterogeneously dense, which may obscure small masses.  FINDINGS:  No spiculated masses or suspicious microcalcifications are identified in the left breast on the mammogram.  --------------------------------------------------------------------------------------  Background echotexture is homogeneous-fibroglandular.  Ultrasound images on the right demonstrate no dominant cyst, suspicious mass or architectural distortion. A benign intramammary lymph node is present at 9:00, 12 cm from the nipple.  No abnormal right axillary lymphadenopathy is identified on images obtained.  Ultrasound images on the left demonstrate no suspicious mass or architectural distortion. There is no specific sonographic abnormality identified in the area of palpable concern, 11:00 left breast, 15 cm from the nipple.  No abnormal left axillary lymphadenopathy is identified on images obtained.    Impression:      1.   No sonographic features specific for malignancy, right breast.      2.    No mammographic or sonographic features specific for malignancy, left breast. No significant change in the mammographic appearance of the left breast when compared with prior studies.       RECOMMENDATION:  Routine annual 3-D tomosynthesis mammography and screening breast ultrasound are recommended for this patient if she remains asymptomatic.  She is due for her annual bilateral mammogram in October 2025.  Findings and recommendation were discussed with the patient by Dr. Hirsch on the afternoon of March 18, 2025.  OVERALL ASSESSMENT:  BENIGN.  A reminder letter will be scheduled.  10-20% of cancers are invisible on a mammogram. A clinical breast exam is  recommended if she has not had one in the last 12 months. A palpable lump  needs clinical evaluation despite a negative mammogram.  Created by: Yana Hirsch MD  Signed by: Yana Hirsch MD  Signed on: 3/18/2025 14:38 EDT  Location: [place] [id number]             EKG: (my review): Sinus rhythm, heart rate 75, QTc 442.  No ST elevation or depression    Assessment & Plan  Assessment / Plan     - I have discussed the case with the ED physician.  I have seen patient at bedside.  I have independently reviewed her EKG, labs, imaging, record    # Strokelike symptoms with left-sided weakness  # Other chronic problems: HTN, HLD, prior TIA, migraines, and mood disorders    - NIH stroke scale score was 2.  However, patient has mostly back to her baseline with no significant residual deficit. No tPA per neurology.  - Admit to stroke / telemetry unit with frequent neurochecks  - Checking brain MRI without contrast and echocardiogram with bubble study  - Checking TSH, A1c, lipid panel, and vitamin D level  - Continue home Plavix 75 mg and atorvastatin 80 mg daily  - PT/OT/speech eval.  Head of bed 30 degrees  - Appreciate further recs from neurology    Resume home medications when appropriate.  DVT prophylaxis with Lovenox.    CODE STATUS:    Code Status (Patient has no pulse and  is not breathing): CPR (Attempt to Resuscitate)  Medical Interventions (Patient has pulse or is breathing): Full Support      Admission Status:  I believe this patient meets admission status.    Electronically signed by Keith Mendez MD, 04/09/25, 2:37 PM EDT.              Electronically signed by Keith Mendez MD at 04/09/25 [id number]          Emergency Department Notes        Cory Richards MD at 04/09/25 [id number]          Time: 1:35 PM EDT  Date of encounter:  4/9/2025  Independent Historian/Clinical History and Information was obtained by:   Patient and EMS    History is limited by: N/A    Chief Complaint: Left-sided weakness      History of Present Illness:  Patient is a 64 y.o. year old female who presents to the emergency department for evaluation of strokelike symptoms with sudden onset of left leg and left arm weakness.  Patient reports she was try to get a bed when she was unable to stand and had to fall back down to the bed.  EMS picked up patient reports left arm and leg weakness.      Patient Care Team  Primary Care Provider: Provider, No Known    Past Medical History:     Allergies   Allergen Reactions    Penicillins Anaphylaxis and Shortness Of Breath    Metronidazole Hives and Nausea Only     Past Medical History:   Diagnosis Date    Acid reflux     Anxiety disorder     Arthritis     Bipolar disorder     Carpal tunnel syndrome     Deep vein thrombosis April 2022    Depression     Diabetes     DOES NOT CHECK BS DAILY    H/O Panic disorder     Hirsutism     History of back pain     Hyperlipidemia     Hypertension     Kidney stones     Limb swelling     Migraines     PTSD (post-traumatic stress disorder)     Pulmonary embolism     Rotator cuff tear, left     Rotator cuff tear, left 06/10/2021    Sleep apnea     Stress incontinence     Tattoos     TIA (transient ischemic attack)      Past Surgical History:   Procedure Laterality Date    ABDOMINOPLASTY      Tummy tuck    ANKLE SURGERY      APPENDECTOMY      25 yrs. old  when pregnant     SECTION      CHOLECYSTECTOMY N/A 10/06/2021    Procedure: CHOLECYSTECTOMY LAPAROSCOPIC;  Surgeon: Jeancarlos Carrington MD;  Location: [facility] MAIN OR;  Service: General;  Laterality: N/A;    COLONOSCOPY      COLONOSCOPY N/A 2022    Procedure: COLONOSCOPY INTO CECUM WITH COLD SNARE POLYPECTOMY;  Surgeon: Ananth Odell MD;  Location:  [facility];  Service: Gastroenterology;  Laterality: N/A;  PRE: PERSONAL H/O COLON POLYPS  POST: DIVERTICULOSIS, POLYP, HEMORRHOIDS, TORTUOUS COLON    ENDOSCOPY N/A 10/28/2021    Procedure: ESOPHAGOGASTRODUODENOSCOPY with biopsies;  Surgeon: Luciano Salmon MD;  Location:  [facility];  Service: Gastroenterology;  Laterality: N/A;  pre:  abnormal CT  post:  gastritis, HH,     EYE SURGERY      childhood; muscle    FRACTURE SURGERY      tibia and fibula fracture    HYSTERECTOMY      INTRAOCULAR LENS INSERTION      KNEE ARTHROSCOPY W/ MENISCAL REPAIR Right     KNEE SURGERY      SHOULDER ARTHROSCOPY Left 06/10/2021    Procedure: LEFT SHOULDER ARTHROSCOPY;  Surgeon: Дмитрий Francois MD;  Location: [facility] OR [facility];  Service: Orthopedics;  Laterality: Left;    SHOULDER ROTATOR CUFF REPAIR Left 06/10/2021    Procedure: SUBACROMIAL DECOMPRESSION, DISTAL CLAVICULECTOMY AND ROTATOR CUFF REPAIR;  Surgeon: Дмитрий Francois MD;  Location: [facility] OR [facility];  Service: Orthopedics;  Laterality: Left;    SHOULDER SURGERY Right     TONSILLECTOMY      TUBAL ABDOMINAL LIGATION       Family History   Problem Relation Age of Onset    Cancer Mother         Unspecified    Breast cancer Mother     Clotting disorder Mother     Hypertension Father     Dementia Father     Stroke Father     Heart disease Father     Diabetes Father         Unspecified type    Arthritis Father     Alzheimer's disease Father     Heart disease Brother     Breast cancer Paternal Aunt     Throat cancer Paternal Aunt     Breast cancer Paternal Aunt     Breast cancer Paternal Grandmother      Malig Hyperthermia Neg Hx        Home Medications:  Prior to Admission medications    Medication Sig Start Date End Date Taking? Authorizing Provider   amLODIPine (NORVASC) 2.5 MG tablet Take 1 tablet by mouth Daily. 6/16/22  Yes Isabela Nelson APRN   atorvastatin (LIPITOR) 80 MG tablet Take 1 tablet by mouth Every Night. 2/20/25  Yes Gregorio Adam MD   clopidogrel (PLAVIX) 75 MG tablet Take 1 tablet by mouth Daily. 2/20/25  Yes Gregorio Adam MD   folic acid (FOLVITE) 1 MG tablet Take 1 tablet by mouth Daily. 2/20/25  Yes Gregorio Adam MD   lamoTRIgine (LaMICtal) 200 MG tablet Take 1 tablet by mouth 2 (Two) Times a Day.   Yes Gregorio Adam MD   multivitamin tablet tablet Take 1 tablet by mouth Daily. 2/20/25  Yes Gregorio Adam MD   pantoprazole (Protonix) 40 MG EC tablet Take 1 tablet by mouth Daily. 4/20/22  Yes Isabela Nelson APRN   prazosin (MINIPRESS) 5 MG capsule Take 1 capsule by mouth Every Night.   Yes Gregorio Adam MD   QUEtiapine (SEROquel) 100 MG tablet Take 0.5 tablets by mouth 2 (Two) Times a Day. Pt takes 50mg qam, 50mg midday and 100mg hs   Yes Gregorio Adam MD   QUEtiapine (SEROquel) 100 MG tablet Take 1 tablet by mouth Every Night. Pt takes 50mg qam, 50mg midday and 100mg hs   Yes Gregorio Adam MD   SUMAtriptan (Imitrex) 5 MG/ACT nasal spray 1 spray into the nostril(s) as directed by provider Every 2 (Two) Hours As Needed for Migraine. 8/30/21  Yes Mihaela Traylor MD   topiramate (TOPAMAX) 50 MG tablet Take 1 tablet by mouth 2 (Two) Times a Day.   Yes Gregorio Adam MD   traZODone (DESYREL) 100 MG tablet Take 1 tablet by mouth Every Night.   Yes Gregorio Adam MD   apixaban (ELIQUIS) 5 MG tablet tablet Take 1 tablet by mouth Every 12 (Twelve) Hours. 7/11/22 4/9/25  Isabela Nelson APRN   Blood Glucose Monitoring Suppl kit 1 kit 3 (Three) Times a Day. Lancets and blood glucose strips with machine to check blood  sugars up to 3 times a day. 7/21/21 4/9/25  Evelia Emmanuel MD   chlorhexidine (PERIDEX) 0.12 % solution  5/26/22 4/9/25  Gregorio Adam MD   DULoxetine (Cymbalta) 30 MG capsule Take 1 capsule by mouth Daily. 5/31/22 4/9/25  Isabela Nelson APRN   empagliflozin (Jardiance) 10 MG tablet tablet Take 1 tablet by mouth Daily. 4/21/22 4/9/25  Isabela Nelson APRN   glucose blood test strip Use as instructed 12/13/21 4/9/25  Isabela Nelson APRN   Lancets misc 2 each 2 (Two) Times a Day. 12/13/21 4/9/25  Isabela Nelson APRN   ondansetron (Zofran) 4 MG tablet Take 1 tablet by mouth Every 8 (Eight) Hours As Needed for Nausea or Vomiting. 4/6/22 4/9/25  Connie Montoya APRN   sucralfate (CARAFATE) 1 g tablet Take 1 g by mouth 4 (Four) Times a Day.  4/9/25  Gregorio Adam MD   verapamil (CALAN) 120 MG tablet Take 120 mg by mouth Daily.  4/9/25  Gregorio Adam MD        Social History:   Social History     Tobacco Use    Smoking status: Never    Smokeless tobacco: Never   Vaping Use    Vaping status: Never Used   Substance Use Topics    Alcohol use: Yes     Comment: rarely drinks    Drug use: Never         Review of Systems:  Review of Systems   Constitutional:  Negative for chills and fever.   HENT:  Negative for congestion, rhinorrhea and sore throat.    Eyes:  Negative for pain and visual disturbance.   Respiratory:  Negative for apnea, cough, chest tightness and shortness of breath.    Cardiovascular:  Negative for chest pain and palpitations.   Gastrointestinal:  Negative for abdominal pain, diarrhea, nausea and vomiting.   Genitourinary:  Negative for difficulty urinating and dysuria.   Musculoskeletal:  Negative for joint swelling and myalgias.   Skin:  Negative for color change.   Neurological:  Positive for weakness. Negative for seizures and headaches.   Psychiatric/Behavioral: Negative.     All other systems reviewed and are negative.       Physical Exam:  /85   Pulse 79    "Temp 98.1 °F (36.7 °C)   Resp 18   Ht 160 cm (63\")   Wt 78.2 kg (172 lb 6.4 oz)   LMP  (LMP Unknown)   SpO2 95%   BMI 30.54 kg/m²     Physical Exam  Vitals and nursing note reviewed.   Constitutional:       General: She is not in acute distress.     Appearance: Normal appearance. She is not toxic-appearing.   HENT:      Head: Normocephalic and atraumatic.      Jaw: There is normal jaw occlusion.   Eyes:      General: Lids are normal.      Extraocular Movements: Extraocular movements intact.      Conjunctiva/sclera: Conjunctivae normal.      Pupils: Pupils are equal, round, and reactive to light.   Cardiovascular:      Rate and Rhythm: Normal rate and regular rhythm.      Pulses: Normal pulses.      Heart sounds: Normal heart sounds.   Pulmonary:      Effort: Pulmonary effort is normal. No respiratory distress.      Breath sounds: Normal breath sounds. No wheezing or rhonchi.   Abdominal:      General: Abdomen is flat.      Palpations: Abdomen is soft.      Tenderness: There is no abdominal tenderness. There is no guarding or rebound.   Musculoskeletal:         General: Normal range of motion.      Cervical back: Normal range of motion and neck supple.      Right lower leg: No edema.      Left lower leg: No edema.   Skin:     General: Skin is warm and dry.   Neurological:      Mental Status: She is alert and oriented to person, place, and time.      Comments: Left leg minimal movement from stretcher  Left arm able to be lifted approximately FPC.   Psychiatric:         Mood and Affect: Mood normal.                    Medical Decision Making:      Comorbidities that affect care:    Stroke    External Notes reviewed:    None      The following orders were placed and all results were independently analyzed by me:  Orders Placed This Encounter   Procedures    CT Head Without Contrast Stroke Protocol    CT Angiogram Head w AI Analysis of LVO    CT Angiogram Neck    CT CEREBRAL PERFUSION WITH & WITHOUT CONTRAST    " XR Chest 1 View    [place] Draw    Comprehensive Metabolic Panel    Protime-INR    aPTT    Urinalysis With Microscopic If Indicated (No Culture) - Urine, Clean Catch    CBC Auto Differential    Urinalysis, Microscopic Only - Urine, Clean Catch    NPO Diet NPO Type: Strict NPO    Initiate Department's Acute Stroke Process (Team D, Code 19, etc)    Perform NIH Stroke Scale    Measure Actual Weight    Head of Bed 30 Degrees or Less    Undress and Gown    Continuous Pulse Oximetry    Vital Signs    Neuro Checks    Notify Provider for SBP <80 or >200    Notify Provider for SBP >140 (For Hemorrhagic Stroke)    No Hypotonic Fluids    Nursing Dysphagia Screening (Complete Prior to Giving anything PO)    RN to Place Order SLP Consult (IF swallow screen failed) - Eval & Treat Choosing Reason of RN Dysphagia Screen Failed    Inpatient Hospitalist Consult    Oxygen Therapy- Nasal Cannula; Titrate 1-6 LPM Per SpO2; 90 - 95%    POC Glucose Once    ECG 12 Lead ED Triage Standing Order; Acute Stroke (Onset <12 hrs)    Type & Screen    ABO RH Specimen Verification    Insert Large Bore Peripheral IV - Right AC Preferred    CBC & Differential    Green Top (Gel)    Lavender Top    Gold Top - SST    Light Blue Top    Extra Tubes    Light Blue Top       Medications Given in the Emergency Department:  Medications   sodium chloride 0.9 % flush 10 mL (has no administration in time range)   iopamidol (ISOVUE-370) 76 % injection 100 mL (100 mL Intravenous Given 4/9/25 1159)        ED Course:         Labs:    Lab Results (last 24 hours)       Procedure Component Value Units Date/Time    CBC & Differential [[id number]]  (Abnormal) Collected: 04/09/25 1134    Specimen: Blood Updated: 04/09/25 1141    Narrative:      The following orders were created for panel order CBC & Differential.  Procedure                               Abnormality         Status                     ---------                               -----------         ------                      CBC Auto Differential[[id number]]        Abnormal            Final result                 Please view results for these tests on the individual orders.    Comprehensive Metabolic Panel [[id number]]  (Abnormal) Collected: 04/09/25 1134    Specimen: Blood Updated: 04/09/25 1200     Glucose 91 mg/dL      BUN 12 mg/dL      Creatinine 0.94 mg/dL      Sodium 141 mmol/L      Potassium 4.4 mmol/L      Chloride 108 mmol/L      CO2 23.0 mmol/L      Calcium 9.4 mg/dL      Total Protein 7.3 g/dL      Albumin 4.3 g/dL      ALT (SGPT) 18 U/L      AST (SGOT) 20 U/L      Alkaline Phosphatase 97 U/L      Total Bilirubin 0.3 mg/dL      Globulin 3.0 gm/dL      A/G Ratio 1.4 g/dL      BUN/Creatinine Ratio 12.8     Anion Gap 10.0 mmol/L      eGFR 68.3 mL/min/1.73     Narrative:      GFR Categories in Chronic Kidney Disease (CKD)      GFR Category          GFR (mL/min/1.73)    Interpretation  G1                     90 or greater         Normal or high (1)  G2                      60-89                Mild decrease (1)  G3a                   45-59                Mild to moderate decrease  G3b                   30-44                Moderate to severe decrease  G4                    15-29                Severe decrease  G5                    14 or less           Kidney failure          (1)In the absence of evidence of kidney disease, neither GFR category G1 or G2 fulfill the criteria for CKD.    eGFR calculation 2021 CKD-EPI creatinine equation, which does not include race as a factor    Protime-INR [[id number]]  (Normal) Collected: 04/09/25 1134    Specimen: Blood Updated: 04/09/25 1313     Protime 13.8 Seconds      INR 1.02    Narrative:      Suggested Therapeutic Ranges For Oral Anticoagulant Therapy:  Level of Therapy                      INR Target Range  Standard Dose                            2.0-3.0  High Dose                                2.5-3.5  Patients not receiving anticoagulant  Therapy Normal Range                      0.86-1.15    aPTT [[id number]]  (Normal) Collected: 04/09/25 1134    Specimen: Blood Updated: 04/09/25 1313     PTT 27.9 seconds     CBC Auto Differential [[id number]]  (Abnormal) Collected: 04/09/25 1134    Specimen: Blood Updated: 04/09/25 1141     WBC 6.39 10*3/mm3      RBC 4.41 10*6/mm3      Hemoglobin 13.1 g/dL      Hematocrit 40.9 %      MCV 92.7 fL      MCH 29.7 pg      MCHC 32.0 g/dL      RDW 15.0 %      RDW-SD 51.6 fl      MPV 10.0 fL      Platelets 201 10*3/mm3      Neutrophil % 36.9 %      Lymphocyte % 52.3 %      Monocyte % 7.5 %      Eosinophil % 2.5 %      Basophil % 0.6 %      Immature Grans % 0.2 %      Neutrophils, Absolute 2.36 10*3/mm3      Lymphocytes, Absolute 3.34 10*3/mm3      Monocytes, Absolute 0.48 10*3/mm3      Eosinophils, Absolute 0.16 10*3/mm3      Basophils, Absolute 0.04 10*3/mm3      Immature Grans, Absolute 0.01 10*3/mm3      nRBC 0.0 /100 WBC     Urinalysis With Microscopic If Indicated (No Culture) - Urine, Clean Catch [[id number]]  (Abnormal) Collected: 04/09/25 1223    Specimen: Urine, Clean Catch Updated: 04/09/25 1250     Color, UA Yellow     Appearance, UA Clear     pH, UA 8.0     Specific Gravity, UA 1.027     Glucose, UA Negative     Ketones, UA Negative     Bilirubin, UA Negative     Blood, UA Negative     Protein, UA Negative     Leuk Esterase, UA Moderate (2+)     Nitrite, UA Negative     Urobilinogen, UA 0.2 E.U./dL    Urinalysis, Microscopic Only - Urine, Clean Catch [[id number]]  (Abnormal) Collected: 04/09/25 1223    Specimen: Urine, Clean Catch Updated: 04/09/25 1250     RBC, UA 0-2 /HPF      WBC, UA 3-5 /HPF      Bacteria, UA None Seen /HPF      Squamous Epithelial Cells, UA 0-2 /HPF      Hyaline Casts, UA None Seen /LPF      Methodology Automated Microscopy             Imaging:    CT Angiogram Head w AI Analysis of LVO  Result Date: 4/9/2025  CT ANGIOGRAM HEAD W AI ANALYSIS OF LVO, CT ANGIOGRAM NECK Date of Exam: 4/9/2025 11:43 AM EDT Indication: Neuro Deficit,  acute, Stroke suspected Neuro deficit, acute, stroke suspected. Comparison: None available. Technique: CTA of the head was performed after the uneventful intravenous administration of iodinated contrast. Reconstructed coronal and sagittal images were also obtained. In addition, a 3-D volume rendered image was created for interpretation. Automated exposure control and iterative reconstruction methods were used. FINDINGS: Vascular Findings: The right common carotid, internal carotid, middle cerebral, anterior cerebral, vertebral, and posterior cerebral arteries are patent without abrupt cut off or aneurysmal dilation. The left common carotid, internal carotid, middle cerebral, anterior cerebral, vertebral, and posterior cerebral arteries are patent without abrupt cut off or aneurysmal dilation. Basilar artery appears patent and appears unremarkable. Non-vascular Findings: For description of nonvascular intracranial findings, please refer to the noncontrast head CT performed the same date. No acute abnormality is identified within the visualized soft tissue or bony structures of the neck. Chronic moderate height loss of C7 The visualized lung apices are clear.     1.No acute abnormality is identified within the large arteries of the head or neck. 2.No significant stenosis of the bilateral internal carotid arteries. 3.Chronic moderate height loss of C7. This was also present on chest CT from 4/13/2022. Electronically Signed: Mani Ibanez MD  4/9/2025 1:01 PM EDT  Workstation ID: [id number]    CT Angiogram Neck  Result Date: 4/9/2025  CT ANGIOGRAM HEAD W AI ANALYSIS OF LVO, CT ANGIOGRAM NECK Date of Exam: 4/9/2025 11:43 AM EDT Indication: Neuro Deficit, acute, Stroke suspected Neuro deficit, acute, stroke suspected. Comparison: None available. Technique: CTA of the head was performed after the uneventful intravenous administration of iodinated contrast. Reconstructed coronal and sagittal images were also obtained. In  addition, a 3-D volume rendered image was created for interpretation. Automated exposure control and iterative reconstruction methods were used. FINDINGS: Vascular Findings: The right common carotid, internal carotid, middle cerebral, anterior cerebral, vertebral, and posterior cerebral arteries are patent without abrupt cut off or aneurysmal dilation. The left common carotid, internal carotid, middle cerebral, anterior cerebral, vertebral, and posterior cerebral arteries are patent without abrupt cut off or aneurysmal dilation. Basilar artery appears patent and appears unremarkable. Non-vascular Findings: For description of nonvascular intracranial findings, please refer to the noncontrast head CT performed the same date. No acute abnormality is identified within the visualized soft tissue or bony structures of the neck. Chronic moderate height loss of C7 The visualized lung apices are clear.     1.No acute abnormality is identified within the large arteries of the head or neck. 2.No significant stenosis of the bilateral internal carotid arteries. 3.Chronic moderate height loss of C7. This was also present on chest CT from 4/13/2022. Electronically Signed: Mani Ibanez MD  4/9/2025 1:01 PM EDT  Workstation ID: [id number]    XR Chest 1 View  Result Date: 4/9/2025  XR CHEST 1 VW Date of Exam: 4/9/2025 12:10 PM EDT Indication: Acute Stroke Protocol (Onset < 12 hrs) Comparison: AP chest x-ray 4/13/2022 Findings: Lungs are well expanded and appear clear. Cardiomediastinal contours are within normal limits.     Impression: No acute cardiopulmonary abnormality is identified. Electronically Signed: Mague Leavitt  4/9/2025 12:33 PM EDT  Workstation ID: [id number]    CT CEREBRAL PERFUSION WITH & WITHOUT CONTRAST  Result Date: 4/9/2025  CT CEREBRAL PERFUSION W WO CONTRAST Date of Exam: 4/9/2025 11:43 AM EDT Indication: Neuro Deficit, acute, Stroke suspected Neuro deficit, acute, stroke suspected.  Comparison: None available.  Technique: Axial CT images of the brain were obtained prior to and after the uneventful intravenous administration of iodinated contrast. Core blood volume, core blood flow, mean transit time, and Tmax images were obtained utilizing the Rapid software protocol. A limited CT angiogram of the head was also performed to measure the blood vessel density. The radiation dose reduction device was turned on for each scan per the ALARA (As Low as Reasonably Achievable) protocol. FINDINGS: CT Perfusion: CBF (<30%) volume: 0 mL Tmax (>6.0s) volume: 0 mL Mismatch volume: 0 mL Mismatch ratio: None     No CT perfusion findings to suggest territorial ischemia or core infarct. Electronically Signed: Mani Ibanez MD  4/9/2025 12:09 PM EDT  Workstation ID: [id number]    CT Head Without Contrast Stroke Protocol  Result Date: 4/9/2025  CT HEAD WO CONTRAST STROKE PROTOCOL Date of Exam: 4/9/2025 11:24 AM EDT Indication: Neuro Deficit, acute, Stroke suspected Neuro deficit, acute, stroke suspected. Comparison: August 30, 2017 Technique: Axial CT images were obtained of the head without contrast administration.  Reconstructed coronal and sagittal images were also obtained. Automated exposure control and iterative construction methods were used. Findings: No acute intracranial hemorrhage or extra-axial collection is identified. The ventricles appear normal in caliber, with no evidence of mass effect or midline shift. The basal cisterns appear patent. The gray-white differentiation appears preserved. The calvarium appear intact. The paranasal sinuses are clear. The mastoid air cells are well-aerated.     Impression: No acute intracranial process identified. Electronically Signed: Fidencio Garces MD  4/9/2025 11:38 AM EDT  Workstation ID: [id number]        Differential Diagnosis and Discussion:    Stroke: Differential diagnosis in this patient with signs of possible ischemic stroke include TIA or ischemic stroke, hemorrhagic stroke,  hypoglycemic episode, toxic or metabolic encephalopathy, paresthesias.    PROCEDURES:    Labs were collected in the emergency department and all labs were reviewed and interpreted by me.  X-ray were performed in the emergency department and all X-ray impressions were independently interpreted by me.  An EKG was performed and the EKG was interpreted by me.  CT scan was performed in the emergency department and the CT scan radiology impression was interpreted by me.    ECG 12 Lead ED Triage Standing Order; Acute Stroke (Onset <12 hrs)   Preliminary Result   HEART RATE=75  bpm   RR Qaadklkx=626  ms   NE Jfxiyaam=323  ms   P Horizontal Axis=-7  deg   P Front Axis=37  deg   QRSD Interval=83  ms   QT Qitgacdv=969  ms   CGqD=387  ms   QRS Axis=26  deg   T Wave Axis=28  deg   - NORMAL ECG -   Sinus rhythm   Date and Time of Study:2025-04-09 12:17:11        My interpretation of the EKG shows normal sinus rhythm, normal rate, normal QT, no acute ischemia    Procedures    MDM  Number of Diagnoses or Management Options  Cerebrovascular accident (CVA), unspecified mechanism  Diagnosis management comments: In summary this is a 64-year-old female who presents to the emergency department for evaluation of stroke workup.  CT brain and CTAs reviewed by me and discussed with neurology are unremarkable negative for acute pathology.  CBC independently reviewed and interpreted by me and shows no critical abnormalities.  CMP independently reviewed and interpreted by me and shows no critical abnormalities.  Patient case has been discussed with the hospitalist team who will admit to the hospital for further evaluation and continuation of treatment.             The patient presents with symptoms concerning for a stroke. The patient was evaluated by me immediately upon arrival. Extensive history and physical was obtained. Family was present to give further insight into patients onset of symptoms.  CT scan findings were discussed with  radiology. The case was also discussed at length with telehealth neurology. Nursing staff was instructed on how to proceed with patient care. Patient was then placed on the cardiac monitor and monitored for arrhythmia that could be contributing to stroke like symptoms. EKG was performed and evaluated by me. Patient's blood pressure was monitored and controled consistent with stroke guidelines. Treatment option's for patient's symptoms include TNKase however after inclusion and exclusion criteria was weighed and discussion with neurologist patient was deemed not a candidate. The patient's mental status and neurological symptoms were monitored throughout their stay for worsening decline.     Total Critical Care time of 40 minutes. Total critical care time documented does not include time spent on separately billed procedures for services of nurses or physician assistants. I personally saw and examined the patient. I have reviewed all diagnostic interpretations and treatment plans as written. I was present for the key portions of any procedures performed and the inclusive time noted in any critical care statement. Critical care time includes patient management by me, time spent at the patients bedside,  time to review lab and imaging results, discussing patient care, documentation in the medical record, and time spent with family or caregiver.          Patient Care Considerations:    MRI: I considered ordering an MRI however this can be accomplished inpatient      Consultants/Shared Management Plan:    Hospitalist: I have discussed the case with Dr. Mendez who agrees to accept the patient for admission.  Consultant: I have discussed the case with teleneurology who agrees to consult on the patient.    Social Determinants of Health:    Patient is independent, reliable, and has access to care.       Disposition and Care Coordination:    Admit:   Through independent evaluation of the patient's history, physical, and imperical  data, the patient meets criteria for inpatient admission to the hospital.        Final diagnoses:   Cerebrovascular accident (CVA), unspecified mechanism        ED Disposition       ED Disposition   Decision to Admit    Condition   --    Comment   --               This medical record created using voice recognition software.             Cory Richards MD  04/09/25 1427      Electronically signed by Cory Richards MD at 04/09/25 1427       Vital Signs (last day)       Date/Time Temp Temp src Pulse Resp BP Patient Position SpO2    04/09/25 2000 -- -- 70 -- 143/78 -- --    04/09/25 1900 -- -- 72 -- 131/91 -- --    04/09/25 1834 97.5 (36.4) Oral 73 18 126/81 Lying 99    04/09/25 1703 -- -- 77 18 -- -- 97    04/09/25 1534 98 (36.7) Oral 73 17 133/88 Sitting 97    04/09/25 1500 -- -- 72 -- 133/88 -- 98    04/09/25 1430 -- -- 87 -- 142/93 -- 99    04/09/25 1400 -- -- 79 -- 124/85 -- 95    04/09/25 1325 -- -- 73 -- -- -- 97    04/09/25 1320 -- -- 72 -- -- -- 96    04/09/25 1315 -- -- 95 -- 150/95 -- 97    04/09/25 1310 -- -- 69 -- -- -- 97    04/09/25 1305 -- -- 73 -- -- -- 97    04/09/25 1300 -- -- 75 -- 142/88 -- 99    04/09/25 1255 -- -- 75 -- -- -- 97    04/09/25 1250 -- -- 75 -- -- -- 99    04/09/25 1245 -- -- 79 -- 139/81 -- 96    04/09/25 1240 -- -- 78 -- -- -- 98    04/09/25 1235 -- -- 78 -- -- -- 97    04/09/25 1230 -- -- 72 -- 142/90 -- 97    04/09/25 1225 -- -- 74 -- -- -- 98    04/09/25 1221 -- -- -- -- -- -- 96    04/09/25 1220 -- -- 99 -- -- -- --    04/09/25 1218 98.1 (36.7) -- -- -- -- -- --    04/09/25 1215 -- -- 80 -- 142/81 -- 97    04/09/25 1213 -- -- 85 -- -- -- --    04/09/25 1210 -- -- -- -- -- -- 97    04/09/25 1209 -- -- -- -- 139/96 -- --    04/09/25 1136 -- -- -- 18 -- -- --    04/09/25 11:27:47 -- -- 73 -- 153/79 -- 94          Facility-Administered Medications as of 4/9/2025   Medication Dose Route Frequency Provider Last Rate Last Admin    [START ON 4/10/2025] amLODIPine (NORVASC) tablet 2.5  mg  2.5 mg Oral Daily Keith Mendez MD        atorvastatin (LIPITOR) tablet 80 mg  80 mg Oral Nightly Keith Mendez MD   80 mg at 04/09/25 2025    [START ON 4/10/2025] clopidogrel (PLAVIX) tablet 75 mg  75 mg Oral Daily Keith Mendez MD        enoxaparin sodium (LOVENOX) syringe 40 mg  40 mg Subcutaneous Daily Keith Mendez MD   40 mg at 04/09/25 1827    [START ON 4/10/2025] folic acid (FOLVITE) tablet 1 mg  1 mg Oral Daily Keith Mendez MD        [COMPLETED] iopamidol (ISOVUE-370) 76 % injection 100 mL  100 mL Intravenous Once in imaging Cory Richards MD   100 mL at 04/09/25 1159    lamoTRIgine (LaMICtal) tablet 200 mg  200 mg Oral BID Keith Mendez MD   200 mg at 04/09/25 2026    ondansetron (ZOFRAN) injection 4 mg  4 mg Intravenous Q6H PRN Keith Mendez MD        [START ON 4/10/2025] pantoprazole (PROTONIX) EC tablet 40 mg  40 mg Oral Daily Keith Mendez MD        prazosin (MINIPRESS) capsule 5 mg  5 mg Oral Nightly Keith Mendez MD   5 mg at 04/09/25 2025    [START ON 4/10/2025] QUEtiapine (SEROquel) tablet 100 mg  100 mg Oral Nightly Keith Mendez MD        [START ON 4/10/2025] QUEtiapine (SEROquel) tablet 50 mg  50 mg Oral 2 times per day Keith Mendez MD        sodium chloride 0.9 % flush 10 mL  10 mL Intravenous PRN Keith Mendez MD        sodium chloride 0.9 % flush 10 mL  10 mL Intravenous Q12H Keith Mendez MD   10 mL at 04/09/25 2027    sodium chloride 0.9 % flush 10 mL  10 mL Intravenous PRN Keith Mendez MD        sodium chloride 0.9 % infusion 40 mL  40 mL Intravenous PRN Keith Mendez MD        sodium chloride 0.9 % infusion  75 mL/hr Intravenous Continuous Keith Mendez MD 75 mL/hr at 04/09/25 1827 75 mL/hr at 04/09/25 1827    SUMAtriptan (IMITREX) tablet 100 mg  100 mg Oral Q12H PRN Keith Mendez MD        topiramate (TOPAMAX) tablet 50 mg  50 mg Oral BID Keith Mendez MD   50 mg at 04/09/25 2026    traZODone (DESYREL) tablet 100 mg  100 mg Oral Nightly Keith Mendez MD   100 mg at 04/09/25 2025     Orders (active)        Start     Ordered    04/10/25 2100  QUEtiapine  (SEROquel) tablet 100 mg  Nightly         04/09/25 1609    04/10/25 0900  amLODIPine (NORVASC) tablet 2.5 mg  Daily         04/09/25 1609    04/10/25 0900  clopidogrel (PLAVIX) tablet 75 mg  Daily         04/09/25 1609    04/10/25 0900  pantoprazole (PROTONIX) EC tablet 40 mg  Daily         04/09/25 1609    04/10/25 0900  QUEtiapine (SEROquel) tablet 50 mg  2 times per day         04/09/25 1609    04/10/25 0900  folic acid (FOLVITE) tablet 1 mg  Daily         04/09/25 1609    04/10/25 0600  Lamotrigine Level  Morning Draw         04/09/25 1609 04/09/25 2100  atorvastatin (LIPITOR) tablet 80 mg  Nightly         04/09/25 1609    04/09/25 2100  lamoTRIgine (LaMICtal) tablet 200 mg  2 Times Daily         04/09/25 1609 04/09/25 2100  prazosin (MINIPRESS) capsule 5 mg  Nightly         04/09/25 1609    04/09/25 2100  topiramate (TOPAMAX) tablet 50 mg  2 Times Daily         04/09/25 1609    04/09/25 2100  traZODone (DESYREL) tablet 100 mg  Nightly         04/09/25 1609    04/09/25 2100  sodium chloride 0.9 % flush 10 mL  Every 12 Hours Scheduled         04/09/25 1609    04/09/25 2000  Intake and Output  Every 4 Hours       04/09/25 1609    04/09/25 1800  POC Glucose Q6H  Every 6 Hours      Comments: May Discontinue After 2 Consecutive Readings Less Than 140Notify Provider if 2 Readings Greater Than 140      04/09/25 1609    04/09/25 1700  enoxaparin sodium (LOVENOX) syringe 40 mg  Daily         04/09/25 1609    04/09/25 1700  sodium chloride 0.9 % infusion  Continuous         04/09/25 1609    04/09/25 1636  Diet: Cardiac; Healthy Heart (2-3 Na+); Texture: Regular (IDDSI 7); Fluid Consistency: Thin (IDDSI 0)  Diet Effective Now         04/09/25 1636    04/09/25 1610  Hemoglobin A1c  Once         04/09/25 1609    04/09/25 1610  Vitamin D,25-Hydroxy  Once         04/09/25 1609    04/09/25 1610  Vital Signs  Per Order Details        Comments: For ICU Admission: Vital Signs Every 2 Hours  For Telemetry Unit Admission:  Vital Signs Every 4 Hours    04/09/25 1609    04/09/25 1610  Continuous Pulse Oximetry  Continuous         04/09/25 1609    04/09/25 1610  Continuous Cardiac Monitoring  Continuous        Comments: Follow Standing Orders As Outlined in Process Instructions (Open Order Report to View Full Instructions)    04/09/25 1609    04/09/25 1610  Telemetry - Place Orders & Notify Provider of Results When Patient Experiences Acute Chest Pain, Dysrhythmia or Respiratory Distress  Continuous        Comments: Open Order Report to View Parameters Requiring Provider Notification    04/09/25 1609    04/09/25 1610  Notify Provider  Continuous        Comments: Open Order Report to View Parameters Requiring Provider Notification    04/09/25 1609    04/09/25 1610  RN to Place Order SLP Consult - Eval & Treat Choosing Reason of RN Dysphagia Screen Failed  Per Order Details        Comments: RN to Place Order SLP Consult - Eval & Treat Choosing Reason of RN Dysphagia Screen Failed    04/09/25 1609    04/09/25 1610  Neuro Checks  Per Order Details        Comments: For ICU Admission: Neuro Checks to Include All Stroke Deficits Every Hour x10 Hours, Then Every 2 Hours,  For Telemetry Unit Admission: Neuro Checks to Include All Stroke Deficits Every 4 Hours    04/09/25 1609    04/09/25 1610  NIHSS Assessment  Every 12 Hours        Comments: Turn off all sedation medications prior to performing assessment. Assessment to be performed upon admission, transfer to another unit, discharge, and with neurological decline. If NIHSS change is greater than or equal to 4 and/or neurological decline is noted notify physician.    04/09/25 1609    04/09/25 1610  Provide Stroke Education Material  Prior to Discharge        Comments: Educate patient PRN and daily during hospitalization.    04/09/25 1609    04/09/25 1610  OT Consult: Eval & Treat  Once         04/09/25 1609    04/09/25 1610  PT Consult: Eval & Treat As Tolerated  Once         04/09/25 1609     04/09/25 1610  SLP Consult: Eval & Treat Communication Disorder  Once        Comments: Per Stroke Protocol    04/09/25 1609    04/09/25 1610  Inpatient Case Management  Consult  Once        Provider:  (Not yet assigned)    04/09/25 1609    04/09/25 1610  Inpatient Diabetes Educator Consult  Once        Provider:  (Not yet assigned)    04/09/25 1609    04/09/25 1610  Inpatient Neurology Consult Stroke  Once        Specialty:  Neurology  Provider:  (Not yet assigned)    04/09/25 1609    04/09/25 1610  Saline Lock & Maintain IV Access  Continuous         04/09/25 1609    04/09/25 1610  Activity As Tolerated  Until Discontinued         04/09/25 1609    04/09/25 1609  SUMAtriptan (IMITREX) tablet 100 mg  Every 12 Hours PRN         04/09/25 1609    04/09/25 1609  sodium chloride 0.9 % flush 10 mL  As Needed         04/09/25 1609    04/09/25 1609  sodium chloride 0.9 % infusion 40 mL  As Needed         04/09/25 1609    04/09/25 1609  ondansetron (ZOFRAN) injection 4 mg  Every 6 Hours PRN         04/09/25 1609    04/09/25 1437  Code Status and Medical Interventions: CPR (Attempt to Resuscitate); Full Support  Continuous         04/09/25 1436    04/09/25 1436  Adult Transthoracic Echo Complete W/ Cont if Necessary Per Protocol  Once        Comments: With bubble study plz, for stroke workups    04/09/25 1436    04/09/25 1354  Inpatient Hospitalist Consult  Once        Specialty:  Hospitalist  Provider:  Keith Mendez MD    04/09/25 1353    04/09/25 1200  Neuro Checks  Every 2 Hours      Comments: On arrival and handoff, increase frequency as clinically indicated or for thrombolytic administration, otherwise Q2 hours.  Documentation of arrival assessment and any neuro change required.    04/09/25 1121    04/09/25 1122  Initiate Department's Acute Stroke Process (Team D, Code 19, etc)  Once         04/09/25 1121    04/09/25 1122  Perform NIH Stroke Scale  Once        Comments: Perform NIH Stroke Scale frequency:  Stat, prior to thrombolytic/intervention, repeat as needed per protocol.    04/09/25 1121    04/09/25 1122  Head of Bed 30 Degrees or Less  Until Discontinued         04/09/25 1121    04/09/25 1122  No Hypotonic Fluids  Until Discontinued         04/09/25 1121    04/09/25 1122  RN to Place Order SLP Consult (IF swallow screen failed) - Eval & Treat Choosing Reason of RN Dysphagia Screen Failed  Once         04/09/25 1122    04/09/25 1122  Insert Large Bore Peripheral IV - Right AC Preferred  Once         04/09/25 1122    04/09/25 1121  sodium chloride 0.9 % flush 10 mL  As Needed         04/09/25 1122    Unscheduled  Oxygen Therapy- Nasal Cannula; Titrate 1-6 LPM Per SpO2; 90 - 95%  Continuous PRN       04/09/25 1121    Unscheduled  Order CT Head Without Contrast for Neurological Decline  As Needed       04/09/25 1609                     Consult Notes (last 24 hours)        Jared Keller Mc-Shady GOYAL MD at 04/09/25 1207          [facility]  [facility]      Patient Name:   Melvina Randhawa  YOB: 1960  Identification Number:   MRN - [id number]  Date of Service:   04/09/2025 11:24:44    Diagnosis:        I63.89 - Cerebrovascular accident (CVA) due to other mechanism ([facility])    Impression:       Patient presented with left leg weakness and double vision. Head CT shows no acute process. Head CT shows no acute process.    Our recommendations are outlined below.    Recommendations:          Stroke/Telemetry Floor        Neuro Checks        Bedside Swallow Eval        DVT Prophylaxis        IV Fluids, Normal Saline        Head of Bed 30 Degrees        Euglycemia and Avoid Hyperthermia (PRN Acetaminophen)        Initiate Clopidogrel 75 mg daily    Sign Out:        Discussed with Emergency Department Provider        ------------------------------------------------------------------------------    Advanced Imaging:  CTA Head and Neck Completed.    CTP  Completed.    LVO:No    Patient in not a candidate for [place]      Metrics:  Last Known Well: 04/09/2025 09:00:00  Dispatch Time: 04/09/2025 11:23:23  Arrival Time: 04/09/2025 11:21:00  Initial Response Time: 04/09/2025 11:31:10  Symptoms: Left sided weakness.  Initial patient interaction: 04/09/2025 12:01:00  NIHSS Assessment Completed: 04/09/2025 12:03:50  Patient is not a candidate for Thrombolytic.  Thrombolytic Medical Decision: 04/09/2025 12:03:54  Patient was not deemed candidate for Thrombolytic because of following reasons:  Significant head trauma or stroke in previous 3 months .    CT head showed no acute hemorrhage or acute core infarct.    Primary Provider Notified of Diagnostic Impression and Management Plan on: 04/09/2025 12:07:04        ------------------------------------------------------------------------------    History of Present Illness:  Patient is a 64 year old Female.    Patient was brought by EMS for symptoms of Left sided weakness.  Past medical history of stroke, Diabetes Mellitus, Hypertension presented with concern of gait disturbances. History was provided by the spouse at [facility]. Last seen normal was around 0900 when she was doing break fast. Patient sat down and tried to rise around 1000 and couldnt       Past Medical History:       Hypertension       Hyperlipidemia       Stroke    Medications:    No Anticoagulant use   Antiplatelet use: Yes plavix  Reviewed EMR for current medications    Allergies:   Reviewed    Social History:  Drug Use: No    Family History:    There is no family history of premature cerebrovascular disease pertinent to this consultation    ROS :  14 Points Review of Systems was performed and was negative except mentioned in HPI.    Past Surgical History:  There Is No Surgical History Contributory To Today’s Visit         Examination:  BP(153/79), Pulse(73), Blood Glucose(81)  1A: Level of Consciousness - Alert; keenly responsive + 0  1B: Ask Month and Age - Both  Questions Right + 0  1C: Blink Eyes & Squeeze Hands - Performs 1 Task + 1  2: Test Horizontal Extraocular Movements - Normal + 0  3: Test Visual Fields - No Visual Loss + 0  4: Test Facial Palsy (Use Grimace if Obtunded) - Normal symmetry + 0  5A: Test Left Arm Motor Drift - No Drift for 10 Seconds + 0  5B: Test Right Arm Motor Drift - No Drift for 10 Seconds + 0  6A: Test Left Leg Motor Drift - Drift, but doesn't hit bed + 1  6B: Test Right Leg Motor Drift - No Drift for 5 Seconds + 0  7: Test Limb Ataxia (FNF/Heel-Shin) - No Ataxia + 0  8: Test Sensation - Normal; No sensory loss + 0  9: Test Language/Aphasia - Normal; No aphasia + 0  10: Test Dysarthria - Normal + 0  11: Test Extinction/Inattention - No abnormality + 0    NIHSS Score: 2      Pre-Morbid Modified Amanuel Scale:  1 Points = No significant disability despite symptoms; able to carry out all usual duties and activities    Spoke with : Dr Richards  I reviewed the available imaging via Rapid and initiated discussion with the primary provider    This consult was conducted in real time using interactive audio and video technology. Patient was informed of the technology being used for this visit and agreed to proceed. Patient located in hospital and provider located at home/office setting.      Patient is being evaluated for possible acute neurologic impairment and high probability of imminent or life-threatening deterioration. I spent total of 30 minutes providing care to this patient, including time for face to face visit via telemedicine, review of medical records, imaging studies and discussion of findings with providers, the patient and/or family.      Dr Jared Ortiz-Skylar Keller      [facility]  For Inpatient follow-up with [facility] physician please call [facility] at [phone]. As we are not an outpatient service for any post hospital discharge needs please contact the hospital for assistance.  If you have any questions for the [facility]  physicians or need to reconsult for clinical or diagnostic changes please contact us via [facility] at [phone].    Electronically signed by Jared Keller Mc-Shady GOYAL MD at 04/09/25 3182

## 2025-04-11 ENCOUNTER — APPOINTMENT (OUTPATIENT)
Dept: CARDIOLOGY | Facility: HOSPITAL | Age: 65
DRG: 066 | End: 2025-04-11
Payer: MEDICARE

## 2025-04-11 VITALS
BODY MASS INDEX: 30.55 KG/M2 | TEMPERATURE: 97.5 F | DIASTOLIC BLOOD PRESSURE: 84 MMHG | RESPIRATION RATE: 18 BRPM | SYSTOLIC BLOOD PRESSURE: 124 MMHG | OXYGEN SATURATION: 96 % | HEART RATE: 72 BPM | HEIGHT: 63 IN | WEIGHT: 172.4 LBS

## 2025-04-11 LAB
AORTIC DIMENSIONLESS INDEX: 0.92 (DI)
ASCENDING AORTA: 3.6 CM
AV MEAN PRESS GRAD SYS DOP V1V2: 3.6 MMHG
AV VMAX SYS DOP: 123.3 CM/SEC
BH CV ECHO MEAS - 2D AUTO EF SIEMENS: 62.2 %
BH CV ECHO MEAS - AO MAX PG: 6.1 MMHG
BH CV ECHO MEAS - AO ROOT DIAM: 3.1 CM
BH CV ECHO MEAS - AO V2 VTI: 28.4 CM
BH CV ECHO MEAS - AVA(I,D): 3.2 CM2
BH CV ECHO MEAS - EF(MOD-SP2): 61 %
BH CV ECHO MEAS - EF(MOD-SP4): 62 %
BH CV ECHO MEAS - FS: 28 %
BH CV ECHO MEAS - IVS/LVPW: 0.92 CM
BH CV ECHO MEAS - IVSD: 1.1 CM
BH CV ECHO MEAS - LA DIMENSION: 3 CM
BH CV ECHO MEAS - LAT PEAK E' VEL: 12.2 CM/SEC
BH CV ECHO MEAS - LV MAX PG: 4.8 MMHG
BH CV ECHO MEAS - LV MEAN PG: 2.4 MMHG
BH CV ECHO MEAS - LV V1 MAX: 110 CM/SEC
BH CV ECHO MEAS - LV V1 VTI: 26.2 CM
BH CV ECHO MEAS - LVIDD: 4.6 CM
BH CV ECHO MEAS - LVIDS: 3.3 CM
BH CV ECHO MEAS - LVOT AREA: 3.5 CM2
BH CV ECHO MEAS - LVOT DIAM: 2.1 CM
BH CV ECHO MEAS - LVPWD: 1.2 CM
BH CV ECHO MEAS - MED PEAK E' VEL: 8.2 CM/SEC
BH CV ECHO MEAS - MV A MAX VEL: 98.8 CM/SEC
BH CV ECHO MEAS - MV E MAX VEL: 82.2 CM/SEC
BH CV ECHO MEAS - MV E/A: 0.83
BH CV ECHO MEAS - RVDD: 3.2 CM
BH CV ECHO MEAS - SV(LVOT): 90.6 ML
BH CV ECHO MEAS - SVI(LVOT): 49.9 ML/M2
BH CV ECHO MEAS - TAPSE (>1.6): 2.16 CM
BH CV ECHO MEASUREMENTS AVERAGE E/E' RATIO: 8.06
BH CV ECHO SHUNT ASSESSMENT PERFORMED (HIDDEN SCRIPTING): 1
IVRT: 67 MS
LEFT ATRIUM VOLUME INDEX: 26.3 ML/M2
LV EF BIPLANE MOD: 62.2 %

## 2025-04-11 PROCEDURE — 93306 TTE W/DOPPLER COMPLETE: CPT | Performed by: INTERNAL MEDICINE

## 2025-04-11 PROCEDURE — 25010000002 ENOXAPARIN PER 10 MG: Performed by: STUDENT IN AN ORGANIZED HEALTH CARE EDUCATION/TRAINING PROGRAM

## 2025-04-11 PROCEDURE — 93306 TTE W/DOPPLER COMPLETE: CPT

## 2025-04-11 RX ADMIN — PANTOPRAZOLE SODIUM 40 MG: 40 TABLET, DELAYED RELEASE ORAL at 08:42

## 2025-04-11 RX ADMIN — Medication 10 ML: at 08:42

## 2025-04-11 RX ADMIN — QUETIAPINE FUMARATE 50 MG: 25 TABLET ORAL at 08:42

## 2025-04-11 RX ADMIN — ENOXAPARIN SODIUM 40 MG: 100 INJECTION SUBCUTANEOUS at 08:41

## 2025-04-11 RX ADMIN — FOLIC ACID 1 MG: 1 TABLET ORAL at 08:42

## 2025-04-11 RX ADMIN — AMLODIPINE BESYLATE 2.5 MG: 5 TABLET ORAL at 08:42

## 2025-04-11 RX ADMIN — TOPIRAMATE 50 MG: 25 TABLET, FILM COATED ORAL at 08:42

## 2025-04-11 RX ADMIN — CLOPIDOGREL BISULFATE 75 MG: 75 TABLET, FILM COATED ORAL at 08:42

## 2025-04-11 RX ADMIN — LAMOTRIGINE 200 MG: 100 TABLET ORAL at 08:42

## 2025-04-11 NOTE — PLAN OF CARE
Goal Outcome Evaluation:              Outcome Evaluation: No acute changes this shift. Pt rested throughout the night. Waiting for Echo to be completed. Patient will be discharged home once echo complete.

## 2025-04-11 NOTE — PLAN OF CARE
Goal Outcome Evaluation:              Outcome Evaluation: pt to dc home. will follow up with PCP in [place].

## 2025-04-12 ENCOUNTER — READMISSION MANAGEMENT (OUTPATIENT)
Dept: CALL CENTER | Facility: HOSPITAL | Age: 65
End: 2025-04-12
Payer: MEDICARE

## 2025-04-12 NOTE — OUTREACH NOTE
Prep Survey      Flowsheet Row Responses   [facility] facility patient discharged from? [facility]   Is LACE score < 7 ? No   Eligibility Readm Mgmt   Discharge diagnosis *Stroke   Does the patient have one of the following disease processes/diagnoses(primary or secondary)? Stroke   Does the patient have Home health ordered? No   Is there a DME ordered? No   Prep survey completed? Yes            MERY BAY - Registered Nurse

## 2025-04-14 ENCOUNTER — TELEPHONE (OUTPATIENT)
Dept: ONCOLOGY | Facility: CLINIC | Age: 65
End: 2025-04-14

## 2025-04-14 NOTE — TELEPHONE ENCOUNTER
.    The [facility] received a fax that requires your attention. The document has been indexed to the patient’s chart for your review.      Reason for sending: RECEIVED UPDATED AUTH [id number]    Documents Description: UPDATED HEM AUTH 04/14/25.> INDEXED IN CHART     Name of Sender: [facility]    Date Indexed: 04/14/25    Notes (if needed): THANKS!

## 2025-04-15 ENCOUNTER — TELEPHONE (OUTPATIENT)
Dept: ONCOLOGY | Facility: CLINIC | Age: 65
End: 2025-04-15
Payer: MEDICARE

## 2025-04-15 ENCOUNTER — OFFICE VISIT (OUTPATIENT)
Dept: CARDIOLOGY | Age: 65
End: 2025-04-15
Payer: MEDICARE

## 2025-04-15 VITALS
HEART RATE: 83 BPM | DIASTOLIC BLOOD PRESSURE: 74 MMHG | HEIGHT: 68 IN | SYSTOLIC BLOOD PRESSURE: 128 MMHG | BODY MASS INDEX: 25.46 KG/M2 | WEIGHT: 168 LBS

## 2025-04-15 DIAGNOSIS — I26.99 OTHER ACUTE PULMONARY EMBOLISM WITHOUT ACUTE COR PULMONALE: Primary | ICD-10-CM

## 2025-04-15 DIAGNOSIS — I63.9 CEREBROVASCULAR ACCIDENT (CVA), UNSPECIFIED MECHANISM: ICD-10-CM

## 2025-04-15 DIAGNOSIS — E11.9 TYPE 2 DIABETES MELLITUS WITHOUT COMPLICATION, WITHOUT LONG-TERM CURRENT USE OF INSULIN: ICD-10-CM

## 2025-04-15 LAB — LAMOTRIGINE SERPL-MCNC: 7.5 UG/ML (ref 2–20)

## 2025-04-15 NOTE — PROGRESS NOTES
Subjective:     Encounter Date:04/15/2025      Patient ID: Melvina Martinez is a 64 y.o. female.    Chief Complaint:follow up PE  History of Present Illness  This is a 64-year-old female who follows with Dr. Multani and is new to me today.  She has a past medical history of diabetes, hypertension, CVA, TIAs and pulmonary embolism.  Dr. Multani saw her for the first time in 2022.  She had been admitted to [facility] with complaints of shortness of breath and chest discomfort.  She was noted to have segmental pulmonary embolus documented in the right lower lobe and multiple branches.  RV size was normal.  Echocardiogram showed normal LV and RV size and systolic function and no segmental wall motion abnormalities.  She was started on Eliquis therapy.  She had a repeat CTA of the chest at [facility] that showed even less PE. She saw Dr. Multani as she did not feel like she was recovering appropriately and wanted a second opinion.    Dr. Multani recommended continued Eliquis for at least 6 months. He recommended proceeding with a stress test. This showed no evidence of ischemia. She underwent a hematologic workup. Her PE was felt to be provoked as she had recently been started on tamoxifen. 6 months anticoagulation was also recommended by hematology.    In October 2023 she was admitted to the hospital in [place] where she had moved for possible stroke.  She did receive tPA as she reportedly had a left arm drift.  CT of the head and MRI of the brain showed no evidence of acute stroke.  Echocardiogram was unremarkable.    She was admitted again in October 2024 in [place] for possible stroke.  She again received tPA.  CT of the head showed no acute findings and MRI of the brain showed no acute findings with mild bilateral small vessel ischemic changes. She was eventually discharged home on aspirin and statin therapy.    She was readmitted in April 2025, this time at [facility] for stroke like symptoms. IVETTE and CT were  negative. She was diagnosed with a TIA. It was noted that at some point she had been placed on aspirin and Plavix even though she was determined to be a nonresponder to Plavix. Due to the recurrent TIAs and CVA, she was taken off DAPT and resumed on Eliquis. I do not see any documentation in review of her prior admissions as to when she was started on DAPT.     She is here today for a follow up visit. She still lives in [place] but has family here in [place]. She tells me that she has not had any further stroke like symptoms since discharge from the hospital. She is compliant with Eliquis. She is feeling fatigued but has also been taking care of her 7 y/o grandchild over the last few days. Her fatigue seems to have started a couple of days after she started taking care of her grandchild. No reported chest pain, shortness of breath, palpitations, dizziness or syncope. No swelling in her lower extremities, orthopnea or PND. Blood pressure is well controlled.    Was on Eliquis in the past. Moved to [place] and was taken off Eliquis and put on Plavix and aspirin. She ended up having a few CVAs and was hospitalized last week for a TIA. Now back on Eliquis. She has been getting fatigued. She has been taking care of her 7 y/o grandchild.     I have reviewed and updated as appropriate allergies, current medications, past family history, past medical history, past surgical history and problem list.    Review of Systems   Constitutional: Positive for malaise/fatigue. Negative for fever, weight gain and weight loss.   HENT:  Negative for congestion, hoarse voice and sore throat.    Eyes:  Negative for blurred vision and double vision.   Cardiovascular:  Negative for chest pain, dyspnea on exertion, leg swelling, orthopnea, palpitations and syncope.   Respiratory:  Negative for cough, shortness of breath and wheezing.    Gastrointestinal:  Negative for abdominal pain, hematemesis, hematochezia and melena.   Genitourinary:   Negative for dysuria and hematuria.   Neurological:  Negative for dizziness, headaches, light-headedness and numbness.   Psychiatric/Behavioral:  Negative for depression. The patient is not nervous/anxious.          Current Outpatient Medications:     amLODIPine (NORVASC) 2.5 MG tablet, Take 1 tablet by mouth Daily., Disp: 30 tablet, Rfl: 0    apixaban (ELIQUIS) 5 MG tablet tablet, Take 1 tablet by mouth 2 (Two) Times a Day., Disp: 180 tablet, Rfl: 3    atorvastatin (LIPITOR) 80 MG tablet, Take 1 tablet by mouth Every Night., Disp: , Rfl:     folic acid (FOLVITE) 1 MG tablet, Take 1 tablet by mouth Daily., Disp: , Rfl:     lamoTRIgine (LaMICtal) 200 MG tablet, Take 1 tablet by mouth 2 (Two) Times a Day., Disp: , Rfl:     multivitamin tablet tablet, Take 1 tablet by mouth Daily., Disp: , Rfl:     pantoprazole (Protonix) 40 MG EC tablet, Take 1 tablet by mouth Daily., Disp: 90 tablet, Rfl: 1    prazosin (MINIPRESS) 5 MG capsule, Take 1 capsule by mouth Every Night., Disp: , Rfl:     QUEtiapine (SEROquel) 100 MG tablet, Take 0.5 tablets by mouth 2 (Two) Times a Day. Pt takes 50mg qam, 50mg midday and 100mg hs, Disp: , Rfl:     QUEtiapine (SEROquel) 100 MG tablet, Take 1 tablet by mouth Every Night. Pt takes 50mg qam, 50mg midday and 100mg hs, Disp: , Rfl:     SUMAtriptan (Imitrex) 5 MG/ACT nasal spray, 1 spray into the nostril(s) as directed by provider Every 2 (Two) Hours As Needed for Migraine., Disp: 6 each, Rfl: 2    topiramate (TOPAMAX) 50 MG tablet, Take 1 tablet by mouth 2 (Two) Times a Day., Disp: , Rfl:     traZODone (DESYREL) 100 MG tablet, Take 1 tablet by mouth Every Night., Disp: , Rfl:     Current Facility-Administered Medications:     ondansetron (ZOFRAN) injection 4 mg, 4 mg, Intravenous, Q6H PRN, Isabela Nelson APRN, 4 mg at 04/08/22 1616    Past Medical History:   Diagnosis Date    Acid reflux     Anxiety disorder     Arthritis     Bipolar disorder     Carpal tunnel syndrome     Deep vein thrombosis  2022    Depression     Diabetes     DOES NOT CHECK BS DAILY    H/O Panic disorder     Hirsutism     History of back pain     Hyperlipidemia     Hypertension     Kidney stones     Limb swelling     Migraines     PTSD (post-traumatic stress disorder)     Pulmonary embolism     Rotator cuff tear, left     Rotator cuff tear, left 06/10/2021    Sleep apnea     Stress incontinence     Tattoos     TIA (transient ischemic attack)        Past Surgical History:   Procedure Laterality Date    ABDOMINOPLASTY      Tummy tuck    ANKLE SURGERY      APPENDECTOMY      25 yrs. old when pregnant     SECTION      CHOLECYSTECTOMY N/A 10/06/2021    Procedure: CHOLECYSTECTOMY LAPAROSCOPIC;  Surgeon: Jeancarlos Carrington MD;  Location: [facility] MAIN OR;  Service: General;  Laterality: N/A;    COLONOSCOPY      COLONOSCOPY N/A 2022    Procedure: COLONOSCOPY INTO CECUM WITH COLD SNARE POLYPECTOMY;  Surgeon: Ananth Odell MD;  Location: [facility] ENDOSCOPY;  Service: Gastroenterology;  Laterality: N/A;  PRE: PERSONAL H/O COLON POLYPS  POST: DIVERTICULOSIS, POLYP, HEMORRHOIDS, TORTUOUS COLON    ENDOSCOPY N/A 10/28/2021    Procedure: ESOPHAGOGASTRODUODENOSCOPY with biopsies;  Surgeon: Luciano Salmon MD;  Location[facility] ENDOSCOPY;  Service: Gastroenterology;  Laterality: N/A;  pre:  abnormal CT  post:  gastritis, HH,     EYE SURGERY      childhood; muscle    FRACTURE SURGERY      tibia and fibula fracture    HYSTERECTOMY      INTRAOCULAR LENS INSERTION      KNEE ARTHROSCOPY W/ MENISCAL REPAIR Right     KNEE SURGERY      SHOULDER ARTHROSCOPY Left 06/10/2021    Procedure: LEFT SHOULDER ARTHROSCOPY;  Surgeon: Дмитрий Francois MD;  Location: [facility];  Service: Orthopedics;  Laterality: Left;    SHOULDER ROTATOR CUFF REPAIR Left 06/10/2021    Procedure: SUBACROMIAL DECOMPRESSION, DISTAL CLAVICULECTOMY AND ROTATOR CUFF REPAIR;  Surgeon: Дмитрий Francois MD;  Location: [facility];  Service: Orthopedics;   "Laterality: Left;    SHOULDER SURGERY Right     TONSILLECTOMY      TUBAL ABDOMINAL LIGATION         Family History   Problem Relation Age of Onset    Cancer Mother         Unspecified    Breast cancer Mother     Clotting disorder Mother     Hypertension Father     Dementia Father     Stroke Father     Heart disease Father     Diabetes Father         Unspecified type    Arthritis Father     Alzheimer's disease Father     Heart disease Brother     Breast cancer Paternal Aunt     Throat cancer Paternal Aunt     Breast cancer Paternal Aunt     Breast cancer Paternal Grandmother     Malig Hyperthermia Neg Hx        Social History     Tobacco Use    Smoking status: Never    Smokeless tobacco: Never   Vaping Use    Vaping status: Never Used   Substance Use Topics    Alcohol use: Yes     Comment: rarely drinks    Drug use: Never         ECG 12 Lead    Date/Time: 4/16/2025 9:29 AM  Performed by: Mary Toro APRN    Authorized by: Mary Toro APRN  Comparison: compared with previous ECG from 4/9/2025  Similar to previous ECG  Rhythm: sinus rhythm             Objective:     Visit Vitals  /74 (BP Location: Left arm, Patient Position: Sitting, Cuff Size: Adult)   Pulse 83   Ht 172.7 cm (68\")   Wt 76.2 kg (168 lb)   LMP  (LMP Unknown)   BMI 25.54 kg/m²             Physical Exam  Constitutional:       Appearance: Normal appearance.   HENT:      Head: Normocephalic.   Neck:      Vascular: No carotid bruit.   Cardiovascular:      Rate and Rhythm: Normal rate and regular rhythm.      Chest Wall: PMI is not displaced.      Pulses: Normal pulses.           Radial pulses are 2+ on the right side and 2+ on the left side.        Posterior tibial pulses are 2+ on the right side and 2+ on the left side.      Heart sounds: Normal heart sounds. No murmur heard.     No friction rub. No gallop.   Pulmonary:      Effort: Pulmonary effort is normal.      Breath sounds: Normal breath sounds.   Abdominal:      General: Bowel sounds " are normal. There is no distension.      Palpations: Abdomen is soft.   Musculoskeletal:      Right lower leg: No edema.      Left lower leg: No edema.   Skin:     General: Skin is warm and dry.      Capillary Refill: Capillary refill takes less than 2 seconds.   Neurological:      Mental Status: She is alert and oriented to person, place, and time.   Psychiatric:         Mood and Affect: Mood normal.         Behavior: Behavior normal.         Thought Content: Thought content normal.          Lab Review:   Lipid Panel          4/9/2025    11:34   Lipid Panel   Total Cholesterol 139    Triglycerides 82    HDL Cholesterol 58    VLDL Cholesterol 16    LDL Cholesterol  65    LDL/HDL Ratio 1.11          Cardiac Procedures:       Assessment:         Diagnoses and all orders for this visit:    1. Other acute pulmonary embolism without acute cor pulmonale (Primary)    2. Type 2 diabetes mellitus without complication, without long-term current use of insulin    3. Cerebrovascular accident (CVA), unspecified mechanism    Other orders  -     ECG 12 Lead            Plan:       Recurrent TIA/history of CVA: previously on Eliquis and at some point was apparently transitioned to Plavix and aspirin. She was deemed to be a nonresponder to Plavix per documentation of history during recent hospitalization. So she is now on Eliquis indefinitely. On statin therapy with goal LDL < 70. Most recent was 65.  History of right lower lobe PE: felt to be provoked and was treated with Eliquis. Echocardiogram at that time did not show acute cor pulmonale. She is back on Eliquis as noted in #1  Fatigue: likely secondary to recent hospitalization and taking care of grandchild. Recent echo was unremarkable. Will monitor for now.   Diabetes    Thank you for allowing me to participate in this patient's care. Please call with any questions or concerns. Ms Martinez will follow up with her primary cardiologist in [place] in 3 months.              Your  medication list            Accurate as of April 15, 2025 11:59 PM. If you have any questions, ask your nurse or doctor.                CONTINUE taking these medications        Instructions Last Dose Given Next Dose Due   amLODIPine 2.5 MG tablet  Commonly known as: NORVASC      Take 1 tablet by mouth Daily.       atorvastatin 80 MG tablet  Commonly known as: LIPITOR      Take 1 tablet by mouth Every Night.       Eliquis 5 MG tablet tablet  Generic drug: apixaban      Take 1 tablet by mouth 2 (Two) Times a Day.       folic acid 1 MG tablet  Commonly known as: FOLVITE      Take 1 tablet by mouth Daily.       lamoTRIgine 200 MG tablet  Commonly known as: LaMICtal      Take 1 tablet by mouth 2 (Two) Times a Day.       multivitamin tablet tablet      Take 1 tablet by mouth Daily.       pantoprazole 40 MG EC tablet  Commonly known as: Protonix      Take 1 tablet by mouth Daily.       prazosin 5 MG capsule  Commonly known as: MINIPRESS      Take 1 capsule by mouth Every Night.       QUEtiapine 100 MG tablet  Commonly known as: SEROquel      Take 0.5 tablets by mouth 2 (Two) Times a Day. Pt takes 50mg qam, 50mg midday and 100mg hs       QUEtiapine 100 MG tablet  Commonly known as: SEROquel      Take 1 tablet by mouth Every Night. Pt takes 50mg qam, 50mg midday and 100mg hs       SUMAtriptan 5 MG/ACT nasal spray  Commonly known as: Imitrex      1 spray into the nostril(s) as directed by provider Every 2 (Two) Hours As Needed for Migraine.       topiramate 50 MG tablet  Commonly known as: TOPAMAX      Take 1 tablet by mouth 2 (Two) Times a Day.       traZODone 100 MG tablet  Commonly known as: DESYREL      Take 1 tablet by mouth Every Night.                  YOLANDA Lin  04/16/25  2:46 PM EDT

## 2025-04-15 NOTE — TELEPHONE ENCOUNTER
Caller: Ananth Martinez    Relationship: Emergency Contact    Best call back number:[phone]      What was the call regarding: PT'S  ASKING IF THERE HAVE BEEN ANY CANCELLATIONS THAT WOULD ALLOW THE PT TO BE SEEN SOONER

## 2025-04-18 ENCOUNTER — READMISSION MANAGEMENT (OUTPATIENT)
Dept: CALL CENTER | Facility: HOSPITAL | Age: 65
End: 2025-04-18
Payer: MEDICARE

## 2025-04-18 NOTE — PROGRESS NOTES
"Chief Complaint  Hospital FU for CVA    Patient or patient representative verbalized consent for the use of Ambient Listening during the visit with  Tyron Jones MD PhD for chart documentation. 4/23/2025  09:13 EDT    Subjective      History of Present Illness:  Melvina Martinez \"Avinash" is a delightful 64 y.o. left handed female s/p CVA who presents to [facility] NEUROLOGY & NEUROSURGERY referred for hospital FU for CVA with history of:  Past Medical History:   Diagnosis Date    Acid reflux     Anxiety disorder     Arthritis     Bipolar disorder     Carpal tunnel syndrome     Deep vein thrombosis April 2022    Depression     Diabetes     DOES NOT CHECK BS DAILY    H/O Panic disorder     Hirsutism     History of back pain     Hyperlipidemia     Hypertension     Kidney stones     Limb swelling     Migraines     PTSD (post-traumatic stress disorder)     Pulmonary embolism     Rotator cuff tear, left     Rotator cuff tear, left 06/10/2021    Sleep apnea     Stress incontinence     Tattoos     TIA (transient ischemic attack)    Morbid obesity, DVT with PE.   Chinmay accompanied.     From [facility] Discharge Summary 4/09-10/2025:  Patient on daily plavix presented to the ER with neurologic symptoms including left-sided weakness and double vision. Symptoms started in the morning around 9 AM. Symptoms had resolved. CT Head negative for acute pathology; MRI negative for acute stroke. Patient was previously on Eliquis for multiple TIAs in the past. She apparently failed aspirin and Plavix before. She was for some reason taken off of Eliquis and placed on Plavix again. I will restart Eliquis at discharge as I do not have another antiplatelet that has been adequate enough for secondary prevention for this patient.      History of Present Illness  The patient is a 64-year-old female referred to neurology for transient ischemic attack (TIA). She is accompanied by her .    A history of multiple " cerebrovascular accidents is noted, including a severe stroke that necessitated a week-long hospitalization followed by three weeks of rehabilitation. Discharge to home occurred without any outpatient therapy such as physical therapy, occupational therapy, or speech therapy. Since discharge, no new stroke-like events or residual symptoms from the previous stroke have been experienced. A healthy diet is followed, and daily exercise is reported.     No history of cardiac disorders such as arrhythmias or valve disease is present. A significant family history of myocardial infarction and stroke is reported, with her biological mother, father, and grandmother all having experienced these conditions. Previously, Dr. Jeronimo in [place] initiated Eliquis due to her family history. However, [facility] [place] leading to subsequent strokes. Plavix was then prescribed, but a mini-stroke occurred, resulting in the re-initiation of Eliquis. A recent consultation with Dr. Jeronimo advised against discontinuing Eliquis again.    PAST MEDICAL HISTORY: A history of diabetes, currently in remission following weight loss, and hypertension, which is well-managed.    SOCIAL HISTORY  Marital Status:   Diet: Tries to eat healthy foods  Alcohol: Drinks alcohol very occasionally  Tobacco: Does not smoke  Recreational Drugs: Does not use illegal drugs      FAMILY HISTORY  - Biological mother: History of heart attack or stroke  - Father: History of heart attack or stroke  - Biological grandmother: History of heart attack or stroke    MEDICATIONS  CURRENT MEDS:  Eliquis  Atorvastatin 80 mg Oral Daily  Amlodipine  Prazosin  PREVIOUS MEDS:  Eliquis  Plavix      All available pertinent Labs and Imaging personally reviewed, including:    Imaging:    MRI Brain Without Contrast 4/9/2025 for stroke / TIA.  Impression  1.No evidence of acute infarct or hemorrhage.  2.Mild chronic and senescent changes of the  "brain are noted.        Labs:  Lab Results   Component Value Date    WBC 5.78 04/22/2025    HGB 12.8 04/22/2025    HCT 40.8 04/22/2025    MCV 94.2 04/22/2025     04/22/2025     Lab Results   Component Value Date    GLUCOSE 101 (H) 04/22/2025    BUN 11 04/22/2025    CREATININE 0.88 04/22/2025     04/22/2025    K 4.2 04/22/2025     (H) 04/22/2025    CALCIUM 9.3 04/22/2025    PROTEINTOT 6.3 04/22/2025    ALBUMIN 3.7 04/22/2025    ALT 22 04/22/2025    AST 19 04/22/2025    ALKPHOS 96 04/22/2025    BILITOT 0.2 04/22/2025    GLOB 2.6 04/22/2025    AGRATIO 1.4 04/22/2025    BCR 12.5 04/22/2025    ANIONGAP 12.5 04/22/2025    EGFR 73.9 04/22/2025     Lab Results   Component Value Date    HGBA1C 4.90 04/09/2025     Lab Results   Component Value Date    TSH 0.830 04/09/2025     No results found for: \"QUMUHVSI96\"  No results found for: \"FOLATE\"  Lab Results   Component Value Date    CHOL 139 04/09/2025    CHLPL 194 06/28/2019    TRIG 82 04/09/2025    HDL 58 04/09/2025    LDL 65 04/09/2025         Objective   Vital Signs:   /74   Pulse 82   Ht 160 cm (63\")   Wt 77 kg (169 lb 11.2 oz)   BMI 30.06 kg/m²     GENERAL:  GEN: owgt, well appearing, no apparent distress, appropriately dressed and groomed.  HEENT: NCAT, nml symm facies, no LNA, no goiter  LUNGS: CTA jeff, no wheezes/crackles/rhonchi noted  Card: RRR, no m noted, no carotid bruit, jeff rad and dp pulses 2+ with cap refill < 2 sec  EXT: no cce, no rash noted    NEUROLOGICAL:  MS: A+O x 3, language spontaneous, fluent with logical content, no word finding, no repeating or perseveration, reported own and regarded as excellent historian, normal judgement and insight, somewhat anxious;    present and adds to and corroborates history.  CN: PERRLA, full excursions in all cardinal directions with smooth pursuits throughout without saccadic breaks or nystagmus noted; horizontal and vertical saccades symmetric and on target. Cover test reveals no " phoria. Visual fields full to confrontation.  V1-III Light touch symm and intact; symm jaw clench masseter and temporalis bulk and tone, medial and lateral pterygoids intact. Symm forehead crease with subtle right lower face droop. Hearing grossly symm and intact to finger rub. Uvula and soft palate midline rise on gag. Sternocleidomastoids and traps symm and 5/5. Tongue demonstrates no furrowing and extends midline and into left and right.  MOTOR: no atrophy/drift, normal tone, strength 5/5, very mild bilateral hand action tremor noted  SENSORY: LT/PP/Vib intact, symm, and wnL for age. Romberg - NEG (some sway)  COORD: PRISCILLA/FTN/HTS with good rhythm, speed, and amplitude. No ataxia noted.  GAIT: Native gait with good stride, nml symm jeff armswing, nml start/stop/turn. Toe and heel walk without difficulty but with wide base. Tandem walk with a few halfsteps to maintain balance.  DTR's: absent AJ,s 1+ left patella and absent right, o/w 2+ Ues, no clonus, Babinski - Absent.         ASSESSMENT & PLAN:    64 y.o. left handed female s/p CVA who presents to [facility] NEUROLOGY & NEUROSURGERY referred for hospital FU for CVA.    From [facility] Discharge Summary 4/09-10/2025:  Patient on daily plavix presented to the ER with neurologic symptoms including left-sided weakness and double vision. Symptoms started in the morning around 9 AM. Symptoms had resolved. CT Head negative for acute pathology; MRI negative for acute stroke. Patient was previously on Eliquis for multiple TIAs in the past. She apparently failed aspirin and Plavix before. She was for some reason taken off of Eliquis and placed on Plavix again. I will restart Eliquis at discharge as I do not have another antiplatelet that has been adequate enough for secondary prevention for this patient.           Diagnoses and all orders for this visit:    1. H/O: CVA (cerebrovascular accident) (Primary)         Assessment & Plan  1. Transient Ischemic Attack  (TIA).  She has been referred to neurology following a TIA hospital FU a couple of weeks ago. Her medical history includes multiple TIAs over the years. The most recent TIA event occurred in mid-April 2025, with no acute stroke detected on work up at that time. Her MRI results are generally unremarkable, with no evidence of stroke, hemorrhage, bleed, hydrocephalus, or tumor. However, there are a few patchy spots indicative of mild chronic small vessel disease due to cardiovascular risk factors which also increase the risk of stroke and heart attack, most especially high blood pressure for small vessel disease. Her gait appears normal, but she exhibits anxiety during stress gait tests (toe/heel/tandem walking) with poorer than expected performance. Apart from this, her examination is non-focal/essentially normal, with the exception of a subtle right facial droop, which is likely longstanding/congenital and unnoticed by her. She was educated about the importance of lifestyle modifications in stroke prevention. She was advised to maintain a heart-healthy diet and engage in daily exercise for 45 minutes per session. She was also informed about the FAST acronym for recognizing stroke symptoms and the importance of seeking immediate medical attention if these symptoms occur.    2. Hypertension.  She has a history of high blood pressure, which is currently controlled. The target for her resting blood pressure after 5 minutes is between 100 and 130 systolic. She was advised to continue monitoring her blood pressure at home and keep a log of the readings. If her primary care provider is unable to control her blood pressure, she may be referred to cardiology.    3. Diabetes Mellitus.  She has a history of diabetes but has lost weight and currently does not have diabetes.    4. Medication Management.  She is currently on Eliquis, atorvastatin 80 mg daily, amlodipine, and prazosin. She was advised to continue these  medications as prescribed. The goal for her LDL cholesterol is less than 70.      Discussed below Stroke prevention measures with patient:  Lifestyle modifications recommended for secondary stroke prevention:  Take medications as prescribed to help prevent recurrent stroke and normal BP  Diet - heart healthy/diabetic diet  Exercise - increase over time to at least most days per week of minimal 40 minutes daily moderate exercise (increased heart rate, mild short of breath, and break a sweat)  Smoking - cessation  Alcohol intake - maximum of a single drink daily or abstain    Discussed with patient common stroke signs and symptoms (see below) and importance of rapid ER medical evaluation for accurate diagnosis and appropriate time sensitive management including consideration for thrombolysis and endovascular therapy.    BE FAST common stroke signs and symptoms:  Balance/walking difficulty  Eye changes - blurred or double vision  Face droop  Asymmetric weakness or sensory loss (right or left side)  Speech difficulty - garbled, unable to speak or understand  Time - symptom onset and last known normal state of health          Follow Up  Return if symptoms worsen or fail to improve.    44 minutes were spent caring for    on this date of service. This time spent by me includes preparing for the visit, reviewing tests, obtaining/reviewing separately obtained history, performing medically appropriate exam/evaluation, counseling/educating the patient/family/caregiver, ordering medications/tests/procedures, referring/communicating with other health care professionals, documenting information in the medical record, independently interpreting results and communicating that with the patient/family/caregiver and/or care coordination.     Patient was given instructions and counseling regarding her condition or for health maintenance advice. Please see specific information pulled into the AVS if appropriate.

## 2025-04-18 NOTE — OUTREACH NOTE
"Stroke Week 1 Survey      Flowsheet Row Responses   [facility] patient discharged from? [facility]   Does the patient have one of the following disease processes/diagnoses(primary or secondary)? Stroke   Week 1 attempt successful? Yes   Call start time 1542   Call end time 1553   Meds reviewed with patient/caregiver? Yes   Is the patient having any side effects they believe may be caused by any medication additions or changes? No   Does the patient have all medications ordered at discharge? Yes   Is the patient taking all medications as directed (includes completed medication regime)? Yes   Comments regarding appointments States has been in contact with her PCP in [place], and they will schedule her a f/u appt. Neurology appt 04/22/25, cardiology appt 05/16/25.   Does the patient have a primary care provider?  Yes   Does the patient have an appointment with their PCP within 7 days of discharge? No   What is preventing the patient from scheduling follow up appointments within 7 days of discharge? Waiting on return call   Nursing Interventions Educated patient on importance of making appointment, Advised patient to make appointment   Has the patient kept scheduled appointments due by today? N/A   Has home health visited the patient within 72 hours of discharge? N/A   Psychosocial issues? No   Does the patient require any assistance with activities of daily living such as eating, bathing, dressing, walking, etc.? No   Does the patient have any residual symptoms from stroke/TIA? Yes   Residual symptoms comments States sometimes has some general weakness/fatigue. States her \"brain fog\" and word finding difficulty from prior TIAs seems slightly worse.   Does the patient understand the diet ordered at discharge? Yes   Did the patient receive a copy of their discharge instructions? Yes   Nursing interventions Reviewed instructions with patient   What is the patient's perception of their health status since discharge? " "Improving   Nursing interventions Nurse provided patient education   Is the patient/caregiver able to teach back the risk factors for a stroke? High blood pressure-goal below 120/80, History of TIAs, High Cholesterol   Is the patient/caregiver able to teach back signs and symptoms related to disease process for when to call PCP? Yes   Is the patient/caregiver able to teach back signs and symptoms related to disease process for when to call 911? Yes   If the patient is a current smoker, are they able to teach back resources for cessation? Not a smoker   Is the patient/caregiver able to teach back the hierarchy of who to call/visit for symptoms/problems? PCP, Specialist, Home health nurse, Urgent Care, ED, 911 Yes   Additional teach back comments Encouraged to monitor BP twice daily and keep record for appts.   Is the patient able to teach back FAST for Stroke? B alance: Watch for sudden loss of balance, E yes: Check for vision loss, F ace: Look for an uneven smile, A rm: Check if one arm is weak, S peech: Listen for slurred speech, T barbara: Call 9-1-1 right away   Week 1 call completed? Yes   Revoked No further contact(revokes)-requires comment   Is the patient interested in additional calls from an ambulatory ? No   Would this patient benefit from a Referral to [facility] Social Work? No   Graduated/Revoked comments States is improving, and prefers to contact nurse call center if has any questions/concerns.   Wrap up additional comments Patient states is improving. States sometimes has some general weakness/fatigue. States her \"brain fog\" and word finding difficulty from prior TIAs seems slightly worse, and will discuss with neurologist/PCP at appts. Denies any needs today.   Call end time 1553            Sydney TERRY - Registered Nurse  "

## 2025-04-22 ENCOUNTER — CONSULT (OUTPATIENT)
Dept: ONCOLOGY | Facility: CLINIC | Age: 65
End: 2025-04-22
Payer: MEDICARE

## 2025-04-22 ENCOUNTER — LAB (OUTPATIENT)
Dept: LAB | Facility: HOSPITAL | Age: 65
End: 2025-04-22
Payer: OTHER GOVERNMENT

## 2025-04-22 VITALS
DIASTOLIC BLOOD PRESSURE: 78 MMHG | SYSTOLIC BLOOD PRESSURE: 126 MMHG | HEART RATE: 78 BPM | RESPIRATION RATE: 17 BRPM | WEIGHT: 168.8 LBS | BODY MASS INDEX: 25.58 KG/M2 | HEIGHT: 68 IN | TEMPERATURE: 97.7 F | OXYGEN SATURATION: 96 %

## 2025-04-22 DIAGNOSIS — I26.99 OTHER ACUTE PULMONARY EMBOLISM WITHOUT ACUTE COR PULMONALE: ICD-10-CM

## 2025-04-22 DIAGNOSIS — M79.662 BILATERAL CALF PAIN: ICD-10-CM

## 2025-04-22 DIAGNOSIS — I26.99 OTHER ACUTE PULMONARY EMBOLISM WITHOUT ACUTE COR PULMONALE: Primary | ICD-10-CM

## 2025-04-22 DIAGNOSIS — I63.9 RECURRENT STROKES: ICD-10-CM

## 2025-04-22 DIAGNOSIS — M79.661 BILATERAL CALF PAIN: ICD-10-CM

## 2025-04-22 DIAGNOSIS — Z82.49 FAMILY HISTORY OF THROMBOSIS: ICD-10-CM

## 2025-04-22 LAB
ALBUMIN SERPL-MCNC: 3.7 G/DL (ref 3.5–5.2)
ALBUMIN/GLOB SERPL: 1.4 G/DL
ALP SERPL-CCNC: 96 U/L (ref 39–117)
ALT SERPL W P-5'-P-CCNC: 22 U/L (ref 1–33)
ANION GAP SERPL CALCULATED.3IONS-SCNC: 12.5 MMOL/L (ref 5–15)
AST SERPL-CCNC: 19 U/L (ref 1–32)
BASOPHILS # BLD AUTO: 0.03 10*3/MM3 (ref 0–0.2)
BASOPHILS NFR BLD AUTO: 0.5 % (ref 0–1.5)
BILIRUB SERPL-MCNC: 0.2 MG/DL (ref 0–1.2)
BUN SERPL-MCNC: 11 MG/DL (ref 8–23)
BUN/CREAT SERPL: 12.5 (ref 7–25)
CALCIUM SPEC-SCNC: 9.3 MG/DL (ref 8.6–10.5)
CHLORIDE SERPL-SCNC: 109 MMOL/L (ref 98–107)
CO2 SERPL-SCNC: 20.5 MMOL/L (ref 22–29)
CREAT SERPL-MCNC: 0.88 MG/DL (ref 0.57–1)
DEPRECATED RDW RBC AUTO: 50.5 FL (ref 37–54)
EGFRCR SERPLBLD CKD-EPI 2021: 73.9 ML/MIN/1.73
EOSINOPHIL # BLD AUTO: 0.12 10*3/MM3 (ref 0–0.4)
EOSINOPHIL NFR BLD AUTO: 2.1 % (ref 0.3–6.2)
ERYTHROCYTE [DISTWIDTH] IN BLOOD BY AUTOMATED COUNT: 14.5 % (ref 12.3–15.4)
GLOBULIN UR ELPH-MCNC: 2.6 GM/DL
GLUCOSE SERPL-MCNC: 101 MG/DL (ref 65–99)
HCT VFR BLD AUTO: 40.8 % (ref 34–46.6)
HGB BLD-MCNC: 12.8 G/DL (ref 12–15.9)
IMM GRANULOCYTES # BLD AUTO: 0.01 10*3/MM3 (ref 0–0.05)
IMM GRANULOCYTES NFR BLD AUTO: 0.2 % (ref 0–0.5)
LYMPHOCYTES # BLD AUTO: 2.54 10*3/MM3 (ref 0.7–3.1)
LYMPHOCYTES NFR BLD AUTO: 43.9 % (ref 19.6–45.3)
MCH RBC QN AUTO: 29.6 PG (ref 26.6–33)
MCHC RBC AUTO-ENTMCNC: 31.4 G/DL (ref 31.5–35.7)
MCV RBC AUTO: 94.2 FL (ref 79–97)
MONOCYTES # BLD AUTO: 0.38 10*3/MM3 (ref 0.1–0.9)
MONOCYTES NFR BLD AUTO: 6.6 % (ref 5–12)
NEUTROPHILS NFR BLD AUTO: 2.7 10*3/MM3 (ref 1.7–7)
NEUTROPHILS NFR BLD AUTO: 46.7 % (ref 42.7–76)
NRBC BLD AUTO-RTO: 0 /100 WBC (ref 0–0.2)
PLATELET # BLD AUTO: 183 10*3/MM3 (ref 140–450)
PMV BLD AUTO: 9.6 FL (ref 6–12)
POTASSIUM SERPL-SCNC: 4.2 MMOL/L (ref 3.5–5.2)
PROT SERPL-MCNC: 6.3 G/DL (ref 6–8.5)
QT INTERVAL: 395 MS
QTC INTERVAL: 442 MS
RBC # BLD AUTO: 4.33 10*6/MM3 (ref 3.77–5.28)
SODIUM SERPL-SCNC: 142 MMOL/L (ref 136–145)
WBC NRBC COR # BLD AUTO: 5.78 10*3/MM3 (ref 3.4–10.8)

## 2025-04-22 PROCEDURE — 1126F AMNT PAIN NOTED NONE PRSNT: CPT | Performed by: INTERNAL MEDICINE

## 2025-04-22 PROCEDURE — 1159F MED LIST DOCD IN RCRD: CPT | Performed by: INTERNAL MEDICINE

## 2025-04-22 PROCEDURE — 3074F SYST BP LT 130 MM HG: CPT | Performed by: INTERNAL MEDICINE

## 2025-04-22 PROCEDURE — 99215 OFFICE O/P EST HI 40 MIN: CPT | Performed by: INTERNAL MEDICINE

## 2025-04-22 PROCEDURE — 81241 F5 GENE: CPT | Performed by: INTERNAL MEDICINE

## 2025-04-22 PROCEDURE — 80053 COMPREHEN METABOLIC PANEL: CPT

## 2025-04-22 PROCEDURE — 85240 CLOT FACTOR VIII AHG 1 STAGE: CPT | Performed by: INTERNAL MEDICINE

## 2025-04-22 PROCEDURE — 36415 COLL VENOUS BLD VENIPUNCTURE: CPT

## 2025-04-22 PROCEDURE — 1160F RVW MEDS BY RX/DR IN RCRD: CPT | Performed by: INTERNAL MEDICINE

## 2025-04-22 PROCEDURE — 3078F DIAST BP <80 MM HG: CPT | Performed by: INTERNAL MEDICINE

## 2025-04-22 PROCEDURE — 85025 COMPLETE CBC W/AUTO DIFF WBC: CPT

## 2025-04-22 NOTE — PROGRESS NOTES
Subjective     CHIEF COMPLAINT:      Chief Complaint   Patient presents with    Appointment     HISTORY OF PRESENT ILLNESS:     Melvina Martinez is a 64 y.o. female patient who returns today for follow up on her embolism.  She was seen in consultation in April 2022 but did not return back for follow-up afterwards.  She reports that she was taken off Eliquis 6 months after it was started.    Patient was referred back to our office to evaluate her significant history of CVA and TIA.  First CVA was about 12-13 years ago.  She was diagnosed with CVA in 2023 and with acute CVA in 2024.  She had IV tPA treatments.    Patient presented to the ER on 4/9/2025 with left-sided weakness.  Symptoms subsequently resolved.  MRI was negative for acute stroke.  She was on Eliquis after failing aspirin and Plavix.  Before admission, patient reported that she was switched from Eliquis to Plavix.  At the time of discharge on 1425, she was switched back to Eliquis.  She is taking Eliquis 5 mg twice daily regularly.  She is tolerating it without problem with bleeding or bruising.    Patient reports that her family history is positive for DVT in her mother and grandmother.    ROS:  Pertinent ROS is in the HPI.     Past medical, surgical, social and family history were reviewed.     MEDICATIONS:    Current Outpatient Medications:     amLODIPine (NORVASC) 2.5 MG tablet, Take 1 tablet by mouth Daily., Disp: 30 tablet, Rfl: 0    apixaban (ELIQUIS) 5 MG tablet tablet, Take 1 tablet by mouth 2 (Two) Times a Day., Disp: 180 tablet, Rfl: 3    atorvastatin (LIPITOR) 80 MG tablet, Take 1 tablet by mouth Every Night., Disp: , Rfl:     folic acid (FOLVITE) 1 MG tablet, Take 1 tablet by mouth Daily., Disp: , Rfl:     lamoTRIgine (LaMICtal) 200 MG tablet, Take 1 tablet by mouth 2 (Two) Times a Day., Disp: , Rfl:     multivitamin tablet tablet, Take 1 tablet by mouth Daily., Disp: , Rfl:     pantoprazole (Protonix) 40 MG EC tablet, Take 1 tablet by mouth  "Daily., Disp: 90 tablet, Rfl: 1    prazosin (MINIPRESS) 5 MG capsule, Take 1 capsule by mouth Every Night., Disp: , Rfl:     QUEtiapine (SEROquel) 100 MG tablet, Take 0.5 tablets by mouth 2 (Two) Times a Day. Pt takes 50mg qam, 50mg midday and 100mg hs, Disp: , Rfl:     QUEtiapine (SEROquel) 100 MG tablet, Take 1 tablet by mouth Every Night. Pt takes 50mg qam, 50mg midday and 100mg hs, Disp: , Rfl:     SUMAtriptan (Imitrex) 5 MG/ACT nasal spray, 1 spray into the nostril(s) as directed by provider Every 2 (Two) Hours As Needed for Migraine., Disp: 6 each, Rfl: 2    topiramate (TOPAMAX) 50 MG tablet, Take 1 tablet by mouth 2 (Two) Times a Day., Disp: , Rfl:     traZODone (DESYREL) 100 MG tablet, Take 1 tablet by mouth Every Night., Disp: , Rfl:     Current Facility-Administered Medications:     ondansetron (ZOFRAN) injection 4 mg, 4 mg, Intravenous, Q6H PRN, Isabela Nelson, APRN, 4 mg at 04/08/22 1616  Objective     VITAL SIGNS:     Vitals:    04/22/25 1353   BP: 126/78   Pulse: 78   Resp: 17   Temp: 97.7 °F (36.5 °C)   TempSrc: Oral   SpO2: 96%   Weight: 76.6 kg (168 lb 12.8 oz)   Height: 172 cm (67.72\")   PainSc: 0-No pain     Body mass index is 25.88 kg/m².     Wt Readings from Last 5 Encounters:   04/22/25 76.6 kg (168 lb 12.8 oz)   04/15/25 76.2 kg (168 lb)   04/09/25 78.2 kg (172 lb 6.4 oz)   05/31/22 92.5 kg (203 lb 14.8 oz)   05/26/22 92.5 kg (204 lb)     PHYSICAL EXAMINATION:   GENERAL: The patient appears in good general condition, not in acute distress.   SKIN: No Ecchymosis.  EYES: No jaundice. No Pallor.  NECK: No carotid bruit.  CHEST: Normal respiratory effort. Normal breathing sounds bilaterally. No added sounds.  CVS: Normal S1 and S2. No Murmur.  ABDOMEN: Nondistended.  EXTREMITIES: No edema.  No calf tenderness.    DIAGNOSTIC DATA:     Results from last 7 days   Lab Units 04/22/25  1347   WBC 10*3/mm3 5.78   NEUTROS ABS 10*3/mm3 2.70   HEMOGLOBIN g/dL 12.8   HEMATOCRIT % 40.8   PLATELETS 10*3/mm3 " 183     Results from last 7 days   Lab Units 04/22/25  1347   SODIUM mmol/L 142   POTASSIUM mmol/L 4.2   CHLORIDE mmol/L 109*   CO2 mmol/L 20.5*   BUN mg/dL 11   CREATININE mg/dL 0.88   CALCIUM mg/dL 9.3   ALBUMIN g/dL 3.7   BILIRUBIN mg/dL 0.2   ALK PHOS U/L 96   ALT (SGPT) U/L 22   AST (SGOT) U/L 19   GLUCOSE mg/dL 101*       Assessment & Plan    *Acute pulmonary embolism diagnosed on 4/8/2022.  Patient presented to the ER on 4/7/2022 with shortness of breath.  CT chest on 4/8/2022 revealed acute pulmonary emboli within several proximal segmental right lower lobe pulmonary arteries.  She was started on Eliquis.  Patient was on Tamoxifen (for chemoprophylaxis due to increased risk for breast cancer). Tamoxifen was discontinued.   Patient presented to the ER on 4/13/2022 with severe chest pain. It was pleuritic in nature.   CT angiogram chest on 4/13/2022 revealed a right basilar segmental pulmonary artery diagnosed on 4/13/2022. Non new pulmonary embolism.   Venous Doppler on 4/15/2022 showed no evidence of lower extremity deep vein thrombosis.   This was considered to be a provoked episode.  CT angiogram on 11/30/2024 showed no evidence of pulmonary embolism.  Patient was taken off Eliquis after 6 months of anticoagulation.  Family history is positive for DVT in the patient's mother and grandmother.  This is concerning for underlying thrombophilia.     *Intermittent calf pain.   Patient previously reported intermittent pain in the calves, concerning for DVTs.    Venous doppler was obtained on 4/15/2022.    The pain was considered muscular in nature.  Venous Doppler on 12/7/2022 revealed no evidence of deep vein thrombosis in the lower extremities.    *Significant history of TIA and CVA.  The first episode was 12-13 years ago.  She was hospitalized with CVA between 10/31/2023 and 11/1/2023 at [facility].  She was hospitalized with CVA between 10/17/2024 and 10/20/2024 at [facility] with ischemic stroke s/p IV  tPA treatment.  She was hospitalized with left-sided weakness-TIA between 4/9/2025 and 4/10/2025.  She was discharged home on Eliquis 5 mg twice daily.  Echocardiogram on 4/11/2025-saline test was negative.  No evidence of PFO.  CT angiogram neck on 4/9/2025-no significant stenosis of the bilateral internal carotid arteries or the large arteries of the head and neck.     PLAN:     1.  Continue Eliquis 5 mg twice daily.  2.  Obtain thrombophilia workup with factor V Leiden, anticardiolipin antibody, beta-2 glycoprotein antibody, antiphosphatidylserine antibody and factor VIII activity.  3.  Due to her history of PE and strokes and TIAs, recommended lifelong anticoagulation.  4.  Follow-up in 6 months.  Will obtain CBC CMP.      I spent 60 minutes caring for Melvina on this date of service. This time includes time spent by me in the following activities: preparing for the visit, reviewing tests, performing a medically appropriate examination and/or evaluation, counseling and educating the patient/family/caregiver, documenting information in the medical record, independently interpreting results and communicating that information with the patient/family/caregiver, and ordering test(s)       Tad Foster MD  04/22/25

## 2025-04-23 ENCOUNTER — OFFICE VISIT (OUTPATIENT)
Dept: NEUROLOGY | Facility: CLINIC | Age: 65
End: 2025-04-23
Payer: MEDICARE

## 2025-04-23 ENCOUNTER — PATIENT ROUNDING (BHMG ONLY) (OUTPATIENT)
Dept: NEUROLOGY | Facility: CLINIC | Age: 65
End: 2025-04-23
Payer: MEDICARE

## 2025-04-23 VITALS
DIASTOLIC BLOOD PRESSURE: 74 MMHG | HEIGHT: 63 IN | SYSTOLIC BLOOD PRESSURE: 117 MMHG | WEIGHT: 169.7 LBS | BODY MASS INDEX: 30.07 KG/M2 | HEART RATE: 82 BPM

## 2025-04-23 DIAGNOSIS — Z86.73 H/O: CVA (CEREBROVASCULAR ACCIDENT): Primary | ICD-10-CM

## 2025-04-23 LAB
B2 GLYCOPROT1 IGA SER-ACNC: <9 GPI IGA UNITS (ref 0–25)
B2 GLYCOPROT1 IGG SER-ACNC: 14 GPI IGG UNITS (ref 0–20)
B2 GLYCOPROT1 IGM SER-ACNC: <9 GPI IGM UNITS (ref 0–32)
CARDIOLIPIN IGG SER IA-ACNC: <9 GPL U/ML (ref 0–14)
CARDIOLIPIN IGM SER IA-ACNC: <9 MPL U/ML (ref 0–12)
F5 GENE MUT ANL BLD/T: NORMAL

## 2025-04-24 ENCOUNTER — PATIENT ROUNDING (BHMG ONLY) (OUTPATIENT)
Dept: ONCOLOGY | Facility: CLINIC | Age: 65
End: 2025-04-24
Payer: MEDICARE

## 2025-04-25 LAB
FACT VIII ACT/NOR PPP: 256 % ACTIVITY (ref 60–150)
Lab: ABNORMAL

## 2025-04-26 ENCOUNTER — HOSPITAL ENCOUNTER (EMERGENCY)
Facility: HOSPITAL | Age: 65
Discharge: HOME | End: 2025-04-26
Attending: EMERGENCY MEDICINE
Payer: OTHER GOVERNMENT

## 2025-04-26 ENCOUNTER — APPOINTMENT (OUTPATIENT)
Dept: MRI IMAGING | Facility: HOSPITAL | Age: 65
End: 2025-04-26
Payer: OTHER GOVERNMENT

## 2025-04-26 ENCOUNTER — APPOINTMENT (OUTPATIENT)
Dept: GENERAL RADIOLOGY | Facility: HOSPITAL | Age: 65
End: 2025-04-26
Payer: OTHER GOVERNMENT

## 2025-04-26 ENCOUNTER — APPOINTMENT (OUTPATIENT)
Dept: CT IMAGING | Facility: HOSPITAL | Age: 65
End: 2025-04-26
Payer: OTHER GOVERNMENT

## 2025-04-26 VITALS
RESPIRATION RATE: 20 BRPM | TEMPERATURE: 97.7 F | HEART RATE: 76 BPM | OXYGEN SATURATION: 99 % | HEIGHT: 63 IN | WEIGHT: 170.19 LBS | SYSTOLIC BLOOD PRESSURE: 132 MMHG | BODY MASS INDEX: 30.16 KG/M2 | DIASTOLIC BLOOD PRESSURE: 94 MMHG

## 2025-04-26 DIAGNOSIS — R42 DIZZINESS: Primary | ICD-10-CM

## 2025-04-26 DIAGNOSIS — M25.362 KNEE INSTABILITY, LEFT: ICD-10-CM

## 2025-04-26 LAB
ABO GROUP BLD: NORMAL
ALBUMIN SERPL-MCNC: 4 G/DL (ref 3.5–5.2)
ALBUMIN/GLOB SERPL: 1.4 G/DL
ALP SERPL-CCNC: 92 U/L (ref 39–117)
ALT SERPL W P-5'-P-CCNC: 12 U/L (ref 1–33)
ANION GAP SERPL CALCULATED.3IONS-SCNC: 12.4 MMOL/L (ref 5–15)
APTT PPP: 34.3 SECONDS (ref 24.2–34.2)
AST SERPL-CCNC: 15 U/L (ref 1–32)
BACTERIA UR QL AUTO: ABNORMAL /HPF
BASOPHILS # BLD AUTO: 0.02 10*3/MM3 (ref 0–0.2)
BASOPHILS NFR BLD AUTO: 0.3 % (ref 0–1.5)
BILIRUB SERPL-MCNC: 0.3 MG/DL (ref 0–1.2)
BILIRUB UR QL STRIP: NEGATIVE
BLD GP AB SCN SERPL QL: NEGATIVE
BUN SERPL-MCNC: 12 MG/DL (ref 8–23)
BUN/CREAT SERPL: 11.8 (ref 7–25)
CALCIUM SPEC-SCNC: 9 MG/DL (ref 8.6–10.5)
CHLORIDE SERPL-SCNC: 104 MMOL/L (ref 98–107)
CLARITY UR: CLEAR
CO2 SERPL-SCNC: 20.6 MMOL/L (ref 22–29)
COLOR UR: YELLOW
CREAT SERPL-MCNC: 1.02 MG/DL (ref 0.57–1)
DEPRECATED RDW RBC AUTO: 49.5 FL (ref 37–54)
EGFRCR SERPLBLD CKD-EPI 2021: 61.9 ML/MIN/1.73
EOSINOPHIL # BLD AUTO: 0.13 10*3/MM3 (ref 0–0.4)
EOSINOPHIL NFR BLD AUTO: 2.1 % (ref 0.3–6.2)
ERYTHROCYTE [DISTWIDTH] IN BLOOD BY AUTOMATED COUNT: 14.4 % (ref 12.3–15.4)
GLOBULIN UR ELPH-MCNC: 2.9 GM/DL
GLUCOSE SERPL-MCNC: 90 MG/DL (ref 65–99)
GLUCOSE UR STRIP-MCNC: NEGATIVE MG/DL
HCT VFR BLD AUTO: 39.3 % (ref 34–46.6)
HGB BLD-MCNC: 12.6 G/DL (ref 12–15.9)
HGB UR QL STRIP.AUTO: NEGATIVE
HOLD SPECIMEN: NORMAL
HYALINE CASTS UR QL AUTO: ABNORMAL /LPF
IMM GRANULOCYTES # BLD AUTO: 0.02 10*3/MM3 (ref 0–0.05)
IMM GRANULOCYTES NFR BLD AUTO: 0.3 % (ref 0–0.5)
INR PPP: 1.45 (ref 0.86–1.15)
KETONES UR QL STRIP: NEGATIVE
LEUKOCYTE ESTERASE UR QL STRIP.AUTO: ABNORMAL
LYMPHOCYTES # BLD AUTO: 2.91 10*3/MM3 (ref 0.7–3.1)
LYMPHOCYTES NFR BLD AUTO: 47.4 % (ref 19.6–45.3)
MCH RBC QN AUTO: 29.9 PG (ref 26.6–33)
MCHC RBC AUTO-ENTMCNC: 32.1 G/DL (ref 31.5–35.7)
MCV RBC AUTO: 93.1 FL (ref 79–97)
MONOCYTES # BLD AUTO: 0.47 10*3/MM3 (ref 0.1–0.9)
MONOCYTES NFR BLD AUTO: 7.7 % (ref 5–12)
NEUTROPHILS NFR BLD AUTO: 2.59 10*3/MM3 (ref 1.7–7)
NEUTROPHILS NFR BLD AUTO: 42.2 % (ref 42.7–76)
NITRITE UR QL STRIP: NEGATIVE
NRBC BLD AUTO-RTO: 0 /100 WBC (ref 0–0.2)
PH UR STRIP.AUTO: 7.5 [PH] (ref 5–8)
PLATELET # BLD AUTO: 187 10*3/MM3 (ref 140–450)
PMV BLD AUTO: 9.8 FL (ref 6–12)
POTASSIUM SERPL-SCNC: 4.2 MMOL/L (ref 3.5–5.2)
PROT SERPL-MCNC: 6.9 G/DL (ref 6–8.5)
PROT UR QL STRIP: NEGATIVE
PROTHROMBIN TIME: 18.3 SECONDS (ref 11.8–14.9)
PS IGA SER-ACNC: <1 APS UNITS (ref 0–19)
PS IGG SER-ACNC: <9 UNITS (ref 0–30)
PS IGM SER-ACNC: 12 UNITS (ref 0–30)
QT INTERVAL: 401 MS
QTC INTERVAL: 448 MS
RBC # BLD AUTO: 4.22 10*6/MM3 (ref 3.77–5.28)
RBC # UR STRIP: ABNORMAL /HPF
REF LAB TEST METHOD: ABNORMAL
RH BLD: POSITIVE
SODIUM SERPL-SCNC: 137 MMOL/L (ref 136–145)
SP GR UR STRIP: 1.01 (ref 1–1.03)
SQUAMOUS #/AREA URNS HPF: ABNORMAL /HPF
T&S EXPIRATION DATE: NORMAL
UROBILINOGEN UR QL STRIP: ABNORMAL
WBC # UR STRIP: ABNORMAL /HPF
WBC NRBC COR # BLD AUTO: 6.14 10*3/MM3 (ref 3.4–10.8)
WHOLE BLOOD HOLD COAG: NORMAL
WHOLE BLOOD HOLD SPECIMEN: NORMAL

## 2025-04-26 PROCEDURE — 70551 MRI BRAIN STEM W/O DYE: CPT

## 2025-04-26 PROCEDURE — 70450 CT HEAD/BRAIN W/O DYE: CPT

## 2025-04-26 PROCEDURE — 0042T HC CT CEREBRAL PERFUSION W/WO CONTRAST: CPT

## 2025-04-26 PROCEDURE — 82948 REAGENT STRIP/BLOOD GLUCOSE: CPT

## 2025-04-26 PROCEDURE — 85025 COMPLETE CBC W/AUTO DIFF WBC: CPT | Performed by: EMERGENCY MEDICINE

## 2025-04-26 PROCEDURE — 99285 EMERGENCY DEPT VISIT HI MDM: CPT

## 2025-04-26 PROCEDURE — 71045 X-RAY EXAM CHEST 1 VIEW: CPT

## 2025-04-26 PROCEDURE — 36415 COLL VENOUS BLD VENIPUNCTURE: CPT

## 2025-04-26 PROCEDURE — 25510000001 IOPAMIDOL PER 1 ML: Performed by: EMERGENCY MEDICINE

## 2025-04-26 PROCEDURE — 70498 CT ANGIOGRAPHY NECK: CPT

## 2025-04-26 PROCEDURE — 85730 THROMBOPLASTIN TIME PARTIAL: CPT | Performed by: EMERGENCY MEDICINE

## 2025-04-26 PROCEDURE — 86850 RBC ANTIBODY SCREEN: CPT | Performed by: EMERGENCY MEDICINE

## 2025-04-26 PROCEDURE — 80053 COMPREHEN METABOLIC PANEL: CPT | Performed by: EMERGENCY MEDICINE

## 2025-04-26 PROCEDURE — 93005 ELECTROCARDIOGRAM TRACING: CPT | Performed by: EMERGENCY MEDICINE

## 2025-04-26 PROCEDURE — 99222 1ST HOSP IP/OBS MODERATE 55: CPT | Performed by: STUDENT IN AN ORGANIZED HEALTH CARE EDUCATION/TRAINING PROGRAM

## 2025-04-26 PROCEDURE — 85610 PROTHROMBIN TIME: CPT | Performed by: EMERGENCY MEDICINE

## 2025-04-26 PROCEDURE — 70496 CT ANGIOGRAPHY HEAD: CPT

## 2025-04-26 PROCEDURE — 86901 BLOOD TYPING SEROLOGIC RH(D): CPT | Performed by: EMERGENCY MEDICINE

## 2025-04-26 PROCEDURE — 81001 URINALYSIS AUTO W/SCOPE: CPT | Performed by: EMERGENCY MEDICINE

## 2025-04-26 PROCEDURE — 86900 BLOOD TYPING SEROLOGIC ABO: CPT | Performed by: EMERGENCY MEDICINE

## 2025-04-26 RX ORDER — IOPAMIDOL 755 MG/ML
150 INJECTION, SOLUTION INTRAVASCULAR
Status: COMPLETED | OUTPATIENT
Start: 2025-04-26 | End: 2025-04-26

## 2025-04-26 RX ORDER — SODIUM CHLORIDE 0.9 % (FLUSH) 0.9 %
10 SYRINGE (ML) INJECTION AS NEEDED
Status: DISCONTINUED | OUTPATIENT
Start: 2025-04-26 | End: 2025-04-26 | Stop reason: HOSPADM

## 2025-04-26 RX ADMIN — IOPAMIDOL 150 ML: 755 INJECTION, SOLUTION INTRAVENOUS at 11:55

## 2025-04-26 NOTE — CONSULTS
[facility]  [facility]      Patient Name:   Melvina Martinez  YOB: 1960  Identification Number:   MRN - [id number]  Date of Service:   04/26/2025 11:19:48    Diagnosis:        I63.89 - Cerebrovascular accident (CVA) due to other mechanism ([facility])    Impression:          This is a 65 yo F w a PMHX of stroke, PE (apixaban), migraine, DM, HTN, who presents to the ED with the acute onset of LLE weakness. She was last known to be at baseline at 11.10AM while shopping at [employer]. On arrival to the ED her symptoms remain persistent. BP was found to be 125 systolic.         Physical exam with LLE weakness, although not rising to the level of a drift. But clearly more heavy.   Labs pending   Imaging with CTH pending official read            Would consider stroke unmasking of prior dormant stroke symptoms in the setting of toxic/metabolic/infectious/hemodynamic changes.   Will need to rule out a new CVA however         -If official CTH shows no bleed, then allow permissive HTN to 220/110 and below. Cw apixaban   -MRI brain w/o con (inpatient). If acute stroke found, will need to reconsider apixaban for an alternative.   -Head and neck vessel imaging   -2D echo (if acute stroke found, or if all symptoms resolve and this becomes a TIA workup)   -UA, Utox, lactate, troponins, A1C, CBC, CMP, Lipids, LFTs, thiamine, ammonia, TSH, B12, ABG               Stroke/Telemetry Floor        Neuro Checks        Bedside Swallow Eval        DVT Prophylaxis        IV Fluids, Normal Saline        Head of Bed 30 Degrees        Euglycemia and Avoid Hyperthermia (PRN Acetaminophen)        ------------------------------------------------------------------------------    Advanced Imaging:  Advanced Imaging Deferred because:    Non-disabling symptoms as verified by the patient; no cortical signs so not consistent with LVO      Metrics:  Last Known Well: 04/26/2025 11:10:00  Dispatch Time:  04/26/2025 11:19:03  Arrival Time: 04/26/2025 11:14:00  Initial Response Time: 04/26/2025 11:22:43  Symptoms: LLE weakness, dizziness.  Initial patient interaction: 04/26/2025 11:26:42  NIHSS Assessment Completed: 04/26/2025 11:31:43  Patient is not a candidate for Thrombolytic.  Thrombolytic Medical Decision: 04/26/2025 11:31:44  Patient was not deemed candidate for Thrombolytic because of following reasons:  Use of NOAC in last 48 hrs. .    I personally Reviewed the CT Head and it Showed    Primary Provider Notified of Diagnostic Impression and Management Plan on: 04/26/2025 11:45:18        ------------------------------------------------------------------------------    History of Present Illness:  Patient is a 64 year old Female.    Patient was brought by EMS for symptoms of LLE weakness, dizziness.    This is a 65 yo F w a PMHX of stroke, PE (apixaban), migraine, DM, HTN, who presents to the ED with the acute onset of LLE weakness. She was last known to be at baseline at 11.10AM while shopping at [employer]. On arrival to the ED her symptoms remain persistent. BP was found to be 125 systolic. She was taken for CTH and further evaluation.  Decision on whether or not to give pharmacological thrombolysis was made based on indications, contraindications, and patient's disability status and preference.       Medications:    Anticoagulant use:  Unknown  Antiplatelet use: Unknown  Reviewed EMR for current medications    Allergies:   NKDA    Social History:  Smoking: No  Alcohol Use: No  Drug Use: No    Family History:    There is no family history of premature cerebrovascular disease pertinent to this consultation    ROS :  14 Points Review of Systems was performed and was negative except mentioned in HPI.    Past Surgical History:  There Is No Surgical History Contributory To Today’s Visit         Examination:  BP(125/77), Pulse(89),  1A: Level of Consciousness - Alert; keenly responsive + 0  1B: Ask Month and Age -  Both Questions Right + 0  1C: Blink Eyes & Squeeze Hands - Performs Both Tasks + 0  2: Test Horizontal Extraocular Movements - Normal + 0  3: Test Visual Fields - No Visual Loss + 0  4: Test Facial Palsy (Use Grimace if Obtunded) - Normal symmetry + 0  5A: Test Left Arm Motor Drift - No Drift for 10 Seconds + 0  5B: Test Right Arm Motor Drift - No Drift for 10 Seconds + 0  6A: Test Left Leg Motor Drift - No Drift for 5 Seconds + 0  6B: Test Right Leg Motor Drift - No Drift for 5 Seconds + 0  7: Test Limb Ataxia (FNF/Heel-Shin) - No Ataxia + 0  8: Test Sensation - Normal; No sensory loss + 0  9: Test Language/Aphasia - Normal; No aphasia + 0  10: Test Dysarthria - Normal + 0  11: Test Extinction/Inattention - No abnormality + 0    NIHSS Score: 0      Pre-Morbid Modified [place] Scale:  0 Points = No symptoms at all    Spoke with : attending  I reviewed the available imaging via Rapid and initiated discussion with the primary provider    This consult was conducted in real time using interactive audio and video technology. Patient was informed of the technology being used for this visit and agreed to proceed. Patient located in hospital and provider located at home/office setting.      Patient is being evaluated for possible acute neurologic impairment and high probability of imminent or life-threatening deterioration. I spent total of 35 minutes providing care to this patient, including time for face to face visit via telemedicine, review of medical records, imaging studies and discussion of findings with providers, the patient and/or family.      Dr Dalton Connell      [facility]  For Inpatient follow-up with [facility] physician please call [facility] at [phone]. As we are not an outpatient service for any post hospital discharge needs please contact the hospital for assistance.  If you have any questions for the [facility] physicians or need to reconsult for clinical or diagnostic changes please  contact us via [facility] at [phone].

## 2025-04-26 NOTE — ED PROVIDER NOTES
"Time: 11:23 AM EDT  Date of encounter:  4/26/2025  Independent Historian/Clinical History and Information was obtained by:   Patient    History is limited by: N/A    Chief Complaint: Dizziness, left leg weakness      History of Present Illness:  Patient is a 64 y.o. year old female who presents to the emergency department for evaluation of dizziness and left leg weakness.  Patient states this abruptly started 15 minutes prior to arrival.  She states that she cannot walk due to left leg weakness.  She states \"it just keeps giving out\".  Also describes being dizzy.  When asked to move her legs she states \"oh, I can raise it\".  She is unable to articulate what is wrong with her left leg as she states it is not weak when she raises it but it is still not functioning when she tries to walk.  Denies any upper extremity weakness.  States she just left here with a recent diagnosis of TIA.      Patient Care Team  Primary Care Provider: Provider, No Known    Past Medical History:     Allergies   Allergen Reactions    Penicillins Anaphylaxis and Shortness Of Breath    Metronidazole Hives and Nausea Only    Doxycycline Nausea And Vomiting     Past Medical History:   Diagnosis Date    Acid reflux     Anxiety disorder     Arthritis     Bipolar disorder     Carpal tunnel syndrome     Deep vein thrombosis April 2022    Depression     Diabetes     patient states that she is no longer diabetic after loosing weight    H/O Panic disorder     Hirsutism     History of back pain     Hyperlipidemia     Hypertension     Kidney stones     Limb swelling     Migraines     PTSD (post-traumatic stress disorder)     Pulmonary embolism     Rotator cuff tear, left     Rotator cuff tear, left 06/10/2021    Sleep apnea     Stress incontinence     Tattoos     TIA (transient ischemic attack)      Past Surgical History:   Procedure Laterality Date    ABDOMINOPLASTY      Tummy tuck    ANKLE SURGERY      APPENDECTOMY      25 yrs. old when pregnant    "  SECTION      CHOLECYSTECTOMY N/A 10/06/2021    Procedure: CHOLECYSTECTOMY LAPAROSCOPIC;  Surgeon: Jeancarlos Carrington MD;  Location: [facility];  Service: General;  Laterality: N/A;    COLONOSCOPY      COLONOSCOPY N/A 2022    Procedure: COLONOSCOPY INTO CECUM WITH COLD SNARE POLYPECTOMY;  Surgeon: Ananth Odell MD;  Location:  [facility];  Service: Gastroenterology;  Laterality: N/A;  PRE: PERSONAL H/O COLON POLYPS  POST: DIVERTICULOSIS, POLYP, HEMORRHOIDS, TORTUOUS COLON    ENDOSCOPY N/A 10/28/2021    Procedure: ESOPHAGOGASTRODUODENOSCOPY with biopsies;  Surgeon: Luciano Salmon MD;  Location:  [facility];  Service: Gastroenterology;  Laterality: N/A;  pre:  abnormal CT  post:  gastritis, HH,     EYE SURGERY      childhood; muscle    FRACTURE SURGERY      tibia and fibula fracture    HYSTERECTOMY      INTRAOCULAR LENS INSERTION      KNEE ARTHROSCOPY W/ MENISCAL REPAIR Right     KNEE SURGERY      SHOULDER ARTHROSCOPY Left 06/10/2021    Procedure: LEFT SHOULDER ARTHROSCOPY;  Surgeon: Дмитрий Francois MD;  Location: [facility];  Service: Orthopedics;  Laterality: Left;    SHOULDER ROTATOR CUFF REPAIR Left 06/10/2021    Procedure: SUBACROMIAL DECOMPRESSION, DISTAL CLAVICULECTOMY AND ROTATOR CUFF REPAIR;  Surgeon: Дмитрий Francois MD;  Location: [facility];  Service: Orthopedics;  Laterality: Left;    SHOULDER SURGERY Right     TONSILLECTOMY      TUBAL ABDOMINAL LIGATION       Family History   Problem Relation Age of Onset    Cancer Mother         Unspecified    Breast cancer Mother     Clotting disorder Mother     Hypertension Father     Dementia Father     Stroke Father     Heart disease Father     Diabetes Father         Unspecified type    Arthritis Father     Alzheimer's disease Father     Heart disease Brother     Breast cancer Paternal Aunt     Throat cancer Paternal Aunt     Breast cancer Paternal Aunt     Breast cancer Paternal Grandmother     Malig Hyperthermia  Neg Hx        Home Medications:  Prior to Admission medications    Medication Sig Start Date End Date Taking? Authorizing Provider   amLODIPine (NORVASC) 2.5 MG tablet Take 1 tablet by mouth Daily. 6/16/22   Isabela Nelson APRN   apixaban (ELIQUIS) 5 MG tablet tablet Take 1 tablet by mouth 2 (Two) Times a Day. 4/10/25   Jeancarlos Poole MD   atorvastatin (LIPITOR) 80 MG tablet Take 1 tablet by mouth Every Night. 2/20/25   Gregorio Adam MD   folic acid (FOLVITE) 1 MG tablet Take 1 tablet by mouth Daily. 2/20/25   Gregorio Adam MD   lamoTRIgine (LaMICtal) 200 MG tablet Take 1 tablet by mouth 2 (Two) Times a Day.    Gregorio Adam MD   multivitamin tablet tablet Take 1 tablet by mouth Daily. 2/20/25   Gregorio Adam MD   pantoprazole (Protonix) 40 MG EC tablet Take 1 tablet by mouth Daily. 4/20/22   Isabela Nelson APRN   prazosin (MINIPRESS) 5 MG capsule Take 1 capsule by mouth Every Night.    Gregorio Adam MD   QUEtiapine (SEROquel) 100 MG tablet Take 0.5 tablets by mouth 2 (Two) Times a Day. Pt takes 50mg qam, 50mg midday and 100mg hs    Gregorio Adam MD   QUEtiapine (SEROquel) 100 MG tablet Take 1 tablet by mouth Every Night. Pt takes 50mg qam, 50mg midday and 100mg hs    Gregorio Adam MD   SUMAtriptan (Imitrex) 5 MG/ACT nasal spray 1 spray into the nostril(s) as directed by provider Every 2 (Two) Hours As Needed for Migraine. 8/30/21   Mihaela Traylor MD   topiramate (TOPAMAX) 50 MG tablet Take 1 tablet by mouth 2 (Two) Times a Day.    Gregorio Adam MD   traZODone (DESYREL) 100 MG tablet Take 1 tablet by mouth Every Night.    Gregorio Adam MD        Social History:   Social History     Tobacco Use    Smoking status: Never    Smokeless tobacco: Never   Vaping Use    Vaping status: Never Used   Substance Use Topics    Alcohol use: Yes     Comment: rarely drinks    Drug use: Never         Review of Systems:  Review of Systems  "  Constitutional:  Negative for chills and fever.   HENT:  Negative for congestion, rhinorrhea and sore throat.    Eyes:  Negative for photophobia.   Respiratory:  Negative for apnea, cough, chest tightness and shortness of breath.    Cardiovascular:  Negative for chest pain and palpitations.   Gastrointestinal:  Negative for abdominal pain, diarrhea, nausea and vomiting.   Endocrine: Negative.    Genitourinary:  Negative for difficulty urinating and dysuria.   Musculoskeletal:  Negative for back pain, joint swelling and myalgias.   Skin:  Negative for color change and wound.   Allergic/Immunologic: Negative.    Neurological:  Positive for dizziness and weakness. Negative for seizures and headaches.   Psychiatric/Behavioral: Negative.     All other systems reviewed and are negative.       Physical Exam:  /81   Pulse 78   Temp 97.7 °F (36.5 °C) (Oral)   Resp 16   Ht 160 cm (63\")   Wt 77.2 kg (170 lb 3.1 oz)   LMP  (LMP Unknown)   SpO2 97%   BMI 30.15 kg/m²     Physical Exam  Vitals and nursing note reviewed.   Constitutional:       General: She is awake.      Appearance: Normal appearance.   HENT:      Head: Normocephalic and atraumatic.      Nose: Nose normal.      Mouth/Throat:      Mouth: Mucous membranes are moist.   Eyes:      Extraocular Movements: Extraocular movements intact.      Pupils: Pupils are equal, round, and reactive to light.   Cardiovascular:      Rate and Rhythm: Normal rate and regular rhythm.      Heart sounds: Normal heart sounds.   Pulmonary:      Effort: Pulmonary effort is normal. No respiratory distress.      Breath sounds: Normal breath sounds. No wheezing, rhonchi or rales.   Abdominal:      General: Bowel sounds are normal.      Palpations: Abdomen is soft.      Tenderness: There is no abdominal tenderness. There is no guarding or rebound.      Comments: No rigidity   Musculoskeletal:         General: No tenderness. Normal range of motion.      Cervical back: Normal range " of motion and neck supple.   Skin:     General: Skin is warm and dry.      Coloration: Skin is not jaundiced.   Neurological:      General: No focal deficit present.      Mental Status: She is alert. Mental status is at baseline.      Cranial Nerves: No cranial nerve deficit.      Sensory: No sensory deficit.      Motor: No weakness.      Comments: Despite the patient feeling weak.  She has full strength to her affected left lower extremity   Psychiatric:         Mood and Affect: Mood normal.                    Medical Decision Making:      Comorbidities that affect care:    TIA, hypertension    External Notes reviewed:    Previous Clinic Note: Neurology office visit 4/23/2025.  Assessment and plan as follows:  Assessment & Plan  1. Transient Ischemic Attack (TIA).  She has been referred to neurology following a TIA hospital FU a couple of weeks ago. Her medical history includes multiple TIAs over the years. The most recent TIA event occurred in mid-April 2025, with no acute stroke detected on work up at that time. Her MRI results are generally unremarkable, with no evidence of stroke, hemorrhage, bleed, hydrocephalus, or tumor. However, there are a few patchy spots indicative of mild chronic small vessel disease due to cardiovascular risk factors which also increase the risk of stroke and heart attack, most especially high blood pressure for small vessel disease. Her gait appears normal, but she exhibits anxiety during stress gait tests (toe/heel/tandem walking) with poorer than expected performance. Apart from this, her examination is non-focal/essentially normal, with the exception of a subtle right facial droop, which is likely longstanding/congenital and unnoticed by her. She was educated about the importance of lifestyle modifications in stroke prevention. She was advised to maintain a heart-healthy diet and engage in daily exercise for 45 minutes per session. She was also informed about the [facility] acronym for  recognizing stroke symptoms and the importance of seeking immediate medical attention if these symptoms occur.       The following orders were placed and all results were independently analyzed by me:  Orders Placed This Encounter   Procedures    CT Head Without Contrast Stroke Protocol    CT CEREBRAL PERFUSION WITH & WITHOUT CONTRAST    XR Chest 1 View    CT Angiogram Head w AI Analysis of LVO    CT Angiogram Neck    MRI Brain Without Contrast    Alsip Draw    Comprehensive Metabolic Panel    Protime-INR    aPTT    Urinalysis With Microscopic If Indicated (No Culture) - Urine, Clean Catch    CBC Auto Differential    Urinalysis, Microscopic Only - Urine, Clean Catch    NPO Diet NPO Type: Strict NPO    Initiate Department's Acute Stroke Process (Team D, Code 19, etc)    Perform NIH Stroke Scale    Measure Actual Weight    Head of Bed 30 Degrees or Less    Undress and Gown    Continuous Pulse Oximetry    Vital Signs    Neuro Checks    Notify Provider for SBP <80 or >200    Notify Provider for SBP >140 (For Hemorrhagic Stroke)    No Hypotonic Fluids    Nursing Dysphagia Screening (Complete Prior to Giving anything PO)    RN to Place Order SLP Consult (IF swallow screen failed) - Eval & Treat Choosing Reason of RN Dysphagia Screen Failed    Oxygen Therapy- Nasal Cannula; Titrate 1-6 LPM Per SpO2; 90 - 95%    POC Glucose Once    ECG 12 Lead ED Triage Standing Order; Acute Stroke (Onset <12 hrs)    Type & Screen    Insert Large Bore Peripheral IV - Right AC Preferred    CBC & Differential    Green Top (Gel)    Lavender Top    Gold Top - SST    Light Blue Top    Extra Tubes    Gray Top       Medications Given in the Emergency Department:  Medications   sodium chloride 0.9 % flush 10 mL (has no administration in time range)   iopamidol (ISOVUE-370) 76 % injection 150 mL (150 mL Intravenous Given 4/26/25 1155)        ED Course:    ED Course as of 04/26/25 1536   Sat Apr 26, 2025   1302 I have personally interpreted the EKG  today and it shows no evidence of any acute ischemia or heart arrhythmia. [RP]   1531 I personally viewed and interpreted the x-ray. [RP]      ED Course User Index  [RP] Rajinder Wright MD       Labs:    Lab Results (last 24 hours)       Procedure Component Value Units Date/Time    CBC & Differential [[id number]]  (Abnormal) Collected: 04/26/25 1127    Specimen: Blood Updated: 04/26/25 1137    Narrative:      The following orders were created for panel order CBC & Differential.  Procedure                               Abnormality         Status                     ---------                               -----------         ------                     CBC Auto Differential[[id number]]        Abnormal            Final result                 Please view results for these tests on the individual orders.    Comprehensive Metabolic Panel [[id number]]  (Abnormal) Collected: 04/26/25 1127    Specimen: Blood Updated: 04/26/25 1155     Glucose 90 mg/dL      BUN 12 mg/dL      Creatinine 1.02 mg/dL      Sodium 137 mmol/L      Potassium 4.2 mmol/L      Chloride 104 mmol/L      CO2 20.6 mmol/L      Calcium 9.0 mg/dL      Total Protein 6.9 g/dL      Albumin 4.0 g/dL      ALT (SGPT) 12 U/L      AST (SGOT) 15 U/L      Alkaline Phosphatase 92 U/L      Total Bilirubin 0.3 mg/dL      Globulin 2.9 gm/dL      A/G Ratio 1.4 g/dL      BUN/Creatinine Ratio 11.8     Anion Gap 12.4 mmol/L      eGFR 61.9 mL/min/1.73     Narrative:      GFR Categories in Chronic Kidney Disease (CKD)      GFR Category          GFR (mL/min/1.73)    Interpretation  G1                     90 or greater         Normal or high (1)  G2                      60-89                Mild decrease (1)  G3a                   45-59                Mild to moderate decrease  G3b                   30-44                Moderate to severe decrease  G4                    15-29                Severe decrease  G5                    14 or less           Kidney failure          (1)In the  absence of evidence of kidney disease, neither GFR category G1 or G2 fulfill the criteria for CKD.    eGFR calculation 2021 CKD-EPI creatinine equation, which does not include race as a factor    Protime-INR [[id number]]  (Abnormal) Collected: 04/26/25 1127    Specimen: Blood Updated: 04/26/25 1145     Protime 18.3 Seconds      INR 1.45    Narrative:      Suggested Therapeutic Ranges For Oral Anticoagulant Therapy:  Level of Therapy                      INR Target Range  Standard Dose                            2.0-3.0  High Dose                                2.5-3.5  Patients not receiving anticoagulant  Therapy Normal Range                     0.86-1.15    aPTT [[id number]]  (Abnormal) Collected: 04/26/25 1127    Specimen: Blood Updated: 04/26/25 1145     PTT 34.3 seconds     CBC Auto Differential [[id number]]  (Abnormal) Collected: 04/26/25 1127    Specimen: Blood Updated: 04/26/25 1137     WBC 6.14 10*3/mm3      RBC 4.22 10*6/mm3      Hemoglobin 12.6 g/dL      Hematocrit 39.3 %      MCV 93.1 fL      MCH 29.9 pg      MCHC 32.1 g/dL      RDW 14.4 %      RDW-SD 49.5 fl      MPV 9.8 fL      Platelets 187 10*3/mm3      Neutrophil % 42.2 %      Lymphocyte % 47.4 %      Monocyte % 7.7 %      Eosinophil % 2.1 %      Basophil % 0.3 %      Immature Grans % 0.3 %      Neutrophils, Absolute 2.59 10*3/mm3      Lymphocytes, Absolute 2.91 10*3/mm3      Monocytes, Absolute 0.47 10*3/mm3      Eosinophils, Absolute 0.13 10*3/mm3      Basophils, Absolute 0.02 10*3/mm3      Immature Grans, Absolute 0.02 10*3/mm3      nRBC 0.0 /100 WBC     Urinalysis With Microscopic If Indicated (No Culture) - Urine, Clean Catch [[id number]]  (Abnormal) Collected: 04/26/25 1204    Specimen: Urine, Clean Catch Updated: 04/26/25 1221     Color, UA Yellow     Appearance, UA Clear     pH, UA 7.5     Specific Gravity, UA 1.011     Glucose, UA Negative     Ketones, UA Negative     Bilirubin, UA Negative     Blood, UA Negative     Protein, UA Negative      Leuk Esterase, UA Moderate (2+)     Nitrite, UA Negative     Urobilinogen, UA 0.2 E.U./dL    Urinalysis, Microscopic Only - Urine, Clean Catch [[id number]]  (Abnormal) Collected: 04/26/25 1204    Specimen: Urine, Clean Catch Updated: 04/26/25 1221     RBC, UA 0-2 /HPF      WBC, UA 3-5 /HPF      Bacteria, UA None Seen /HPF      Squamous Epithelial Cells, UA 0-2 /HPF      Hyaline Casts, UA None Seen /LPF      Methodology Automated Microscopy             Imaging:    XR Chest 1 View  Result Date: 4/26/2025  XR CHEST 1 VW Date of Exam: 4/26/2025 1:35 PM EDT Indication: Acute Stroke Protocol (Onset < 12 hrs) Comparison: 4/9/2025 Findings: Unremarkable cardiomediastinal silhouette. No focal airspace consolidation. No pleural effusion or pneumothorax. No acute osseous abnormality.     Impression: No acute process. Electronically Signed: Amilcar Clay MD  4/26/2025 1:40 PM EDT  Workstation ID: [id number]    MRI Brain Without Contrast  Result Date: 4/26/2025  MRI BRAIN WO CONTRAST Date of Exam: 4/26/2025 12:40 PM EDT Indication: Stroke symptoms  Comparison: Head CT 4/26/2025 Technique:  Routine multiplanar/multisequence sequence images of the brain were obtained without contrast administration. Findings: No areas of diffusion restriction to suggest a recent infarct. Negative for acute intracranial hemorrhage, large mass lesion, midline shift or hydrocephalus. Scattered periventricular and subcortical areas of T2/FLAIR hyperintense signal suggesting gliosis, most likely reflecting sequelae of chronic microvascular ischemic change. No large extra-axial fluid collection. Major intracranial flow voids appear preserved. Orbits symmetric. Minimal nonobstructive ethmoid sinus mucosal thickening. No mastoid effusion. Partially empty sella. Negative for cerebellar tonsillar ectopia. Corpus callosum unremarkable.     Impression: 1. No acute MRI findings. No findings to suggest a recent infarct. 2. Mild white matter gliosis favoring  sequelae of chronic microvascular ischemic change.. Electronically Signed: Bob Ellis MD  4/26/2025 1:05 PM EDT  Workstation ID: [id number]    CT Angiogram Head w AI Analysis of LVO  Result Date: 4/26/2025  CT ANGIOGRAM NECK, CT ANGIOGRAM HEAD W AI ANALYSIS OF LVO Date of Exam: 4/26/2025 11:51 AM EDT Indication: Neuro Deficit, acute, Stroke suspected Neuro deficit, acute, stroke suspected. Comparison: CT angiogram of the neck and head 4/9/2025 Technique: CTA of the neck was performed before and after the uneventful intravenous administration of iodinated contrast. Reconstructed coronal and sagittal images were also obtained. In addition, a 3-D volume rendered image was created for interpretation. Automated exposure control and iterative reconstruction methods were used. Findings: The great vessels have a normal pattern of origin from the aortic arch. The innominate artery, subclavian arteries, and common carotid arteries are widely patent. The carotid bifurcations are patent. The the internal carotid arteries are patent. The vertebral arteries are patent. The left vertebral artery is dominant. Symmetrical flow is seen in the middle cerebral artery complexes. The anterior cerebral arteries and the anterior communicating artery are patent. The basilar artery is patent. No aneurysm is seen. No soft tissue mass or adenopathy is evident. No abnormality is seen at the lung apices.     Impression: CT angiogram of the neck and head demonstrating no vascular abnormality. Electronically Signed: Gibran Lin MD  4/26/2025 12:58 PM EDT  Workstation ID: [id number]    CT Angiogram Neck  Result Date: 4/26/2025  CT ANGIOGRAM NECK, CT ANGIOGRAM HEAD W AI ANALYSIS OF LVO Date of Exam: 4/26/2025 11:51 AM EDT Indication: Neuro Deficit, acute, Stroke suspected Neuro deficit, acute, stroke suspected. Comparison: CT angiogram of the neck and head 4/9/2025 Technique: CTA of the neck was performed before and after the uneventful  intravenous administration of iodinated contrast. Reconstructed coronal and sagittal images were also obtained. In addition, a 3-D volume rendered image was created for interpretation. Automated exposure control and iterative reconstruction methods were used. Findings: The great vessels have a normal pattern of origin from the aortic arch. The innominate artery, subclavian arteries, and common carotid arteries are widely patent. The carotid bifurcations are patent. The the internal carotid arteries are patent. The vertebral arteries are patent. The left vertebral artery is dominant. Symmetrical flow is seen in the middle cerebral artery complexes. The anterior cerebral arteries and the anterior communicating artery are patent. The basilar artery is patent. No aneurysm is seen. No soft tissue mass or adenopathy is evident. No abnormality is seen at the lung apices.     Impression: CT angiogram of the neck and head demonstrating no vascular abnormality. Electronically Signed: Gibran Lin MD  4/26/2025 12:58 PM EDT  Workstation ID: [id number]    CT CEREBRAL PERFUSION WITH & WITHOUT CONTRAST  Result Date: 4/26/2025  CT CEREBRAL PERFUSION W WO CONTRAST Date of Exam: 4/26/2025 11:43 AM EDT Indication: Neuro Deficit, acute, Stroke suspected Neuro deficit, acute, stroke suspected.  Comparison: CT head 4/26/2025, CT cerebral perfusion 4/9/2025 Technique: Axial CT images of the brain were obtained prior to and after the uneventful intravenous administration of iodinated contrast. Core blood volume, core blood flow, mean transit time, and Tmax images were obtained utilizing the Rapid software protocol. A limited CT angiogram of the head was also performed to measure the blood vessel density. The radiation dose reduction device was turned on for each scan per the ALARA (As Low as Reasonably Achievable) protocol. Findings: Total hypoperfusion: Using a threshold of Tmax greater than 6 seconds, there is no convincing evidence of  acute cerebral hypoperfusion. Core infarct: Using the threshold of CBF less than 30%, there is no convincing evidence for an acute ischemic core infarct. No penumbra or tissue at risk is evident.     Impression: CT perfusion study demonstrating no core infarct or ischemic penumbra. Electronically Signed: Gibran Lin MD  4/26/2025 12:08 PM EDT  Workstation ID: [id number]    CT Head Without Contrast Stroke Protocol  Result Date: 4/26/2025  CT HEAD WO CONTRAST STROKE PROTOCOL Date of Exam: 4/26/2025 11:18 AM EDT Indication: Neuro Deficit, acute, Stroke suspected, left-sided weakness Comparison: CT head 4/9/2025 Technique: Axial CT images were obtained of the head without contrast administration.  Reconstructed coronal and sagittal images were also obtained. Automated exposure control and iterative construction methods were used. Findings: The ventricles are normal in size, position, and configuration. Sulci are not abnormally prominent. No abnormal gray or white matter density is appreciated. There is no CT evidence of acute intracranial hemorrhage, mass, or mass effect. No abnormality of the orbits is evident. The paranasal sinuses, middle ears, and mastoid air cells are well aerated.     Impression: Negative CT scan of the head without IV contrast. Electronically Signed: Gibran Lin MD  4/26/2025 11:30 AM EDT  Workstation ID: [id number]        Differential Diagnosis and Discussion:    Stroke: Differential diagnosis in this patient with signs of possible ischemic stroke include TIA or ischemic stroke, hemorrhagic stroke, hypoglycemic episode, toxic or metabolic encephalopathy, paresthesias.    PROCEDURES:    Labs were collected in the emergency department and all labs were reviewed and interpreted by me.  X-ray were performed in the emergency department and all X-ray impressions were independently interpreted by me.  An EKG was performed and the EKG was interpreted by me.  CT scan was performed in the emergency  department and the CT scan radiology impression was interpreted by me.  MRI was performed in the emergency department and the MRI impression was interpreted by me.     ECG 12 Lead ED Triage Standing Order; Acute Stroke (Onset <12 hrs)   Preliminary Result   HEART RATE=75  bpm   RR Tgdgyulk=382  ms   WA Oqxhkpro=875  ms   P Horizontal Axis=-4  deg   P Front Axis=25  deg   QRSD Interval=86  ms   QT Wxmpqtgy=524  ms   BGzB=693  ms   QRS Axis=17  deg   T Wave Axis=10  deg   - NORMAL ECG -   Sinus rhythm   Date and Time of Study:2025-04-26 12:05:42          Procedures    MDM                     Patient Care Considerations:    SEPSIS was considered but is NOT present in the emergency department as SIRS criteria is not present.      Consultants/Shared Management Plan:    Consultant: I have discussed the case with teleneurology who agrees to consult on the patient.    Social Determinants of Health:    Patient is independent, reliable, and has access to care.       Disposition and Care Coordination:    Discharged: I considered escalation of care by admitting this patient to the hospital, however patient is now entirely asymptomatic in the emergency department, ambulating without difficulty whatsoever.  In addition, she has had numerous stroke workups recently, including earlier this month.  She continues to have no findings on her imaging.  She is already on Eliquis.    I have explained the patient´s condition, diagnoses and treatment plan based on the information available to me at this time. I have answered questions and addressed any concerns. The patient has a good  understanding of the patient´s diagnosis, condition, and treatment plan as can be expected at this point. The vital signs have been stable. The patient´s condition is stable and appropriate for discharge from the emergency department.      The patient will pursue further outpatient evaluation with the primary care physician or other designated or consulting  physician as outlined in the discharge instructions. They are agreeable to this plan of care and follow-up instructions have been explained in detail. The patient has received these instructions in written format and has expressed an understanding of the discharge instructions. The patient is aware that any significant change in condition or worsening of symptoms should prompt an immediate return to this or the closest emergency department or call to 911.    Final diagnoses:   Dizziness   Knee instability, left        ED Disposition       ED Disposition   Discharge    Condition   Stable    Comment   --               This medical record created using voice recognition software.             Rajinder Wright MD  04/26/25 [id number]

## 2025-04-28 LAB — GLUCOSE BLDC GLUCOMTR-MCNC: 86 MG/DL (ref 70–99)

## 2025-05-01 LAB
QT INTERVAL: 401 MS
QTC INTERVAL: 448 MS

## (undated) DEVICE — GLV SURG ULTRAFREE MAX LTX PF 8

## (undated) DEVICE — SHOULDER P.A.D. III: Brand: DEROYAL

## (undated) DEVICE — GLV SURG SENSICARE W/ALOE PF LF 7 STRL

## (undated) DEVICE — INTENDED FOR TISSUE SEPARATION, AND OTHER PROCEDURES THAT REQUIRE A SHARP SURGICAL BLADE TO PUNCTURE OR CUT.: Brand: BARD-PARKER ® CARBON RIB-BACK BLADES

## (undated) DEVICE — SLV SCD LEG COMFORT KENDALLSCD MD REPROC

## (undated) DEVICE — ADAPT CLN BIOGUARD AIR/H2O DISP

## (undated) DEVICE — SENSR O2 OXIMAX FNGR A/ 18IN NONSTR

## (undated) DEVICE — ENDOPATH PNEUMONEEDLE INSUFFLATION NEEDLES WITH LUER LOCK CONNECTORS 120MM: Brand: ENDOPATH

## (undated) DEVICE — 3 RING SUTURE PASSER - 16 CM: Brand: 3 RING SUTURE PASSER - 16 CM

## (undated) DEVICE — GLV SURG SENSICARE SLT PF LF 7.5 STRL

## (undated) DEVICE — GOWN,REINFRCE,POLY,SIRUS,BREATH SLV,XXLG: Brand: MEDLINE

## (undated) DEVICE — SLV DISTRACTION STAR VELCRO XL DISP

## (undated) DEVICE — VISUALIZATION SYSTEM: Brand: CLEARIFY

## (undated) DEVICE — SNAR POLYP SENSATION STDOVL 27 240 BX40

## (undated) DEVICE — ENDOPATH XCEL WITH OPTIVIEW TECHNOLOGY UNIVERSAL TROCAR STABILITY SLEEVES: Brand: ENDOPATH XCEL OPTIVIEW

## (undated) DEVICE — MSK PROC CURAPLEX O2 2/ADAPT 7FT

## (undated) DEVICE — LAPAROSCOPIC SCISSORS: Brand: EPIX LAPAROSCOPIC SCISSORS

## (undated) DEVICE — CANN O2 ETCO2 FITS ALL CONN CO2 SMPL A/ 7IN DISP LF

## (undated) DEVICE — SUT VIC UD BR COAT 0 CP2 27IN

## (undated) DEVICE — CANN TRPL DAM 7X7MM

## (undated) DEVICE — ENDOPATH XCEL WITH OPTIVIEW TECHNOLOGY BLADELESS TROCARS WITH STABILITY SLEEVES: Brand: ENDOPATH XCEL OPTIVIEW

## (undated) DEVICE — GLV SURG SENSICARE SLT PF LF 6.5 STRL

## (undated) DEVICE — COLD THERAPY WRAP: Brand: DEROYAL

## (undated) DEVICE — COLD THERAPY BLANKET: Brand: DEROYAL

## (undated) DEVICE — GLV SURG SENSICARE SLT PF LF 7 STRL

## (undated) DEVICE — SOL IRR NACL 0.9PCT 3000ML

## (undated) DEVICE — STERILE POLYISOPRENE POWDER-FREE SURGICAL GLOVES: Brand: PROTEXIS

## (undated) DEVICE — PENCL E/S HNDSWCH ROCKR CB

## (undated) DEVICE — BLD CUT FORMLA AGGR PLS 4.0MM

## (undated) DEVICE — UNDYED BRAIDED (POLYGLACTIN 910), SYNTHETIC ABSORBABLE SUTURE: Brand: COATED VICRYL

## (undated) DEVICE — ANTIBACTERIAL UNDYED BRAIDED (POLYGLACTIN 910), SYNTHETIC ABSORBABLE SUTURE: Brand: COATED VICRYL

## (undated) DEVICE — ADHS SKIN SURG TISS VISC PREMIERPRO EXOFIN HI/VISC FAST/DRY

## (undated) DEVICE — 2, DISPOSABLE SUCTION/IRRIGATOR WITHOUT DISPOSABLE TIP: Brand: STRYKEFLOW

## (undated) DEVICE — GAUZE,SPONGE,4"X4",16PLY,STRL,LF,10/TRAY: Brand: MEDLINE

## (undated) DEVICE — BUR BRL FORMLA 12FLUT 4MM

## (undated) DEVICE — TOWEL,OR,DSP,ST,BLUE,STD,4/PK,20PK/CS: Brand: MEDLINE

## (undated) DEVICE — LN SMPL CO2 SHTRM SD STREAM W/M LUER

## (undated) DEVICE — TUBING, SUCTION, 1/4" X 10', STRAIGHT: Brand: MEDLINE

## (undated) DEVICE — BITEBLOCK OMNI BLOC

## (undated) DEVICE — DRSNG GZ PETROLTM XEROFORM CURAD 1X8IN STRL

## (undated) DEVICE — KT ORCA ORCAPOD DISP STRL

## (undated) DEVICE — FMS FLUID MANAGEMENT SYSTEM INFLOW TUBING (FMS VUE): Brand: FMS

## (undated) DEVICE — SUT ETHLN 3-0 FS118IN 663H

## (undated) DEVICE — 90-S CRUISE, SUCTION PROBE, NON-BENDABLE, MAX CUT LEVEL 1: Brand: SERFAS ENERGY

## (undated) DEVICE — SHOULDER ARTHROSCOPY-LF: Brand: MEDLINE INDUSTRIES, INC.

## (undated) DEVICE — PROXIMATE RH ROTATING HEAD SKIN STAPLERS (35 WIDE) CONTAINS 35 STAINLESS STEEL STAPLES: Brand: PROXIMATE

## (undated) DEVICE — THE SINGLE USE ETRAP – POLYP TRAP IS USED FOR SUCTION RETRIEVAL OF ENDOSCOPICALLY REMOVED POLYPS.: Brand: ETRAP

## (undated) DEVICE — FRCP BX RADJAW4 NDL 2.8 240CM LG OG BX40

## (undated) DEVICE — TISSUE RETRIEVAL SYSTEM: Brand: INZII RETRIEVAL SYSTEM

## (undated) DEVICE — PAD GRND REM POLYHESIVE A/ DISP

## (undated) DEVICE — TP NDL SCORPION

## (undated) DEVICE — [THREADED CANNULA.  DO NOT RESTERILIZE,  DO NOT USE IF PACKAGE IS DAMAGED]: Brand: DRI-LOK

## (undated) DEVICE — SUT VIC PLS CTD BR 0 TIE 18IN VIL

## (undated) DEVICE — FMS FLUID MANAGEMENT SYSTEM OUTFLOW TUBING WITHOUT ONE-WAY VALVE (FMS VUE OR FMS DUO PLUS): Brand: FMS

## (undated) DEVICE — GENERAL LAPAROSCOPY-LF: Brand: MEDLINE INDUSTRIES, INC.

## (undated) DEVICE — STERILE POLYISOPRENE POWDER-FREE SURGICAL GLOVES WITH EMOLLIENT COATING: Brand: PROTEXIS